# Patient Record
Sex: MALE | Race: WHITE | Employment: OTHER | URBAN - NONMETROPOLITAN AREA
[De-identification: names, ages, dates, MRNs, and addresses within clinical notes are randomized per-mention and may not be internally consistent; named-entity substitution may affect disease eponyms.]

---

## 2018-10-05 ENCOUNTER — OFFICE VISIT (OUTPATIENT)
Dept: SURGERY | Age: 63
End: 2018-10-05
Payer: COMMERCIAL

## 2018-10-05 VITALS
WEIGHT: 213 LBS | DIASTOLIC BLOOD PRESSURE: 84 MMHG | HEIGHT: 75 IN | TEMPERATURE: 98.3 F | SYSTOLIC BLOOD PRESSURE: 139 MMHG | BODY MASS INDEX: 26.49 KG/M2 | OXYGEN SATURATION: 98 % | HEART RATE: 100 BPM

## 2018-10-05 DIAGNOSIS — K43.9 VENTRAL HERNIA WITHOUT OBSTRUCTION OR GANGRENE: Primary | ICD-10-CM

## 2018-10-05 DIAGNOSIS — Z01.818 PRE-OP TESTING: ICD-10-CM

## 2018-10-05 PROCEDURE — 99203 OFFICE O/P NEW LOW 30 MIN: CPT | Performed by: SURGERY

## 2018-10-05 RX ORDER — CETIRIZINE HYDROCHLORIDE 10 MG/1
10 TABLET ORAL DAILY PRN
COMMUNITY

## 2018-10-08 ENCOUNTER — HOSPITAL ENCOUNTER (OUTPATIENT)
Age: 63
Discharge: HOME | End: 2018-10-08
Payer: COMMERCIAL

## 2018-10-08 ENCOUNTER — HOSPITAL ENCOUNTER (OUTPATIENT)
Age: 63
Discharge: HOME | End: 2018-10-10
Payer: COMMERCIAL

## 2018-10-08 ENCOUNTER — TELEPHONE (OUTPATIENT)
Dept: SURGERY | Age: 63
End: 2018-10-08

## 2018-10-08 ENCOUNTER — HOSPITAL ENCOUNTER (OUTPATIENT)
Dept: GENERAL RADIOLOGY | Age: 63
Discharge: HOME | End: 2018-10-10
Payer: COMMERCIAL

## 2018-10-08 DIAGNOSIS — Z01.818 PRE-OP TESTING: ICD-10-CM

## 2018-10-08 LAB
ANION GAP SERPL CALCULATED.3IONS-SCNC: 13 MMOL/L (ref 7–16)
BUN BLDV-MCNC: 12 MG/DL (ref 8–23)
CALCIUM SERPL-MCNC: 8.5 MG/DL (ref 8.6–10.2)
CHLORIDE BLD-SCNC: 99 MMOL/L (ref 98–107)
CO2: 28 MMOL/L (ref 22–29)
CREAT SERPL-MCNC: 1.2 MG/DL (ref 0.7–1.2)
EKG ATRIAL RATE: 86 BPM
EKG P AXIS: 61 DEGREES
EKG P-R INTERVAL: 156 MS
EKG Q-T INTERVAL: 402 MS
EKG QRS DURATION: 86 MS
EKG QTC CALCULATION (BAZETT): 481 MS
EKG R AXIS: -4 DEGREES
EKG T AXIS: 47 DEGREES
EKG VENTRICULAR RATE: 86 BPM
GFR AFRICAN AMERICAN: >60
GFR NON-AFRICAN AMERICAN: >60 ML/MIN/1.73
GLUCOSE BLD-MCNC: 127 MG/DL (ref 74–109)
HCT VFR BLD CALC: 42.2 % (ref 37–54)
HEMOGLOBIN: 15 G/DL (ref 12.5–16.5)
MCH RBC QN AUTO: 36.2 PG (ref 26–35)
MCHC RBC AUTO-ENTMCNC: 35.5 % (ref 32–34.5)
MCV RBC AUTO: 101.9 FL (ref 80–99.9)
PDW BLD-RTO: 13.9 FL (ref 11.5–15)
PLATELET # BLD: 318 E9/L (ref 130–450)
PMV BLD AUTO: 8.8 FL (ref 7–12)
POTASSIUM SERPL-SCNC: 3.3 MMOL/L (ref 3.5–5)
RBC # BLD: 4.14 E12/L (ref 3.8–5.8)
SODIUM BLD-SCNC: 140 MMOL/L (ref 132–146)
WBC # BLD: 7.4 E9/L (ref 4.5–11.5)

## 2018-10-08 PROCEDURE — 93010 ELECTROCARDIOGRAM REPORT: CPT | Performed by: INTERNAL MEDICINE

## 2018-10-08 PROCEDURE — 71045 X-RAY EXAM CHEST 1 VIEW: CPT

## 2018-10-08 PROCEDURE — 93005 ELECTROCARDIOGRAM TRACING: CPT | Performed by: SURGERY

## 2018-10-08 PROCEDURE — 85027 COMPLETE CBC AUTOMATED: CPT

## 2018-10-08 PROCEDURE — 80048 BASIC METABOLIC PNL TOTAL CA: CPT

## 2018-10-08 PROCEDURE — 36415 COLL VENOUS BLD VENIPUNCTURE: CPT

## 2018-10-08 NOTE — TELEPHONE ENCOUNTER
Called and left message for patient to call me back. Called and spoke to Hollis from [facility] to see if CPT code 45342 requires and authorization, per reference number [email or web address], Anh Mortensen is required. Patient is scheduled for [email or web address]. All documents are scanned into chart.  Electronically signed by Debo Sol MA on 10/8/18 at 2:27 PM

## 2018-10-12 ENCOUNTER — ANESTHESIA EVENT (OUTPATIENT)
Dept: OPERATING ROOM | Age: 63
End: 2018-10-12
Payer: COMMERCIAL

## 2018-10-15 ENCOUNTER — HOSPITAL ENCOUNTER (EMERGENCY)
Age: 63
Discharge: HOME | End: 2018-10-16
Attending: EMERGENCY MEDICINE
Payer: COMMERCIAL

## 2018-10-15 ENCOUNTER — ANESTHESIA (OUTPATIENT)
Dept: OPERATING ROOM | Age: 63
End: 2018-10-15
Payer: COMMERCIAL

## 2018-10-15 ENCOUNTER — HOSPITAL ENCOUNTER (OUTPATIENT)
Age: 63
Setting detail: OUTPATIENT SURGERY
Discharge: HOME | End: 2018-10-15
Attending: SURGERY | Admitting: SURGERY
Payer: COMMERCIAL

## 2018-10-15 VITALS
DIASTOLIC BLOOD PRESSURE: 75 MMHG | WEIGHT: 217 LBS | RESPIRATION RATE: 18 BRPM | SYSTOLIC BLOOD PRESSURE: 115 MMHG | BODY MASS INDEX: 27.85 KG/M2 | OXYGEN SATURATION: 95 % | TEMPERATURE: 97.3 F | HEIGHT: 74 IN | HEART RATE: 76 BPM

## 2018-10-15 VITALS
SYSTOLIC BLOOD PRESSURE: 158 MMHG | HEIGHT: 74 IN | BODY MASS INDEX: 27.85 KG/M2 | HEART RATE: 108 BPM | TEMPERATURE: 97.8 F | OXYGEN SATURATION: 97 % | DIASTOLIC BLOOD PRESSURE: 63 MMHG | RESPIRATION RATE: 16 BRPM | WEIGHT: 217 LBS

## 2018-10-15 VITALS
RESPIRATION RATE: 6 BRPM | TEMPERATURE: 96.8 F | OXYGEN SATURATION: 99 % | SYSTOLIC BLOOD PRESSURE: 135 MMHG | DIASTOLIC BLOOD PRESSURE: 81 MMHG

## 2018-10-15 DIAGNOSIS — G89.18 POST-OP PAIN: Primary | ICD-10-CM

## 2018-10-15 DIAGNOSIS — Z71.1 WORRIED WELL: Primary | ICD-10-CM

## 2018-10-15 PROCEDURE — 99283 EMERGENCY DEPT VISIT LOW MDM: CPT

## 2018-10-15 PROCEDURE — 2580000003 HC RX 258

## 2018-10-15 PROCEDURE — 3600000013 HC SURGERY LEVEL 3 ADDTL 15MIN: Performed by: SURGERY

## 2018-10-15 PROCEDURE — 3700000001 HC ADD 15 MINUTES (ANESTHESIA): Performed by: SURGERY

## 2018-10-15 PROCEDURE — 3700000000 HC ANESTHESIA ATTENDED CARE: Performed by: SURGERY

## 2018-10-15 PROCEDURE — 2709999900 HC NON-CHARGEABLE SUPPLY: Performed by: SURGERY

## 2018-10-15 PROCEDURE — 7100000000 HC PACU RECOVERY - FIRST 15 MIN: Performed by: SURGERY

## 2018-10-15 PROCEDURE — 6360000002 HC RX W HCPCS

## 2018-10-15 PROCEDURE — C1781 MESH (IMPLANTABLE): HCPCS | Performed by: SURGERY

## 2018-10-15 PROCEDURE — 7100000001 HC PACU RECOVERY - ADDTL 15 MIN: Performed by: SURGERY

## 2018-10-15 PROCEDURE — 7100000011 HC PHASE II RECOVERY - ADDTL 15 MIN: Performed by: SURGERY

## 2018-10-15 PROCEDURE — 7100000010 HC PHASE II RECOVERY - FIRST 15 MIN: Performed by: SURGERY

## 2018-10-15 PROCEDURE — 49568 PR IMPLANT MESH HERNIA REPAIR/DEBRIDEMENT CLOSURE: CPT | Performed by: SURGERY

## 2018-10-15 PROCEDURE — 2500000003 HC RX 250 WO HCPCS

## 2018-10-15 PROCEDURE — 6360000002 HC RX W HCPCS: Performed by: ANESTHESIOLOGY

## 2018-10-15 PROCEDURE — 49561 PR REPAIR INCISIONAL HERNIA,STRANG: CPT | Performed by: SURGERY

## 2018-10-15 PROCEDURE — 2500000003 HC RX 250 WO HCPCS: Performed by: SURGERY

## 2018-10-15 PROCEDURE — 3600000003 HC SURGERY LEVEL 3 BASE: Performed by: SURGERY

## 2018-10-15 PROCEDURE — 6370000000 HC RX 637 (ALT 250 FOR IP): Performed by: ANESTHESIOLOGY

## 2018-10-15 DEVICE — IMPLANTABLE DEVICE: Type: IMPLANTABLE DEVICE | Site: ABDOMEN | Status: FUNCTIONAL

## 2018-10-15 RX ORDER — PROMETHAZINE HYDROCHLORIDE 25 MG/ML
6.25 INJECTION, SOLUTION INTRAMUSCULAR; INTRAVENOUS PRN
Status: DISCONTINUED | OUTPATIENT
Start: 2018-10-15 | End: 2018-10-15 | Stop reason: HOSPADM

## 2018-10-15 RX ORDER — PROPOFOL 10 MG/ML
INJECTION, EMULSION INTRAVENOUS PRN
Status: DISCONTINUED | OUTPATIENT
Start: 2018-10-15 | End: 2018-10-15 | Stop reason: SDUPTHER

## 2018-10-15 RX ORDER — SODIUM CHLORIDE 9 MG/ML
INJECTION, SOLUTION INTRAVENOUS CONTINUOUS PRN
Status: DISCONTINUED | OUTPATIENT
Start: 2018-10-15 | End: 2018-10-15 | Stop reason: SDUPTHER

## 2018-10-15 RX ORDER — LIDOCAINE HYDROCHLORIDE 20 MG/ML
INJECTION, SOLUTION EPIDURAL; INFILTRATION; INTRACAUDAL; PERINEURAL PRN
Status: DISCONTINUED | OUTPATIENT
Start: 2018-10-15 | End: 2018-10-15 | Stop reason: SDUPTHER

## 2018-10-15 RX ORDER — ROCURONIUM BROMIDE 10 MG/ML
INJECTION, SOLUTION INTRAVENOUS PRN
Status: DISCONTINUED | OUTPATIENT
Start: 2018-10-15 | End: 2018-10-15 | Stop reason: SDUPTHER

## 2018-10-15 RX ORDER — OXYCODONE HYDROCHLORIDE AND ACETAMINOPHEN 5; 325 MG/1; MG/1
1 TABLET ORAL EVERY 6 HOURS PRN
Qty: 28 TABLET | Refills: 0 | Status: SHIPPED | OUTPATIENT
Start: 2018-10-15 | End: 2018-10-15

## 2018-10-15 RX ORDER — CLINDAMYCIN PHOSPHATE 900 MG/50ML
900 INJECTION INTRAVENOUS
Status: COMPLETED | OUTPATIENT
Start: 2018-10-15 | End: 2018-10-15

## 2018-10-15 RX ORDER — LABETALOL HYDROCHLORIDE 5 MG/ML
INJECTION, SOLUTION INTRAVENOUS PRN
Status: DISCONTINUED | OUTPATIENT
Start: 2018-10-15 | End: 2018-10-15 | Stop reason: SDUPTHER

## 2018-10-15 RX ORDER — MIDAZOLAM HYDROCHLORIDE 1 MG/ML
INJECTION INTRAMUSCULAR; INTRAVENOUS PRN
Status: DISCONTINUED | OUTPATIENT
Start: 2018-10-15 | End: 2018-10-15 | Stop reason: SDUPTHER

## 2018-10-15 RX ORDER — NEOSTIGMINE METHYLSULFATE 1 MG/ML
INJECTION, SOLUTION INTRAVENOUS PRN
Status: DISCONTINUED | OUTPATIENT
Start: 2018-10-15 | End: 2018-10-15 | Stop reason: SDUPTHER

## 2018-10-15 RX ORDER — DIPHENHYDRAMINE HYDROCHLORIDE 50 MG/ML
12.5 INJECTION INTRAMUSCULAR; INTRAVENOUS
Status: DISCONTINUED | OUTPATIENT
Start: 2018-10-15 | End: 2018-10-15 | Stop reason: HOSPADM

## 2018-10-15 RX ORDER — OXYCODONE HYDROCHLORIDE AND ACETAMINOPHEN 5; 325 MG/1; MG/1
1 TABLET ORAL EVERY 6 HOURS PRN
Qty: 28 TABLET | Refills: 0 | Status: SHIPPED | OUTPATIENT
Start: 2018-10-15 | End: 2018-10-22

## 2018-10-15 RX ORDER — ONDANSETRON 2 MG/ML
INJECTION INTRAMUSCULAR; INTRAVENOUS PRN
Status: DISCONTINUED | OUTPATIENT
Start: 2018-10-15 | End: 2018-10-15 | Stop reason: SDUPTHER

## 2018-10-15 RX ORDER — GLYCOPYRROLATE 0.2 MG/ML
INJECTION INTRAMUSCULAR; INTRAVENOUS PRN
Status: DISCONTINUED | OUTPATIENT
Start: 2018-10-15 | End: 2018-10-15 | Stop reason: SDUPTHER

## 2018-10-15 RX ORDER — BUPIVACAINE HYDROCHLORIDE AND EPINEPHRINE 2.5; 5 MG/ML; UG/ML
INJECTION, SOLUTION EPIDURAL; INFILTRATION; INTRACAUDAL; PERINEURAL PRN
Status: DISCONTINUED | OUTPATIENT
Start: 2018-10-15 | End: 2018-10-15 | Stop reason: HOSPADM

## 2018-10-15 RX ORDER — MEPERIDINE HYDROCHLORIDE 25 MG/ML
12.5 INJECTION INTRAMUSCULAR; INTRAVENOUS; SUBCUTANEOUS EVERY 10 MIN PRN
Status: DISCONTINUED | OUTPATIENT
Start: 2018-10-15 | End: 2018-10-15 | Stop reason: HOSPADM

## 2018-10-15 RX ORDER — FENTANYL CITRATE 50 UG/ML
INJECTION, SOLUTION INTRAMUSCULAR; INTRAVENOUS PRN
Status: DISCONTINUED | OUTPATIENT
Start: 2018-10-15 | End: 2018-10-15 | Stop reason: SDUPTHER

## 2018-10-15 RX ORDER — OXYCODONE HYDROCHLORIDE AND ACETAMINOPHEN 5; 325 MG/1; MG/1
1 TABLET ORAL PRN
Status: COMPLETED | OUTPATIENT
Start: 2018-10-15 | End: 2018-10-15

## 2018-10-15 RX ORDER — SODIUM CHLORIDE 0.9 % (FLUSH) 0.9 %
10 SYRINGE (ML) INJECTION PRN
Status: DISCONTINUED | OUTPATIENT
Start: 2018-10-15 | End: 2018-10-15 | Stop reason: HOSPADM

## 2018-10-15 RX ORDER — SODIUM CHLORIDE 0.9 % (FLUSH) 0.9 %
10 SYRINGE (ML) INJECTION EVERY 12 HOURS SCHEDULED
Status: DISCONTINUED | OUTPATIENT
Start: 2018-10-15 | End: 2018-10-15 | Stop reason: HOSPADM

## 2018-10-15 RX ORDER — FENTANYL CITRATE 50 UG/ML
25 INJECTION, SOLUTION INTRAMUSCULAR; INTRAVENOUS EVERY 5 MIN PRN
Status: DISCONTINUED | OUTPATIENT
Start: 2018-10-15 | End: 2018-10-15 | Stop reason: HOSPADM

## 2018-10-15 RX ORDER — DEXAMETHASONE SODIUM PHOSPHATE 4 MG/ML
INJECTION, SOLUTION INTRA-ARTICULAR; INTRALESIONAL; INTRAMUSCULAR; INTRAVENOUS; SOFT TISSUE PRN
Status: DISCONTINUED | OUTPATIENT
Start: 2018-10-15 | End: 2018-10-15 | Stop reason: SDUPTHER

## 2018-10-15 RX ADMIN — FENTANYL CITRATE 25 MCG: 50 INJECTION INTRAMUSCULAR; INTRAVENOUS at 11:44

## 2018-10-15 RX ADMIN — MIDAZOLAM HYDROCHLORIDE 2 MG: 1 INJECTION, SOLUTION INTRAMUSCULAR; INTRAVENOUS at 10:08

## 2018-10-15 RX ADMIN — ROCURONIUM BROMIDE 50 MG: 10 SOLUTION INTRAVENOUS at 10:13

## 2018-10-15 RX ADMIN — LIDOCAINE HYDROCHLORIDE 100 MG: 20 INJECTION, SOLUTION EPIDURAL; INFILTRATION; INTRACAUDAL; PERINEURAL at 10:13

## 2018-10-15 RX ADMIN — PROPOFOL 200 MG: 10 INJECTION, EMULSION INTRAVENOUS at 10:13

## 2018-10-15 RX ADMIN — FENTANYL CITRATE 50 MCG: 50 INJECTION, SOLUTION INTRAMUSCULAR; INTRAVENOUS at 11:26

## 2018-10-15 RX ADMIN — OXYCODONE HYDROCHLORIDE AND ACETAMINOPHEN 1 TABLET: 5; 325 TABLET ORAL at 12:33

## 2018-10-15 RX ADMIN — SODIUM CHLORIDE: 9 INJECTION, SOLUTION INTRAVENOUS at 11:27

## 2018-10-15 RX ADMIN — GLYCOPYRROLATE 0.6 MG: 0.2 INJECTION, SOLUTION INTRAMUSCULAR; INTRAVENOUS at 11:09

## 2018-10-15 RX ADMIN — FENTANYL CITRATE 25 MCG: 50 INJECTION, SOLUTION INTRAMUSCULAR; INTRAVENOUS at 11:23

## 2018-10-15 RX ADMIN — SODIUM CHLORIDE: 9 INJECTION, SOLUTION INTRAVENOUS at 10:04

## 2018-10-15 RX ADMIN — DEXAMETHASONE SODIUM PHOSPHATE 10 MG: 4 INJECTION, SOLUTION INTRAMUSCULAR; INTRAVENOUS at 10:20

## 2018-10-15 RX ADMIN — Medication 3 MG: at 11:09

## 2018-10-15 RX ADMIN — CLINDAMYCIN IN 5 PERCENT DEXTROSE 900 MG: 18 INJECTION, SOLUTION INTRAVENOUS at 10:04

## 2018-10-15 RX ADMIN — MIDAZOLAM HYDROCHLORIDE 1 MG: 1 INJECTION, SOLUTION INTRAMUSCULAR; INTRAVENOUS at 10:04

## 2018-10-15 RX ADMIN — FENTANYL CITRATE 50 MCG: 50 INJECTION, SOLUTION INTRAMUSCULAR; INTRAVENOUS at 10:13

## 2018-10-15 RX ADMIN — LABETALOL HYDROCHLORIDE 10 MG: 5 INJECTION INTRAVENOUS at 11:16

## 2018-10-15 RX ADMIN — ONDANSETRON HYDROCHLORIDE 4 MG: 2 INJECTION, SOLUTION INTRAMUSCULAR; INTRAVENOUS at 10:45

## 2018-10-15 RX ADMIN — FENTANYL CITRATE 25 MCG: 50 INJECTION INTRAMUSCULAR; INTRAVENOUS at 11:52

## 2018-10-15 ASSESSMENT — PAIN DESCRIPTION - PAIN TYPE
TYPE: SURGICAL PAIN

## 2018-10-15 ASSESSMENT — PULMONARY FUNCTION TESTS
PIF_VALUE: 23
PIF_VALUE: 24
PIF_VALUE: 30
PIF_VALUE: 1
PIF_VALUE: 0
PIF_VALUE: 23
PIF_VALUE: 30
PIF_VALUE: 27
PIF_VALUE: 30
PIF_VALUE: 26
PIF_VALUE: 0
PIF_VALUE: 25
PIF_VALUE: 25
PIF_VALUE: 28
PIF_VALUE: 0
PIF_VALUE: 28
PIF_VALUE: 4
PIF_VALUE: 20
PIF_VALUE: 24
PIF_VALUE: 0
PIF_VALUE: 26
PIF_VALUE: 25
PIF_VALUE: 29
PIF_VALUE: 24
PIF_VALUE: 24
PIF_VALUE: 0
PIF_VALUE: 24
PIF_VALUE: 31
PIF_VALUE: 2
PIF_VALUE: 25
PIF_VALUE: 23
PIF_VALUE: 13
PIF_VALUE: 0
PIF_VALUE: 26
PIF_VALUE: 29
PIF_VALUE: 3
PIF_VALUE: 0
PIF_VALUE: 24
PIF_VALUE: 24
PIF_VALUE: 27
PIF_VALUE: 0
PIF_VALUE: 2
PIF_VALUE: 23
PIF_VALUE: 24
PIF_VALUE: 24
PIF_VALUE: 28
PIF_VALUE: 26
PIF_VALUE: 18
PIF_VALUE: 24
PIF_VALUE: 24
PIF_VALUE: 23
PIF_VALUE: 30
PIF_VALUE: 23
PIF_VALUE: 24
PIF_VALUE: 24
PIF_VALUE: 28
PIF_VALUE: 28
PIF_VALUE: 24
PIF_VALUE: 1
PIF_VALUE: 1
PIF_VALUE: 23
PIF_VALUE: 5
PIF_VALUE: 26
PIF_VALUE: 3
PIF_VALUE: 23
PIF_VALUE: 23
PIF_VALUE: 19
PIF_VALUE: 27
PIF_VALUE: 26
PIF_VALUE: 23
PIF_VALUE: 1

## 2018-10-15 ASSESSMENT — PAIN SCALES - GENERAL
PAINLEVEL_OUTOF10: 4
PAINLEVEL_OUTOF10: 5
PAINLEVEL_OUTOF10: 7
PAINLEVEL_OUTOF10: 5
PAINLEVEL_OUTOF10: 5

## 2018-10-15 ASSESSMENT — PAIN DESCRIPTION - ONSET
ONSET: GRADUAL

## 2018-10-15 ASSESSMENT — PAIN DESCRIPTION - LOCATION
LOCATION: ABDOMEN

## 2018-10-15 ASSESSMENT — PAIN DESCRIPTION - DESCRIPTORS
DESCRIPTORS: BURNING;DISCOMFORT
DESCRIPTORS: DISCOMFORT
DESCRIPTORS: DISCOMFORT
DESCRIPTORS: BURNING;DISCOMFORT
DESCRIPTORS: DISCOMFORT
DESCRIPTORS: BURNING;DISCOMFORT

## 2018-10-15 ASSESSMENT — PAIN DESCRIPTION - FREQUENCY
FREQUENCY: CONTINUOUS

## 2018-10-15 ASSESSMENT — PAIN - FUNCTIONAL ASSESSMENT: PAIN_FUNCTIONAL_ASSESSMENT: 0-10

## 2018-10-15 ASSESSMENT — PAIN DESCRIPTION - ORIENTATION
ORIENTATION: ANTERIOR

## 2018-10-15 ASSESSMENT — LIFESTYLE VARIABLES: SMOKING_STATUS: 0

## 2018-10-15 ASSESSMENT — PAIN DESCRIPTION - PROGRESSION: CLINICAL_PROGRESSION: GRADUALLY IMPROVING

## 2018-10-15 NOTE — H&P
morning (before breakfast) 7/1/15     Eric Ortega MD            Allergies   Allergen Reactions    Pcn [Penicillins]           Social History   Social History            Social History    Marital status:        Spouse name: N/A    Number of children: N/A    Years of education: N/A             Social History Main Topics    Smoking status: Former Smoker       Packs/day: 1.00       Start date: 1/1/1985       Quit date: 1/1/2005    Smokeless tobacco: Never Used    Alcohol use Yes         Comment: occ    Drug use: No    Sexual activity: Not Asked           Other Topics Concern    None          Social History Narrative    None            Family History         Family History   Problem Relation Age of Onset    Cancer Mother      Cancer Father              Review of Systems   All other systems reviewed and are negative.               Objective:  Vitals       Vitals:     10/05/18 1040   BP: 139/84   Site: Right Upper Arm   Position: Sitting   Cuff Size: Medium Adult   Pulse: 100   Temp: 98.3 °F (36.8 °C)   TempSrc: Oral   SpO2: 98%   Weight: 213 lb (96.6 kg)   Height: 6' 3\" (1.905 m)            Physical Exam   Constitutional: He is oriented to person, place, and time and well-developed, well-nourished, and in no distress. HENT:   Head: Normocephalic and atraumatic. Eyes: Right eye exhibits no discharge. Left eye exhibits no discharge. Neck: No tracheal deviation present. Cardiovascular: Normal rate. Pulmonary/Chest: Effort normal. No respiratory distress. Abdominal: Soft. He exhibits no distension. There is no rebound and no guarding. There is a tender approximately 3 cm soft tissue mass in the midline several cm above the umbilicus. No hernia defect appreciated. Non reducible. Neurological: He is alert and oriented to person, place, and time. Skin: Skin is warm and dry.    Psychiatric: Affect normal.                               Audelia Lowe MD  10/5/2018     NOTE: This report, in part or full, may have been transcribed using voice recognition software. Every effort was made to ensure accuracy; however, inadvertent computerized transcription errors may be present.  Please excuse any transcriptional grammatical or spelling errors that may have escaped my editorial review.

## 2018-10-15 NOTE — OP NOTE
with cautery. The protruding fat was amputated and the remainder was returned to the abdomen. The peritoneum was entered, and closed with 3-0 vicryl suture. Preproperitoneal space was cleared off circumferentially. Above ePTFE coated mesh was placed in the preperitoneal space and  Secured with 2-0 prolene. The fascia was closed over the mesh with 2-0 PDS in interrupted, vest over pants fashion. The abdomen was reinsufflated and the abdominal wall inspected. There was no bleeding noted. No mesh was exposed. The abdomen was desufflated. The midline dermis was closed with  3-0 vicryl. Skin was closed at all sites with 4-0 monocryl and skin glue. Counts were correct at the end of the case. Tolerated procedure well    I was present and scrubbed for the procedure.     Sana Lopez MD  10/15/18  11:20 AM

## 2018-10-15 NOTE — PROGRESS NOTES
1218 admitted to pacu stage 2  2 lap sites to abdomen well approx with surgical glue intact  , family member at bedside   [id number] [id number] medicated for discomfort

## 2018-10-16 ASSESSMENT — ENCOUNTER SYMPTOMS
EYE PAIN: 0
BACK PAIN: 0
WHEEZING: 0
EYE DISCHARGE: 0
VOMITING: 0
ABDOMINAL PAIN: 0
SORE THROAT: 0
SINUS PRESSURE: 0
NAUSEA: 0
EYE REDNESS: 0
COUGH: 0
DIARRHEA: 0
SHORTNESS OF BREATH: 0

## 2018-10-30 ENCOUNTER — OFFICE VISIT (OUTPATIENT)
Dept: SURGERY | Age: 63
End: 2018-10-30

## 2018-10-30 VITALS
TEMPERATURE: 98.3 F | DIASTOLIC BLOOD PRESSURE: 89 MMHG | HEART RATE: 120 BPM | WEIGHT: 215 LBS | HEIGHT: 74 IN | SYSTOLIC BLOOD PRESSURE: 134 MMHG | BODY MASS INDEX: 27.59 KG/M2

## 2018-10-30 DIAGNOSIS — Z98.890 S/P REPAIR OF VENTRAL HERNIA: Primary | ICD-10-CM

## 2018-10-30 DIAGNOSIS — Z87.19 S/P REPAIR OF VENTRAL HERNIA: Primary | ICD-10-CM

## 2018-10-30 PROCEDURE — 99024 POSTOP FOLLOW-UP VISIT: CPT | Performed by: SURGERY

## 2018-10-30 NOTE — PROGRESS NOTES
General Surgery Clinic Note      Assessment/Plan:     Diagnosis Orders   1. S/P repair of ventral hernia      doing well. no issues. continue restrictions       Return in about 4 weeks (around 11/27/2018). Chief Complaint   Patient presents with    Post-Op Check     Hernia repair       HPI: Asiya Obregon is a 61 y.o. male here for postop follow-up. He is doing well following his ventral hernia repair. He's had no issues. Has not any pain. His preoperative discomfort has resolved. His bowels are moving normally. He has not noticed any recurrent bulge or issues. Review of Systems          Objective:  Vitals:    10/30/18 1523   BP: 134/89   Pulse: 120   Temp: 98.3 °F (36.8 °C)   TempSrc: Oral   Weight: 215 lb (97.5 kg)   Height: 6' 2\" (1.88 m)          Physical Exam   Constitutional: No distress. Pulmonary/Chest: Effort normal. No respiratory distress. Abdominal: Soft. He exhibits no distension. There is no tenderness. There is no rebound and no guarding. Incisions clean, dry and intact. There is no evidence of hernia recurrence. Skin: He is not diaphoretic. Ana Carmona MD  10/30/2018    NOTE: This report, in part or full, may have been transcribed using voice recognition software. Every effort was made to ensure accuracy; however, inadvertent computerized transcription errors may be present. Please excuseany transcriptional grammatical or spelling errors that may have escaped my editorial review.

## 2018-11-27 ENCOUNTER — OFFICE VISIT (OUTPATIENT)
Dept: SURGERY | Age: 63
End: 2018-11-27

## 2018-11-27 VITALS
DIASTOLIC BLOOD PRESSURE: 91 MMHG | HEART RATE: 96 BPM | OXYGEN SATURATION: 98 % | TEMPERATURE: 98.6 F | BODY MASS INDEX: 27.59 KG/M2 | WEIGHT: 215 LBS | HEIGHT: 74 IN | SYSTOLIC BLOOD PRESSURE: 147 MMHG

## 2018-11-27 DIAGNOSIS — Z87.19 S/P REPAIR OF VENTRAL HERNIA: Primary | ICD-10-CM

## 2018-11-27 DIAGNOSIS — Z98.890 S/P REPAIR OF VENTRAL HERNIA: Primary | ICD-10-CM

## 2018-11-27 PROCEDURE — 99024 POSTOP FOLLOW-UP VISIT: CPT | Performed by: SURGERY

## 2018-11-30 NOTE — PROGRESS NOTES
General Surgery Clinic Note      Assessment/Plan:     Diagnosis Orders   1. S/P repair of ventral hernia      doing well. no issues. Return if symptoms worsen or fail to improve. Chief Complaint   Patient presents with    Post-Op Check     post-op check from hernia repair       HPI: Asiya Obregon is a 61 y.o. male here for follow-up of ventral hernia repair. He is doing well. Has no pain present. No issues. Denies any bulging. He has been adhering to his restrictions. Review of Systems          Objective:  Vitals:    11/27/18 1325   BP: (!) 147/91   Site: Right Upper Arm   Position: Sitting   Cuff Size: Medium Adult   Pulse: 96   Temp: 98.6 °F (37 °C)   TempSrc: Oral   SpO2: 98%   Weight: 215 lb (97.5 kg)   Height: 6' 2\" (1.88 m)          Physical Exam   Constitutional: No distress. Pulmonary/Chest: Effort normal. No respiratory distress. Abdominal: Soft. He exhibits no distension. There is no tenderness. There is no rebound and no guarding. Incisions clean, dry and intact. There is no evidence of hernia recurrence. Skin: He is not diaphoretic. Ana Carmona MD  11/30/2018    NOTE: This report, in part or full, may have been transcribed using voice recognition software. Every effort was made to ensure accuracy; however, inadvertent computerized transcription errors may be present. Please excuseany transcriptional grammatical or spelling errors that may have escaped my editorial review.

## 2019-03-01 NOTE — ED PROVIDER NOTES
ATTENDING PROVIDER ATTESTATION:     I have personally performed and/or participated in the history, exam, medical decision making, and procedures and agree with all pertinent clinical information unless otherwise noted. I have also reviewed and agree with the past medical, family and social history unless otherwise noted. My findings/plan:Workup is negative, pt is feeling better, pt to dc home and f/u with pcp, given strict return precautions for any new or worsening symptoms    Impression:   1.  Worried well            Rachel Medel MD  03/01/19 2287
(36.6 °C) (Oral)   Resp 16   Ht 6' 2\" (1.88 m)   Wt 217 lb (98.4 kg)   SpO2 97%   BMI 27.86 kg/m²   Oxygen Saturation Interpretation: Normal      ------------------------------------------ PROGRESS NOTES ------------------------------------------  Patient seen and examined. There is no tenderness in the suprapubic region and does not appear to feel full. Patient was bladder scanned and there was no evidence of urine the patient's bladder at this point it is felt safe for discharge, follow up with his primary care physician and surgeon as recommended. I have spoken with the patient and discussed todays results, in addition to providing specific details for the plan of care and counseling regarding the diagnosis and prognosis. Their questions are answered at this time and they are agreeable with the plan. I discussed at length with them reasons for immediate return here for re evaluation. They will followup with their primary care physician by calling their office tomorrow. --------------------------------- ADDITIONAL PROVIDER NOTES ---------------------------------  At this time the patient is without objective evidence of an acute process requiring hospitalization or inpatient management. They have remained hemodynamically stable throughout their entire ED visit and are stable for discharge with outpatient follow-up. The plan has been discussed in detail and they are aware of the specific conditions for emergent return, as well as the importance of follow-up. New Prescriptions    No medications on file       Diagnosis:  1. Worried well        Disposition:  Patient's disposition: Discharge to home  Patient's condition is stable.          Nica Harris DO  Resident  10/16/18 0001

## 2021-01-01 ENCOUNTER — IMMUNIZATION (OUTPATIENT)
Dept: PRIMARY CARE CLINIC | Age: 66
End: 2021-01-01
Payer: MEDICARE

## 2021-01-01 PROCEDURE — 91300 COVID-19, PFIZER VACCINE 30MCG/0.3ML DOSE: CPT | Performed by: PHYSICIAN ASSISTANT

## 2021-01-01 PROCEDURE — 0002A COVID-19, PFIZER VACCINE 30MCG/0.3ML DOSE: CPT | Performed by: PHYSICIAN ASSISTANT

## 2021-02-27 ENCOUNTER — IMMUNIZATION (OUTPATIENT)
Dept: PRIMARY CARE CLINIC | Age: 66
End: 2021-02-27
Payer: MEDICARE

## 2021-02-27 PROCEDURE — 0001A COVID-19, PFIZER VACCINE 30MCG/0.3ML DOSE: CPT | Performed by: EMERGENCY MEDICINE

## 2021-02-27 PROCEDURE — 91300 COVID-19, PFIZER VACCINE 30MCG/0.3ML DOSE: CPT | Performed by: EMERGENCY MEDICINE

## 2022-01-01 ENCOUNTER — APPOINTMENT (OUTPATIENT)
Dept: GENERAL RADIOLOGY | Age: 67
DRG: 207 | End: 2022-01-01
Payer: MEDICARE

## 2022-01-01 ENCOUNTER — APPOINTMENT (OUTPATIENT)
Dept: MRI IMAGING | Age: 67
DRG: 207 | End: 2022-01-01
Payer: MEDICARE

## 2022-01-01 ENCOUNTER — ANESTHESIA EVENT (OUTPATIENT)
Dept: ENDOSCOPY | Age: 67
DRG: 207 | End: 2022-01-01
Payer: MEDICARE

## 2022-01-01 ENCOUNTER — HOSPITAL ENCOUNTER (INPATIENT)
Age: 67
LOS: 17 days | DRG: 207 | End: 2022-03-19
Attending: EMERGENCY MEDICINE | Admitting: GENERAL PRACTICE
Payer: MEDICARE

## 2022-01-01 ENCOUNTER — ANESTHESIA EVENT (OUTPATIENT)
Dept: ENDOSCOPY | Age: 67
DRG: 684 | End: 2022-01-01
Payer: MEDICARE

## 2022-01-01 ENCOUNTER — APPOINTMENT (OUTPATIENT)
Dept: CT IMAGING | Age: 67
DRG: 684 | End: 2022-01-01
Payer: MEDICARE

## 2022-01-01 ENCOUNTER — APPOINTMENT (OUTPATIENT)
Dept: CT IMAGING | Age: 67
DRG: 207 | End: 2022-01-01
Payer: MEDICARE

## 2022-01-01 ENCOUNTER — ANESTHESIA (OUTPATIENT)
Dept: ENDOSCOPY | Age: 67
DRG: 684 | End: 2022-01-01
Payer: MEDICARE

## 2022-01-01 ENCOUNTER — APPOINTMENT (OUTPATIENT)
Dept: ULTRASOUND IMAGING | Age: 67
DRG: 207 | End: 2022-01-01
Payer: MEDICARE

## 2022-01-01 ENCOUNTER — HOSPITAL ENCOUNTER (INPATIENT)
Age: 67
LOS: 5 days | Discharge: NURSING, REHAB OR LONG-TERM CARE | DRG: 684 | End: 2022-02-23
Attending: EMERGENCY MEDICINE | Admitting: GENERAL PRACTICE
Payer: MEDICARE

## 2022-01-01 ENCOUNTER — ANESTHESIA (OUTPATIENT)
Dept: ENDOSCOPY | Age: 67
DRG: 207 | End: 2022-01-01
Payer: MEDICARE

## 2022-01-01 VITALS
TEMPERATURE: 97.7 F | OXYGEN SATURATION: 87 % | HEART RATE: 90 BPM | RESPIRATION RATE: 22 BRPM | WEIGHT: 183.8 LBS | HEIGHT: 74 IN | BODY MASS INDEX: 23.59 KG/M2 | DIASTOLIC BLOOD PRESSURE: 88 MMHG | SYSTOLIC BLOOD PRESSURE: 206 MMHG

## 2022-01-01 VITALS — DIASTOLIC BLOOD PRESSURE: 59 MMHG | OXYGEN SATURATION: 94 % | SYSTOLIC BLOOD PRESSURE: 137 MMHG

## 2022-01-01 VITALS
OXYGEN SATURATION: 99 % | DIASTOLIC BLOOD PRESSURE: 70 MMHG | RESPIRATION RATE: 19 BRPM | SYSTOLIC BLOOD PRESSURE: 104 MMHG

## 2022-01-01 VITALS
HEART RATE: 86 BPM | HEIGHT: 74 IN | DIASTOLIC BLOOD PRESSURE: 80 MMHG | RESPIRATION RATE: 18 BRPM | WEIGHT: 186.8 LBS | BODY MASS INDEX: 23.97 KG/M2 | SYSTOLIC BLOOD PRESSURE: 130 MMHG | OXYGEN SATURATION: 100 % | TEMPERATURE: 98.2 F

## 2022-01-01 DIAGNOSIS — R11.0 NAUSEA: ICD-10-CM

## 2022-01-01 DIAGNOSIS — N17.9 ACUTE KIDNEY INJURY (HCC): ICD-10-CM

## 2022-01-01 DIAGNOSIS — R63.8 ALTERATION IN APPETITE: ICD-10-CM

## 2022-01-01 DIAGNOSIS — R41.82 ALTERED MENTAL STATUS, UNSPECIFIED ALTERED MENTAL STATUS TYPE: Primary | ICD-10-CM

## 2022-01-01 DIAGNOSIS — R10.84 GENERALIZED ABDOMINAL PAIN: Primary | ICD-10-CM

## 2022-01-01 LAB
ABO/RH: NORMAL
ABSOLUTE CD 3: 1022 CELLS/UL (ref 660–2200)
ABSOLUTE CD 4 HELPER: 927 CELLS/UL (ref 490–1600)
ABSOLUTE CD 8 (SUPP): 95 CELLS/UL (ref 150–1050)
ACANTHOCYTES: ABNORMAL
ACETAMINOPHEN LEVEL: <5 MCG/ML (ref 10–30)
AFP-TUMOR MARKER: 2 NG/ML (ref 0–9)
ALBUMIN SERPL-MCNC: 2.3 G/DL (ref 3.5–5.2)
ALBUMIN SERPL-MCNC: 2.4 G/DL (ref 3.5–5.2)
ALBUMIN SERPL-MCNC: 2.5 G/DL (ref 3.5–5.2)
ALBUMIN SERPL-MCNC: 2.6 G/DL (ref 3.5–5.2)
ALBUMIN SERPL-MCNC: 2.8 G/DL (ref 3.5–4.7)
ALBUMIN SERPL-MCNC: 2.8 G/DL (ref 3.5–5.2)
ALBUMIN SERPL-MCNC: 2.8 G/DL (ref 3.5–5.2)
ALBUMIN SERPL-MCNC: 3.1 G/DL (ref 3.5–5.2)
ALBUMIN SERPL-MCNC: 3.2 G/DL (ref 3.5–5.2)
ALBUMIN SERPL-MCNC: 3.3 G/DL (ref 3.5–5.2)
ALBUMIN SERPL-MCNC: 3.3 G/DL (ref 3.5–5.2)
ALBUMIN SERPL-MCNC: 3.5 G/DL (ref 3.5–5.2)
ALBUMIN SERPL-MCNC: 3.5 G/DL (ref 3.5–5.2)
ALBUMIN SERPL-MCNC: 3.7 G/DL (ref 3.5–5.2)
ALBUMIN SERPL-MCNC: 3.8 G/DL (ref 3.5–5.2)
ALP BLD-CCNC: 102 U/L (ref 40–129)
ALP BLD-CCNC: 102 U/L (ref 40–129)
ALP BLD-CCNC: 105 U/L (ref 40–129)
ALP BLD-CCNC: 108 U/L (ref 40–129)
ALP BLD-CCNC: 114 U/L (ref 40–129)
ALP BLD-CCNC: 122 U/L (ref 40–129)
ALP BLD-CCNC: 124 U/L (ref 40–129)
ALP BLD-CCNC: 126 U/L (ref 40–129)
ALP BLD-CCNC: 142 U/L (ref 40–129)
ALP BLD-CCNC: 79 U/L (ref 40–129)
ALP BLD-CCNC: 85 U/L (ref 40–129)
ALP BLD-CCNC: 89 U/L (ref 40–129)
ALP BLD-CCNC: 91 U/L (ref 40–129)
ALP BLD-CCNC: 95 U/L (ref 40–129)
ALP BLD-CCNC: 96 U/L (ref 40–129)
ALP BLD-CCNC: 97 U/L (ref 40–129)
ALP BLD-CCNC: 98 U/L (ref 40–129)
ALPHA-1-GLOBULIN: 0.2 G/DL (ref 0.2–0.4)
ALPHA-2-GLOBULIN: 0.6 G/DL (ref 0.5–1)
ALT SERPL-CCNC: 15 U/L (ref 0–40)
ALT SERPL-CCNC: 15 U/L (ref 0–40)
ALT SERPL-CCNC: 18 U/L (ref 0–40)
ALT SERPL-CCNC: 20 U/L (ref 0–40)
ALT SERPL-CCNC: 21 U/L (ref 0–40)
ALT SERPL-CCNC: 22 U/L (ref 0–40)
ALT SERPL-CCNC: 23 U/L (ref 0–40)
ALT SERPL-CCNC: 24 U/L (ref 0–40)
ALT SERPL-CCNC: 26 U/L (ref 0–40)
ALT SERPL-CCNC: 30 U/L (ref 0–40)
ALT SERPL-CCNC: 31 U/L (ref 0–40)
AMMONIA: 16.4 UMOL/L (ref 16–60)
AMMONIA: 18.9 UMOL/L (ref 16–60)
AMMONIA: 20.3 UMOL/L (ref 16–60)
AMMONIA: 25.5 UMOL/L (ref 16–60)
AMPHETAMINE SCREEN, URINE: NOT DETECTED
ANAEROBIC CULTURE: NORMAL
ANION GAP SERPL CALCULATED.3IONS-SCNC: 10 MMOL/L (ref 7–16)
ANION GAP SERPL CALCULATED.3IONS-SCNC: 11 MMOL/L (ref 7–16)
ANION GAP SERPL CALCULATED.3IONS-SCNC: 11 MMOL/L (ref 7–16)
ANION GAP SERPL CALCULATED.3IONS-SCNC: 12 MMOL/L (ref 7–16)
ANION GAP SERPL CALCULATED.3IONS-SCNC: 12 MMOL/L (ref 7–16)
ANION GAP SERPL CALCULATED.3IONS-SCNC: 13 MMOL/L (ref 7–16)
ANION GAP SERPL CALCULATED.3IONS-SCNC: 14 MMOL/L (ref 7–16)
ANION GAP SERPL CALCULATED.3IONS-SCNC: 15 MMOL/L (ref 7–16)
ANION GAP SERPL CALCULATED.3IONS-SCNC: 16 MMOL/L (ref 7–16)
ANION GAP SERPL CALCULATED.3IONS-SCNC: 17 MMOL/L (ref 7–16)
ANION GAP SERPL CALCULATED.3IONS-SCNC: 17 MMOL/L (ref 7–16)
ANION GAP SERPL CALCULATED.3IONS-SCNC: 18 MMOL/L (ref 7–16)
ANION GAP SERPL CALCULATED.3IONS-SCNC: 19 MMOL/L (ref 7–16)
ANION GAP SERPL CALCULATED.3IONS-SCNC: 21 MMOL/L (ref 7–16)
ANION GAP SERPL CALCULATED.3IONS-SCNC: 22 MMOL/L (ref 7–16)
ANION GAP SERPL CALCULATED.3IONS-SCNC: 22 MMOL/L (ref 7–16)
ANION GAP SERPL CALCULATED.3IONS-SCNC: 26 MMOL/L (ref 7–16)
ANION GAP SERPL CALCULATED.3IONS-SCNC: 28 MMOL/L (ref 7–16)
ANION GAP SERPL CALCULATED.3IONS-SCNC: 8 MMOL/L (ref 7–16)
ANION GAP SERPL CALCULATED.3IONS-SCNC: 8 MMOL/L (ref 7–16)
ANION GAP SERPL CALCULATED.3IONS-SCNC: 9 MMOL/L (ref 7–16)
ANION GAP SERPL CALCULATED.3IONS-SCNC: 9 MMOL/L (ref 7–16)
ANISOCYTOSIS: ABNORMAL
ANTI-MITOCHONDRIAL AB, IFA: NEGATIVE
ANTI-NUCLEAR ANTIBODY (ANA): NEGATIVE
ANTIBODY SCREEN: NORMAL
APPEARANCE CSF: CLEAR
APPEARANCE CSF: CLEAR
APTT: 24.3 SEC (ref 24.5–35.1)
APTT: 26.6 SEC (ref 24.5–35.1)
AST SERPL-CCNC: 28 U/L (ref 0–39)
AST SERPL-CCNC: 29 U/L (ref 0–39)
AST SERPL-CCNC: 30 U/L (ref 0–39)
AST SERPL-CCNC: 32 U/L (ref 0–39)
AST SERPL-CCNC: 33 U/L (ref 0–39)
AST SERPL-CCNC: 33 U/L (ref 0–39)
AST SERPL-CCNC: 34 U/L (ref 0–39)
AST SERPL-CCNC: 36 U/L (ref 0–39)
AST SERPL-CCNC: 41 U/L (ref 0–39)
AST SERPL-CCNC: 42 U/L (ref 0–39)
AST SERPL-CCNC: 43 U/L (ref 0–39)
AST SERPL-CCNC: 44 U/L (ref 0–39)
AST SERPL-CCNC: 49 U/L (ref 0–39)
ATYPICAL LYMPHOCYTE RELATIVE PERCENT: 0.8 % (ref 0–4)
ATYPICAL LYMPHOCYTE RELATIVE PERCENT: 0.9 % (ref 0–4)
B.E.: -6.1 MMOL/L (ref -3–3)
B.E.: -7 MMOL/L (ref -3–3)
B.E.: -8.2 MMOL/L (ref -3–3)
B.E.: 2 MMOL/L (ref -3–3)
B.E.: 2.1 MMOL/L (ref -3–3)
B.E.: 3.7 MMOL/L (ref -3–3)
B.E.: 4.1 MMOL/L (ref -3–3)
B.E.: 4.7 MMOL/L (ref -3–3)
B.E.: 5 MMOL/L (ref -3–3)
B.E.: 5.9 MMOL/L (ref -3–3)
B.E.: 8.7 MMOL/L (ref -3–3)
BACTERIA: ABNORMAL /HPF
BACTERIA: ABNORMAL /HPF
BARBITURATE SCREEN URINE: NOT DETECTED
BASOPHILIC STIPPLING: ABNORMAL
BASOPHILS ABSOLUTE: 0 E9/L (ref 0–0.2)
BASOPHILS ABSOLUTE: 0.01 E9/L (ref 0–0.2)
BASOPHILS ABSOLUTE: 0.02 E9/L (ref 0–0.2)
BASOPHILS ABSOLUTE: 0.02 E9/L (ref 0–0.2)
BASOPHILS ABSOLUTE: 0.03 E9/L (ref 0–0.2)
BASOPHILS ABSOLUTE: 0.04 E9/L (ref 0–0.2)
BASOPHILS ABSOLUTE: 0.12 E9/L (ref 0–0.2)
BASOPHILS ABSOLUTE: 0.12 E9/L (ref 0–0.2)
BASOPHILS ABSOLUTE: 0.13 E9/L (ref 0–0.2)
BASOPHILS RELATIVE PERCENT: 0 % (ref 0–2)
BASOPHILS RELATIVE PERCENT: 0.1 % (ref 0–2)
BASOPHILS RELATIVE PERCENT: 0.1 % (ref 0–2)
BASOPHILS RELATIVE PERCENT: 0.2 % (ref 0–2)
BASOPHILS RELATIVE PERCENT: 0.3 % (ref 0–2)
BASOPHILS RELATIVE PERCENT: 0.4 % (ref 0–2)
BASOPHILS RELATIVE PERCENT: 0.9 % (ref 0–2)
BENZODIAZEPINE SCREEN, URINE: NOT DETECTED
BETA GLOBULIN: 0.8 G/DL (ref 0.8–1.3)
BILIRUB SERPL-MCNC: 0.5 MG/DL (ref 0–1.2)
BILIRUB SERPL-MCNC: 0.6 MG/DL (ref 0–1.2)
BILIRUB SERPL-MCNC: 0.9 MG/DL (ref 0–1.2)
BILIRUB SERPL-MCNC: 0.9 MG/DL (ref 0–1.2)
BILIRUB SERPL-MCNC: 1 MG/DL (ref 0–1.2)
BILIRUB SERPL-MCNC: 1.1 MG/DL (ref 0–1.2)
BILIRUB SERPL-MCNC: 1.1 MG/DL (ref 0–1.2)
BILIRUB SERPL-MCNC: 1.2 MG/DL (ref 0–1.2)
BILIRUB SERPL-MCNC: 1.3 MG/DL (ref 0–1.2)
BILIRUB SERPL-MCNC: 1.4 MG/DL (ref 0–1.2)
BILIRUB SERPL-MCNC: 1.5 MG/DL (ref 0–1.2)
BILIRUBIN URINE: ABNORMAL
BILIRUBIN URINE: NEGATIVE
BLOOD BANK DISPENSE STATUS: NORMAL
BLOOD BANK DISPENSE STATUS: NORMAL
BLOOD BANK PRODUCT CODE: NORMAL
BLOOD BANK PRODUCT CODE: NORMAL
BLOOD CULTURE, ROUTINE: NORMAL
BLOOD, URINE: NEGATIVE
BLOOD, URINE: NEGATIVE
BPU ID: NORMAL
BPU ID: NORMAL
BUN BLDV-MCNC: 12 MG/DL (ref 6–23)
BUN BLDV-MCNC: 14 MG/DL (ref 6–23)
BUN BLDV-MCNC: 15 MG/DL (ref 6–23)
BUN BLDV-MCNC: 15 MG/DL (ref 6–23)
BUN BLDV-MCNC: 17 MG/DL (ref 6–23)
BUN BLDV-MCNC: 17 MG/DL (ref 6–23)
BUN BLDV-MCNC: 18 MG/DL (ref 6–23)
BUN BLDV-MCNC: 18 MG/DL (ref 6–23)
BUN BLDV-MCNC: 19 MG/DL (ref 6–23)
BUN BLDV-MCNC: 20 MG/DL (ref 6–23)
BUN BLDV-MCNC: 22 MG/DL (ref 6–23)
BUN BLDV-MCNC: 24 MG/DL (ref 6–23)
BUN BLDV-MCNC: 25 MG/DL (ref 6–23)
BUN BLDV-MCNC: 25 MG/DL (ref 6–23)
BUN BLDV-MCNC: 26 MG/DL (ref 6–23)
BUN BLDV-MCNC: 26 MG/DL (ref 6–23)
BUN BLDV-MCNC: 27 MG/DL (ref 6–23)
BUN BLDV-MCNC: 27 MG/DL (ref 6–23)
BUN BLDV-MCNC: 28 MG/DL (ref 6–23)
BUN BLDV-MCNC: 29 MG/DL (ref 6–23)
BUN BLDV-MCNC: 30 MG/DL (ref 6–23)
BUN BLDV-MCNC: 33 MG/DL (ref 6–23)
BUN BLDV-MCNC: 34 MG/DL (ref 6–23)
BUN BLDV-MCNC: 35 MG/DL (ref 6–23)
BUN BLDV-MCNC: 37 MG/DL (ref 6–23)
BUN BLDV-MCNC: 38 MG/DL (ref 6–23)
BUN BLDV-MCNC: 45 MG/DL (ref 6–23)
BUN BLDV-MCNC: 45 MG/DL (ref 6–23)
BUN BLDV-MCNC: 46 MG/DL (ref 6–23)
C-REACTIVE PROTEIN: 15.1 MG/DL (ref 0–0.4)
C-REACTIVE PROTEIN: 29.8 MG/DL (ref 0–0.4)
CALCIUM SERPL-MCNC: 6.5 MG/DL (ref 8.6–10.2)
CALCIUM SERPL-MCNC: 6.6 MG/DL (ref 8.6–10.2)
CALCIUM SERPL-MCNC: 6.6 MG/DL (ref 8.6–10.2)
CALCIUM SERPL-MCNC: 7 MG/DL (ref 8.6–10.2)
CALCIUM SERPL-MCNC: 7.2 MG/DL (ref 8.6–10.2)
CALCIUM SERPL-MCNC: 7.2 MG/DL (ref 8.6–10.2)
CALCIUM SERPL-MCNC: 7.5 MG/DL (ref 8.6–10.2)
CALCIUM SERPL-MCNC: 7.6 MG/DL (ref 8.6–10.2)
CALCIUM SERPL-MCNC: 7.7 MG/DL (ref 8.6–10.2)
CALCIUM SERPL-MCNC: 7.8 MG/DL (ref 8.6–10.2)
CALCIUM SERPL-MCNC: 7.8 MG/DL (ref 8.6–10.2)
CALCIUM SERPL-MCNC: 8.4 MG/DL (ref 8.6–10.2)
CALCIUM SERPL-MCNC: 8.5 MG/DL (ref 8.6–10.2)
CALCIUM SERPL-MCNC: 8.6 MG/DL (ref 8.6–10.2)
CALCIUM SERPL-MCNC: 8.7 MG/DL (ref 8.6–10.2)
CALCIUM SERPL-MCNC: 8.8 MG/DL (ref 8.6–10.2)
CALCIUM SERPL-MCNC: 8.9 MG/DL (ref 8.6–10.2)
CALCIUM SERPL-MCNC: 8.9 MG/DL (ref 8.6–10.2)
CALCIUM SERPL-MCNC: 9.1 MG/DL (ref 8.6–10.2)
CALCIUM SERPL-MCNC: 9.2 MG/DL (ref 8.6–10.2)
CALCIUM SERPL-MCNC: 9.3 MG/DL (ref 8.6–10.2)
CALCIUM SERPL-MCNC: 9.3 MG/DL (ref 8.6–10.2)
CALCIUM SERPL-MCNC: 9.5 MG/DL (ref 8.6–10.2)
CALCIUM SERPL-MCNC: 9.5 MG/DL (ref 8.6–10.2)
CANNABINOID SCREEN URINE: POSITIVE
CD4/CD8 RATIO: 9.76 RATIO (ref 0.8–6.17)
CHLORIDE BLD-SCNC: 101 MMOL/L (ref 98–107)
CHLORIDE BLD-SCNC: 102 MMOL/L (ref 98–107)
CHLORIDE BLD-SCNC: 102 MMOL/L (ref 98–107)
CHLORIDE BLD-SCNC: 103 MMOL/L (ref 98–107)
CHLORIDE BLD-SCNC: 103 MMOL/L (ref 98–107)
CHLORIDE BLD-SCNC: 104 MMOL/L (ref 98–107)
CHLORIDE BLD-SCNC: 105 MMOL/L (ref 98–107)
CHLORIDE BLD-SCNC: 105 MMOL/L (ref 98–107)
CHLORIDE BLD-SCNC: 106 MMOL/L (ref 98–107)
CHLORIDE BLD-SCNC: 107 MMOL/L (ref 98–107)
CHLORIDE BLD-SCNC: 108 MMOL/L (ref 98–107)
CHLORIDE BLD-SCNC: 109 MMOL/L (ref 98–107)
CHLORIDE BLD-SCNC: 110 MMOL/L (ref 98–107)
CHLORIDE BLD-SCNC: 112 MMOL/L (ref 98–107)
CHLORIDE BLD-SCNC: 95 MMOL/L (ref 98–107)
CHLORIDE BLD-SCNC: 95 MMOL/L (ref 98–107)
CHLORIDE BLD-SCNC: 96 MMOL/L (ref 98–107)
CHLORIDE BLD-SCNC: 97 MMOL/L (ref 98–107)
CHLORIDE BLD-SCNC: 98 MMOL/L (ref 98–107)
CHLORIDE BLD-SCNC: 99 MMOL/L (ref 98–107)
CHLORIDE URINE RANDOM: <20 MMOL/L
CLARITY: CLEAR
CLARITY: CLEAR
CO2: 13 MMOL/L (ref 22–29)
CO2: 14 MMOL/L (ref 22–29)
CO2: 14 MMOL/L (ref 22–29)
CO2: 15 MMOL/L (ref 22–29)
CO2: 16 MMOL/L (ref 22–29)
CO2: 16 MMOL/L (ref 22–29)
CO2: 17 MMOL/L (ref 22–29)
CO2: 17 MMOL/L (ref 22–29)
CO2: 18 MMOL/L (ref 22–29)
CO2: 19 MMOL/L (ref 22–29)
CO2: 19 MMOL/L (ref 22–29)
CO2: 20 MMOL/L (ref 22–29)
CO2: 20 MMOL/L (ref 22–29)
CO2: 22 MMOL/L (ref 22–29)
CO2: 22 MMOL/L (ref 22–29)
CO2: 23 MMOL/L (ref 22–29)
CO2: 23 MMOL/L (ref 22–29)
CO2: 25 MMOL/L (ref 22–29)
CO2: 26 MMOL/L (ref 22–29)
CO2: 26 MMOL/L (ref 22–29)
CO2: 28 MMOL/L (ref 22–29)
CO2: 29 MMOL/L (ref 22–29)
CO2: 31 MMOL/L (ref 22–29)
CO2: 32 MMOL/L (ref 22–29)
CO2: 32 MMOL/L (ref 22–29)
CO2: 35 MMOL/L (ref 22–29)
CO2: 37 MMOL/L (ref 22–29)
CO2: 38 MMOL/L (ref 22–29)
CO2: 9 MMOL/L (ref 22–29)
COCAINE METABOLITE SCREEN URINE: NOT DETECTED
COHB: 0.1 % (ref 0–1.5)
COHB: 0.1 % (ref 0–1.5)
COHB: 0.2 % (ref 0–1.5)
COHB: 0.2 % (ref 0–1.5)
COHB: 0.3 % (ref 0–1.5)
COHB: 0.4 % (ref 0–1.5)
COHB: 0.5 % (ref 0–1.5)
COHB: 0.5 % (ref 0–1.5)
COHB: 0.6 % (ref 0–1.5)
COHB: 0.8 % (ref 0–1.5)
COHB: 0.9 % (ref 0–1.5)
COLOR CSF: COLORLESS
COLOR CSF: COLORLESS
COLOR: ABNORMAL
COLOR: YELLOW
CREAT SERPL-MCNC: 1.1 MG/DL (ref 0.7–1.2)
CREAT SERPL-MCNC: 1.2 MG/DL (ref 0.7–1.2)
CREAT SERPL-MCNC: 1.3 MG/DL (ref 0.7–1.2)
CREAT SERPL-MCNC: 1.4 MG/DL (ref 0.7–1.2)
CREAT SERPL-MCNC: 1.5 MG/DL (ref 0.7–1.2)
CREAT SERPL-MCNC: 1.6 MG/DL (ref 0.7–1.2)
CREAT SERPL-MCNC: 1.7 MG/DL (ref 0.7–1.2)
CREAT SERPL-MCNC: 1.8 MG/DL (ref 0.7–1.2)
CREAT SERPL-MCNC: 1.8 MG/DL (ref 0.7–1.2)
CREAT SERPL-MCNC: 2.2 MG/DL (ref 0.7–1.2)
CREATININE URINE: 134 MG/DL (ref 40–278)
CRITICAL: ABNORMAL
CRYPTOCOCCAL ANTIGEN CSF: NEGATIVE
CRYPTOCOCCUS NEOFORMANS/GATTII CSF BY PCR: NOT DETECTED
CSF CULTURE: NORMAL
CULTURE, BLOOD 2: NORMAL
CULTURE, RESPIRATORY: NORMAL
CYTOMEGALOVIRUS CSF BY PCR: NOT DETECTED
DATE ANALYZED: ABNORMAL
DATE OF COLLECTION: ABNORMAL
DESCRIPTION BLOOD BANK: NORMAL
DESCRIPTION BLOOD BANK: NORMAL
EKG ATRIAL RATE: 101 BPM
EKG P AXIS: 102 DEGREES
EKG P-R INTERVAL: 160 MS
EKG Q-T INTERVAL: 310 MS
EKG QRS DURATION: 82 MS
EKG QTC CALCULATION (BAZETT): 401 MS
EKG R AXIS: -23 DEGREES
EKG T AXIS: -176 DEGREES
EKG VENTRICULAR RATE: 101 BPM
ELECTROPHORESIS: ABNORMAL
ENTEROVIRUS CSF BY PCR: NOT DETECTED
EOSINOPHILS ABSOLUTE: 0 E9/L (ref 0.05–0.5)
EOSINOPHILS ABSOLUTE: 0.01 E9/L (ref 0.05–0.5)
EOSINOPHILS ABSOLUTE: 0.01 E9/L (ref 0.05–0.5)
EOSINOPHILS ABSOLUTE: 0.02 E9/L (ref 0.05–0.5)
EOSINOPHILS ABSOLUTE: 0.03 E9/L (ref 0.05–0.5)
EOSINOPHILS ABSOLUTE: 0.07 E9/L (ref 0.05–0.5)
EOSINOPHILS ABSOLUTE: 0.08 E9/L (ref 0.05–0.5)
EOSINOPHILS ABSOLUTE: 0.08 E9/L (ref 0.05–0.5)
EOSINOPHILS ABSOLUTE: 0.13 E9/L (ref 0.05–0.5)
EOSINOPHILS ABSOLUTE: 0.23 E9/L (ref 0.05–0.5)
EOSINOPHILS ABSOLUTE: 0.24 E9/L (ref 0.05–0.5)
EOSINOPHILS ABSOLUTE: 0.68 E9/L (ref 0.05–0.5)
EOSINOPHILS RELATIVE PERCENT: 0 % (ref 0–6)
EOSINOPHILS RELATIVE PERCENT: 0.1 % (ref 0–6)
EOSINOPHILS RELATIVE PERCENT: 0.3 % (ref 0–6)
EOSINOPHILS RELATIVE PERCENT: 0.6 % (ref 0–6)
EOSINOPHILS RELATIVE PERCENT: 0.8 % (ref 0–6)
EOSINOPHILS RELATIVE PERCENT: 0.9 % (ref 0–6)
EOSINOPHILS RELATIVE PERCENT: 0.9 % (ref 0–6)
EOSINOPHILS RELATIVE PERCENT: 1.7 % (ref 0–6)
EOSINOPHILS RELATIVE PERCENT: 1.7 % (ref 0–6)
EOSINOPHILS RELATIVE PERCENT: 4.3 % (ref 0–6)
ESCHERICHIA COLI K1 CSF BY PCR: NOT DETECTED
ETHANOL: <10 MG/DL (ref 0–0.08)
FENTANYL SCREEN, URINE: NOT DETECTED
FIO2: 30 %
FIO2: 40 %
FOLATE: 2.7 NG/ML (ref 4.8–24.2)
FOLATE: 3.7 NG/ML (ref 4.8–24.2)
GAMMA GLOBULIN: 0.7 G/DL (ref 0.7–1.6)
GFR AFRICAN AMERICAN: 36
GFR AFRICAN AMERICAN: 46
GFR AFRICAN AMERICAN: 46
GFR AFRICAN AMERICAN: 49
GFR AFRICAN AMERICAN: 52
GFR AFRICAN AMERICAN: 57
GFR AFRICAN AMERICAN: >60
GFR NON-AFRICAN AMERICAN: 30 ML/MIN/1.73
GFR NON-AFRICAN AMERICAN: 38 ML/MIN/1.73
GFR NON-AFRICAN AMERICAN: 38 ML/MIN/1.73
GFR NON-AFRICAN AMERICAN: 40 ML/MIN/1.73
GFR NON-AFRICAN AMERICAN: 43 ML/MIN/1.73
GFR NON-AFRICAN AMERICAN: 47 ML/MIN/1.73
GFR NON-AFRICAN AMERICAN: 51 ML/MIN/1.73
GFR NON-AFRICAN AMERICAN: 55 ML/MIN/1.73
GFR NON-AFRICAN AMERICAN: >60 ML/MIN/1.73
GLUCOSE BLD-MCNC: 106 MG/DL (ref 74–99)
GLUCOSE BLD-MCNC: 109 MG/DL (ref 74–99)
GLUCOSE BLD-MCNC: 113 MG/DL (ref 74–99)
GLUCOSE BLD-MCNC: 113 MG/DL (ref 74–99)
GLUCOSE BLD-MCNC: 116 MG/DL (ref 74–99)
GLUCOSE BLD-MCNC: 124 MG/DL (ref 74–99)
GLUCOSE BLD-MCNC: 125 MG/DL (ref 74–99)
GLUCOSE BLD-MCNC: 134 MG/DL (ref 74–99)
GLUCOSE BLD-MCNC: 136 MG/DL (ref 74–99)
GLUCOSE BLD-MCNC: 139 MG/DL (ref 74–99)
GLUCOSE BLD-MCNC: 140 MG/DL (ref 74–99)
GLUCOSE BLD-MCNC: 140 MG/DL (ref 74–99)
GLUCOSE BLD-MCNC: 143 MG/DL (ref 74–99)
GLUCOSE BLD-MCNC: 145 MG/DL (ref 74–99)
GLUCOSE BLD-MCNC: 150 MG/DL (ref 74–99)
GLUCOSE BLD-MCNC: 152 MG/DL (ref 74–99)
GLUCOSE BLD-MCNC: 162 MG/DL (ref 74–99)
GLUCOSE BLD-MCNC: 163 MG/DL (ref 74–99)
GLUCOSE BLD-MCNC: 166 MG/DL (ref 74–99)
GLUCOSE BLD-MCNC: 184 MG/DL (ref 74–99)
GLUCOSE BLD-MCNC: 214 MG/DL (ref 74–99)
GLUCOSE BLD-MCNC: 221 MG/DL (ref 74–99)
GLUCOSE BLD-MCNC: 65 MG/DL (ref 74–99)
GLUCOSE BLD-MCNC: 71 MG/DL (ref 74–99)
GLUCOSE BLD-MCNC: 76 MG/DL (ref 74–99)
GLUCOSE BLD-MCNC: 77 MG/DL (ref 74–99)
GLUCOSE BLD-MCNC: 77 MG/DL (ref 74–99)
GLUCOSE BLD-MCNC: 78 MG/DL (ref 74–99)
GLUCOSE BLD-MCNC: 82 MG/DL (ref 74–99)
GLUCOSE BLD-MCNC: 83 MG/DL (ref 74–99)
GLUCOSE BLD-MCNC: 86 MG/DL (ref 74–99)
GLUCOSE BLD-MCNC: 87 MG/DL (ref 74–99)
GLUCOSE BLD-MCNC: 87 MG/DL (ref 74–99)
GLUCOSE BLD-MCNC: 88 MG/DL (ref 74–99)
GLUCOSE BLD-MCNC: 96 MG/DL (ref 74–99)
GLUCOSE URINE: NEGATIVE MG/DL
GLUCOSE URINE: NEGATIVE MG/DL
GLUCOSE, CSF: 105 MG/DL (ref 40–70)
GRAM STAIN ORDERABLE: NORMAL
GRAM STAIN RESULT: NORMAL
HAEMOPHILUS INFLUENZAE CSF BY PCR: NOT DETECTED
HAPTOGLOBIN: 10 MG/DL (ref 30–200)
HAV IGM SER IA-ACNC: NORMAL
HCO3: 15.1 MMOL/L (ref 22–26)
HCO3: 16.5 MMOL/L (ref 22–26)
HCO3: 16.6 MMOL/L (ref 22–26)
HCO3: 24.8 MMOL/L (ref 22–26)
HCO3: 24.9 MMOL/L (ref 22–26)
HCO3: 26.1 MMOL/L (ref 22–26)
HCO3: 26.9 MMOL/L (ref 22–26)
HCO3: 27.2 MMOL/L (ref 22–26)
HCO3: 28.3 MMOL/L (ref 22–26)
HCO3: 29.2 MMOL/L (ref 22–26)
HCO3: 32.2 MMOL/L (ref 22–26)
HCT VFR BLD CALC: 19.2 % (ref 37–54)
HCT VFR BLD CALC: 20.8 % (ref 37–54)
HCT VFR BLD CALC: 21.7 % (ref 37–54)
HCT VFR BLD CALC: 21.8 % (ref 37–54)
HCT VFR BLD CALC: 21.9 % (ref 37–54)
HCT VFR BLD CALC: 21.9 % (ref 37–54)
HCT VFR BLD CALC: 22.7 % (ref 37–54)
HCT VFR BLD CALC: 22.9 % (ref 37–54)
HCT VFR BLD CALC: 23 % (ref 37–54)
HCT VFR BLD CALC: 23.7 % (ref 37–54)
HCT VFR BLD CALC: 23.8 % (ref 37–54)
HCT VFR BLD CALC: 23.9 % (ref 37–54)
HCT VFR BLD CALC: 24.6 % (ref 37–54)
HCT VFR BLD CALC: 24.8 % (ref 37–54)
HCT VFR BLD CALC: 24.9 % (ref 37–54)
HCT VFR BLD CALC: 26.2 % (ref 37–54)
HCT VFR BLD CALC: 28.8 % (ref 37–54)
HCT VFR BLD CALC: 29.4 % (ref 37–54)
HCT VFR BLD CALC: 31.4 % (ref 37–54)
HCT VFR BLD CALC: 31.5 % (ref 37–54)
HCT VFR BLD CALC: 32.8 % (ref 37–54)
HCT VFR BLD CALC: 33.9 % (ref 37–54)
HCT VFR BLD CALC: 34.5 % (ref 37–54)
HCT VFR BLD CALC: 36.2 % (ref 37–54)
HCT VFR BLD CALC: 36.3 % (ref 37–54)
HEMOGLOBIN: 10.4 G/DL (ref 12.5–16.5)
HEMOGLOBIN: 11.2 G/DL (ref 12.5–16.5)
HEMOGLOBIN: 11.3 G/DL (ref 12.5–16.5)
HEMOGLOBIN: 11.6 G/DL (ref 12.5–16.5)
HEMOGLOBIN: 12.5 G/DL (ref 12.5–16.5)
HEMOGLOBIN: 12.8 G/DL (ref 12.5–16.5)
HEMOGLOBIN: 6.3 G/DL (ref 12.5–16.5)
HEMOGLOBIN: 7 G/DL (ref 12.5–16.5)
HEMOGLOBIN: 7 G/DL (ref 12.5–16.5)
HEMOGLOBIN: 7.2 G/DL (ref 12.5–16.5)
HEMOGLOBIN: 7.4 G/DL (ref 12.5–16.5)
HEMOGLOBIN: 7.4 G/DL (ref 12.5–16.5)
HEMOGLOBIN: 7.5 G/DL (ref 12.5–16.5)
HEMOGLOBIN: 7.7 G/DL (ref 12.5–16.5)
HEMOGLOBIN: 8 G/DL (ref 12.5–16.5)
HEMOGLOBIN: 8 G/DL (ref 12.5–16.5)
HEMOGLOBIN: 8.1 G/DL (ref 12.5–16.5)
HEMOGLOBIN: 8.3 G/DL (ref 12.5–16.5)
HEMOGLOBIN: 8.3 G/DL (ref 12.5–16.5)
HEMOGLOBIN: 8.6 G/DL (ref 12.5–16.5)
HEMOGLOBIN: 9.6 G/DL (ref 12.5–16.5)
HEMOGLOBIN: 9.7 G/DL (ref 12.5–16.5)
HEPATITIS B CORE IGM ANTIBODY: NORMAL
HEPATITIS B SURFACE ANTIGEN INTERPRETATION: NORMAL
HEPATITIS C ANTIBODY INTERPRETATION: NORMAL
HERPES SIMPLEX VIRUS 1 CSF BY PCR: NOT DETECTED
HERPES SIMPLEX VIRUS 2 CSF BY PCR: NOT DETECTED
HHB: 0.7 % (ref 0–5)
HHB: 0.7 % (ref 0–5)
HHB: 1.2 % (ref 0–5)
HHB: 1.3 % (ref 0–5)
HHB: 1.4 % (ref 0–5)
HHB: 1.5 % (ref 0–5)
HHB: 1.7 % (ref 0–5)
HHB: 2.7 % (ref 0–5)
HHB: 2.8 % (ref 0–5)
HHB: 3.2 % (ref 0–5)
HHB: 3.5 % (ref 0–5)
HIV-1 AND HIV-2 ANTIBODIES: NORMAL
HUMAN HERPESVIRUS 6 CSF BY PCR: NOT DETECTED
HUMAN PARECHOVIRUS CSF BY PCR: NOT DETECTED
HYPOCHROMIA: ABNORMAL
IGA: 259 MG/DL (ref 70–400)
IGG 1: 268 MG/DL (ref 240–1118)
IGG 2: 285 MG/DL (ref 124–549)
IGG 3: 70 MG/DL (ref 21–134)
IGG 4: 4 MG/DL (ref 1–123)
IGG: 723 MG/DL (ref 700–1600)
IGM: 41 MG/DL (ref 40–230)
IMMATURE GRANULOCYTES #: 0.13 E9/L
IMMATURE GRANULOCYTES #: 0.23 E9/L
IMMATURE GRANULOCYTES #: 0.35 E9/L
IMMATURE GRANULOCYTES #: 0.42 E9/L
IMMATURE GRANULOCYTES #: 0.61 E9/L
IMMATURE GRANULOCYTES %: 1.4 % (ref 0–5)
IMMATURE GRANULOCYTES %: 1.9 % (ref 0–5)
IMMATURE GRANULOCYTES %: 3.5 % (ref 0–5)
IMMATURE GRANULOCYTES %: 4.2 % (ref 0–5)
IMMATURE GRANULOCYTES %: 4.3 % (ref 0–5)
IMMATURE RETIC FRACT: 12 % (ref 2.3–13.4)
IMMUNOFIXATION RESULT, SERUM: NORMAL
INR BLD: 1.2
INR BLD: 1.2
IRON SATURATION: 31 % (ref 20–55)
IRON: 32 MCG/DL (ref 59–158)
KETONES, URINE: 15 MG/DL
KETONES, URINE: NEGATIVE MG/DL
L. PNEUMOPHILA SEROGP 1 UR AG: NORMAL
LAB: ABNORMAL
LACTATE DEHYDROGENASE: 285 U/L (ref 135–225)
LACTIC ACID, SEPSIS: 1.9 MMOL/L (ref 0.5–1.9)
LACTIC ACID: 1.1 MMOL/L (ref 0.5–2.2)
LACTIC ACID: 1.2 MMOL/L (ref 0.5–2.2)
LACTIC ACID: 1.4 MMOL/L (ref 0.5–2.2)
LACTIC ACID: 1.8 MMOL/L (ref 0.5–2.2)
LACTIC ACID: 2 MMOL/L (ref 0.5–2.2)
LEUKOCYTE ESTERASE, URINE: ABNORMAL
LEUKOCYTE ESTERASE, URINE: ABNORMAL
LIPASE: 41 U/L (ref 13–60)
LISTERIA MONOCYTOGENES CSF BY PCR: NOT DETECTED
LYMPH SUBSET INFORMATION: ABNORMAL
LYMPHOCYTES ABSOLUTE: 0.41 E9/L (ref 1.5–4)
LYMPHOCYTES ABSOLUTE: 0.47 E9/L (ref 1.5–4)
LYMPHOCYTES ABSOLUTE: 0.59 E9/L (ref 1.5–4)
LYMPHOCYTES ABSOLUTE: 0.79 E9/L (ref 1.5–4)
LYMPHOCYTES ABSOLUTE: 0.81 E9/L (ref 1.5–4)
LYMPHOCYTES ABSOLUTE: 0.92 E9/L (ref 1.5–4)
LYMPHOCYTES ABSOLUTE: 0.95 E9/L (ref 1.5–4)
LYMPHOCYTES ABSOLUTE: 1.01 E9/L (ref 1.5–4)
LYMPHOCYTES ABSOLUTE: 1.11 E9/L (ref 1.5–4)
LYMPHOCYTES ABSOLUTE: 1.14 E9/L (ref 1.5–4)
LYMPHOCYTES ABSOLUTE: 1.48 E9/L (ref 1.5–4)
LYMPHOCYTES ABSOLUTE: 2.28 E9/L (ref 1.5–4)
LYMPHOCYTES ABSOLUTE: 2.35 E9/L (ref 1.5–4)
LYMPHOCYTES RELATIVE PERCENT: 1.8 % (ref 20–42)
LYMPHOCYTES RELATIVE PERCENT: 10.4 % (ref 20–42)
LYMPHOCYTES RELATIVE PERCENT: 15.8 % (ref 20–42)
LYMPHOCYTES RELATIVE PERCENT: 16.1 % (ref 20–42)
LYMPHOCYTES RELATIVE PERCENT: 16.7 % (ref 20–42)
LYMPHOCYTES RELATIVE PERCENT: 2.6 % (ref 20–42)
LYMPHOCYTES RELATIVE PERCENT: 3.5 % (ref 20–42)
LYMPHOCYTES RELATIVE PERCENT: 3.5 % (ref 20–42)
LYMPHOCYTES RELATIVE PERCENT: 4.3 % (ref 20–42)
LYMPHOCYTES RELATIVE PERCENT: 5.6 % (ref 20–42)
LYMPHOCYTES RELATIVE PERCENT: 6.1 % (ref 20–42)
LYMPHOCYTES RELATIVE PERCENT: 7 % (ref 20–42)
LYMPHOCYTES RELATIVE PERCENT: 8.8 % (ref 20–42)
LYMPHOCYTES RELATIVE PERCENT: 9.3 % (ref 20–42)
LYMPHOCYTES RELATIVE PERCENT: 9.3 % (ref 20–42)
Lab: ABNORMAL
MAGNESIUM: 1.4 MG/DL (ref 1.6–2.6)
MAGNESIUM: 1.5 MG/DL (ref 1.6–2.6)
MAGNESIUM: 1.6 MG/DL (ref 1.6–2.6)
MAGNESIUM: 1.7 MG/DL (ref 1.6–2.6)
MAGNESIUM: 1.8 MG/DL (ref 1.6–2.6)
MAGNESIUM: 1.9 MG/DL (ref 1.6–2.6)
MAGNESIUM: 2 MG/DL (ref 1.6–2.6)
MAGNESIUM: 2.3 MG/DL (ref 1.6–2.6)
MAGNESIUM: 2.4 MG/DL (ref 1.6–2.6)
MCH RBC QN AUTO: 33 PG (ref 26–35)
MCH RBC QN AUTO: 34 PG (ref 26–35)
MCH RBC QN AUTO: 34 PG (ref 26–35)
MCH RBC QN AUTO: 34.1 PG (ref 26–35)
MCH RBC QN AUTO: 34.1 PG (ref 26–35)
MCH RBC QN AUTO: 34.2 PG (ref 26–35)
MCH RBC QN AUTO: 34.9 PG (ref 26–35)
MCH RBC QN AUTO: 36.3 PG (ref 26–35)
MCH RBC QN AUTO: 38 PG (ref 26–35)
MCH RBC QN AUTO: 38.7 PG (ref 26–35)
MCH RBC QN AUTO: 38.9 PG (ref 26–35)
MCH RBC QN AUTO: 39.1 PG (ref 26–35)
MCH RBC QN AUTO: 39.5 PG (ref 26–35)
MCH RBC QN AUTO: 39.6 PG (ref 26–35)
MCH RBC QN AUTO: 39.8 PG (ref 26–35)
MCH RBC QN AUTO: 40.3 PG (ref 26–35)
MCH RBC QN AUTO: 40.6 PG (ref 26–35)
MCH RBC QN AUTO: 40.7 PG (ref 26–35)
MCH RBC QN AUTO: 40.8 PG (ref 26–35)
MCH RBC QN AUTO: 40.8 PG (ref 26–35)
MCH RBC QN AUTO: 40.9 PG (ref 26–35)
MCH RBC QN AUTO: 41 PG (ref 26–35)
MCHC RBC AUTO-ENTMCNC: 31.1 % (ref 32–34.5)
MCHC RBC AUTO-ENTMCNC: 32 % (ref 32–34.5)
MCHC RBC AUTO-ENTMCNC: 32.2 % (ref 32–34.5)
MCHC RBC AUTO-ENTMCNC: 32.3 % (ref 32–34.5)
MCHC RBC AUTO-ENTMCNC: 32.8 % (ref 32–34.5)
MCHC RBC AUTO-ENTMCNC: 32.8 % (ref 32–34.5)
MCHC RBC AUTO-ENTMCNC: 33 % (ref 32–34.5)
MCHC RBC AUTO-ENTMCNC: 33.3 % (ref 32–34.5)
MCHC RBC AUTO-ENTMCNC: 33.3 % (ref 32–34.5)
MCHC RBC AUTO-ENTMCNC: 33.6 % (ref 32–34.5)
MCHC RBC AUTO-ENTMCNC: 33.7 % (ref 32–34.5)
MCHC RBC AUTO-ENTMCNC: 33.7 % (ref 32–34.5)
MCHC RBC AUTO-ENTMCNC: 33.9 % (ref 32–34.5)
MCHC RBC AUTO-ENTMCNC: 34.1 % (ref 32–34.5)
MCHC RBC AUTO-ENTMCNC: 34.2 % (ref 32–34.5)
MCHC RBC AUTO-ENTMCNC: 34.2 % (ref 32–34.5)
MCHC RBC AUTO-ENTMCNC: 34.4 % (ref 32–34.5)
MCHC RBC AUTO-ENTMCNC: 34.5 % (ref 32–34.5)
MCHC RBC AUTO-ENTMCNC: 34.8 % (ref 32–34.5)
MCHC RBC AUTO-ENTMCNC: 35.4 % (ref 32–34.5)
MCV RBC AUTO: 101.9 FL (ref 80–99.9)
MCV RBC AUTO: 102.5 FL (ref 80–99.9)
MCV RBC AUTO: 104 FL (ref 80–99.9)
MCV RBC AUTO: 105.5 FL (ref 80–99.9)
MCV RBC AUTO: 105.8 FL (ref 80–99.9)
MCV RBC AUTO: 106.3 FL (ref 80–99.9)
MCV RBC AUTO: 106.3 FL (ref 80–99.9)
MCV RBC AUTO: 107.8 FL (ref 80–99.9)
MCV RBC AUTO: 113.9 FL (ref 80–99.9)
MCV RBC AUTO: 114.1 FL (ref 80–99.9)
MCV RBC AUTO: 114.2 FL (ref 80–99.9)
MCV RBC AUTO: 115.7 FL (ref 80–99.9)
MCV RBC AUTO: 116 FL (ref 80–99.9)
MCV RBC AUTO: 117.1 FL (ref 80–99.9)
MCV RBC AUTO: 117.9 FL (ref 80–99.9)
MCV RBC AUTO: 117.9 FL (ref 80–99.9)
MCV RBC AUTO: 118.4 FL (ref 80–99.9)
MCV RBC AUTO: 118.5 FL (ref 80–99.9)
MCV RBC AUTO: 119.5 FL (ref 80–99.9)
MCV RBC AUTO: 121.1 FL (ref 80–99.9)
MCV RBC AUTO: 123.5 FL (ref 80–99.9)
MCV RBC AUTO: 123.7 FL (ref 80–99.9)
METAMYELOCYTES RELATIVE PERCENT: 0.9 % (ref 0–1)
METAMYELOCYTES RELATIVE PERCENT: 0.9 % (ref 0–1)
METAMYELOCYTES RELATIVE PERCENT: 1.7 % (ref 0–1)
METAMYELOCYTES RELATIVE PERCENT: 4.2 % (ref 0–1)
METER GLUCOSE: 108 MG/DL (ref 74–99)
METER GLUCOSE: 83 MG/DL (ref 74–99)
METHADONE SCREEN, URINE: NOT DETECTED
METHB: 0.3 % (ref 0–1.5)
MODE: ABNORMAL
MODE: AC
MONOCYTE, CSF: 31 % (ref 10–70)
MONOCYTE, CSF: 67 % (ref 10–70)
MONOCYTES ABSOLUTE: 0 E9/L (ref 0.1–0.95)
MONOCYTES ABSOLUTE: 0.14 E9/L (ref 0.1–0.95)
MONOCYTES ABSOLUTE: 0.27 E9/L (ref 0.1–0.95)
MONOCYTES ABSOLUTE: 0.32 E9/L (ref 0.1–0.95)
MONOCYTES ABSOLUTE: 0.39 E9/L (ref 0.1–0.95)
MONOCYTES ABSOLUTE: 0.41 E9/L (ref 0.1–0.95)
MONOCYTES ABSOLUTE: 0.44 E9/L (ref 0.1–0.95)
MONOCYTES ABSOLUTE: 0.5 E9/L (ref 0.1–0.95)
MONOCYTES ABSOLUTE: 0.62 E9/L (ref 0.1–0.95)
MONOCYTES ABSOLUTE: 0.62 E9/L (ref 0.1–0.95)
MONOCYTES ABSOLUTE: 0.63 E9/L (ref 0.1–0.95)
MONOCYTES ABSOLUTE: 0.73 E9/L (ref 0.1–0.95)
MONOCYTES ABSOLUTE: 0.8 E9/L (ref 0.1–0.95)
MONOCYTES ABSOLUTE: 1.12 E9/L (ref 0.1–0.95)
MONOCYTES ABSOLUTE: 1.14 E9/L (ref 0.1–0.95)
MONOCYTES RELATIVE PERCENT: 0 % (ref 2–12)
MONOCYTES RELATIVE PERCENT: 0.9 % (ref 2–12)
MONOCYTES RELATIVE PERCENT: 1.7 % (ref 2–12)
MONOCYTES RELATIVE PERCENT: 1.8 % (ref 2–12)
MONOCYTES RELATIVE PERCENT: 2.6 % (ref 2–12)
MONOCYTES RELATIVE PERCENT: 3.4 % (ref 2–12)
MONOCYTES RELATIVE PERCENT: 3.5 % (ref 2–12)
MONOCYTES RELATIVE PERCENT: 3.9 % (ref 2–12)
MONOCYTES RELATIVE PERCENT: 4.3 % (ref 2–12)
MONOCYTES RELATIVE PERCENT: 5.2 % (ref 2–12)
MONOCYTES RELATIVE PERCENT: 6.1 % (ref 2–12)
MONOCYTES RELATIVE PERCENT: 7.5 % (ref 2–12)
MONOCYTES RELATIVE PERCENT: 7.9 % (ref 2–12)
MONOCYTES RELATIVE PERCENT: 8.5 % (ref 2–12)
MONOCYTES RELATIVE PERCENT: 9.3 % (ref 2–12)
MYELOCYTE PERCENT: 0.9 % (ref 0–0)
MYELOCYTE PERCENT: 1.7 % (ref 0–0)
MYELOCYTE PERCENT: 2.6 % (ref 0–0)
MYELOCYTE PERCENT: 3.5 % (ref 0–0)
MYELOCYTE PERCENT: 4.4 % (ref 0–0)
MYELOCYTE PERCENT: 6.8 % (ref 0–0)
NEISSERIA MENINGITIDIS CSF BY PCR: NOT DETECTED
NEUTROPHILS ABSOLUTE: 10.59 E9/L (ref 1.8–7.3)
NEUTROPHILS ABSOLUTE: 11.32 E9/L (ref 1.8–7.3)
NEUTROPHILS ABSOLUTE: 11.64 E9/L (ref 1.8–7.3)
NEUTROPHILS ABSOLUTE: 12.59 E9/L (ref 1.8–7.3)
NEUTROPHILS ABSOLUTE: 13.97 E9/L (ref 1.8–7.3)
NEUTROPHILS ABSOLUTE: 14.06 E9/L (ref 1.8–7.3)
NEUTROPHILS ABSOLUTE: 14.54 E9/L (ref 1.8–7.3)
NEUTROPHILS ABSOLUTE: 19.78 E9/L (ref 1.8–7.3)
NEUTROPHILS ABSOLUTE: 6.92 E9/L (ref 1.8–7.3)
NEUTROPHILS ABSOLUTE: 7.48 E9/L (ref 1.8–7.3)
NEUTROPHILS ABSOLUTE: 7.52 E9/L (ref 1.8–7.3)
NEUTROPHILS ABSOLUTE: 8.21 E9/L (ref 1.8–7.3)
NEUTROPHILS ABSOLUTE: 9.27 E9/L (ref 1.8–7.3)
NEUTROPHILS ABSOLUTE: 9.37 E9/L (ref 1.8–7.3)
NEUTROPHILS ABSOLUTE: 9.44 E9/L (ref 1.8–7.3)
NEUTROPHILS RELATIVE PERCENT: 67.8 % (ref 43–80)
NEUTROPHILS RELATIVE PERCENT: 73.8 % (ref 43–80)
NEUTROPHILS RELATIVE PERCENT: 77.2 % (ref 43–80)
NEUTROPHILS RELATIVE PERCENT: 77.4 % (ref 43–80)
NEUTROPHILS RELATIVE PERCENT: 78.1 % (ref 43–80)
NEUTROPHILS RELATIVE PERCENT: 78.6 % (ref 43–80)
NEUTROPHILS RELATIVE PERCENT: 83 % (ref 43–80)
NEUTROPHILS RELATIVE PERCENT: 84.2 % (ref 43–80)
NEUTROPHILS RELATIVE PERCENT: 86.1 % (ref 43–80)
NEUTROPHILS RELATIVE PERCENT: 86.6 % (ref 43–80)
NEUTROPHILS RELATIVE PERCENT: 87 % (ref 43–80)
NEUTROPHILS RELATIVE PERCENT: 90.4 % (ref 43–80)
NEUTROPHILS RELATIVE PERCENT: 91.3 % (ref 43–80)
NEUTROPHILS RELATIVE PERCENT: 91.3 % (ref 43–80)
NEUTROPHILS RELATIVE PERCENT: 94.7 % (ref 43–80)
NEUTROPHILS, CSF: 33 % (ref 0–10)
NEUTROPHILS, CSF: 69 % (ref 0–10)
NITRITE, URINE: NEGATIVE
NITRITE, URINE: NEGATIVE
NUCLEATED RED BLOOD CELLS: 0 /100 WBC
NUCLEATED RED BLOOD CELLS: 0.8 /100 WBC
NUCLEATED RED BLOOD CELLS: 0.9 /100 WBC
O2 CONTENT: 10.5 ML/DL
O2 CONTENT: 11 ML/DL
O2 CONTENT: 11.6 ML/DL
O2 CONTENT: 11.6 ML/DL
O2 CONTENT: 11.9 ML/DL
O2 CONTENT: 12.2 ML/DL
O2 CONTENT: 12.2 ML/DL
O2 CONTENT: 12.9 ML/DL
O2 CONTENT: 14.2 ML/DL
O2 CONTENT: 15.8 ML/DL
O2 CONTENT: 9.3 ML/DL
O2 SATURATION: 96.5 % (ref 92–98.5)
O2 SATURATION: 96.8 % (ref 92–98.5)
O2 SATURATION: 97.2 % (ref 92–98.5)
O2 SATURATION: 97.3 % (ref 92–98.5)
O2 SATURATION: 98.3 % (ref 92–98.5)
O2 SATURATION: 98.5 % (ref 92–98.5)
O2 SATURATION: 98.6 % (ref 92–98.5)
O2 SATURATION: 98.7 % (ref 92–98.5)
O2 SATURATION: 98.8 % (ref 92–98.5)
O2 SATURATION: 99.3 % (ref 92–98.5)
O2 SATURATION: 99.3 % (ref 92–98.5)
O2HB: 95.3 % (ref 94–97)
O2HB: 96.2 % (ref 94–97)
O2HB: 96.4 % (ref 94–97)
O2HB: 96.5 % (ref 94–97)
O2HB: 97.5 % (ref 94–97)
O2HB: 97.6 % (ref 94–97)
O2HB: 97.8 % (ref 94–97)
O2HB: 98 % (ref 94–97)
O2HB: 98.4 % (ref 94–97)
O2HB: 98.8 % (ref 94–97)
O2HB: 98.9 % (ref 94–97)
OPERATOR ID: 101
OPERATOR ID: 2485
OPERATOR ID: 2485
OPERATOR ID: 2856
OPERATOR ID: 516
OPERATOR ID: 7296
OPERATOR ID: 7296
OPERATOR ID: ABNORMAL
OPIATE SCREEN URINE: NOT DETECTED
OSMOLALITY: 307 MOSM/KG (ref 285–310)
OVALOCYTES: ABNORMAL
OVALOCYTES: ABNORMAL
OXYCODONE URINE: NOT DETECTED
PATHOLOGIST REVIEW: NORMAL
PATIENT TEMP: 27 C
PATIENT TEMP: 37 C
PCO2: 24.5 MMHG (ref 35–45)
PCO2: 24.6 MMHG (ref 35–45)
PCO2: 26.6 MMHG (ref 35–45)
PCO2: 30.2 MMHG (ref 35–45)
PCO2: 31.5 MMHG (ref 35–45)
PCO2: 31.6 MMHG (ref 35–45)
PCO2: 31.9 MMHG (ref 35–45)
PCO2: 34.5 MMHG (ref 35–45)
PCO2: 36.2 MMHG (ref 35–45)
PCO2: 36.5 MMHG (ref 35–45)
PCO2: 39.7 MMHG (ref 35–45)
PDW BLD-RTO: 12.3 FL (ref 11.5–15)
PDW BLD-RTO: 12.5 FL (ref 11.5–15)
PDW BLD-RTO: 12.6 FL (ref 11.5–15)
PDW BLD-RTO: 12.8 FL (ref 11.5–15)
PDW BLD-RTO: 12.9 FL (ref 11.5–15)
PDW BLD-RTO: 13.1 FL (ref 11.5–15)
PDW BLD-RTO: 13.6 FL (ref 11.5–15)
PDW BLD-RTO: 13.7 FL (ref 11.5–15)
PDW BLD-RTO: 17.9 FL (ref 11.5–15)
PDW BLD-RTO: 20.6 FL (ref 11.5–15)
PDW BLD-RTO: 20.7 FL (ref 11.5–15)
PDW BLD-RTO: 20.7 FL (ref 11.5–15)
PDW BLD-RTO: 20.8 FL (ref 11.5–15)
PDW BLD-RTO: 21 FL (ref 11.5–15)
PDW BLD-RTO: 21.2 FL (ref 11.5–15)
PDW BLD-RTO: 21.6 FL (ref 11.5–15)
PEEP/CPAP: 8 CMH2O
PFO2: 2.66 MMHG/%
PFO2: 2.86 MMHG/%
PFO2: 3.34 MMHG/%
PFO2: 3.37 MMHG/%
PFO2: 3.4 MMHG/%
PFO2: 3.66 MMHG/%
PFO2: 3.66 MMHG/%
PFO2: 3.96 MMHG/%
PFO2: 4.04 MMHG/%
PH BLOOD GAS: 7.41 (ref 7.35–7.45)
PH BLOOD GAS: 7.41 (ref 7.35–7.45)
PH BLOOD GAS: 7.45 (ref 7.35–7.45)
PH BLOOD GAS: 7.51 (ref 7.35–7.45)
PH BLOOD GAS: 7.52 (ref 7.35–7.45)
PH BLOOD GAS: 7.53 (ref 7.35–7.45)
PH BLOOD GAS: 7.54 (ref 7.35–7.45)
PH BLOOD GAS: 7.57 (ref 7.35–7.45)
PH UA: 6 (ref 5–9)
PH UA: 6.5 (ref 5–9)
PHENCYCLIDINE SCREEN URINE: NOT DETECTED
PHOSPHORUS: 1 MG/DL (ref 2.5–4.5)
PHOSPHORUS: 1.3 MG/DL (ref 2.5–4.5)
PHOSPHORUS: 1.5 MG/DL (ref 2.5–4.5)
PHOSPHORUS: 2.1 MG/DL (ref 2.5–4.5)
PHOSPHORUS: 2.3 MG/DL (ref 2.5–4.5)
PHOSPHORUS: 2.3 MG/DL (ref 2.5–4.5)
PHOSPHORUS: 2.5 MG/DL (ref 2.5–4.5)
PHOSPHORUS: 2.7 MG/DL (ref 2.5–4.5)
PHOSPHORUS: 2.7 MG/DL (ref 2.5–4.5)
PHOSPHORUS: 2.9 MG/DL (ref 2.5–4.5)
PHOSPHORUS: 2.9 MG/DL (ref 2.5–4.5)
PHOSPHORUS: 3 MG/DL (ref 2.5–4.5)
PHOSPHORUS: 3.1 MG/DL (ref 2.5–4.5)
PHOSPHORUS: 3.4 MG/DL (ref 2.5–4.5)
PHOSPHORUS: 3.5 MG/DL (ref 2.5–4.5)
PHOSPHORUS: 3.6 MG/DL (ref 2.5–4.5)
PHOSPHORUS: 3.9 MG/DL (ref 2.5–4.5)
PHOSPHORUS: 4.2 MG/DL (ref 2.5–4.5)
PLATELET # BLD: 108 E9/L (ref 130–450)
PLATELET # BLD: 115 E9/L (ref 130–450)
PLATELET # BLD: 126 E9/L (ref 130–450)
PLATELET # BLD: 135 E9/L (ref 130–450)
PLATELET # BLD: 137 E9/L (ref 130–450)
PLATELET # BLD: 142 E9/L (ref 130–450)
PLATELET # BLD: 165 E9/L (ref 130–450)
PLATELET # BLD: 193 E9/L (ref 130–450)
PLATELET # BLD: 194 E9/L (ref 130–450)
PLATELET # BLD: 210 E9/L (ref 130–450)
PLATELET # BLD: 212 E9/L (ref 130–450)
PLATELET # BLD: 216 E9/L (ref 130–450)
PLATELET # BLD: 238 E9/L (ref 130–450)
PLATELET # BLD: 262 E9/L (ref 130–450)
PLATELET # BLD: 294 E9/L (ref 130–450)
PLATELET # BLD: 302 E9/L (ref 130–450)
PLATELET # BLD: 302 E9/L (ref 130–450)
PLATELET # BLD: 312 E9/L (ref 130–450)
PLATELET # BLD: 340 E9/L (ref 130–450)
PLATELET # BLD: 365 E9/L (ref 130–450)
PLATELET # BLD: 392 E9/L (ref 130–450)
PLATELET # BLD: 417 E9/L (ref 130–450)
PLATELET # BLD: 441 E9/L (ref 130–450)
PMV BLD AUTO: 10 FL (ref 7–12)
PMV BLD AUTO: 10 FL (ref 7–12)
PMV BLD AUTO: 10.1 FL (ref 7–12)
PMV BLD AUTO: 10.1 FL (ref 7–12)
PMV BLD AUTO: 10.2 FL (ref 7–12)
PMV BLD AUTO: 10.2 FL (ref 7–12)
PMV BLD AUTO: 10.4 FL (ref 7–12)
PMV BLD AUTO: 10.5 FL (ref 7–12)
PMV BLD AUTO: 10.5 FL (ref 7–12)
PMV BLD AUTO: 10.7 FL (ref 7–12)
PMV BLD AUTO: 10.8 FL (ref 7–12)
PMV BLD AUTO: 10.8 FL (ref 7–12)
PMV BLD AUTO: 10.9 FL (ref 7–12)
PMV BLD AUTO: 10.9 FL (ref 7–12)
PMV BLD AUTO: 9.8 FL (ref 7–12)
PMV BLD AUTO: 9.9 FL (ref 7–12)
PO2: 100.1 MMHG (ref 75–100)
PO2: 102 MMHG (ref 75–100)
PO2: 109.7 MMHG (ref 75–100)
PO2: 109.8 MMHG (ref 75–100)
PO2: 134.6 MMHG (ref 75–100)
PO2: 158.4 MMHG (ref 75–100)
PO2: 161.6 MMHG (ref 75–100)
PO2: 79.8 MMHG (ref 75–100)
PO2: 84.5 MMHG (ref 75–100)
PO2: 85 MMHG (ref 75–100)
PO2: 85.7 MMHG (ref 75–100)
POIKILOCYTES: ABNORMAL
POLYCHROMASIA: ABNORMAL
POTASSIUM REFLEX MAGNESIUM: 3.1 MMOL/L (ref 3.5–5)
POTASSIUM REFLEX MAGNESIUM: 3.6 MMOL/L (ref 3.5–5)
POTASSIUM REFLEX MAGNESIUM: 3.6 MMOL/L (ref 3.5–5)
POTASSIUM REFLEX MAGNESIUM: 3.7 MMOL/L (ref 3.5–5)
POTASSIUM REFLEX MAGNESIUM: 3.8 MMOL/L (ref 3.5–5)
POTASSIUM SERPL-SCNC: 2.5 MMOL/L (ref 3.5–5)
POTASSIUM SERPL-SCNC: 2.6 MMOL/L (ref 3.5–5)
POTASSIUM SERPL-SCNC: 2.7 MMOL/L (ref 3.5–5)
POTASSIUM SERPL-SCNC: 2.9 MMOL/L (ref 3.5–5)
POTASSIUM SERPL-SCNC: 3 MMOL/L (ref 3.5–5)
POTASSIUM SERPL-SCNC: 3.2 MMOL/L (ref 3.5–5)
POTASSIUM SERPL-SCNC: 3.4 MMOL/L (ref 3.5–5)
POTASSIUM SERPL-SCNC: 3.4 MMOL/L (ref 3.5–5)
POTASSIUM SERPL-SCNC: 3.5 MMOL/L (ref 3.5–5)
POTASSIUM SERPL-SCNC: 3.6 MMOL/L (ref 3.5–5)
POTASSIUM SERPL-SCNC: 3.7 MMOL/L (ref 3.5–5)
POTASSIUM SERPL-SCNC: 3.7 MMOL/L (ref 3.5–5)
POTASSIUM SERPL-SCNC: 3.8 MMOL/L (ref 3.5–5)
POTASSIUM SERPL-SCNC: 4 MMOL/L (ref 3.5–5)
POTASSIUM SERPL-SCNC: 4.1 MMOL/L (ref 3.5–5)
POTASSIUM SERPL-SCNC: 4.2 MMOL/L (ref 3.5–5)
POTASSIUM SERPL-SCNC: 4.3 MMOL/L (ref 3.5–5)
POTASSIUM SERPL-SCNC: 4.4 MMOL/L (ref 3.5–5)
POTASSIUM SERPL-SCNC: 4.5 MMOL/L (ref 3.5–5)
POTASSIUM SERPL-SCNC: 4.5 MMOL/L (ref 3.5–5)
POTASSIUM SERPL-SCNC: 4.7 MMOL/L (ref 3.5–5)
POTASSIUM SERPL-SCNC: 5.1 MMOL/L (ref 3.5–5)
PROCALCITONIN: 0.35 NG/ML (ref 0–0.08)
PROCALCITONIN: 0.37 NG/ML (ref 0–0.08)
PROMYELOCYTES PERCENT: 0.9 % (ref 0–0)
PROMYELOCYTES PERCENT: 0.9 % (ref 0–0)
PROTEIN CSF: 77 MG/DL (ref 15–40)
PROTEIN UA: NEGATIVE MG/DL
PROTEIN UA: NEGATIVE MG/DL
PROTHROMBIN TIME: 12.4 SEC (ref 9.3–12.4)
PROTHROMBIN TIME: 13.4 SEC (ref 9.3–12.4)
PS: 12 CMH20
PS: 8 CMH20
PS: 8 CMH20
RBC # BLD: 1.66 E12/L (ref 3.8–5.8)
RBC # BLD: 1.9 E12/L (ref 3.8–5.8)
RBC # BLD: 1.93 E12/L (ref 3.8–5.8)
RBC # BLD: 1.99 E12/L (ref 3.8–5.8)
RBC # BLD: 2.01 E12/L (ref 3.8–5.8)
RBC # BLD: 2.02 E12/L (ref 3.8–5.8)
RBC # BLD: 2.06 E12/L (ref 3.8–5.8)
RBC # BLD: 2.1 E12/L (ref 3.8–5.8)
RBC # BLD: 2.15 E12/L (ref 3.8–5.8)
RBC # BLD: 2.24 E12/L (ref 3.8–5.8)
RBC # BLD: 2.26 E12/L (ref 3.8–5.8)
RBC # BLD: 2.35 E12/L (ref 3.8–5.8)
RBC # BLD: 2.41 E12/L (ref 3.8–5.8)
RBC # BLD: 2.43 E12/L (ref 3.8–5.8)
RBC # BLD: 2.48 E12/L (ref 3.8–5.8)
RBC # BLD: 2.52 E12/L (ref 3.8–5.8)
RBC # BLD: 2.55 E12/L (ref 3.8–5.8)
RBC # BLD: 2.74 E12/L (ref 3.8–5.8)
RBC # BLD: 2.77 E12/L (ref 3.8–5.8)
RBC # BLD: 2.85 E12/L (ref 3.8–5.8)
RBC # BLD: 3.08 E12/L (ref 3.8–5.8)
RBC # BLD: 3.12 E12/L (ref 3.8–5.8)
RBC # BLD: NORMAL 10*6/UL
RBC # BLD: NORMAL 10*6/UL
RBC CSF: <2000 /UL
RBC CSF: <2000 /UL
RBC UA: ABNORMAL /HPF (ref 0–2)
RBC UA: ABNORMAL /HPF (ref 0–2)
REASON FOR REJECTION: NORMAL
REJECTED TEST: NORMAL
RETIC HGB EQUIVALENT: 38.5 PG (ref 28.2–36.6)
RETICULOCYTE ABSOLUTE COUNT: 0.07 E12/L
RETICULOCYTE COUNT PCT: 2.8 % (ref 0.4–1.9)
RPR: NORMAL
RR MECHANICAL: 10 B/MIN
RR MECHANICAL: 10 B/MIN
RR MECHANICAL: 12 B/MIN
RR MECHANICAL: 12 B/MIN
RR MECHANICAL: 16 B/MIN
RR MECHANICAL: 16 B/MIN
SALICYLATE, SERUM: <0.3 MG/DL (ref 0–30)
SARS-COV-2, NAAT: NOT DETECTED
SARS-COV-2, NAAT: NOT DETECTED
SCHISTOCYTES: ABNORMAL
SEDIMENTATION RATE, ERYTHROCYTE: 108 MM/HR (ref 0–15)
SEDIMENTATION RATE, ERYTHROCYTE: 40 MM/HR (ref 0–15)
SEDIMENTATION RATE, ERYTHROCYTE: 47 MM/HR (ref 0–15)
SMEAR, RESPIRATORY: NORMAL
SMOOTH MUSCLE ANTIBODY: NEGATIVE
SODIUM BLD-SCNC: 134 MMOL/L (ref 132–146)
SODIUM BLD-SCNC: 134 MMOL/L (ref 132–146)
SODIUM BLD-SCNC: 135 MMOL/L (ref 132–146)
SODIUM BLD-SCNC: 135 MMOL/L (ref 132–146)
SODIUM BLD-SCNC: 136 MMOL/L (ref 132–146)
SODIUM BLD-SCNC: 137 MMOL/L (ref 132–146)
SODIUM BLD-SCNC: 138 MMOL/L (ref 132–146)
SODIUM BLD-SCNC: 139 MMOL/L (ref 132–146)
SODIUM BLD-SCNC: 140 MMOL/L (ref 132–146)
SODIUM BLD-SCNC: 140 MMOL/L (ref 132–146)
SODIUM BLD-SCNC: 141 MMOL/L (ref 132–146)
SODIUM BLD-SCNC: 142 MMOL/L (ref 132–146)
SODIUM BLD-SCNC: 142 MMOL/L (ref 132–146)
SODIUM BLD-SCNC: 143 MMOL/L (ref 132–146)
SODIUM BLD-SCNC: 143 MMOL/L (ref 132–146)
SODIUM BLD-SCNC: 144 MMOL/L (ref 132–146)
SODIUM BLD-SCNC: 145 MMOL/L (ref 132–146)
SODIUM BLD-SCNC: 147 MMOL/L (ref 132–146)
SODIUM BLD-SCNC: 148 MMOL/L (ref 132–146)
SODIUM BLD-SCNC: 148 MMOL/L (ref 132–146)
SODIUM BLD-SCNC: 150 MMOL/L (ref 132–146)
SODIUM BLD-SCNC: 152 MMOL/L (ref 132–146)
SODIUM BLD-SCNC: 154 MMOL/L (ref 132–146)
SODIUM URINE: <20 MMOL/L
SOURCE, BLOOD GAS: ABNORMAL
SPECIFIC GRAVITY UA: 1.01 (ref 1–1.03)
SPECIFIC GRAVITY UA: 1.02 (ref 1–1.03)
STOMATOCYTES: ABNORMAL
STOMATOCYTES: ABNORMAL
STREPTOCOCCUS AGALACTIAE CSF BY PCR: NOT DETECTED
STREPTOCOCCUS PNEUMONIAE CSF BY PCR: NOT DETECTED
T4 FREE: 1.14 NG/DL (ref 0.93–1.7)
T4 TOTAL: 4 MCG/DL (ref 4.5–11.7)
TARGET CELLS: ABNORMAL
TEAR DROP CELLS: ABNORMAL
THB: 10.4 G/DL (ref 11.5–16.5)
THB: 11.1 G/DL (ref 11.5–16.5)
THB: 6.7 G/DL (ref 11.5–16.5)
THB: 7.5 G/DL (ref 11.5–16.5)
THB: 7.9 G/DL (ref 11.5–16.5)
THB: 8.3 G/DL (ref 11.5–16.5)
THB: 8.5 G/DL (ref 11.5–16.5)
THB: 8.6 G/DL (ref 11.5–16.5)
THB: 8.7 G/DL (ref 11.5–16.5)
THB: 8.7 G/DL (ref 11.5–16.5)
THB: 9.1 G/DL (ref 11.5–16.5)
TIME ANALYZED: 1132
TIME ANALYZED: 1824
TIME ANALYZED: 516
TIME ANALYZED: 520
TIME ANALYZED: 526
TIME ANALYZED: 526
TIME ANALYZED: 542
TIME ANALYZED: 550
TIME ANALYZED: 612
TIME ANALYZED: 644
TIME ANALYZED: 648
TOTAL IRON BINDING CAPACITY: 103 MCG/DL (ref 250–450)
TOTAL PROTEIN: 4.2 G/DL (ref 6.4–8.3)
TOTAL PROTEIN: 5 G/DL (ref 6.4–8.3)
TOTAL PROTEIN: 5.1 G/DL (ref 6.4–8.3)
TOTAL PROTEIN: 5.1 G/DL (ref 6.4–8.3)
TOTAL PROTEIN: 5.3 G/DL (ref 6.4–8.3)
TOTAL PROTEIN: 5.4 G/DL (ref 6.4–8.3)
TOTAL PROTEIN: 5.5 G/DL (ref 6.4–8.3)
TOTAL PROTEIN: 5.5 G/DL (ref 6.4–8.3)
TOTAL PROTEIN: 5.6 G/DL (ref 6.4–8.3)
TOTAL PROTEIN: 5.6 G/DL (ref 6.4–8.3)
TOTAL PROTEIN: 5.7 G/DL (ref 6.4–8.3)
TOTAL PROTEIN: 6.1 G/DL (ref 6.4–8.3)
TOTAL PROTEIN: 6.5 G/DL (ref 6.4–8.3)
TOXIC GRANULATION: ABNORMAL
TOXIC GRANULATION: ABNORMAL
TRICYCLIC ANTIDEPRESSANTS SCREEN SERUM: NEGATIVE NG/ML
TROPONIN, HIGH SENSITIVITY: 47 NG/L (ref 0–11)
TROPONIN, HIGH SENSITIVITY: 54 NG/L (ref 0–11)
TSH SERPL DL<=0.05 MIU/L-ACNC: 1.41 UIU/ML (ref 0.27–4.2)
TSH SERPL DL<=0.05 MIU/L-ACNC: 2.23 UIU/ML (ref 0.27–4.2)
TSH SERPL DL<=0.05 MIU/L-ACNC: 3.33 UIU/ML (ref 0.27–4.2)
TUBE NUMBER CSF: ABNORMAL
TUBE NUMBER CSF: ABNORMAL
URIC ACID, SERUM: 9.9 MG/DL (ref 3.4–7)
URINE CULTURE, ROUTINE: NORMAL
UROBILINOGEN, URINE: 1 E.U./DL
UROBILINOGEN, URINE: 1 E.U./DL
VARICELLA ZOSTER VIRUS CSF BY PCR: NOT DETECTED
VDRL CSF SCREEN: NON REACTIVE
VDRL TITER CSF: NON REACTIVE
VITAMIN B-12: 1776 PG/ML (ref 211–946)
VITAMIN B-12: 998 PG/ML (ref 211–946)
VT MECHANICAL: 450 ML
VT MECHANICAL: 480 ML
VT MECHANICAL: 500 ML
WBC # BLD: 10.3 E9/L (ref 4.5–11.5)
WBC # BLD: 10.3 E9/L (ref 4.5–11.5)
WBC # BLD: 11 E9/L (ref 4.5–11.5)
WBC # BLD: 12 E9/L (ref 4.5–11.5)
WBC # BLD: 13.4 E9/L (ref 4.5–11.5)
WBC # BLD: 13.8 E9/L (ref 4.5–11.5)
WBC # BLD: 14.2 E9/L (ref 4.5–11.5)
WBC # BLD: 14.6 E9/L (ref 4.5–11.5)
WBC # BLD: 14.7 E9/L (ref 4.5–11.5)
WBC # BLD: 14.8 E9/L (ref 4.5–11.5)
WBC # BLD: 15.8 E9/L (ref 4.5–11.5)
WBC # BLD: 15.8 E9/L (ref 4.5–11.5)
WBC # BLD: 16.8 E9/L (ref 4.5–11.5)
WBC # BLD: 20.6 E9/L (ref 4.5–11.5)
WBC # BLD: 7.2 E9/L (ref 4.5–11.5)
WBC # BLD: 7.5 E9/L (ref 4.5–11.5)
WBC # BLD: 8.4 E9/L (ref 4.5–11.5)
WBC # BLD: 8.8 E9/L (ref 4.5–11.5)
WBC # BLD: 9.2 E9/L (ref 4.5–11.5)
WBC # BLD: 9.4 E9/L (ref 4.5–11.5)
WBC # BLD: 9.7 E9/L (ref 4.5–11.5)
WBC # BLD: 9.9 E9/L (ref 4.5–11.5)
WBC CSF: <3 /UL (ref 0–2)
WBC CSF: <3 /UL (ref 0–2)
WBC UA: ABNORMAL /HPF (ref 0–5)
WBC UA: ABNORMAL /HPF (ref 0–5)

## 2022-01-01 PROCEDURE — 82805 BLOOD GASES W/O2 SATURATION: CPT

## 2022-01-01 PROCEDURE — 87102 FUNGUS ISOLATION CULTURE: CPT

## 2022-01-01 PROCEDURE — 81001 URINALYSIS AUTO W/SCOPE: CPT

## 2022-01-01 PROCEDURE — 2000000000 HC ICU R&B

## 2022-01-01 PROCEDURE — 94003 VENT MGMT INPAT SUBQ DAY: CPT

## 2022-01-01 PROCEDURE — 6360000002 HC RX W HCPCS: Performed by: INTERNAL MEDICINE

## 2022-01-01 PROCEDURE — 85027 COMPLETE CBC AUTOMATED: CPT

## 2022-01-01 PROCEDURE — 2580000003 HC RX 258: Performed by: INTERNAL MEDICINE

## 2022-01-01 PROCEDURE — 82607 VITAMIN B-12: CPT

## 2022-01-01 PROCEDURE — 85025 COMPLETE CBC W/AUTO DIFF WBC: CPT

## 2022-01-01 PROCEDURE — P9047 ALBUMIN (HUMAN), 25%, 50ML: HCPCS | Performed by: INTERNAL MEDICINE

## 2022-01-01 PROCEDURE — 82105 ALPHA-FETOPROTEIN SERUM: CPT

## 2022-01-01 PROCEDURE — 3609010300 HC COLONOSCOPY W/BIOPSY SINGLE/MULTIPLE: Performed by: SURGERY

## 2022-01-01 PROCEDURE — 6370000000 HC RX 637 (ALT 250 FOR IP): Performed by: GENERAL PRACTICE

## 2022-01-01 PROCEDURE — 83735 ASSAY OF MAGNESIUM: CPT

## 2022-01-01 PROCEDURE — 82945 GLUCOSE OTHER FLUID: CPT

## 2022-01-01 PROCEDURE — 6370000000 HC RX 637 (ALT 250 FOR IP): Performed by: INTERNAL MEDICINE

## 2022-01-01 PROCEDURE — 86593 SYPHILIS TEST NON-TREP QUANT: CPT

## 2022-01-01 PROCEDURE — 70450 CT HEAD/BRAIN W/O DYE: CPT

## 2022-01-01 PROCEDURE — 80053 COMPREHEN METABOLIC PANEL: CPT

## 2022-01-01 PROCEDURE — 87305 ASPERGILLUS AG IA: CPT

## 2022-01-01 PROCEDURE — 76705 ECHO EXAM OF ABDOMEN: CPT

## 2022-01-01 PROCEDURE — 6370000000 HC RX 637 (ALT 250 FOR IP): Performed by: SURGERY

## 2022-01-01 PROCEDURE — 84100 ASSAY OF PHOSPHORUS: CPT

## 2022-01-01 PROCEDURE — 2500000003 HC RX 250 WO HCPCS: Performed by: INTERNAL MEDICINE

## 2022-01-01 PROCEDURE — 71045 X-RAY EXAM CHEST 1 VIEW: CPT

## 2022-01-01 PROCEDURE — 86255 FLUORESCENT ANTIBODY SCREEN: CPT

## 2022-01-01 PROCEDURE — 2709999900 HC NON-CHARGEABLE SUPPLY: Performed by: SURGERY

## 2022-01-01 PROCEDURE — 009U3ZX DRAINAGE OF SPINAL CANAL, PERCUTANEOUS APPROACH, DIAGNOSTIC: ICD-10-PCS | Performed by: GENERAL PRACTICE

## 2022-01-01 PROCEDURE — 87070 CULTURE OTHR SPECIMN AEROBIC: CPT

## 2022-01-01 PROCEDURE — 2580000003 HC RX 258: Performed by: EMERGENCY MEDICINE

## 2022-01-01 PROCEDURE — 2580000003 HC RX 258: Performed by: SPECIALIST

## 2022-01-01 PROCEDURE — 92526 ORAL FUNCTION THERAPY: CPT | Performed by: SPEECH-LANGUAGE PATHOLOGIST

## 2022-01-01 PROCEDURE — 7100000010 HC PHASE II RECOVERY - FIRST 15 MIN: Performed by: SURGERY

## 2022-01-01 PROCEDURE — 82436 ASSAY OF URINE CHLORIDE: CPT

## 2022-01-01 PROCEDURE — 86403 PARTICLE AGGLUT ANTBDY SCRN: CPT

## 2022-01-01 PROCEDURE — 37799 UNLISTED PX VASCULAR SURGERY: CPT

## 2022-01-01 PROCEDURE — 6370000000 HC RX 637 (ALT 250 FOR IP): Performed by: STUDENT IN AN ORGANIZED HEALTH CARE EDUCATION/TRAINING PROGRAM

## 2022-01-01 PROCEDURE — 36415 COLL VENOUS BLD VENIPUNCTURE: CPT

## 2022-01-01 PROCEDURE — 36620 INSERTION CATHETER ARTERY: CPT

## 2022-01-01 PROCEDURE — 6360000002 HC RX W HCPCS: Performed by: SPECIALIST

## 2022-01-01 PROCEDURE — 88305 TISSUE EXAM BY PATHOLOGIST: CPT

## 2022-01-01 PROCEDURE — 3700000001 HC ADD 15 MINUTES (ANESTHESIA): Performed by: INTERNAL MEDICINE

## 2022-01-01 PROCEDURE — 83930 ASSAY OF BLOOD OSMOLALITY: CPT

## 2022-01-01 PROCEDURE — 87206 SMEAR FLUORESCENT/ACID STAI: CPT

## 2022-01-01 PROCEDURE — 99233 SBSQ HOSP IP/OBS HIGH 50: CPT | Performed by: INTERNAL MEDICINE

## 2022-01-01 PROCEDURE — 2700000000 HC OXYGEN THERAPY PER DAY

## 2022-01-01 PROCEDURE — 2060000000 HC ICU INTERMEDIATE R&B

## 2022-01-01 PROCEDURE — 88342 IMHCHEM/IMCYTCHM 1ST ANTB: CPT

## 2022-01-01 PROCEDURE — 2709999900 HC NON-CHARGEABLE SUPPLY: Performed by: INTERNAL MEDICINE

## 2022-01-01 PROCEDURE — 80048 BASIC METABOLIC PNL TOTAL CA: CPT

## 2022-01-01 PROCEDURE — 97161 PT EVAL LOW COMPLEX 20 MIN: CPT

## 2022-01-01 PROCEDURE — 87449 NOS EACH ORGANISM AG IA: CPT

## 2022-01-01 PROCEDURE — 99222 1ST HOSP IP/OBS MODERATE 55: CPT | Performed by: SURGERY

## 2022-01-01 PROCEDURE — 84443 ASSAY THYROID STIM HORMONE: CPT

## 2022-01-01 PROCEDURE — 94640 AIRWAY INHALATION TREATMENT: CPT

## 2022-01-01 PROCEDURE — 85014 HEMATOCRIT: CPT

## 2022-01-01 PROCEDURE — 6360000002 HC RX W HCPCS: Performed by: STUDENT IN AN ORGANIZED HEALTH CARE EDUCATION/TRAINING PROGRAM

## 2022-01-01 PROCEDURE — 86592 SYPHILIS TEST NON-TREP QUAL: CPT

## 2022-01-01 PROCEDURE — 83605 ASSAY OF LACTIC ACID: CPT

## 2022-01-01 PROCEDURE — 71250 CT THORAX DX C-: CPT

## 2022-01-01 PROCEDURE — 2500000003 HC RX 250 WO HCPCS

## 2022-01-01 PROCEDURE — 86900 BLOOD TYPING SEROLOGIC ABO: CPT

## 2022-01-01 PROCEDURE — 87075 CULTR BACTERIA EXCEPT BLOOD: CPT

## 2022-01-01 PROCEDURE — 87205 SMEAR GRAM STAIN: CPT

## 2022-01-01 PROCEDURE — 2580000003 HC RX 258: Performed by: SURGERY

## 2022-01-01 PROCEDURE — 87635 SARS-COV-2 COVID-19 AMP PRB: CPT

## 2022-01-01 PROCEDURE — 85730 THROMBOPLASTIN TIME PARTIAL: CPT

## 2022-01-01 PROCEDURE — 6360000004 HC RX CONTRAST MEDICATION: Performed by: RADIOLOGY

## 2022-01-01 PROCEDURE — 2580000003 HC RX 258: Performed by: NURSE PRACTITIONER

## 2022-01-01 PROCEDURE — 82784 ASSAY IGA/IGD/IGG/IGM EACH: CPT

## 2022-01-01 PROCEDURE — 6370000000 HC RX 637 (ALT 250 FOR IP): Performed by: SPECIALIST

## 2022-01-01 PROCEDURE — 84145 PROCALCITONIN (PCT): CPT

## 2022-01-01 PROCEDURE — 82140 ASSAY OF AMMONIA: CPT

## 2022-01-01 PROCEDURE — 92611 MOTION FLUOROSCOPY/SWALLOW: CPT | Performed by: SPEECH-LANGUAGE PATHOLOGIST

## 2022-01-01 PROCEDURE — 87088 URINE BACTERIA CULTURE: CPT

## 2022-01-01 PROCEDURE — 6360000002 HC RX W HCPCS: Performed by: GENERAL PRACTICE

## 2022-01-01 PROCEDURE — 2580000003 HC RX 258

## 2022-01-01 PROCEDURE — 96360 HYDRATION IV INFUSION INIT: CPT

## 2022-01-01 PROCEDURE — 74018 RADEX ABDOMEN 1 VIEW: CPT

## 2022-01-01 PROCEDURE — 85018 HEMOGLOBIN: CPT

## 2022-01-01 PROCEDURE — 6360000002 HC RX W HCPCS: Performed by: NURSE PRACTITIONER

## 2022-01-01 PROCEDURE — 94668 MNPJ CHEST WALL SBSQ: CPT

## 2022-01-01 PROCEDURE — 87040 BLOOD CULTURE FOR BACTERIA: CPT

## 2022-01-01 PROCEDURE — 93005 ELECTROCARDIOGRAM TRACING: CPT | Performed by: EMERGENCY MEDICINE

## 2022-01-01 PROCEDURE — 3700000000 HC ANESTHESIA ATTENDED CARE: Performed by: SURGERY

## 2022-01-01 PROCEDURE — 86038 ANTINUCLEAR ANTIBODIES: CPT

## 2022-01-01 PROCEDURE — 82746 ASSAY OF FOLIC ACID SERUM: CPT

## 2022-01-01 PROCEDURE — 0DB68ZX EXCISION OF STOMACH, VIA NATURAL OR ARTIFICIAL OPENING ENDOSCOPIC, DIAGNOSTIC: ICD-10-PCS | Performed by: SURGERY

## 2022-01-01 PROCEDURE — 87116 MYCOBACTERIA CULTURE: CPT

## 2022-01-01 PROCEDURE — 6370000000 HC RX 637 (ALT 250 FOR IP): Performed by: NURSE PRACTITIONER

## 2022-01-01 PROCEDURE — 80179 DRUG ASSAY SALICYLATE: CPT

## 2022-01-01 PROCEDURE — 86360 T CELL ABSOLUTE COUNT/RATIO: CPT

## 2022-01-01 PROCEDURE — C9113 INJ PANTOPRAZOLE SODIUM, VIA: HCPCS | Performed by: INTERNAL MEDICINE

## 2022-01-01 PROCEDURE — 84132 ASSAY OF SERUM POTASSIUM: CPT

## 2022-01-01 PROCEDURE — 94669 MECHANICAL CHEST WALL OSCILL: CPT

## 2022-01-01 PROCEDURE — 85045 AUTOMATED RETICULOCYTE COUNT: CPT

## 2022-01-01 PROCEDURE — 86923 COMPATIBILITY TEST ELECTRIC: CPT

## 2022-01-01 PROCEDURE — 86703 HIV-1/HIV-2 1 RESULT ANTBDY: CPT

## 2022-01-01 PROCEDURE — 6360000002 HC RX W HCPCS

## 2022-01-01 PROCEDURE — 83550 IRON BINDING TEST: CPT

## 2022-01-01 PROCEDURE — 82570 ASSAY OF URINE CREATININE: CPT

## 2022-01-01 PROCEDURE — 2580000003 HC RX 258: Performed by: GENERAL PRACTICE

## 2022-01-01 PROCEDURE — 80074 ACUTE HEPATITIS PANEL: CPT

## 2022-01-01 PROCEDURE — 5A1955Z RESPIRATORY VENTILATION, GREATER THAN 96 CONSECUTIVE HOURS: ICD-10-PCS | Performed by: INTERNAL MEDICINE

## 2022-01-01 PROCEDURE — 94664 DEMO&/EVAL PT USE INHALER: CPT

## 2022-01-01 PROCEDURE — 87015 SPECIMEN INFECT AGNT CONCNTJ: CPT

## 2022-01-01 PROCEDURE — 72125 CT NECK SPINE W/O DYE: CPT

## 2022-01-01 PROCEDURE — 0DBQ8ZX EXCISION OF ANUS, VIA NATURAL OR ARTIFICIAL OPENING ENDOSCOPIC, DIAGNOSTIC: ICD-10-PCS | Performed by: SURGERY

## 2022-01-01 PROCEDURE — 82077 ASSAY SPEC XCP UR&BREATH IA: CPT

## 2022-01-01 PROCEDURE — 86334 IMMUNOFIX E-PHORESIS SERUM: CPT

## 2022-01-01 PROCEDURE — C1751 CATH, INF, PER/CENT/MIDLINE: HCPCS

## 2022-01-01 PROCEDURE — 87483 CNS DNA AMP PROBE TYPE 12-25: CPT

## 2022-01-01 PROCEDURE — 89220 SPUTUM SPECIMEN COLLECTION: CPT

## 2022-01-01 PROCEDURE — 36592 COLLECT BLOOD FROM PICC: CPT

## 2022-01-01 PROCEDURE — 85049 AUTOMATED PLATELET COUNT: CPT

## 2022-01-01 PROCEDURE — 36556 INSERT NON-TUNNEL CV CATH: CPT

## 2022-01-01 PROCEDURE — 45380 COLONOSCOPY AND BIOPSY: CPT | Performed by: SURGERY

## 2022-01-01 PROCEDURE — 36430 TRANSFUSION BLD/BLD COMPNT: CPT

## 2022-01-01 PROCEDURE — 51702 INSERT TEMP BLADDER CATH: CPT

## 2022-01-01 PROCEDURE — 82962 GLUCOSE BLOOD TEST: CPT

## 2022-01-01 PROCEDURE — P9016 RBC LEUKOCYTES REDUCED: HCPCS

## 2022-01-01 PROCEDURE — 3609012400 HC EGD TRANSORAL BIOPSY SINGLE/MULTIPLE: Performed by: SURGERY

## 2022-01-01 PROCEDURE — 2580000003 HC RX 258: Performed by: NURSE ANESTHETIST, CERTIFIED REGISTERED

## 2022-01-01 PROCEDURE — 85651 RBC SED RATE NONAUTOMATED: CPT

## 2022-01-01 PROCEDURE — 0BJ08ZZ INSPECTION OF TRACHEOBRONCHIAL TREE, VIA NATURAL OR ARTIFICIAL OPENING ENDOSCOPIC: ICD-10-PCS | Performed by: GENERAL PRACTICE

## 2022-01-01 PROCEDURE — 0DB98ZX EXCISION OF DUODENUM, VIA NATURAL OR ARTIFICIAL OPENING ENDOSCOPIC, DIAGNOSTIC: ICD-10-PCS | Performed by: SURGERY

## 2022-01-01 PROCEDURE — 86359 T CELLS TOTAL COUNT: CPT

## 2022-01-01 PROCEDURE — 43239 EGD BIOPSY SINGLE/MULTIPLE: CPT | Performed by: SURGERY

## 2022-01-01 PROCEDURE — 0DB58ZX EXCISION OF ESOPHAGUS, VIA NATURAL OR ARTIFICIAL OPENING ENDOSCOPIC, DIAGNOSTIC: ICD-10-PCS | Performed by: SURGERY

## 2022-01-01 PROCEDURE — 80307 DRUG TEST PRSMV CHEM ANLYZR: CPT

## 2022-01-01 PROCEDURE — 99284 EMERGENCY DEPT VISIT MOD MDM: CPT

## 2022-01-01 PROCEDURE — 80143 DRUG ASSAY ACETAMINOPHEN: CPT

## 2022-01-01 PROCEDURE — 83010 ASSAY OF HAPTOGLOBIN QUANT: CPT

## 2022-01-01 PROCEDURE — 84436 ASSAY OF TOTAL THYROXINE: CPT

## 2022-01-01 PROCEDURE — 3700000001 HC ADD 15 MINUTES (ANESTHESIA): Performed by: SURGERY

## 2022-01-01 PROCEDURE — 84550 ASSAY OF BLOOD/URIC ACID: CPT

## 2022-01-01 PROCEDURE — 2500000003 HC RX 250 WO HCPCS: Performed by: RADIOLOGY

## 2022-01-01 PROCEDURE — 93010 ELECTROCARDIOGRAM REPORT: CPT | Performed by: INTERNAL MEDICINE

## 2022-01-01 PROCEDURE — 02HV33Z INSERTION OF INFUSION DEVICE INTO SUPERIOR VENA CAVA, PERCUTANEOUS APPROACH: ICD-10-PCS | Performed by: GENERAL PRACTICE

## 2022-01-01 PROCEDURE — 70553 MRI BRAIN STEM W/O & W/DYE: CPT

## 2022-01-01 PROCEDURE — 86140 C-REACTIVE PROTEIN: CPT

## 2022-01-01 PROCEDURE — 89051 BODY FLUID CELL COUNT: CPT

## 2022-01-01 PROCEDURE — 99283 EMERGENCY DEPT VISIT LOW MDM: CPT

## 2022-01-01 PROCEDURE — 84165 PROTEIN E-PHORESIS SERUM: CPT

## 2022-01-01 PROCEDURE — 84300 ASSAY OF URINE SODIUM: CPT

## 2022-01-01 PROCEDURE — 76937 US GUIDE VASCULAR ACCESS: CPT

## 2022-01-01 PROCEDURE — 3700000000 HC ANESTHESIA ATTENDED CARE: Performed by: INTERNAL MEDICINE

## 2022-01-01 PROCEDURE — 6360000002 HC RX W HCPCS: Performed by: NURSE ANESTHETIST, CERTIFIED REGISTERED

## 2022-01-01 PROCEDURE — 83690 ASSAY OF LIPASE: CPT

## 2022-01-01 PROCEDURE — 94667 MNPJ CHEST WALL 1ST: CPT

## 2022-01-01 PROCEDURE — 83540 ASSAY OF IRON: CPT

## 2022-01-01 PROCEDURE — 97165 OT EVAL LOW COMPLEX 30 MIN: CPT

## 2022-01-01 PROCEDURE — 99233 SBSQ HOSP IP/OBS HIGH 50: CPT | Performed by: SURGERY

## 2022-01-01 PROCEDURE — 85610 PROTHROMBIN TIME: CPT

## 2022-01-01 PROCEDURE — 31500 INSERT EMERGENCY AIRWAY: CPT

## 2022-01-01 PROCEDURE — 6370000000 HC RX 637 (ALT 250 FOR IP): Performed by: PHYSICIAN ASSISTANT

## 2022-01-01 PROCEDURE — 0BH17EZ INSERTION OF ENDOTRACHEAL AIRWAY INTO TRACHEA, VIA NATURAL OR ARTIFICIAL OPENING: ICD-10-PCS | Performed by: INTERNAL MEDICINE

## 2022-01-01 PROCEDURE — 99232 SBSQ HOSP IP/OBS MODERATE 35: CPT | Performed by: SURGERY

## 2022-01-01 PROCEDURE — 84157 ASSAY OF PROTEIN OTHER: CPT

## 2022-01-01 PROCEDURE — 94002 VENT MGMT INPAT INIT DAY: CPT

## 2022-01-01 PROCEDURE — 86850 RBC ANTIBODY SCREEN: CPT

## 2022-01-01 PROCEDURE — 74176 CT ABD & PELVIS W/O CONTRAST: CPT

## 2022-01-01 PROCEDURE — 73120 X-RAY EXAM OF HAND: CPT

## 2022-01-01 PROCEDURE — 7100000010 HC PHASE II RECOVERY - FIRST 15 MIN: Performed by: INTERNAL MEDICINE

## 2022-01-01 PROCEDURE — 70486 CT MAXILLOFACIAL W/O DYE: CPT

## 2022-01-01 PROCEDURE — 62328 DX LMBR SPI PNXR W/FLUOR/CT: CPT

## 2022-01-01 PROCEDURE — A9579 GAD-BASE MR CONTRAST NOS,1ML: HCPCS | Performed by: RADIOLOGY

## 2022-01-01 PROCEDURE — 36569 INSJ PICC 5 YR+ W/O IMAGING: CPT

## 2022-01-01 PROCEDURE — 97168 OT RE-EVAL EST PLAN CARE: CPT

## 2022-01-01 PROCEDURE — 86901 BLOOD TYPING SEROLOGIC RH(D): CPT

## 2022-01-01 PROCEDURE — 97530 THERAPEUTIC ACTIVITIES: CPT

## 2022-01-01 PROCEDURE — 3609027000 HC BRONCHOSCOPY: Performed by: INTERNAL MEDICINE

## 2022-01-01 PROCEDURE — 97165 OT EVAL LOW COMPLEX 30 MIN: CPT | Performed by: OCCUPATIONAL THERAPIST

## 2022-01-01 PROCEDURE — 84439 ASSAY OF FREE THYROXINE: CPT

## 2022-01-01 PROCEDURE — 7100000011 HC PHASE II RECOVERY - ADDTL 15 MIN: Performed by: INTERNAL MEDICINE

## 2022-01-01 PROCEDURE — 7100000011 HC PHASE II RECOVERY - ADDTL 15 MIN: Performed by: SURGERY

## 2022-01-01 PROCEDURE — 74230 X-RAY XM SWLNG FUNCJ C+: CPT

## 2022-01-01 PROCEDURE — 83615 LACTATE (LD) (LDH) ENZYME: CPT

## 2022-01-01 PROCEDURE — 92610 EVALUATE SWALLOWING FUNCTION: CPT

## 2022-01-01 PROCEDURE — 84484 ASSAY OF TROPONIN QUANT: CPT

## 2022-01-01 PROCEDURE — 82787 IGG 1 2 3 OR 4 EACH: CPT

## 2022-01-01 RX ORDER — FOLIC ACID 1 MG/1
1 TABLET ORAL DAILY
Status: DISCONTINUED | OUTPATIENT
Start: 2022-01-01 | End: 2022-01-01 | Stop reason: HOSPADM

## 2022-01-01 RX ORDER — SUCRALFATE 1 G/1
1 TABLET ORAL
COMMUNITY

## 2022-01-01 RX ORDER — HEPARIN SODIUM (PORCINE) LOCK FLUSH IV SOLN 100 UNIT/ML 100 UNIT/ML
3 SOLUTION INTRAVENOUS PRN
Status: DISCONTINUED | OUTPATIENT
Start: 2022-01-01 | End: 2022-01-01 | Stop reason: HOSPADM

## 2022-01-01 RX ORDER — HEPARIN SODIUM (PORCINE) LOCK FLUSH IV SOLN 100 UNIT/ML 100 UNIT/ML
3 SOLUTION INTRAVENOUS EVERY 12 HOURS SCHEDULED
Status: DISCONTINUED | OUTPATIENT
Start: 2022-01-01 | End: 2022-01-01 | Stop reason: HOSPADM

## 2022-01-01 RX ORDER — MAGNESIUM SULFATE IN WATER 40 MG/ML
2000 INJECTION, SOLUTION INTRAVENOUS ONCE
Status: COMPLETED | OUTPATIENT
Start: 2022-01-01 | End: 2022-01-01

## 2022-01-01 RX ORDER — POTASSIUM CHLORIDE 7.45 MG/ML
INJECTION INTRAVENOUS
Status: COMPLETED
Start: 2022-01-01 | End: 2022-01-01

## 2022-01-01 RX ORDER — SODIUM CHLORIDE 9 MG/ML
INJECTION, SOLUTION INTRAVENOUS EVERY 8 HOURS
Status: DISCONTINUED | OUTPATIENT
Start: 2022-01-01 | End: 2022-01-01 | Stop reason: HOSPADM

## 2022-01-01 RX ORDER — FOLIC ACID 1 MG/1
1 TABLET ORAL DAILY
Qty: 30 TABLET | Refills: 3 | DISCHARGE
Start: 2022-01-01

## 2022-01-01 RX ORDER — CHLORDIAZEPOXIDE HYDROCHLORIDE 5 MG/1
10 CAPSULE, GELATIN COATED ORAL EVERY 12 HOURS
Status: DISCONTINUED | OUTPATIENT
Start: 2022-01-01 | End: 2022-01-01

## 2022-01-01 RX ORDER — ONDANSETRON 2 MG/ML
4 INJECTION INTRAMUSCULAR; INTRAVENOUS EVERY 6 HOURS PRN
Status: DISCONTINUED | OUTPATIENT
Start: 2022-01-01 | End: 2022-01-01 | Stop reason: HOSPADM

## 2022-01-01 RX ORDER — LANSOPRAZOLE
30 KIT
Status: DISCONTINUED | OUTPATIENT
Start: 2022-01-01 | End: 2022-01-01 | Stop reason: HOSPADM

## 2022-01-01 RX ORDER — ETOMIDATE 2 MG/ML
INJECTION INTRAVENOUS
Status: COMPLETED
Start: 2022-01-01 | End: 2022-01-01

## 2022-01-01 RX ORDER — BENZOCAINE/MENTHOL 6 MG-10 MG
LOZENGE MUCOUS MEMBRANE 2 TIMES DAILY
Status: DISCONTINUED | OUTPATIENT
Start: 2022-01-01 | End: 2022-01-01 | Stop reason: HOSPADM

## 2022-01-01 RX ORDER — ROCURONIUM BROMIDE 10 MG/ML
80 INJECTION, SOLUTION INTRAVENOUS ONCE
Status: COMPLETED | OUTPATIENT
Start: 2022-01-01 | End: 2022-01-01

## 2022-01-01 RX ORDER — ACETAMINOPHEN 325 MG/1
650 TABLET ORAL EVERY 4 HOURS PRN
COMMUNITY

## 2022-01-01 RX ORDER — ZINC SULFATE 50(220)MG
50 CAPSULE ORAL DAILY
Status: DISCONTINUED | OUTPATIENT
Start: 2022-01-01 | End: 2022-01-01 | Stop reason: HOSPADM

## 2022-01-01 RX ORDER — PROPOFOL 10 MG/ML
INJECTION, EMULSION INTRAVENOUS
Status: DISCONTINUED
Start: 2022-01-01 | End: 2022-01-01 | Stop reason: HOSPADM

## 2022-01-01 RX ORDER — FLUCONAZOLE 100 MG/1
200 TABLET ORAL DAILY
Status: DISCONTINUED | OUTPATIENT
Start: 2022-01-01 | End: 2022-01-01

## 2022-01-01 RX ORDER — LORAZEPAM 2 MG/ML
1 INJECTION INTRAMUSCULAR ONCE
Status: COMPLETED | OUTPATIENT
Start: 2022-01-01 | End: 2022-01-01

## 2022-01-01 RX ORDER — SODIUM CHLORIDE 9 MG/ML
INJECTION, SOLUTION INTRAVENOUS CONTINUOUS PRN
Status: DISCONTINUED | OUTPATIENT
Start: 2022-01-01 | End: 2022-01-01 | Stop reason: SDUPTHER

## 2022-01-01 RX ORDER — MORPHINE SULFATE 2 MG/ML
INJECTION, SOLUTION INTRAMUSCULAR; INTRAVENOUS
Status: DISCONTINUED
Start: 2022-01-01 | End: 2022-01-01 | Stop reason: HOSPADM

## 2022-01-01 RX ORDER — POTASSIUM CHLORIDE 29.8 MG/ML
20 INJECTION INTRAVENOUS
Status: COMPLETED | OUTPATIENT
Start: 2022-01-01 | End: 2022-01-01

## 2022-01-01 RX ORDER — ALBUMIN (HUMAN) 12.5 G/50ML
25 SOLUTION INTRAVENOUS EVERY 8 HOURS
Status: COMPLETED | OUTPATIENT
Start: 2022-01-01 | End: 2022-01-01

## 2022-01-01 RX ORDER — SODIUM CHLORIDE 9 MG/ML
25 INJECTION, SOLUTION INTRAVENOUS PRN
Status: DISCONTINUED | OUTPATIENT
Start: 2022-01-01 | End: 2022-01-01 | Stop reason: HOSPADM

## 2022-01-01 RX ORDER — DOXYCYCLINE HYCLATE 100 MG/1
100 CAPSULE ORAL 2 TIMES DAILY
Status: DISCONTINUED | OUTPATIENT
Start: 2022-01-01 | End: 2022-01-01

## 2022-01-01 RX ORDER — DIMETHICONE, OXYBENZONE, AND PADIMATE O 2; 2.5; 6.6 G/100G; G/100G; G/100G
STICK TOPICAL PRN
Status: DISCONTINUED | OUTPATIENT
Start: 2022-01-01 | End: 2022-01-01 | Stop reason: HOSPADM

## 2022-01-01 RX ORDER — POTASSIUM CHLORIDE 29.8 MG/ML
40 INJECTION INTRAVENOUS
Status: COMPLETED | OUTPATIENT
Start: 2022-01-01 | End: 2022-01-01

## 2022-01-01 RX ORDER — SODIUM CHLORIDE 9 MG/ML
INJECTION, SOLUTION INTRAVENOUS EVERY 8 HOURS
Status: DISCONTINUED | OUTPATIENT
Start: 2022-01-01 | End: 2022-01-01

## 2022-01-01 RX ORDER — SODIUM CHLORIDE 9 MG/ML
1000 INJECTION, SOLUTION INTRAVENOUS CONTINUOUS
Status: DISCONTINUED | OUTPATIENT
Start: 2022-01-01 | End: 2022-01-01

## 2022-01-01 RX ORDER — SODIUM CHLORIDE 9 MG/ML
INJECTION, SOLUTION INTRAVENOUS CONTINUOUS
Status: DISCONTINUED | OUTPATIENT
Start: 2022-01-01 | End: 2022-01-01

## 2022-01-01 RX ORDER — PANTOPRAZOLE SODIUM 40 MG/1
40 TABLET, DELAYED RELEASE ORAL
Qty: 30 TABLET | Refills: 3 | Status: SHIPPED | OUTPATIENT
Start: 2022-01-01

## 2022-01-01 RX ORDER — MORPHINE SULFATE 4 MG/ML
4 INJECTION, SOLUTION INTRAMUSCULAR; INTRAVENOUS
Status: DISCONTINUED | OUTPATIENT
Start: 2022-01-01 | End: 2022-01-01 | Stop reason: HOSPADM

## 2022-01-01 RX ORDER — SODIUM CHLORIDE 0.9 % (FLUSH) 0.9 %
5-40 SYRINGE (ML) INJECTION PRN
Status: DISCONTINUED | OUTPATIENT
Start: 2022-01-01 | End: 2022-01-01 | Stop reason: HOSPADM

## 2022-01-01 RX ORDER — PROPOFOL 10 MG/ML
INJECTION, EMULSION INTRAVENOUS PRN
Status: DISCONTINUED | OUTPATIENT
Start: 2022-01-01 | End: 2022-01-01

## 2022-01-01 RX ORDER — MAGNESIUM SULFATE 1 G/100ML
1000 INJECTION INTRAVENOUS ONCE
Status: COMPLETED | OUTPATIENT
Start: 2022-01-01 | End: 2022-01-01

## 2022-01-01 RX ORDER — FUROSEMIDE 10 MG/ML
40 INJECTION INTRAMUSCULAR; INTRAVENOUS EVERY 8 HOURS
Status: DISCONTINUED | OUTPATIENT
Start: 2022-01-01 | End: 2022-01-01

## 2022-01-01 RX ORDER — POTASSIUM CHLORIDE 7.45 MG/ML
10 INJECTION INTRAVENOUS
Status: COMPLETED | OUTPATIENT
Start: 2022-01-01 | End: 2022-01-01

## 2022-01-01 RX ORDER — POTASSIUM CHLORIDE 29.8 MG/ML
20 INJECTION INTRAVENOUS ONCE
Status: COMPLETED | OUTPATIENT
Start: 2022-01-01 | End: 2022-01-01

## 2022-01-01 RX ORDER — PROPOFOL 10 MG/ML
INJECTION, EMULSION INTRAVENOUS PRN
Status: DISCONTINUED | OUTPATIENT
Start: 2022-01-01 | End: 2022-01-01 | Stop reason: SDUPTHER

## 2022-01-01 RX ORDER — ONDANSETRON 2 MG/ML
4 INJECTION INTRAMUSCULAR; INTRAVENOUS EVERY 4 HOURS PRN
Status: DISCONTINUED | OUTPATIENT
Start: 2022-01-01 | End: 2022-01-01 | Stop reason: HOSPADM

## 2022-01-01 RX ORDER — SODIUM CHLORIDE 9 MG/ML
INJECTION, SOLUTION INTRAVENOUS PRN
Status: DISCONTINUED | OUTPATIENT
Start: 2022-01-01 | End: 2022-01-01 | Stop reason: HOSPADM

## 2022-01-01 RX ORDER — DOCUSATE SODIUM 100 MG/1
100 CAPSULE, LIQUID FILLED ORAL EVERY 12 HOURS PRN
COMMUNITY

## 2022-01-01 RX ORDER — NIFEDIPINE 30 MG/1
30 TABLET, FILM COATED, EXTENDED RELEASE ORAL DAILY
Status: DISCONTINUED | OUTPATIENT
Start: 2022-01-01 | End: 2022-01-01 | Stop reason: HOSPADM

## 2022-01-01 RX ORDER — POTASSIUM CHLORIDE 7.45 MG/ML
10 INJECTION INTRAVENOUS ONCE
Status: COMPLETED | OUTPATIENT
Start: 2022-01-01 | End: 2022-01-01

## 2022-01-01 RX ORDER — DOCUSATE SODIUM 100 MG/1
100 CAPSULE, LIQUID FILLED ORAL EVERY 12 HOURS PRN
Status: DISCONTINUED | OUTPATIENT
Start: 2022-01-01 | End: 2022-01-01

## 2022-01-01 RX ORDER — PANTOPRAZOLE SODIUM 40 MG/10ML
40 INJECTION, POWDER, LYOPHILIZED, FOR SOLUTION INTRAVENOUS DAILY
Status: DISCONTINUED | OUTPATIENT
Start: 2022-01-01 | End: 2022-01-01

## 2022-01-01 RX ORDER — FLUCONAZOLE 2 MG/ML
200 INJECTION, SOLUTION INTRAVENOUS EVERY 24 HOURS
Status: DISCONTINUED | OUTPATIENT
Start: 2022-01-01 | End: 2022-01-01

## 2022-01-01 RX ORDER — POTASSIUM CHLORIDE 29.8 MG/ML
20 INJECTION INTRAVENOUS
Status: DISPENSED | OUTPATIENT
Start: 2022-01-01 | End: 2022-01-01

## 2022-01-01 RX ORDER — AMLODIPINE BESYLATE 5 MG/1
5 TABLET ORAL DAILY
Qty: 30 TABLET | Refills: 3 | DISCHARGE
Start: 2022-01-01

## 2022-01-01 RX ORDER — ASCORBIC ACID 500 MG
500 TABLET ORAL 3 TIMES DAILY
COMMUNITY

## 2022-01-01 RX ORDER — ASCORBIC ACID 500 MG
500 TABLET ORAL 3 TIMES DAILY
Status: DISCONTINUED | OUTPATIENT
Start: 2022-01-01 | End: 2022-01-01 | Stop reason: HOSPADM

## 2022-01-01 RX ORDER — FUROSEMIDE 10 MG/ML
40 INJECTION INTRAMUSCULAR; INTRAVENOUS DAILY
Status: DISCONTINUED | OUTPATIENT
Start: 2022-01-01 | End: 2022-01-01

## 2022-01-01 RX ORDER — 0.9 % SODIUM CHLORIDE 0.9 %
1000 INTRAVENOUS SOLUTION INTRAVENOUS ONCE
Status: COMPLETED | OUTPATIENT
Start: 2022-01-01 | End: 2022-01-01

## 2022-01-01 RX ORDER — POTASSIUM CHLORIDE 7.45 MG/ML
10 INJECTION INTRAVENOUS
Status: DISPENSED | OUTPATIENT
Start: 2022-01-01 | End: 2022-01-01

## 2022-01-01 RX ORDER — MORPHINE SULFATE 2 MG/ML
2 INJECTION, SOLUTION INTRAMUSCULAR; INTRAVENOUS
Status: DISCONTINUED | OUTPATIENT
Start: 2022-01-01 | End: 2022-01-01 | Stop reason: HOSPADM

## 2022-01-01 RX ORDER — SODIUM CHLORIDE 0.9 % (FLUSH) 0.9 %
5-40 SYRINGE (ML) INJECTION EVERY 12 HOURS SCHEDULED
Status: DISCONTINUED | OUTPATIENT
Start: 2022-01-01 | End: 2022-01-01 | Stop reason: HOSPADM

## 2022-01-01 RX ORDER — POTASSIUM CHLORIDE 7.45 MG/ML
10 INJECTION INTRAVENOUS
Status: DISCONTINUED | OUTPATIENT
Start: 2022-01-01 | End: 2022-01-01 | Stop reason: ALTCHOICE

## 2022-01-01 RX ORDER — LIDOCAINE HYDROCHLORIDE 20 MG/ML
SOLUTION OROPHARYNGEAL PRN
Status: DISCONTINUED | OUTPATIENT
Start: 2022-01-01 | End: 2022-01-01 | Stop reason: ALTCHOICE

## 2022-01-01 RX ORDER — LEVOFLOXACIN 5 MG/ML
500 INJECTION, SOLUTION INTRAVENOUS EVERY 24 HOURS
Status: DISCONTINUED | OUTPATIENT
Start: 2022-01-01 | End: 2022-01-01

## 2022-01-01 RX ORDER — POTASSIUM CHLORIDE 7.45 MG/ML
40 INJECTION INTRAVENOUS ONCE
Status: COMPLETED | OUTPATIENT
Start: 2022-01-01 | End: 2022-01-01

## 2022-01-01 RX ORDER — SODIUM CHLORIDE 450 MG/100ML
INJECTION, SOLUTION INTRAVENOUS CONTINUOUS
Status: DISCONTINUED | OUTPATIENT
Start: 2022-01-01 | End: 2022-01-01 | Stop reason: HOSPADM

## 2022-01-01 RX ORDER — SUCRALFATE 1 G/1
1 TABLET ORAL
Status: DISCONTINUED | OUTPATIENT
Start: 2022-01-01 | End: 2022-01-01 | Stop reason: HOSPADM

## 2022-01-01 RX ORDER — LIDOCAINE HYDROCHLORIDE 10 MG/ML
5 INJECTION, SOLUTION EPIDURAL; INFILTRATION; INTRACAUDAL; PERINEURAL ONCE
Status: COMPLETED | OUTPATIENT
Start: 2022-01-01 | End: 2022-01-01

## 2022-01-01 RX ORDER — DOXYCYCLINE HYCLATE 100 MG
100 TABLET ORAL 2 TIMES DAILY
COMMUNITY
Start: 2022-01-01 | End: 2022-01-01

## 2022-01-01 RX ORDER — SODIUM CHLORIDE 9 MG/ML
INJECTION, SOLUTION INTRAVENOUS PRN
Status: DISCONTINUED | OUTPATIENT
Start: 2022-01-01 | End: 2022-01-01 | Stop reason: SDUPTHER

## 2022-01-01 RX ORDER — PANTOPRAZOLE SODIUM 40 MG/1
40 TABLET, DELAYED RELEASE ORAL
Status: DISCONTINUED | OUTPATIENT
Start: 2022-01-01 | End: 2022-01-01

## 2022-01-01 RX ORDER — DIAPER,BRIEF,INFANT-TODD,DISP
EACH MISCELLANEOUS 2 TIMES DAILY
Status: DISCONTINUED | OUTPATIENT
Start: 2022-01-01 | End: 2022-01-01 | Stop reason: CLARIF

## 2022-01-01 RX ORDER — THIAMINE HYDROCHLORIDE 100 MG/ML
INJECTION, SOLUTION INTRAMUSCULAR; INTRAVENOUS
Status: DISCONTINUED
Start: 2022-01-01 | End: 2022-01-01 | Stop reason: WASHOUT

## 2022-01-01 RX ORDER — FENTANYL CITRATE 50 UG/ML
25 INJECTION, SOLUTION INTRAMUSCULAR; INTRAVENOUS
Status: DISCONTINUED | OUTPATIENT
Start: 2022-01-01 | End: 2022-01-01

## 2022-01-01 RX ORDER — ACYCLOVIR 200 MG/1
400 CAPSULE ORAL 3 TIMES DAILY
Status: DISCONTINUED | OUTPATIENT
Start: 2022-01-01 | End: 2022-01-01

## 2022-01-01 RX ORDER — ZINC SULFATE 50(220)MG
50 CAPSULE ORAL DAILY
Qty: 30 CAPSULE | Refills: 3 | DISCHARGE
Start: 2022-01-01

## 2022-01-01 RX ORDER — DEXTROSE AND SODIUM CHLORIDE 5; .45 G/100ML; G/100ML
INJECTION, SOLUTION INTRAVENOUS CONTINUOUS
Status: DISCONTINUED | OUTPATIENT
Start: 2022-01-01 | End: 2022-01-01

## 2022-01-01 RX ORDER — FLUCONAZOLE 100 MG/1
200 TABLET ORAL DAILY
Status: DISCONTINUED | OUTPATIENT
Start: 2022-01-01 | End: 2022-01-01 | Stop reason: HOSPADM

## 2022-01-01 RX ORDER — DEXTROSE, SODIUM CHLORIDE, AND POTASSIUM CHLORIDE 5; .45; .15 G/100ML; G/100ML; G/100ML
INJECTION INTRAVENOUS CONTINUOUS
Status: DISCONTINUED | OUTPATIENT
Start: 2022-01-01 | End: 2022-01-01

## 2022-01-01 RX ORDER — FLUCONAZOLE 2 MG/ML
400 INJECTION, SOLUTION INTRAVENOUS EVERY 24 HOURS
Status: DISCONTINUED | OUTPATIENT
Start: 2022-01-01 | End: 2022-01-01

## 2022-01-01 RX ORDER — ACETAMINOPHEN 325 MG/1
650 TABLET ORAL EVERY 4 HOURS PRN
Status: DISCONTINUED | OUTPATIENT
Start: 2022-01-01 | End: 2022-01-01 | Stop reason: HOSPADM

## 2022-01-01 RX ORDER — DOCUSATE SODIUM 100 MG/1
100 CAPSULE, LIQUID FILLED ORAL 2 TIMES DAILY
Status: DISCONTINUED | OUTPATIENT
Start: 2022-01-01 | End: 2022-01-01

## 2022-01-01 RX ORDER — SENNA PLUS 8.6 MG/1
1 TABLET ORAL NIGHTLY
Status: DISCONTINUED | OUTPATIENT
Start: 2022-01-01 | End: 2022-01-01 | Stop reason: HOSPADM

## 2022-01-01 RX ORDER — ACYCLOVIR 800 MG/1
800 TABLET ORAL
COMMUNITY

## 2022-01-01 RX ORDER — POTASSIUM CHLORIDE 20 MEQ/1
20 TABLET, EXTENDED RELEASE ORAL 2 TIMES DAILY WITH MEALS
Status: DISCONTINUED | OUTPATIENT
Start: 2022-01-01 | End: 2022-01-01 | Stop reason: HOSPADM

## 2022-01-01 RX ORDER — ACYCLOVIR 800 MG/1
800 TABLET ORAL
Status: DISCONTINUED | OUTPATIENT
Start: 2022-01-01 | End: 2022-01-01

## 2022-01-01 RX ORDER — AMLODIPINE BESYLATE 5 MG/1
5 TABLET ORAL DAILY
Status: DISCONTINUED | OUTPATIENT
Start: 2022-01-01 | End: 2022-01-01 | Stop reason: HOSPADM

## 2022-01-01 RX ORDER — BUMETANIDE 0.25 MG/ML
1 INJECTION, SOLUTION INTRAMUSCULAR; INTRAVENOUS EVERY 8 HOURS
Status: COMPLETED | OUTPATIENT
Start: 2022-01-01 | End: 2022-01-01

## 2022-01-01 RX ORDER — PANTOPRAZOLE SODIUM 40 MG/1
40 TABLET, DELAYED RELEASE ORAL
Status: DISCONTINUED | OUTPATIENT
Start: 2022-01-01 | End: 2022-01-01 | Stop reason: HOSPADM

## 2022-01-01 RX ORDER — POTASSIUM CHLORIDE 29.8 MG/ML
20 INJECTION INTRAVENOUS
Status: DISCONTINUED | OUTPATIENT
Start: 2022-01-01 | End: 2022-01-01

## 2022-01-01 RX ORDER — GLYCOPYRROLATE 0.2 MG/ML
0.2 INJECTION INTRAMUSCULAR; INTRAVENOUS EVERY 4 HOURS PRN
Status: DISCONTINUED | OUTPATIENT
Start: 2022-01-01 | End: 2022-01-01 | Stop reason: HOSPADM

## 2022-01-01 RX ORDER — LORAZEPAM 2 MG/ML
1 INJECTION INTRAMUSCULAR EVERY 6 HOURS PRN
Status: DISCONTINUED | OUTPATIENT
Start: 2022-01-01 | End: 2022-01-01 | Stop reason: HOSPADM

## 2022-01-01 RX ORDER — CHLORHEXIDINE GLUCONATE 0.12 MG/ML
15 RINSE ORAL 2 TIMES DAILY
Status: DISCONTINUED | OUTPATIENT
Start: 2022-01-01 | End: 2022-01-01

## 2022-01-01 RX ORDER — IPRATROPIUM BROMIDE AND ALBUTEROL SULFATE 2.5; .5 MG/3ML; MG/3ML
1 SOLUTION RESPIRATORY (INHALATION)
Status: DISCONTINUED | OUTPATIENT
Start: 2022-01-01 | End: 2022-01-01 | Stop reason: HOSPADM

## 2022-01-01 RX ORDER — SODIUM CHLORIDE, SODIUM LACTATE, POTASSIUM CHLORIDE, CALCIUM CHLORIDE 600; 310; 30; 20 MG/100ML; MG/100ML; MG/100ML; MG/100ML
INJECTION, SOLUTION INTRAVENOUS CONTINUOUS
Status: DISCONTINUED | OUTPATIENT
Start: 2022-01-01 | End: 2022-01-01

## 2022-01-01 RX ORDER — POTASSIUM CHLORIDE 29.8 MG/ML
40 INJECTION INTRAVENOUS ONCE
Status: DISCONTINUED | OUTPATIENT
Start: 2022-01-01 | End: 2022-01-01 | Stop reason: CLARIF

## 2022-01-01 RX ORDER — VITAMIN B COMPLEX
1000 TABLET ORAL DAILY
Status: DISCONTINUED | OUTPATIENT
Start: 2022-01-01 | End: 2022-01-01 | Stop reason: HOSPADM

## 2022-01-01 RX ORDER — FUROSEMIDE 10 MG/ML
40 INJECTION INTRAMUSCULAR; INTRAVENOUS 2 TIMES DAILY
Status: DISCONTINUED | OUTPATIENT
Start: 2022-01-01 | End: 2022-01-01

## 2022-01-01 RX ORDER — CETIRIZINE HYDROCHLORIDE 10 MG/1
10 TABLET ORAL DAILY PRN
Status: DISCONTINUED | OUTPATIENT
Start: 2022-01-01 | End: 2022-01-01 | Stop reason: HOSPADM

## 2022-01-01 RX ORDER — ASPIRIN 81 MG/1
81 TABLET, CHEWABLE ORAL DAILY
Status: DISCONTINUED | OUTPATIENT
Start: 2022-01-01 | End: 2022-01-01 | Stop reason: HOSPADM

## 2022-01-01 RX ORDER — LIDOCAINE HYDROCHLORIDE 20 MG/ML
INJECTION, SOLUTION EPIDURAL; INFILTRATION; INTRACAUDAL; PERINEURAL PRN
Status: DISCONTINUED | OUTPATIENT
Start: 2022-01-01 | End: 2022-01-01 | Stop reason: ALTCHOICE

## 2022-01-01 RX ORDER — ALBUMIN (HUMAN) 12.5 G/50ML
SOLUTION INTRAVENOUS
Status: DISPENSED
Start: 2022-01-01 | End: 2022-01-01

## 2022-01-01 RX ORDER — LORAZEPAM 2 MG/ML
INJECTION INTRAMUSCULAR
Status: COMPLETED
Start: 2022-01-01 | End: 2022-01-01

## 2022-01-01 RX ADMIN — FOLIC ACID 1 MG: 1 TABLET ORAL at 08:25

## 2022-01-01 RX ADMIN — ACYCLOVIR 400 MG: 200 CAPSULE ORAL at 13:54

## 2022-01-01 RX ADMIN — SUCRALFATE 1 G: 1 TABLET ORAL at 20:21

## 2022-01-01 RX ADMIN — ERTAPENEM SODIUM 1000 MG: 1 INJECTION, POWDER, LYOPHILIZED, FOR SOLUTION INTRAMUSCULAR; INTRAVENOUS at 12:53

## 2022-01-01 RX ADMIN — IPRATROPIUM BROMIDE AND ALBUTEROL SULFATE 1 AMPULE: .5; 2.5 SOLUTION RESPIRATORY (INHALATION) at 09:32

## 2022-01-01 RX ADMIN — IPRATROPIUM BROMIDE AND ALBUTEROL SULFATE 1 AMPULE: .5; 2.5 SOLUTION RESPIRATORY (INHALATION) at 20:33

## 2022-01-01 RX ADMIN — ZINC SULFATE 220 MG (50 MG) CAPSULE 50 MG: CAPSULE at 08:25

## 2022-01-01 RX ADMIN — SUCRALFATE 1 G: 1 TABLET ORAL at 10:45

## 2022-01-01 RX ADMIN — NYSTATIN 500000 UNITS: 500000 SUSPENSION ORAL at 15:43

## 2022-01-01 RX ADMIN — POTASSIUM CHLORIDE 10 MEQ: 7.46 INJECTION, SOLUTION INTRAVENOUS at 17:20

## 2022-01-01 RX ADMIN — PANTOPRAZOLE SODIUM 40 MG: 40 INJECTION, POWDER, FOR SOLUTION INTRAVENOUS at 08:31

## 2022-01-01 RX ADMIN — IPRATROPIUM BROMIDE AND ALBUTEROL SULFATE 1 AMPULE: .5; 2.5 SOLUTION RESPIRATORY (INHALATION) at 16:02

## 2022-01-01 RX ADMIN — POTASSIUM PHOSPHATE, MONOBASIC AND POTASSIUM PHOSPHATE, DIBASIC 30 MMOL: 224; 236 INJECTION, SOLUTION, CONCENTRATE INTRAVENOUS at 11:42

## 2022-01-01 RX ADMIN — IPRATROPIUM BROMIDE AND ALBUTEROL SULFATE 1 AMPULE: .5; 2.5 SOLUTION RESPIRATORY (INHALATION) at 16:48

## 2022-01-01 RX ADMIN — SODIUM BICARBONATE: 84 INJECTION, SOLUTION INTRAVENOUS at 10:44

## 2022-01-01 RX ADMIN — ACETAMINOPHEN 650 MG: 325 TABLET ORAL at 23:41

## 2022-01-01 RX ADMIN — SODIUM CHLORIDE, PRESERVATIVE FREE 300 UNITS: 5 INJECTION INTRAVENOUS at 10:53

## 2022-01-01 RX ADMIN — SENNOSIDES 8.6 MG: 8.6 TABLET, COATED ORAL at 21:14

## 2022-01-01 RX ADMIN — SODIUM CHLORIDE: 9 INJECTION, SOLUTION INTRAVENOUS at 23:52

## 2022-01-01 RX ADMIN — BUMETANIDE 1 MG: 0.25 INJECTION, SOLUTION INTRAMUSCULAR; INTRAVENOUS at 17:21

## 2022-01-01 RX ADMIN — NYSTATIN 500000 UNITS: 500000 SUSPENSION ORAL at 22:35

## 2022-01-01 RX ADMIN — PANTOPRAZOLE SODIUM 40 MG: 40 TABLET, DELAYED RELEASE ORAL at 05:23

## 2022-01-01 RX ADMIN — NYSTATIN 500000 UNITS: 500000 SUSPENSION ORAL at 09:41

## 2022-01-01 RX ADMIN — IPRATROPIUM BROMIDE AND ALBUTEROL SULFATE 1 AMPULE: .5; 2.5 SOLUTION RESPIRATORY (INHALATION) at 08:00

## 2022-01-01 RX ADMIN — IPRATROPIUM BROMIDE AND ALBUTEROL SULFATE 1 AMPULE: .5; 2.5 SOLUTION RESPIRATORY (INHALATION) at 08:32

## 2022-01-01 RX ADMIN — FLUCONAZOLE 200 MG: 100 TABLET ORAL at 07:56

## 2022-01-01 RX ADMIN — BUMETANIDE 1 MG: 0.25 INJECTION, SOLUTION INTRAMUSCULAR; INTRAVENOUS at 02:25

## 2022-01-01 RX ADMIN — MEROPENEM 1000 MG: 1 INJECTION, POWDER, FOR SOLUTION INTRAVENOUS at 11:21

## 2022-01-01 RX ADMIN — CHLORHEXIDINE GLUCONATE 0.12% ORAL RINSE 15 ML: 1.2 LIQUID ORAL at 22:07

## 2022-01-01 RX ADMIN — DOCUSATE SODIUM 100 MG: 50 LIQUID ORAL at 22:10

## 2022-01-01 RX ADMIN — CHLORHEXIDINE GLUCONATE 0.12% ORAL RINSE 15 ML: 1.2 LIQUID ORAL at 21:19

## 2022-01-01 RX ADMIN — Medication 500 MG: at 14:51

## 2022-01-01 RX ADMIN — SENNOSIDES 8.6 MG: 8.6 TABLET, COATED ORAL at 19:53

## 2022-01-01 RX ADMIN — POTASSIUM CHLORIDE 10 MEQ: 7.46 INJECTION, SOLUTION INTRAVENOUS at 23:28

## 2022-01-01 RX ADMIN — NYSTATIN 500000 UNITS: 500000 SUSPENSION ORAL at 13:00

## 2022-01-01 RX ADMIN — IPRATROPIUM BROMIDE AND ALBUTEROL SULFATE 1 AMPULE: .5; 2.5 SOLUTION RESPIRATORY (INHALATION) at 21:11

## 2022-01-01 RX ADMIN — Medication 25 MCG/HR: at 16:26

## 2022-01-01 RX ADMIN — SODIUM CHLORIDE: 9 INJECTION, SOLUTION INTRAVENOUS at 21:54

## 2022-01-01 RX ADMIN — NYSTATIN 500000 UNITS: 500000 SUSPENSION ORAL at 12:56

## 2022-01-01 RX ADMIN — NYSTATIN 500000 UNITS: 500000 SUSPENSION ORAL at 17:29

## 2022-01-01 RX ADMIN — SUCRALFATE 1 G: 1 TABLET ORAL at 16:25

## 2022-01-01 RX ADMIN — NYSTATIN 500000 UNITS: 500000 SUSPENSION ORAL at 16:55

## 2022-01-01 RX ADMIN — Medication 500 MG: at 10:41

## 2022-01-01 RX ADMIN — SUCRALFATE 1 G: 1 TABLET ORAL at 05:39

## 2022-01-01 RX ADMIN — FUROSEMIDE 40 MG: 10 INJECTION, SOLUTION INTRAMUSCULAR; INTRAVENOUS at 08:46

## 2022-01-01 RX ADMIN — IPRATROPIUM BROMIDE AND ALBUTEROL SULFATE 1 AMPULE: .5; 2.5 SOLUTION RESPIRATORY (INHALATION) at 15:16

## 2022-01-01 RX ADMIN — SODIUM CHLORIDE: 4.5 INJECTION, SOLUTION INTRAVENOUS at 00:09

## 2022-01-01 RX ADMIN — Medication 10 ML: at 09:18

## 2022-01-01 RX ADMIN — PETROLATUM: 420 OINTMENT TOPICAL at 09:00

## 2022-01-01 RX ADMIN — MEROPENEM 1000 MG: 1 INJECTION, POWDER, FOR SOLUTION INTRAVENOUS at 17:45

## 2022-01-01 RX ADMIN — SUCRALFATE 1 G: 1 TABLET ORAL at 21:14

## 2022-01-01 RX ADMIN — CHLORHEXIDINE GLUCONATE 0.12% ORAL RINSE 15 ML: 1.2 LIQUID ORAL at 20:59

## 2022-01-01 RX ADMIN — FOLIC ACID 1 MG: 1 TABLET ORAL at 09:13

## 2022-01-01 RX ADMIN — DOCUSATE SODIUM 100 MG: 100 CAPSULE, LIQUID FILLED ORAL at 21:30

## 2022-01-01 RX ADMIN — SUCRALFATE 1 G: 1 TABLET ORAL at 06:17

## 2022-01-01 RX ADMIN — PETROLATUM: 420 OINTMENT TOPICAL at 09:23

## 2022-01-01 RX ADMIN — Medication 500 MG: at 09:50

## 2022-01-01 RX ADMIN — MEROPENEM 1000 MG: 1 INJECTION, POWDER, FOR SOLUTION INTRAVENOUS at 09:07

## 2022-01-01 RX ADMIN — NYSTATIN 500000 UNITS: 500000 SUSPENSION ORAL at 08:46

## 2022-01-01 RX ADMIN — THIAMINE HYDROCHLORIDE 250 MG: 100 INJECTION, SOLUTION INTRAMUSCULAR; INTRAVENOUS at 12:30

## 2022-01-01 RX ADMIN — MEROPENEM 1000 MG: 1 INJECTION, POWDER, FOR SOLUTION INTRAVENOUS at 11:00

## 2022-01-01 RX ADMIN — NYSTATIN 500000 UNITS: 500000 SUSPENSION ORAL at 21:30

## 2022-01-01 RX ADMIN — ENOXAPARIN SODIUM 40 MG: 100 INJECTION SUBCUTANEOUS at 08:26

## 2022-01-01 RX ADMIN — DEXMEDETOMIDINE HYDROCHLORIDE 0.2 MCG/KG/HR: 100 INJECTION, SOLUTION INTRAVENOUS at 18:04

## 2022-01-01 RX ADMIN — GADOTERIDOL 16 ML: 279.3 INJECTION, SOLUTION INTRAVENOUS at 15:38

## 2022-01-01 RX ADMIN — ROCURONIUM BROMIDE 80 MG: 10 INJECTION INTRAVENOUS at 11:26

## 2022-01-01 RX ADMIN — IPRATROPIUM BROMIDE AND ALBUTEROL SULFATE 1 AMPULE: .5; 2.5 SOLUTION RESPIRATORY (INHALATION) at 11:10

## 2022-01-01 RX ADMIN — FUROSEMIDE 40 MG: 10 INJECTION, SOLUTION INTRAMUSCULAR; INTRAVENOUS at 08:26

## 2022-01-01 RX ADMIN — MAGNESIUM SULFATE HEPTAHYDRATE 2000 MG: 40 INJECTION, SOLUTION INTRAVENOUS at 09:10

## 2022-01-01 RX ADMIN — SODIUM BICARBONATE: 84 INJECTION, SOLUTION INTRAVENOUS at 16:38

## 2022-01-01 RX ADMIN — SODIUM CHLORIDE: 9 INJECTION, SOLUTION INTRAVENOUS at 11:35

## 2022-01-01 RX ADMIN — ACYCLOVIR 400 MG: 200 CAPSULE ORAL at 20:30

## 2022-01-01 RX ADMIN — PANTOPRAZOLE SODIUM 40 MG: 40 INJECTION, POWDER, FOR SOLUTION INTRAVENOUS at 09:14

## 2022-01-01 RX ADMIN — SUCRALFATE 1 G: 1 TABLET ORAL at 11:21

## 2022-01-01 RX ADMIN — ACYCLOVIR 400 MG: 200 CAPSULE ORAL at 14:51

## 2022-01-01 RX ADMIN — Medication 500 MG: at 08:35

## 2022-01-01 RX ADMIN — IPRATROPIUM BROMIDE AND ALBUTEROL SULFATE 1 AMPULE: .5; 2.5 SOLUTION RESPIRATORY (INHALATION) at 12:02

## 2022-01-01 RX ADMIN — IPRATROPIUM BROMIDE AND ALBUTEROL SULFATE 1 AMPULE: .5; 2.5 SOLUTION RESPIRATORY (INHALATION) at 08:37

## 2022-01-01 RX ADMIN — DOXYCYCLINE HYCLATE 100 MG: 100 CAPSULE ORAL at 20:19

## 2022-01-01 RX ADMIN — IPRATROPIUM BROMIDE AND ALBUTEROL SULFATE 1 AMPULE: .5; 2.5 SOLUTION RESPIRATORY (INHALATION) at 20:21

## 2022-01-01 RX ADMIN — Medication 25 MCG/HR: at 12:08

## 2022-01-01 RX ADMIN — SUCRALFATE 1 G: 1 TABLET ORAL at 10:33

## 2022-01-01 RX ADMIN — SODIUM CHLORIDE, PRESERVATIVE FREE 300 UNITS: 5 INJECTION INTRAVENOUS at 21:14

## 2022-01-01 RX ADMIN — THIAMINE HYDROCHLORIDE 500 MG: 100 INJECTION, SOLUTION INTRAMUSCULAR; INTRAVENOUS at 11:16

## 2022-01-01 RX ADMIN — ALBUMIN (HUMAN) 25 G: 0.25 INJECTION, SOLUTION INTRAVENOUS at 02:16

## 2022-01-01 RX ADMIN — NYSTATIN 500000 UNITS: 500000 SUSPENSION ORAL at 18:50

## 2022-01-01 RX ADMIN — POTASSIUM CHLORIDE 10 MEQ: 7.46 INJECTION, SOLUTION INTRAVENOUS at 22:52

## 2022-01-01 RX ADMIN — SUCRALFATE 1 G: 1 TABLET ORAL at 20:20

## 2022-01-01 RX ADMIN — Medication 500 MG: at 08:25

## 2022-01-01 RX ADMIN — ACYCLOVIR 400 MG: 200 CAPSULE ORAL at 15:43

## 2022-01-01 RX ADMIN — CHLORDIAZEPOXIDE HYDROCHLORIDE 10 MG: 5 CAPSULE ORAL at 09:38

## 2022-01-01 RX ADMIN — IPRATROPIUM BROMIDE AND ALBUTEROL SULFATE 1 AMPULE: .5; 2.5 SOLUTION RESPIRATORY (INHALATION) at 11:29

## 2022-01-01 RX ADMIN — IPRATROPIUM BROMIDE AND ALBUTEROL SULFATE 1 AMPULE: .5; 2.5 SOLUTION RESPIRATORY (INHALATION) at 13:22

## 2022-01-01 RX ADMIN — PETROLATUM: 420 OINTMENT TOPICAL at 23:52

## 2022-01-01 RX ADMIN — SODIUM CHLORIDE: 9 INJECTION, SOLUTION INTRAVENOUS at 20:25

## 2022-01-01 RX ADMIN — FLUCONAZOLE, SODIUM CHLORIDE 200 MG: 2 INJECTION INTRAVENOUS at 08:53

## 2022-01-01 RX ADMIN — AMLODIPINE BESYLATE 5 MG: 5 TABLET ORAL at 20:20

## 2022-01-01 RX ADMIN — SODIUM CHLORIDE 1000 ML: 9 INJECTION, SOLUTION INTRAVENOUS at 17:12

## 2022-01-01 RX ADMIN — ZINC SULFATE 220 MG (50 MG) CAPSULE 50 MG: CAPSULE at 09:35

## 2022-01-01 RX ADMIN — SUCRALFATE 1 G: 1 TABLET ORAL at 10:58

## 2022-01-01 RX ADMIN — LORAZEPAM 2 MG: 2 INJECTION INTRAMUSCULAR; INTRAVENOUS at 16:52

## 2022-01-01 RX ADMIN — ACYCLOVIR SODIUM 800 MG: 500 INJECTION, SOLUTION INTRAVENOUS at 23:12

## 2022-01-01 RX ADMIN — CHLORHEXIDINE GLUCONATE 0.12% ORAL RINSE 15 ML: 1.2 LIQUID ORAL at 20:24

## 2022-01-01 RX ADMIN — SUCRALFATE 1 G: 1 TABLET ORAL at 20:30

## 2022-01-01 RX ADMIN — PETROLATUM: 420 OINTMENT TOPICAL at 08:34

## 2022-01-01 RX ADMIN — FOLIC ACID 1 MG: 1 TABLET ORAL at 09:50

## 2022-01-01 RX ADMIN — Medication 1000 UNITS: at 08:25

## 2022-01-01 RX ADMIN — IPRATROPIUM BROMIDE AND ALBUTEROL SULFATE 1 AMPULE: .5; 2.5 SOLUTION RESPIRATORY (INHALATION) at 12:46

## 2022-01-01 RX ADMIN — NYSTATIN 500000 UNITS: 500000 SUSPENSION ORAL at 13:17

## 2022-01-01 RX ADMIN — BARIUM SULFATE 70 G: 0.81 POWDER, FOR SUSPENSION ORAL at 13:23

## 2022-01-01 RX ADMIN — PETROLATUM: 420 OINTMENT TOPICAL at 21:58

## 2022-01-01 RX ADMIN — IPRATROPIUM BROMIDE AND ALBUTEROL SULFATE 1 AMPULE: .5; 2.5 SOLUTION RESPIRATORY (INHALATION) at 16:22

## 2022-01-01 RX ADMIN — FLUCONAZOLE 200 MG: 100 TABLET ORAL at 10:41

## 2022-01-01 RX ADMIN — HYDROCORTISONE: 0.01 CREAM TOPICAL at 12:27

## 2022-01-01 RX ADMIN — IPRATROPIUM BROMIDE AND ALBUTEROL SULFATE 1 AMPULE: .5; 2.5 SOLUTION RESPIRATORY (INHALATION) at 20:47

## 2022-01-01 RX ADMIN — SUCRALFATE 1 G: 1 TABLET ORAL at 06:01

## 2022-01-01 RX ADMIN — NYSTATIN 500000 UNITS: 500000 SUSPENSION ORAL at 08:26

## 2022-01-01 RX ADMIN — CETIRIZINE HYDROCHLORIDE 10 MG: 10 TABLET, FILM COATED ORAL at 09:03

## 2022-01-01 RX ADMIN — SUCRALFATE 1 G: 1 TABLET ORAL at 06:46

## 2022-01-01 RX ADMIN — PETROLATUM: 420 OINTMENT TOPICAL at 09:18

## 2022-01-01 RX ADMIN — ACYCLOVIR 400 MG: 200 CAPSULE ORAL at 09:50

## 2022-01-01 RX ADMIN — IPRATROPIUM BROMIDE AND ALBUTEROL SULFATE 1 AMPULE: .5; 2.5 SOLUTION RESPIRATORY (INHALATION) at 19:58

## 2022-01-01 RX ADMIN — ASPIRIN 81 MG 81 MG: 81 TABLET ORAL at 09:51

## 2022-01-01 RX ADMIN — Medication 500 MG: at 15:43

## 2022-01-01 RX ADMIN — MAGNESIUM SULFATE HEPTAHYDRATE 1000 MG: 1 INJECTION, SOLUTION INTRAVENOUS at 12:27

## 2022-01-01 RX ADMIN — SODIUM CHLORIDE 1000 ML: 9 INJECTION, SOLUTION INTRAVENOUS at 15:31

## 2022-01-01 RX ADMIN — Medication 500 MG: at 09:02

## 2022-01-01 RX ADMIN — SODIUM CHLORIDE, PRESERVATIVE FREE 300 UNITS: 5 INJECTION INTRAVENOUS at 11:05

## 2022-01-01 RX ADMIN — NYSTATIN 500000 UNITS: 500000 SUSPENSION ORAL at 09:03

## 2022-01-01 RX ADMIN — Medication 500 MG: at 09:36

## 2022-01-01 RX ADMIN — SUCRALFATE 1 G: 1 TABLET ORAL at 05:59

## 2022-01-01 RX ADMIN — MEROPENEM 1000 MG: 1 INJECTION, POWDER, FOR SOLUTION INTRAVENOUS at 02:01

## 2022-01-01 RX ADMIN — AMLODIPINE BESYLATE 5 MG: 5 TABLET ORAL at 09:24

## 2022-01-01 RX ADMIN — SODIUM CHLORIDE, PRESERVATIVE FREE 300 UNITS: 5 INJECTION INTRAVENOUS at 09:04

## 2022-01-01 RX ADMIN — SENNOSIDES 8.6 MG: 8.6 TABLET, COATED ORAL at 20:51

## 2022-01-01 RX ADMIN — SODIUM BICARBONATE: 84 INJECTION, SOLUTION INTRAVENOUS at 14:11

## 2022-01-01 RX ADMIN — NYSTATIN 500000 UNITS: 500000 SUSPENSION ORAL at 17:55

## 2022-01-01 RX ADMIN — IPRATROPIUM BROMIDE AND ALBUTEROL SULFATE 1 AMPULE: .5; 2.5 SOLUTION RESPIRATORY (INHALATION) at 13:37

## 2022-01-01 RX ADMIN — CHLORDIAZEPOXIDE HYDROCHLORIDE 10 MG: 5 CAPSULE ORAL at 09:22

## 2022-01-01 RX ADMIN — ZINC SULFATE 220 MG (50 MG) CAPSULE 50 MG: CAPSULE at 16:34

## 2022-01-01 RX ADMIN — ACETAMINOPHEN 650 MG: 325 TABLET ORAL at 00:23

## 2022-01-01 RX ADMIN — POTASSIUM CHLORIDE 10 MEQ: 7.46 INJECTION, SOLUTION INTRAVENOUS at 16:17

## 2022-01-01 RX ADMIN — IPRATROPIUM BROMIDE AND ALBUTEROL SULFATE 1 AMPULE: .5; 2.5 SOLUTION RESPIRATORY (INHALATION) at 08:41

## 2022-01-01 RX ADMIN — Medication 1000 UNITS: at 09:02

## 2022-01-01 RX ADMIN — Medication 500 MG: at 09:03

## 2022-01-01 RX ADMIN — ACYCLOVIR 400 MG: 200 CAPSULE ORAL at 21:19

## 2022-01-01 RX ADMIN — POTASSIUM CHLORIDE 20 MEQ: 29.8 INJECTION INTRAVENOUS at 10:04

## 2022-01-01 RX ADMIN — IPRATROPIUM BROMIDE AND ALBUTEROL SULFATE 1 AMPULE: .5; 2.5 SOLUTION RESPIRATORY (INHALATION) at 08:19

## 2022-01-01 RX ADMIN — ONDANSETRON 4 MG: 2 INJECTION INTRAMUSCULAR; INTRAVENOUS at 18:55

## 2022-01-01 RX ADMIN — CHLORHEXIDINE GLUCONATE 0.12% ORAL RINSE 15 ML: 1.2 LIQUID ORAL at 20:00

## 2022-01-01 RX ADMIN — Medication 1000 UNITS: at 09:13

## 2022-01-01 RX ADMIN — SUCRALFATE 1 G: 1 TABLET ORAL at 20:17

## 2022-01-01 RX ADMIN — Medication 2 MG/HR: at 12:09

## 2022-01-01 RX ADMIN — IPRATROPIUM BROMIDE AND ALBUTEROL SULFATE 1 AMPULE: .5; 2.5 SOLUTION RESPIRATORY (INHALATION) at 08:52

## 2022-01-01 RX ADMIN — IPRATROPIUM BROMIDE AND ALBUTEROL SULFATE 1 AMPULE: .5; 2.5 SOLUTION RESPIRATORY (INHALATION) at 19:22

## 2022-01-01 RX ADMIN — Medication 10 ML: at 10:42

## 2022-01-01 RX ADMIN — PETROLATUM: 420 OINTMENT TOPICAL at 22:35

## 2022-01-01 RX ADMIN — SUCRALFATE 1 G: 1 TABLET ORAL at 16:11

## 2022-01-01 RX ADMIN — SENNOSIDES 8.6 MG: 8.6 TABLET, COATED ORAL at 20:00

## 2022-01-01 RX ADMIN — ACETAMINOPHEN 650 MG: 325 TABLET ORAL at 18:06

## 2022-01-01 RX ADMIN — Medication 1000 UNITS: at 08:26

## 2022-01-01 RX ADMIN — DOCUSATE SODIUM 100 MG: 50 LIQUID ORAL at 09:02

## 2022-01-01 RX ADMIN — SUCRALFATE 1 G: 1 TABLET ORAL at 22:10

## 2022-01-01 RX ADMIN — ACYCLOVIR SODIUM 800 MG: 500 INJECTION, SOLUTION INTRAVENOUS at 01:30

## 2022-01-01 RX ADMIN — Medication 500 MG: at 19:53

## 2022-01-01 RX ADMIN — POTASSIUM CHLORIDE 40 MEQ: 29.8 INJECTION, SOLUTION INTRAVENOUS at 10:44

## 2022-01-01 RX ADMIN — NYSTATIN 500000 UNITS: 500000 SUSPENSION ORAL at 17:38

## 2022-01-01 RX ADMIN — PETROLATUM: 420 OINTMENT TOPICAL at 08:44

## 2022-01-01 RX ADMIN — FUROSEMIDE 40 MG: 10 INJECTION, SOLUTION INTRAMUSCULAR; INTRAVENOUS at 22:08

## 2022-01-01 RX ADMIN — SENNOSIDES 8.6 MG: 8.6 TABLET, COATED ORAL at 22:10

## 2022-01-01 RX ADMIN — MORPHINE SULFATE 2 MG: 2 INJECTION, SOLUTION INTRAMUSCULAR; INTRAVENOUS at 16:49

## 2022-01-01 RX ADMIN — POTASSIUM CHLORIDE 10 MEQ: 7.45 INJECTION INTRAVENOUS at 11:28

## 2022-01-01 RX ADMIN — CHLORDIAZEPOXIDE HYDROCHLORIDE 10 MG: 5 CAPSULE ORAL at 21:30

## 2022-01-01 RX ADMIN — LIDOCAINE HYDROCHLORIDE 1 ML: 10 INJECTION, SOLUTION EPIDURAL; INFILTRATION; INTRACAUDAL; PERINEURAL at 11:05

## 2022-01-01 RX ADMIN — ERTAPENEM SODIUM 1000 MG: 1 INJECTION, POWDER, LYOPHILIZED, FOR SOLUTION INTRAMUSCULAR; INTRAVENOUS at 11:12

## 2022-01-01 RX ADMIN — CHLORDIAZEPOXIDE HYDROCHLORIDE 10 MG: 5 CAPSULE ORAL at 21:21

## 2022-01-01 RX ADMIN — CHLORHEXIDINE GLUCONATE 0.12% ORAL RINSE 15 ML: 1.2 LIQUID ORAL at 08:35

## 2022-01-01 RX ADMIN — PETROLATUM: 420 OINTMENT TOPICAL at 10:07

## 2022-01-01 RX ADMIN — Medication 500 MG: at 21:30

## 2022-01-01 RX ADMIN — POTASSIUM CHLORIDE 10 MEQ: 7.46 INJECTION, SOLUTION INTRAVENOUS at 02:17

## 2022-01-01 RX ADMIN — Medication: at 09:02

## 2022-01-01 RX ADMIN — ZINC SULFATE 220 MG (50 MG) CAPSULE 50 MG: CAPSULE at 09:13

## 2022-01-01 RX ADMIN — SODIUM CHLORIDE 1000 ML: 9 INJECTION, SOLUTION INTRAVENOUS at 15:30

## 2022-01-01 RX ADMIN — DOCUSATE SODIUM 100 MG: 50 LIQUID ORAL at 21:14

## 2022-01-01 RX ADMIN — PANTOPRAZOLE SODIUM 40 MG: 40 INJECTION, POWDER, FOR SOLUTION INTRAVENOUS at 09:42

## 2022-01-01 RX ADMIN — IPRATROPIUM BROMIDE AND ALBUTEROL SULFATE 1 AMPULE: .5; 2.5 SOLUTION RESPIRATORY (INHALATION) at 08:26

## 2022-01-01 RX ADMIN — HYDROCORTISONE: 0.01 CREAM TOPICAL at 22:51

## 2022-01-01 RX ADMIN — SUCRALFATE 1 G: 1 TABLET ORAL at 10:00

## 2022-01-01 RX ADMIN — NIFEDIPINE 30 MG: 30 TABLET, EXTENDED RELEASE ORAL at 11:22

## 2022-01-01 RX ADMIN — POTASSIUM CHLORIDE 20 MEQ: 29.8 INJECTION, SOLUTION INTRAVENOUS at 11:11

## 2022-01-01 RX ADMIN — DOXYCYCLINE HYCLATE 100 MG: 100 CAPSULE ORAL at 09:24

## 2022-01-01 RX ADMIN — ERTAPENEM SODIUM 1000 MG: 1 INJECTION, POWDER, LYOPHILIZED, FOR SOLUTION INTRAMUSCULAR; INTRAVENOUS at 12:15

## 2022-01-01 RX ADMIN — Medication 10 ML: at 08:30

## 2022-01-01 RX ADMIN — FOLIC ACID 1 MG: 1 TABLET ORAL at 08:34

## 2022-01-01 RX ADMIN — FOLIC ACID 1 MG: 1 TABLET ORAL at 10:41

## 2022-01-01 RX ADMIN — NYSTATIN 500000 UNITS: 500000 SUSPENSION ORAL at 12:52

## 2022-01-01 RX ADMIN — NYSTATIN 500000 UNITS: 500000 SUSPENSION ORAL at 20:59

## 2022-01-01 RX ADMIN — IPRATROPIUM BROMIDE AND ALBUTEROL SULFATE 1 AMPULE: .5; 2.5 SOLUTION RESPIRATORY (INHALATION) at 16:29

## 2022-01-01 RX ADMIN — MEROPENEM 1000 MG: 1 INJECTION, POWDER, FOR SOLUTION INTRAVENOUS at 21:30

## 2022-01-01 RX ADMIN — BUMETANIDE 1 MG: 0.25 INJECTION, SOLUTION INTRAMUSCULAR; INTRAVENOUS at 12:01

## 2022-01-01 RX ADMIN — AMLODIPINE BESYLATE 5 MG: 5 TABLET ORAL at 09:10

## 2022-01-01 RX ADMIN — SUCRALFATE 1 G: 1 TABLET ORAL at 21:17

## 2022-01-01 RX ADMIN — ACYCLOVIR 400 MG: 200 CAPSULE ORAL at 08:25

## 2022-01-01 RX ADMIN — FLUCONAZOLE 200 MG: 100 TABLET ORAL at 09:50

## 2022-01-01 RX ADMIN — Medication 500 MG: at 22:10

## 2022-01-01 RX ADMIN — SUCRALFATE 1 G: 1 TABLET ORAL at 21:19

## 2022-01-01 RX ADMIN — SODIUM BICARBONATE: 84 INJECTION, SOLUTION INTRAVENOUS at 06:54

## 2022-01-01 RX ADMIN — FUROSEMIDE 40 MG: 10 INJECTION, SOLUTION INTRAMUSCULAR; INTRAVENOUS at 18:42

## 2022-01-01 RX ADMIN — POTASSIUM CHLORIDE 10 MEQ: 7.46 INJECTION, SOLUTION INTRAVENOUS at 10:05

## 2022-01-01 RX ADMIN — POTASSIUM CHLORIDE 20 MEQ: 20 TABLET, EXTENDED RELEASE ORAL at 10:20

## 2022-01-01 RX ADMIN — PETROLATUM: 420 OINTMENT TOPICAL at 03:22

## 2022-01-01 RX ADMIN — ASPIRIN 81 MG 81 MG: 81 TABLET ORAL at 13:48

## 2022-01-01 RX ADMIN — DOCUSATE SODIUM 100 MG: 50 LIQUID ORAL at 08:34

## 2022-01-01 RX ADMIN — IPRATROPIUM BROMIDE AND ALBUTEROL SULFATE 1 AMPULE: .5; 2.5 SOLUTION RESPIRATORY (INHALATION) at 08:12

## 2022-01-01 RX ADMIN — Medication 3 MG/HR: at 05:43

## 2022-01-01 RX ADMIN — FUROSEMIDE 40 MG: 10 INJECTION, SOLUTION INTRAMUSCULAR; INTRAVENOUS at 05:03

## 2022-01-01 RX ADMIN — NYSTATIN 500000 UNITS: 500000 SUSPENSION ORAL at 22:10

## 2022-01-01 RX ADMIN — DOCUSATE SODIUM 100 MG: 50 LIQUID ORAL at 20:30

## 2022-01-01 RX ADMIN — ENOXAPARIN SODIUM 40 MG: 100 INJECTION SUBCUTANEOUS at 08:31

## 2022-01-01 RX ADMIN — LANSOPRAZOLE 30 MG: KIT at 05:39

## 2022-01-01 RX ADMIN — ZINC SULFATE 220 MG (50 MG) CAPSULE 50 MG: CAPSULE at 09:03

## 2022-01-01 RX ADMIN — POTASSIUM CHLORIDE 10 MEQ: 7.46 INJECTION, SOLUTION INTRAVENOUS at 11:10

## 2022-01-01 RX ADMIN — NYSTATIN 500000 UNITS: 500000 SUSPENSION ORAL at 12:00

## 2022-01-01 RX ADMIN — ALBUMIN (HUMAN) 25 G: 0.25 INJECTION, SOLUTION INTRAVENOUS at 21:39

## 2022-01-01 RX ADMIN — POTASSIUM CHLORIDE 20 MEQ: 20 TABLET, EXTENDED RELEASE ORAL at 19:04

## 2022-01-01 RX ADMIN — IPRATROPIUM BROMIDE AND ALBUTEROL SULFATE 1 AMPULE: .5; 2.5 SOLUTION RESPIRATORY (INHALATION) at 12:57

## 2022-01-01 RX ADMIN — PETROLATUM: 420 OINTMENT TOPICAL at 21:19

## 2022-01-01 RX ADMIN — FLUCONAZOLE 400 MG: 2 INJECTION, SOLUTION INTRAVENOUS at 09:00

## 2022-01-01 RX ADMIN — IPRATROPIUM BROMIDE AND ALBUTEROL SULFATE 1 AMPULE: .5; 2.5 SOLUTION RESPIRATORY (INHALATION) at 16:03

## 2022-01-01 RX ADMIN — IPRATROPIUM BROMIDE AND ALBUTEROL SULFATE 1 AMPULE: .5; 2.5 SOLUTION RESPIRATORY (INHALATION) at 15:49

## 2022-01-01 RX ADMIN — Medication 500 MG: at 09:13

## 2022-01-01 RX ADMIN — ACYCLOVIR 800 MG: 800 TABLET ORAL at 00:40

## 2022-01-01 RX ADMIN — IPRATROPIUM BROMIDE AND ALBUTEROL SULFATE 1 AMPULE: .5; 2.5 SOLUTION RESPIRATORY (INHALATION) at 20:15

## 2022-01-01 RX ADMIN — SUCRALFATE 1 G: 1 TABLET ORAL at 13:17

## 2022-01-01 RX ADMIN — ZINC SULFATE 220 MG (50 MG) CAPSULE 50 MG: CAPSULE at 08:34

## 2022-01-01 RX ADMIN — ZINC SULFATE 220 MG (50 MG) CAPSULE 50 MG: CAPSULE at 09:24

## 2022-01-01 RX ADMIN — Medication 10 ML: at 20:01

## 2022-01-01 RX ADMIN — THIAMINE HYDROCHLORIDE 250 MG: 100 INJECTION, SOLUTION INTRAMUSCULAR; INTRAVENOUS at 12:09

## 2022-01-01 RX ADMIN — ENOXAPARIN SODIUM 40 MG: 100 INJECTION SUBCUTANEOUS at 09:13

## 2022-01-01 RX ADMIN — ENOXAPARIN SODIUM 40 MG: 100 INJECTION SUBCUTANEOUS at 09:03

## 2022-01-01 RX ADMIN — PANTOPRAZOLE SODIUM 40 MG: 40 INJECTION, POWDER, FOR SOLUTION INTRAVENOUS at 08:43

## 2022-01-01 RX ADMIN — FUROSEMIDE 40 MG: 10 INJECTION, SOLUTION INTRAMUSCULAR; INTRAVENOUS at 10:40

## 2022-01-01 RX ADMIN — IPRATROPIUM BROMIDE AND ALBUTEROL SULFATE 1 AMPULE: .5; 2.5 SOLUTION RESPIRATORY (INHALATION) at 20:24

## 2022-01-01 RX ADMIN — SODIUM CHLORIDE, PRESERVATIVE FREE 300 UNITS: 5 INJECTION INTRAVENOUS at 20:51

## 2022-01-01 RX ADMIN — NYSTATIN 500000 UNITS: 500000 SUSPENSION ORAL at 16:11

## 2022-01-01 RX ADMIN — Medication 1000 UNITS: at 09:03

## 2022-01-01 RX ADMIN — ENOXAPARIN SODIUM 40 MG: 100 INJECTION SUBCUTANEOUS at 10:42

## 2022-01-01 RX ADMIN — ACYCLOVIR 400 MG: 200 CAPSULE ORAL at 21:17

## 2022-01-01 RX ADMIN — MEROPENEM 1000 MG: 1 INJECTION, POWDER, FOR SOLUTION INTRAVENOUS at 03:17

## 2022-01-01 RX ADMIN — SODIUM CHLORIDE: 4.5 INJECTION, SOLUTION INTRAVENOUS at 07:41

## 2022-01-01 RX ADMIN — Medication 500 MG: at 13:16

## 2022-01-01 RX ADMIN — SUCRALFATE 1 G: 1 TABLET ORAL at 06:53

## 2022-01-01 RX ADMIN — SUCRALFATE 1 G: 1 TABLET ORAL at 09:25

## 2022-01-01 RX ADMIN — Medication 500 MG: at 21:17

## 2022-01-01 RX ADMIN — CHLORDIAZEPOXIDE HYDROCHLORIDE 10 MG: 5 CAPSULE ORAL at 22:55

## 2022-01-01 RX ADMIN — Medication 50 MCG/HR: at 05:44

## 2022-01-01 RX ADMIN — PETROLATUM: 420 OINTMENT TOPICAL at 09:39

## 2022-01-01 RX ADMIN — POTASSIUM CHLORIDE 20 MEQ: 20 TABLET, EXTENDED RELEASE ORAL at 16:51

## 2022-01-01 RX ADMIN — ZINC SULFATE 220 MG (50 MG) CAPSULE 50 MG: CAPSULE at 10:03

## 2022-01-01 RX ADMIN — PETROLATUM: 420 OINTMENT TOPICAL at 08:30

## 2022-01-01 RX ADMIN — PANTOPRAZOLE SODIUM 40 MG: 40 TABLET, DELAYED RELEASE ORAL at 09:25

## 2022-01-01 RX ADMIN — SUCRALFATE 1 G: 1 TABLET ORAL at 20:59

## 2022-01-01 RX ADMIN — SUCRALFATE 1 G: 1 TABLET ORAL at 17:34

## 2022-01-01 RX ADMIN — FLUCONAZOLE 200 MG: 100 TABLET ORAL at 08:25

## 2022-01-01 RX ADMIN — ENOXAPARIN SODIUM 40 MG: 100 INJECTION SUBCUTANEOUS at 10:01

## 2022-01-01 RX ADMIN — POTASSIUM CHLORIDE 20 MEQ: 29.8 INJECTION, SOLUTION INTRAVENOUS at 17:17

## 2022-01-01 RX ADMIN — ENOXAPARIN SODIUM 40 MG: 100 INJECTION SUBCUTANEOUS at 09:30

## 2022-01-01 RX ADMIN — POTASSIUM CHLORIDE 10 MEQ: 7.46 INJECTION, SOLUTION INTRAVENOUS at 13:24

## 2022-01-01 RX ADMIN — PETROLATUM: 420 OINTMENT TOPICAL at 21:14

## 2022-01-01 RX ADMIN — IPRATROPIUM BROMIDE AND ALBUTEROL SULFATE 1 AMPULE: .5; 2.5 SOLUTION RESPIRATORY (INHALATION) at 13:07

## 2022-01-01 RX ADMIN — POTASSIUM CHLORIDE 10 MEQ: 7.46 INJECTION, SOLUTION INTRAVENOUS at 09:12

## 2022-01-01 RX ADMIN — FLUCONAZOLE 400 MG: 2 INJECTION, SOLUTION INTRAVENOUS at 11:10

## 2022-01-01 RX ADMIN — SUCRALFATE 1 G: 1 TABLET ORAL at 11:09

## 2022-01-01 RX ADMIN — Medication 500 MG: at 13:30

## 2022-01-01 RX ADMIN — MEROPENEM 1000 MG: 1 INJECTION, POWDER, FOR SOLUTION INTRAVENOUS at 04:22

## 2022-01-01 RX ADMIN — DOCUSATE SODIUM 100 MG: 50 LIQUID ORAL at 09:13

## 2022-01-01 RX ADMIN — THIAMINE HYDROCHLORIDE 250 MG: 100 INJECTION, SOLUTION INTRAMUSCULAR; INTRAVENOUS at 13:12

## 2022-01-01 RX ADMIN — MAGNESIUM SULFATE HEPTAHYDRATE 2000 MG: 40 INJECTION, SOLUTION INTRAVENOUS at 06:58

## 2022-01-01 RX ADMIN — IPRATROPIUM BROMIDE AND ALBUTEROL SULFATE 1 AMPULE: .5; 2.5 SOLUTION RESPIRATORY (INHALATION) at 17:06

## 2022-01-01 RX ADMIN — NYSTATIN 500000 UNITS: 500000 SUSPENSION ORAL at 16:04

## 2022-01-01 RX ADMIN — SODIUM CHLORIDE: 9 INJECTION, SOLUTION INTRAVENOUS at 13:57

## 2022-01-01 RX ADMIN — PROPOFOL 300 MG: 10 INJECTION, EMULSION INTRAVENOUS at 14:46

## 2022-01-01 RX ADMIN — NYSTATIN 500000 UNITS: 500000 SUSPENSION ORAL at 08:44

## 2022-01-01 RX ADMIN — Medication 500 MG: at 20:00

## 2022-01-01 RX ADMIN — HYDROCORTISONE: 0.01 CREAM TOPICAL at 10:22

## 2022-01-01 RX ADMIN — POTASSIUM CHLORIDE, DEXTROSE MONOHYDRATE AND SODIUM CHLORIDE: 150; 5; 450 INJECTION, SOLUTION INTRAVENOUS at 20:19

## 2022-01-01 RX ADMIN — AMLODIPINE BESYLATE 5 MG: 5 TABLET ORAL at 09:03

## 2022-01-01 RX ADMIN — FOLIC ACID 1 MG: 1 TABLET ORAL at 09:24

## 2022-01-01 RX ADMIN — POTASSIUM PHOSPHATE, MONOBASIC AND POTASSIUM PHOSPHATE, DIBASIC 30 MMOL: 224; 236 INJECTION, SOLUTION, CONCENTRATE INTRAVENOUS at 11:11

## 2022-01-01 RX ADMIN — CHLORHEXIDINE GLUCONATE 0.12% ORAL RINSE 15 ML: 1.2 LIQUID ORAL at 11:17

## 2022-01-01 RX ADMIN — NYSTATIN 500000 UNITS: 500000 SUSPENSION ORAL at 12:08

## 2022-01-01 RX ADMIN — SUCRALFATE 1 G: 1 TABLET ORAL at 06:09

## 2022-01-01 RX ADMIN — POTASSIUM CHLORIDE, DEXTROSE MONOHYDRATE AND SODIUM CHLORIDE: 150; 5; 450 INJECTION, SOLUTION INTRAVENOUS at 05:02

## 2022-01-01 RX ADMIN — FLUCONAZOLE 200 MG: 100 TABLET ORAL at 08:34

## 2022-01-01 RX ADMIN — DEXTROSE AND SODIUM CHLORIDE: 5; 450 INJECTION, SOLUTION INTRAVENOUS at 14:38

## 2022-01-01 RX ADMIN — NYSTATIN 500000 UNITS: 500000 SUSPENSION ORAL at 09:14

## 2022-01-01 RX ADMIN — ZINC SULFATE 220 MG (50 MG) CAPSULE 50 MG: CAPSULE at 10:20

## 2022-01-01 RX ADMIN — Medication 500 MG: at 22:34

## 2022-01-01 RX ADMIN — POTASSIUM CHLORIDE 20 MEQ: 29.8 INJECTION, SOLUTION INTRAVENOUS at 06:58

## 2022-01-01 RX ADMIN — SUCRALFATE 1 G: 1 TABLET ORAL at 17:38

## 2022-01-01 RX ADMIN — Medication 500 MG: at 21:19

## 2022-01-01 RX ADMIN — NYSTATIN 500000 UNITS: 500000 SUSPENSION ORAL at 16:38

## 2022-01-01 RX ADMIN — ENOXAPARIN SODIUM 40 MG: 100 INJECTION SUBCUTANEOUS at 08:45

## 2022-01-01 RX ADMIN — Medication 1 MG/HR: at 08:10

## 2022-01-01 RX ADMIN — PANTOPRAZOLE SODIUM 40 MG: 40 TABLET, DELAYED RELEASE ORAL at 06:56

## 2022-01-01 RX ADMIN — PETROLATUM: 420 OINTMENT TOPICAL at 09:14

## 2022-01-01 RX ADMIN — FUROSEMIDE 40 MG: 10 INJECTION, SOLUTION INTRAMUSCULAR; INTRAVENOUS at 14:24

## 2022-01-01 RX ADMIN — DEXTROSE AND SODIUM CHLORIDE: 5; 450 INJECTION, SOLUTION INTRAVENOUS at 06:32

## 2022-01-01 RX ADMIN — PETROLATUM: 420 OINTMENT TOPICAL at 22:56

## 2022-01-01 RX ADMIN — SUCRALFATE 1 G: 1 TABLET ORAL at 22:34

## 2022-01-01 RX ADMIN — NYSTATIN 500000 UNITS: 500000 SUSPENSION ORAL at 21:19

## 2022-01-01 RX ADMIN — POTASSIUM CHLORIDE 20 MEQ: 29.8 INJECTION, SOLUTION INTRAVENOUS at 09:52

## 2022-01-01 RX ADMIN — Medication 1000 UNITS: at 08:34

## 2022-01-01 RX ADMIN — DOCUSATE SODIUM 100 MG: 50 LIQUID ORAL at 09:50

## 2022-01-01 RX ADMIN — Medication 500 MG: at 09:10

## 2022-01-01 RX ADMIN — FLUCONAZOLE 400 MG: 2 INJECTION, SOLUTION INTRAVENOUS at 10:56

## 2022-01-01 RX ADMIN — ACYCLOVIR SODIUM 800 MG: 500 INJECTION, SOLUTION INTRAVENOUS at 08:00

## 2022-01-01 RX ADMIN — PETROLATUM: 420 OINTMENT TOPICAL at 08:48

## 2022-01-01 RX ADMIN — DEXTROSE AND SODIUM CHLORIDE: 5; 450 INJECTION, SOLUTION INTRAVENOUS at 12:22

## 2022-01-01 RX ADMIN — IPRATROPIUM BROMIDE AND ALBUTEROL SULFATE 1 AMPULE: .5; 2.5 SOLUTION RESPIRATORY (INHALATION) at 15:44

## 2022-01-01 RX ADMIN — SODIUM CHLORIDE: 9 INJECTION, SOLUTION INTRAVENOUS at 10:00

## 2022-01-01 RX ADMIN — CHLORHEXIDINE GLUCONATE 0.12% ORAL RINSE 15 ML: 1.2 LIQUID ORAL at 08:49

## 2022-01-01 RX ADMIN — FLUCONAZOLE 400 MG: 2 INJECTION, SOLUTION INTRAVENOUS at 17:00

## 2022-01-01 RX ADMIN — FOLIC ACID 1 MG: 1 TABLET ORAL at 07:56

## 2022-01-01 RX ADMIN — SODIUM CHLORIDE, PRESERVATIVE FREE 300 UNITS: 5 INJECTION INTRAVENOUS at 22:10

## 2022-01-01 RX ADMIN — Medication 500 MG: at 20:51

## 2022-01-01 RX ADMIN — THIAMINE HYDROCHLORIDE 250 MG: 100 INJECTION, SOLUTION INTRAMUSCULAR; INTRAVENOUS at 15:14

## 2022-01-01 RX ADMIN — MEROPENEM 1000 MG: 1 INJECTION, POWDER, FOR SOLUTION INTRAVENOUS at 17:00

## 2022-01-01 RX ADMIN — ERTAPENEM SODIUM 1000 MG: 1 INJECTION, POWDER, LYOPHILIZED, FOR SOLUTION INTRAMUSCULAR; INTRAVENOUS at 12:08

## 2022-01-01 RX ADMIN — SODIUM BICARBONATE: 84 INJECTION, SOLUTION INTRAVENOUS at 00:19

## 2022-01-01 RX ADMIN — SODIUM CHLORIDE: 9 INJECTION, SOLUTION INTRAVENOUS at 14:38

## 2022-01-01 RX ADMIN — NYSTATIN 500000 UNITS: 500000 SUSPENSION ORAL at 10:01

## 2022-01-01 RX ADMIN — DOCUSATE SODIUM 100 MG: 50 LIQUID ORAL at 20:59

## 2022-01-01 RX ADMIN — POTASSIUM CHLORIDE 10 MEQ: 7.46 INJECTION, SOLUTION INTRAVENOUS at 09:06

## 2022-01-01 RX ADMIN — Medication 10 ML: at 20:51

## 2022-01-01 RX ADMIN — DOCUSATE SODIUM 100 MG: 50 LIQUID ORAL at 20:16

## 2022-01-01 RX ADMIN — Medication 10 ML: at 22:23

## 2022-01-01 RX ADMIN — SUCRALFATE 1 G: 1 TABLET ORAL at 11:18

## 2022-01-01 RX ADMIN — ENOXAPARIN SODIUM 40 MG: 100 INJECTION SUBCUTANEOUS at 08:32

## 2022-01-01 RX ADMIN — PANTOPRAZOLE SODIUM 40 MG: 40 INJECTION, POWDER, FOR SOLUTION INTRAVENOUS at 08:46

## 2022-01-01 RX ADMIN — ASPIRIN 81 MG 81 MG: 81 TABLET ORAL at 09:39

## 2022-01-01 RX ADMIN — CHLORHEXIDINE GLUCONATE 0.12% ORAL RINSE 15 ML: 1.2 LIQUID ORAL at 08:31

## 2022-01-01 RX ADMIN — CETIRIZINE HYDROCHLORIDE 10 MG: 10 TABLET, FILM COATED ORAL at 09:39

## 2022-01-01 RX ADMIN — PANTOPRAZOLE SODIUM 40 MG: 40 TABLET, DELAYED RELEASE ORAL at 06:14

## 2022-01-01 RX ADMIN — NYSTATIN 500000 UNITS: 500000 SUSPENSION ORAL at 10:41

## 2022-01-01 RX ADMIN — FLUCONAZOLE 200 MG: 100 TABLET ORAL at 09:13

## 2022-01-01 RX ADMIN — DOCUSATE SODIUM 100 MG: 50 LIQUID ORAL at 08:26

## 2022-01-01 RX ADMIN — DOCUSATE SODIUM 100 MG: 50 LIQUID ORAL at 20:51

## 2022-01-01 RX ADMIN — IPRATROPIUM BROMIDE AND ALBUTEROL SULFATE 1 AMPULE: .5; 2.5 SOLUTION RESPIRATORY (INHALATION) at 20:16

## 2022-01-01 RX ADMIN — MAGNESIUM SULFATE HEPTAHYDRATE 1000 MG: 1 INJECTION, SOLUTION INTRAVENOUS at 09:43

## 2022-01-01 RX ADMIN — Medication 500 MG: at 18:42

## 2022-01-01 RX ADMIN — CHLORHEXIDINE GLUCONATE 0.12% ORAL RINSE 15 ML: 1.2 LIQUID ORAL at 20:17

## 2022-01-01 RX ADMIN — FUROSEMIDE 40 MG: 10 INJECTION, SOLUTION INTRAMUSCULAR; INTRAVENOUS at 21:35

## 2022-01-01 RX ADMIN — SODIUM CHLORIDE: 9 INJECTION, SOLUTION INTRAVENOUS at 06:13

## 2022-01-01 RX ADMIN — POTASSIUM CHLORIDE: 7.45 INJECTION INTRAVENOUS at 07:04

## 2022-01-01 RX ADMIN — POTASSIUM CHLORIDE 10 MEQ: 7.46 INJECTION, SOLUTION INTRAVENOUS at 22:09

## 2022-01-01 RX ADMIN — NIFEDIPINE 30 MG: 30 TABLET, EXTENDED RELEASE ORAL at 09:52

## 2022-01-01 RX ADMIN — PETROLATUM: 420 OINTMENT TOPICAL at 21:17

## 2022-01-01 RX ADMIN — THIAMINE HYDROCHLORIDE 250 MG: 100 INJECTION, SOLUTION INTRAMUSCULAR; INTRAVENOUS at 13:22

## 2022-01-01 RX ADMIN — ALBUMIN (HUMAN) 25 G: 0.25 INJECTION, SOLUTION INTRAVENOUS at 15:02

## 2022-01-01 RX ADMIN — POTASSIUM PHOSPHATE, MONOBASIC AND POTASSIUM PHOSPHATE, DIBASIC 30 MMOL: 224; 236 INJECTION, SOLUTION, CONCENTRATE INTRAVENOUS at 13:15

## 2022-01-01 RX ADMIN — NYSTATIN 500000 UNITS: 500000 SUSPENSION ORAL at 12:04

## 2022-01-01 RX ADMIN — Medication 1000 UNITS: at 09:10

## 2022-01-01 RX ADMIN — DOCUSATE SODIUM 100 MG: 50 LIQUID ORAL at 09:10

## 2022-01-01 RX ADMIN — ALBUMIN (HUMAN) 25 G: 0.25 INJECTION, SOLUTION INTRAVENOUS at 17:27

## 2022-01-01 RX ADMIN — ZINC SULFATE 220 MG (50 MG) CAPSULE 50 MG: CAPSULE at 09:10

## 2022-01-01 RX ADMIN — IPRATROPIUM BROMIDE AND ALBUTEROL SULFATE 1 AMPULE: .5; 2.5 SOLUTION RESPIRATORY (INHALATION) at 16:26

## 2022-01-01 RX ADMIN — ALBUMIN (HUMAN) 25 G: 0.25 INJECTION, SOLUTION INTRAVENOUS at 14:24

## 2022-01-01 RX ADMIN — SUCRALFATE 1 G: 1 TABLET ORAL at 19:53

## 2022-01-01 RX ADMIN — Medication 25 MCG/HR: at 08:09

## 2022-01-01 RX ADMIN — ETOMIDATE 20 MG: 2 INJECTION, SOLUTION INTRAVENOUS at 11:25

## 2022-01-01 RX ADMIN — SODIUM CHLORIDE, PRESERVATIVE FREE 300 UNITS: 5 INJECTION INTRAVENOUS at 21:00

## 2022-01-01 RX ADMIN — AMLODIPINE BESYLATE 5 MG: 5 TABLET ORAL at 08:26

## 2022-01-01 RX ADMIN — NIFEDIPINE 30 MG: 30 TABLET, EXTENDED RELEASE ORAL at 09:38

## 2022-01-01 RX ADMIN — IPRATROPIUM BROMIDE AND ALBUTEROL SULFATE 1 AMPULE: .5; 2.5 SOLUTION RESPIRATORY (INHALATION) at 07:54

## 2022-01-01 RX ADMIN — POTASSIUM CHLORIDE 10 MEQ: 7.46 INJECTION, SOLUTION INTRAVENOUS at 10:08

## 2022-01-01 RX ADMIN — MAGNESIUM SULFATE HEPTAHYDRATE 2000 MG: 40 INJECTION, SOLUTION INTRAVENOUS at 07:10

## 2022-01-01 RX ADMIN — ALBUMIN (HUMAN) 25 G: 0.25 INJECTION, SOLUTION INTRAVENOUS at 05:03

## 2022-01-01 RX ADMIN — MAGNESIUM CITRATE 300 ML: 1.75 LIQUID ORAL at 16:02

## 2022-01-01 RX ADMIN — NYSTATIN 500000 UNITS: 500000 SUSPENSION ORAL at 08:33

## 2022-01-01 RX ADMIN — DOCUSATE SODIUM 100 MG: 50 LIQUID ORAL at 10:01

## 2022-01-01 RX ADMIN — AMLODIPINE BESYLATE 5 MG: 5 TABLET ORAL at 10:41

## 2022-01-01 RX ADMIN — IPRATROPIUM BROMIDE AND ALBUTEROL SULFATE 1 AMPULE: .5; 2.5 SOLUTION RESPIRATORY (INHALATION) at 12:31

## 2022-01-01 RX ADMIN — PETROLATUM: 420 OINTMENT TOPICAL at 08:27

## 2022-01-01 RX ADMIN — Medication 1000 UNITS: at 10:03

## 2022-01-01 RX ADMIN — CHLORHEXIDINE GLUCONATE 0.12% ORAL RINSE 15 ML: 1.2 LIQUID ORAL at 22:36

## 2022-01-01 RX ADMIN — Medication 500 MG: at 09:24

## 2022-01-01 RX ADMIN — POTASSIUM CHLORIDE 20 MEQ: 20 TABLET, EXTENDED RELEASE ORAL at 09:52

## 2022-01-01 RX ADMIN — CHLORHEXIDINE GLUCONATE 0.12% ORAL RINSE 15 ML: 1.2 LIQUID ORAL at 08:43

## 2022-01-01 RX ADMIN — PETROLATUM: 420 OINTMENT TOPICAL at 08:31

## 2022-01-01 RX ADMIN — DOCUSATE SODIUM 100 MG: 50 LIQUID ORAL at 08:43

## 2022-01-01 RX ADMIN — PANTOPRAZOLE SODIUM 40 MG: 40 TABLET, DELAYED RELEASE ORAL at 05:43

## 2022-01-01 RX ADMIN — POTASSIUM CHLORIDE 40 MEQ: 29.8 INJECTION, SOLUTION INTRAVENOUS at 07:32

## 2022-01-01 RX ADMIN — NIFEDIPINE 30 MG: 30 TABLET, EXTENDED RELEASE ORAL at 22:55

## 2022-01-01 RX ADMIN — ALBUMIN (HUMAN) 25 G: 0.25 INJECTION, SOLUTION INTRAVENOUS at 21:17

## 2022-01-01 RX ADMIN — NYSTATIN 500000 UNITS: 500000 SUSPENSION ORAL at 21:17

## 2022-01-01 RX ADMIN — FOLIC ACID 1 MG: 1 TABLET ORAL at 09:03

## 2022-01-01 RX ADMIN — PETROLATUM: 420 OINTMENT TOPICAL at 09:30

## 2022-01-01 RX ADMIN — IPRATROPIUM BROMIDE AND ALBUTEROL SULFATE 1 AMPULE: .5; 2.5 SOLUTION RESPIRATORY (INHALATION) at 16:11

## 2022-01-01 RX ADMIN — SUCRALFATE 1 G: 1 TABLET ORAL at 09:02

## 2022-01-01 RX ADMIN — Medication 1000 UNITS: at 09:50

## 2022-01-01 RX ADMIN — DOCUSATE SODIUM 100 MG: 50 LIQUID ORAL at 21:17

## 2022-01-01 RX ADMIN — FUROSEMIDE 40 MG: 10 INJECTION, SOLUTION INTRAMUSCULAR; INTRAVENOUS at 13:47

## 2022-01-01 RX ADMIN — NYSTATIN 500000 UNITS: 500000 SUSPENSION ORAL at 13:47

## 2022-01-01 RX ADMIN — SUCRALFATE 1 G: 1 TABLET ORAL at 17:45

## 2022-01-01 RX ADMIN — Medication 1000 UNITS: at 09:36

## 2022-01-01 RX ADMIN — FLUCONAZOLE, SODIUM CHLORIDE 200 MG: 2 INJECTION INTRAVENOUS at 08:46

## 2022-01-01 RX ADMIN — Medication 1000 UNITS: at 09:24

## 2022-01-01 RX ADMIN — NYSTATIN 500000 UNITS: 500000 SUSPENSION ORAL at 21:21

## 2022-01-01 RX ADMIN — ASPIRIN 81 MG 81 MG: 81 TABLET ORAL at 10:20

## 2022-01-01 RX ADMIN — BARIUM SULFATE 120 ML: 400 SUSPENSION ORAL at 13:23

## 2022-01-01 RX ADMIN — Medication 3 MG/HR: at 17:31

## 2022-01-01 RX ADMIN — PANTOPRAZOLE SODIUM 40 MG: 40 INJECTION, POWDER, FOR SOLUTION INTRAVENOUS at 08:26

## 2022-01-01 RX ADMIN — LORAZEPAM 1 MG: 2 INJECTION INTRAMUSCULAR; INTRAVENOUS at 21:05

## 2022-01-01 RX ADMIN — NYSTATIN 500000 UNITS: 500000 SUSPENSION ORAL at 18:42

## 2022-01-01 RX ADMIN — POTASSIUM CHLORIDE 20 MEQ: 29.8 INJECTION, SOLUTION INTRAVENOUS at 07:58

## 2022-01-01 RX ADMIN — IPRATROPIUM BROMIDE AND ALBUTEROL SULFATE 1 AMPULE: .5; 2.5 SOLUTION RESPIRATORY (INHALATION) at 07:55

## 2022-01-01 RX ADMIN — POTASSIUM CHLORIDE 20 MEQ: 20 TABLET, EXTENDED RELEASE ORAL at 16:18

## 2022-01-01 RX ADMIN — Medication 10 ML: at 21:14

## 2022-01-01 RX ADMIN — NYSTATIN 500000 UNITS: 500000 SUSPENSION ORAL at 22:09

## 2022-01-01 RX ADMIN — IPRATROPIUM BROMIDE AND ALBUTEROL SULFATE 1 AMPULE: .5; 2.5 SOLUTION RESPIRATORY (INHALATION) at 11:43

## 2022-01-01 RX ADMIN — FUROSEMIDE 40 MG: 10 INJECTION, SOLUTION INTRAMUSCULAR; INTRAVENOUS at 21:10

## 2022-01-01 RX ADMIN — FENTANYL CITRATE 25 MCG: 50 INJECTION, SOLUTION INTRAMUSCULAR; INTRAVENOUS at 10:42

## 2022-01-01 RX ADMIN — MAGNESIUM SULFATE HEPTAHYDRATE 2000 MG: 40 INJECTION, SOLUTION INTRAVENOUS at 17:30

## 2022-01-01 RX ADMIN — SODIUM CHLORIDE: 9 INJECTION, SOLUTION INTRAVENOUS at 12:08

## 2022-01-01 RX ADMIN — Medication 500 MG: at 20:31

## 2022-01-01 RX ADMIN — NYSTATIN 500000 UNITS: 500000 SUSPENSION ORAL at 14:10

## 2022-01-01 RX ADMIN — CHLORHEXIDINE GLUCONATE 0.12% ORAL RINSE 15 ML: 1.2 LIQUID ORAL at 10:01

## 2022-01-01 RX ADMIN — SUCRALFATE 1 G: 1 TABLET ORAL at 18:28

## 2022-01-01 RX ADMIN — SUCRALFATE 1 G: 1 TABLET ORAL at 17:21

## 2022-01-01 RX ADMIN — SUCRALFATE 1 G: 1 TABLET ORAL at 20:00

## 2022-01-01 RX ADMIN — DOCUSATE SODIUM 100 MG: 50 LIQUID ORAL at 19:53

## 2022-01-01 RX ADMIN — NIFEDIPINE 30 MG: 30 TABLET, EXTENDED RELEASE ORAL at 13:49

## 2022-01-01 RX ADMIN — SODIUM CHLORIDE: 9 INJECTION, SOLUTION INTRAVENOUS at 00:09

## 2022-01-01 RX ADMIN — POTASSIUM CHLORIDE 10 MEQ: 7.46 INJECTION, SOLUTION INTRAVENOUS at 10:56

## 2022-01-01 RX ADMIN — BARIUM SULFATE 10 ML: 400 PASTE ORAL at 13:23

## 2022-01-01 RX ADMIN — POTASSIUM CHLORIDE 20 MEQ: 29.8 INJECTION, SOLUTION INTRAVENOUS at 09:10

## 2022-01-01 RX ADMIN — POTASSIUM CHLORIDE 10 MEQ: 7.46 INJECTION, SOLUTION INTRAVENOUS at 19:02

## 2022-01-01 RX ADMIN — SUCRALFATE 1 G: 1 TABLET ORAL at 20:51

## 2022-01-01 RX ADMIN — IPRATROPIUM BROMIDE AND ALBUTEROL SULFATE 1 AMPULE: .5; 2.5 SOLUTION RESPIRATORY (INHALATION) at 08:33

## 2022-01-01 RX ADMIN — PETROLATUM: 420 OINTMENT TOPICAL at 22:08

## 2022-01-01 RX ADMIN — ACYCLOVIR SODIUM 800 MG: 500 INJECTION, SOLUTION INTRAVENOUS at 11:12

## 2022-01-01 RX ADMIN — FUROSEMIDE 40 MG: 10 INJECTION, SOLUTION INTRAMUSCULAR; INTRAVENOUS at 16:02

## 2022-01-01 RX ADMIN — FOLIC ACID 1 MG: 1 TABLET ORAL at 20:21

## 2022-01-01 RX ADMIN — Medication 500 MG: at 13:54

## 2022-01-01 RX ADMIN — FLUCONAZOLE, SODIUM CHLORIDE 200 MG: 2 INJECTION INTRAVENOUS at 09:30

## 2022-01-01 RX ADMIN — PETROLATUM: 420 OINTMENT TOPICAL at 23:29

## 2022-01-01 RX ADMIN — NYSTATIN 500000 UNITS: 500000 SUSPENSION ORAL at 18:02

## 2022-01-01 RX ADMIN — CHLORHEXIDINE GLUCONATE 0.12% ORAL RINSE 15 ML: 1.2 LIQUID ORAL at 09:15

## 2022-01-01 RX ADMIN — POTASSIUM CHLORIDE 10 MEQ: 7.46 INJECTION, SOLUTION INTRAVENOUS at 00:39

## 2022-01-01 RX ADMIN — PETROLATUM: 420 OINTMENT TOPICAL at 20:51

## 2022-01-01 RX ADMIN — SUCRALFATE 1 G: 1 TABLET ORAL at 16:55

## 2022-01-01 RX ADMIN — IPRATROPIUM BROMIDE AND ALBUTEROL SULFATE 1 AMPULE: .5; 2.5 SOLUTION RESPIRATORY (INHALATION) at 20:38

## 2022-01-01 RX ADMIN — PETROLATUM: 420 OINTMENT TOPICAL at 20:24

## 2022-01-01 RX ADMIN — ACETAMINOPHEN 650 MG: 325 TABLET ORAL at 08:34

## 2022-01-01 RX ADMIN — IPRATROPIUM BROMIDE AND ALBUTEROL SULFATE 1 AMPULE: .5; 2.5 SOLUTION RESPIRATORY (INHALATION) at 20:48

## 2022-01-01 RX ADMIN — IPRATROPIUM BROMIDE AND ALBUTEROL SULFATE 1 AMPULE: .5; 2.5 SOLUTION RESPIRATORY (INHALATION) at 19:34

## 2022-01-01 RX ADMIN — NYSTATIN 500000 UNITS: 500000 SUSPENSION ORAL at 09:24

## 2022-01-01 RX ADMIN — PANTOPRAZOLE SODIUM 40 MG: 40 INJECTION, POWDER, FOR SOLUTION INTRAVENOUS at 10:01

## 2022-01-01 RX ADMIN — IPRATROPIUM BROMIDE AND ALBUTEROL SULFATE 1 AMPULE: .5; 2.5 SOLUTION RESPIRATORY (INHALATION) at 20:29

## 2022-01-01 RX ADMIN — Medication 500 MG: at 13:38

## 2022-01-01 RX ADMIN — SODIUM CHLORIDE: 9 INJECTION, SOLUTION INTRAVENOUS at 14:55

## 2022-01-01 RX ADMIN — SODIUM BICARBONATE: 84 INJECTION, SOLUTION INTRAVENOUS at 03:22

## 2022-01-01 RX ADMIN — IPRATROPIUM BROMIDE AND ALBUTEROL SULFATE 1 AMPULE: .5; 2.5 SOLUTION RESPIRATORY (INHALATION) at 07:57

## 2022-01-01 RX ADMIN — DEXTROSE AND SODIUM CHLORIDE: 5; 450 INJECTION, SOLUTION INTRAVENOUS at 16:25

## 2022-01-01 RX ADMIN — ALBUMIN (HUMAN) 25 G: 0.25 INJECTION, SOLUTION INTRAVENOUS at 05:26

## 2022-01-01 RX ADMIN — SUCRALFATE 1 G: 1 TABLET ORAL at 06:38

## 2022-01-01 RX ADMIN — CHLORHEXIDINE GLUCONATE 0.12% ORAL RINSE 15 ML: 1.2 LIQUID ORAL at 09:41

## 2022-01-01 RX ADMIN — FUROSEMIDE 40 MG: 10 INJECTION, SOLUTION INTRAMUSCULAR; INTRAVENOUS at 08:32

## 2022-01-01 RX ADMIN — PETROLATUM: 420 OINTMENT TOPICAL at 20:02

## 2022-01-01 RX ADMIN — SUCRALFATE 1 G: 1 TABLET ORAL at 18:02

## 2022-01-01 RX ADMIN — POTASSIUM PHOSPHATE, MONOBASIC AND POTASSIUM PHOSPHATE, DIBASIC 30 MMOL: 224; 236 INJECTION, SOLUTION, CONCENTRATE INTRAVENOUS at 16:03

## 2022-01-01 RX ADMIN — FOLIC ACID 1 MG: 1 TABLET ORAL at 16:34

## 2022-01-01 RX ADMIN — SODIUM CHLORIDE: 9 INJECTION, SOLUTION INTRAVENOUS at 05:23

## 2022-01-01 RX ADMIN — NOREPINEPHRINE BITARTRATE 5 MCG/MIN: 1 INJECTION, SOLUTION, CONCENTRATE INTRAVENOUS at 13:50

## 2022-01-01 RX ADMIN — POTASSIUM CHLORIDE 20 MEQ: 29.8 INJECTION, SOLUTION INTRAVENOUS at 08:34

## 2022-01-01 RX ADMIN — Medication 500 MG: at 10:03

## 2022-01-01 RX ADMIN — NYSTATIN 500000 UNITS: 500000 SUSPENSION ORAL at 20:31

## 2022-01-01 RX ADMIN — Medication 500 MG: at 21:14

## 2022-01-01 RX ADMIN — THIAMINE HYDROCHLORIDE 250 MG: 100 INJECTION, SOLUTION INTRAMUSCULAR; INTRAVENOUS at 12:05

## 2022-01-01 RX ADMIN — PETROLATUM: 420 OINTMENT TOPICAL at 22:12

## 2022-01-01 RX ADMIN — DOCUSATE SODIUM 100 MG: 50 LIQUID ORAL at 21:19

## 2022-01-01 RX ADMIN — FOLIC ACID 1 MG: 1 TABLET ORAL at 10:01

## 2022-01-01 RX ADMIN — POTASSIUM CHLORIDE 20 MEQ: 20 TABLET, EXTENDED RELEASE ORAL at 13:49

## 2022-01-01 RX ADMIN — FLUCONAZOLE 200 MG: 100 TABLET ORAL at 09:03

## 2022-01-01 RX ADMIN — NYSTATIN 500000 UNITS: 500000 SUSPENSION ORAL at 20:00

## 2022-01-01 RX ADMIN — DOXYCYCLINE HYCLATE 100 MG: 100 CAPSULE ORAL at 09:03

## 2022-01-01 RX ADMIN — NYSTATIN 500000 UNITS: 500000 SUSPENSION ORAL at 17:45

## 2022-01-01 RX ADMIN — FOLIC ACID 1 MG: 1 TABLET ORAL at 10:20

## 2022-01-01 RX ADMIN — DOCUSATE SODIUM 100 MG: 50 LIQUID ORAL at 10:41

## 2022-01-01 RX ADMIN — POTASSIUM CHLORIDE 10 MEQ: 7.46 INJECTION, SOLUTION INTRAVENOUS at 08:22

## 2022-01-01 RX ADMIN — SUCRALFATE 1 G: 1 TABLET ORAL at 21:30

## 2022-01-01 RX ADMIN — NYSTATIN 500000 UNITS: 500000 SUSPENSION ORAL at 13:48

## 2022-01-01 RX ADMIN — IPRATROPIUM BROMIDE AND ALBUTEROL SULFATE 1 AMPULE: .5; 2.5 SOLUTION RESPIRATORY (INHALATION) at 11:39

## 2022-01-01 RX ADMIN — ZINC SULFATE 220 MG (50 MG) CAPSULE 50 MG: CAPSULE at 09:50

## 2022-01-01 RX ADMIN — ACYCLOVIR SODIUM 800 MG: 500 INJECTION, SOLUTION INTRAVENOUS at 00:55

## 2022-01-01 RX ADMIN — THIAMINE HYDROCHLORIDE 500 MG: 100 INJECTION, SOLUTION INTRAMUSCULAR; INTRAVENOUS at 13:29

## 2022-01-01 RX ADMIN — SODIUM CHLORIDE: 9 INJECTION, SOLUTION INTRAVENOUS at 08:14

## 2022-01-01 RX ADMIN — MAGNESIUM CITRATE 300 ML: 1.75 LIQUID ORAL at 17:06

## 2022-01-01 RX ADMIN — MEROPENEM 1000 MG: 1 INJECTION, POWDER, FOR SOLUTION INTRAVENOUS at 03:11

## 2022-01-01 RX ADMIN — SUCRALFATE 1 G: 1 TABLET ORAL at 11:25

## 2022-01-01 RX ADMIN — MAGNESIUM SULFATE HEPTAHYDRATE 2000 MG: 40 INJECTION, SOLUTION INTRAVENOUS at 08:44

## 2022-01-01 RX ADMIN — POTASSIUM CHLORIDE 20 MEQ: 29.8 INJECTION, SOLUTION INTRAVENOUS at 10:19

## 2022-01-01 RX ADMIN — PETROLATUM: 420 OINTMENT TOPICAL at 21:27

## 2022-01-01 RX ADMIN — PANTOPRAZOLE SODIUM 40 MG: 40 INJECTION, POWDER, FOR SOLUTION INTRAVENOUS at 15:20

## 2022-01-01 RX ADMIN — IPRATROPIUM BROMIDE AND ALBUTEROL SULFATE 1 AMPULE: .5; 2.5 SOLUTION RESPIRATORY (INHALATION) at 19:54

## 2022-01-01 RX ADMIN — NYSTATIN 500000 UNITS: 500000 SUSPENSION ORAL at 17:34

## 2022-01-01 RX ADMIN — NYSTATIN 500000 UNITS: 500000 SUSPENSION ORAL at 17:21

## 2022-01-01 RX ADMIN — POTASSIUM CHLORIDE 20 MEQ: 29.8 INJECTION, SOLUTION INTRAVENOUS at 17:28

## 2022-01-01 RX ADMIN — FUROSEMIDE 40 MG: 10 INJECTION, SOLUTION INTRAMUSCULAR; INTRAVENOUS at 05:26

## 2022-01-01 RX ADMIN — ACYCLOVIR 400 MG: 200 CAPSULE ORAL at 09:13

## 2022-01-01 RX ADMIN — NYSTATIN 500000 UNITS: 500000 SUSPENSION ORAL at 13:25

## 2022-01-01 RX ADMIN — PETROLATUM: 420 OINTMENT TOPICAL at 20:17

## 2022-01-01 RX ADMIN — SUCRALFATE 1 G: 1 TABLET ORAL at 10:42

## 2022-01-01 RX ADMIN — IPRATROPIUM BROMIDE AND ALBUTEROL SULFATE 1 AMPULE: .5; 2.5 SOLUTION RESPIRATORY (INHALATION) at 16:34

## 2022-01-01 RX ADMIN — NYSTATIN 500000 UNITS: 500000 SUSPENSION ORAL at 09:35

## 2022-01-01 RX ADMIN — Medication 10 ML: at 08:32

## 2022-01-01 RX ADMIN — SUCRALFATE 1 G: 1 TABLET ORAL at 06:14

## 2022-01-01 RX ADMIN — SODIUM CHLORIDE: 9 INJECTION, SOLUTION INTRAVENOUS at 23:08

## 2022-01-01 RX ADMIN — LEVOFLOXACIN 500 MG: 500 INJECTION, SOLUTION INTRAVENOUS at 13:17

## 2022-01-01 RX ADMIN — SUCRALFATE 1 G: 1 TABLET ORAL at 21:53

## 2022-01-01 RX ADMIN — Medication 500 MG: at 13:17

## 2022-01-01 RX ADMIN — SODIUM CHLORIDE: 4.5 INJECTION, SOLUTION INTRAVENOUS at 21:20

## 2022-01-01 RX ADMIN — ZINC SULFATE 220 MG (50 MG) CAPSULE 50 MG: CAPSULE at 10:41

## 2022-01-01 RX ADMIN — POTASSIUM CHLORIDE 10 MEQ: 7.45 INJECTION INTRAVENOUS at 13:45

## 2022-01-01 RX ADMIN — MEROPENEM 1000 MG: 1 INJECTION, POWDER, FOR SOLUTION INTRAVENOUS at 18:30

## 2022-01-01 RX ADMIN — THIAMINE HYDROCHLORIDE 500 MG: 100 INJECTION, SOLUTION INTRAMUSCULAR; INTRAVENOUS at 14:50

## 2022-01-01 RX ADMIN — POTASSIUM CHLORIDE 20 MEQ: 29.8 INJECTION, SOLUTION INTRAVENOUS at 16:00

## 2022-01-01 RX ADMIN — FENTANYL CITRATE 25 MCG: 50 INJECTION, SOLUTION INTRAMUSCULAR; INTRAVENOUS at 10:51

## 2022-01-01 RX ADMIN — ACYCLOVIR SODIUM 800 MG: 500 INJECTION, SOLUTION INTRAVENOUS at 13:50

## 2022-01-01 RX ADMIN — SUCRALFATE 1 G: 1 TABLET ORAL at 06:48

## 2022-01-01 RX ADMIN — SODIUM CHLORIDE, PRESERVATIVE FREE 300 UNITS: 5 INJECTION INTRAVENOUS at 08:35

## 2022-01-01 RX ADMIN — Medication 500 MG: at 21:53

## 2022-01-01 RX ADMIN — PETROLATUM: 420 OINTMENT TOPICAL at 10:43

## 2022-01-01 RX ADMIN — ALBUMIN (HUMAN) 25 G: 0.25 INJECTION, SOLUTION INTRAVENOUS at 10:24

## 2022-01-01 RX ADMIN — NYSTATIN 500000 UNITS: 500000 SUSPENSION ORAL at 15:20

## 2022-01-01 RX ADMIN — CHLORHEXIDINE GLUCONATE 0.12% ORAL RINSE 15 ML: 1.2 LIQUID ORAL at 08:27

## 2022-01-01 RX ADMIN — SUCRALFATE 1 G: 1 TABLET ORAL at 06:00

## 2022-01-01 RX ADMIN — NYSTATIN 500000 UNITS: 500000 SUSPENSION ORAL at 19:58

## 2022-01-01 RX ADMIN — POTASSIUM CHLORIDE 20 MEQ: 29.8 INJECTION, SOLUTION INTRAVENOUS at 11:19

## 2022-01-01 RX ADMIN — NYSTATIN 500000 UNITS: 500000 SUSPENSION ORAL at 21:53

## 2022-01-01 RX ADMIN — SODIUM CHLORIDE: 9 INJECTION, SOLUTION INTRAVENOUS at 22:44

## 2022-01-01 RX ADMIN — POTASSIUM CHLORIDE 20 MEQ: 29.8 INJECTION, SOLUTION INTRAVENOUS at 09:41

## 2022-01-01 RX ADMIN — PETROLATUM: 420 OINTMENT TOPICAL at 09:12

## 2022-01-01 RX ADMIN — PETROLATUM: 420 OINTMENT TOPICAL at 09:47

## 2022-01-01 RX ADMIN — IPRATROPIUM BROMIDE AND ALBUTEROL SULFATE 1 AMPULE: .5; 2.5 SOLUTION RESPIRATORY (INHALATION) at 20:45

## 2022-01-01 RX ADMIN — CHLORHEXIDINE GLUCONATE 0.12% ORAL RINSE 15 ML: 1.2 LIQUID ORAL at 21:17

## 2022-01-01 RX ADMIN — NYSTATIN 500000 UNITS: 500000 SUSPENSION ORAL at 20:17

## 2022-01-01 RX ADMIN — IPRATROPIUM BROMIDE AND ALBUTEROL SULFATE 1 AMPULE: .5; 2.5 SOLUTION RESPIRATORY (INHALATION) at 17:41

## 2022-01-01 RX ADMIN — POTASSIUM CHLORIDE 40 MEQ: 7.45 INJECTION INTRAVENOUS at 20:59

## 2022-01-01 RX ADMIN — IPRATROPIUM BROMIDE AND ALBUTEROL SULFATE 1 AMPULE: .5; 2.5 SOLUTION RESPIRATORY (INHALATION) at 12:15

## 2022-01-01 RX ADMIN — POTASSIUM CHLORIDE 20 MEQ: 20 TABLET, EXTENDED RELEASE ORAL at 09:38

## 2022-01-01 RX ADMIN — NYSTATIN 500000 UNITS: 500000 SUSPENSION ORAL at 17:20

## 2022-01-01 RX ADMIN — SUCRALFATE 1 G: 1 TABLET ORAL at 18:42

## 2022-01-01 RX ADMIN — NYSTATIN 500000 UNITS: 500000 SUSPENSION ORAL at 07:57

## 2022-01-01 RX ADMIN — FOLIC ACID 1 MG: 1 TABLET ORAL at 09:36

## 2022-01-01 RX ADMIN — SUCRALFATE 1 G: 1 TABLET ORAL at 16:26

## 2022-01-01 RX ADMIN — FLUCONAZOLE 200 MG: 100 TABLET ORAL at 08:26

## 2022-01-01 RX ADMIN — DOCUSATE SODIUM 100 MG: 50 LIQUID ORAL at 20:01

## 2022-01-01 RX ADMIN — NYSTATIN 500000 UNITS: 500000 SUSPENSION ORAL at 16:21

## 2022-01-01 RX ADMIN — PETROLATUM: 420 OINTMENT TOPICAL at 20:59

## 2022-01-01 RX ADMIN — SENNOSIDES 8.6 MG: 8.6 TABLET, COATED ORAL at 20:31

## 2022-01-01 RX ADMIN — Medication 1000 UNITS: at 10:41

## 2022-01-01 RX ADMIN — SUCRALFATE 1 G: 1 TABLET ORAL at 06:19

## 2022-01-01 RX ADMIN — MEROPENEM 1000 MG: 1 INJECTION, POWDER, FOR SOLUTION INTRAVENOUS at 18:38

## 2022-01-01 RX ADMIN — ENOXAPARIN SODIUM 40 MG: 100 INJECTION SUBCUTANEOUS at 08:43

## 2022-01-01 RX ADMIN — THIAMINE HYDROCHLORIDE 250 MG: 100 INJECTION, SOLUTION INTRAMUSCULAR; INTRAVENOUS at 12:13

## 2022-01-01 RX ADMIN — DEXTROSE AND SODIUM CHLORIDE: 5; 450 INJECTION, SOLUTION INTRAVENOUS at 17:48

## 2022-01-01 RX ADMIN — SODIUM BICARBONATE: 84 INJECTION, SOLUTION INTRAVENOUS at 17:21

## 2022-01-01 RX ADMIN — PETROLATUM: 420 OINTMENT TOPICAL at 09:42

## 2022-01-01 RX ADMIN — SODIUM CHLORIDE: 9 INJECTION, SOLUTION INTRAVENOUS at 00:11

## 2022-01-01 RX ADMIN — ACYCLOVIR 800 MG: 800 TABLET ORAL at 20:19

## 2022-01-01 ASSESSMENT — PULMONARY FUNCTION TESTS
PIF_VALUE: 12
PIF_VALUE: 17
PIF_VALUE: 15
PIF_VALUE: 18
PIF_VALUE: 17
PIF_VALUE: 25
PIF_VALUE: 17
PIF_VALUE: 17
PIF_VALUE: 15
PIF_VALUE: 16
PIF_VALUE: 20
PIF_VALUE: 23
PIF_VALUE: 17
PIF_VALUE: 17
PIF_VALUE: 13
PIF_VALUE: 17
PIF_VALUE: 14
PIF_VALUE: 19
PIF_VALUE: 24
PIF_VALUE: 15
PIF_VALUE: 17
PIF_VALUE: 17
PIF_VALUE: 16
PIF_VALUE: 20
PIF_VALUE: 19
PIF_VALUE: 17
PIF_VALUE: 21
PIF_VALUE: 17
PIF_VALUE: 17
PIF_VALUE: 23
PIF_VALUE: 24
PIF_VALUE: 17
PIF_VALUE: 17
PIF_VALUE: 23
PIF_VALUE: 17
PIF_VALUE: 26
PIF_VALUE: 17
PIF_VALUE: 23
PIF_VALUE: 17
PIF_VALUE: 23
PIF_VALUE: 17
PIF_VALUE: 17
PIF_VALUE: 23
PIF_VALUE: 17
PIF_VALUE: 22
PIF_VALUE: 17
PIF_VALUE: 17
PIF_VALUE: 23
PIF_VALUE: 17
PIF_VALUE: 18
PIF_VALUE: 19
PIF_VALUE: 17
PIF_VALUE: 24
PIF_VALUE: 16
PIF_VALUE: 23
PIF_VALUE: 16
PIF_VALUE: 19
PIF_VALUE: 15
PIF_VALUE: 21
PIF_VALUE: 16
PIF_VALUE: 18
PIF_VALUE: 17
PIF_VALUE: 18
PIF_VALUE: 34
PIF_VALUE: 27
PIF_VALUE: 23
PIF_VALUE: 19
PIF_VALUE: 20
PIF_VALUE: 19
PIF_VALUE: 17
PIF_VALUE: 12
PIF_VALUE: 17
PIF_VALUE: 15
PIF_VALUE: 15
PIF_VALUE: 22
PIF_VALUE: 17
PIF_VALUE: 17
PIF_VALUE: 12
PIF_VALUE: 22
PIF_VALUE: 23
PIF_VALUE: 24
PIF_VALUE: 17
PIF_VALUE: 17
PIF_VALUE: 23
PIF_VALUE: 17
PIF_VALUE: 23
PIF_VALUE: 19
PIF_VALUE: 18
PIF_VALUE: 17
PIF_VALUE: 25
PIF_VALUE: 19
PIF_VALUE: 24
PIF_VALUE: 17
PIF_VALUE: 17
PIF_VALUE: 23
PIF_VALUE: 17
PIF_VALUE: 19
PIF_VALUE: 17
PIF_VALUE: 20
PIF_VALUE: 22
PIF_VALUE: 17
PIF_VALUE: 17
PIF_VALUE: 22
PIF_VALUE: 21
PIF_VALUE: 17
PIF_VALUE: 20
PIF_VALUE: 17
PIF_VALUE: 15
PIF_VALUE: 20

## 2022-01-01 ASSESSMENT — PAIN SCALES - GENERAL
PAINLEVEL_OUTOF10: 0
PAINLEVEL_OUTOF10: 10
PAINLEVEL_OUTOF10: 0
PAINLEVEL_OUTOF10: 4
PAINLEVEL_OUTOF10: 0
PAINLEVEL_OUTOF10: 3
PAINLEVEL_OUTOF10: 0

## 2022-01-01 ASSESSMENT — LIFESTYLE VARIABLES
SMOKING_STATUS: 0
SMOKING_STATUS: 0

## 2022-01-01 ASSESSMENT — PAIN SCALES - WONG BAKER

## 2022-01-01 ASSESSMENT — ENCOUNTER SYMPTOMS
ABDOMINAL PAIN: 1
BLOOD IN STOOL: 0
VOMITING: 0
SHORTNESS OF BREATH: 0
SHORTNESS OF BREATH: 0
NAUSEA: 0
ALLERGIC/IMMUNOLOGIC NEGATIVE: 1
EYE REDNESS: 0
CONSTIPATION: 1
COUGH: 0
SORE THROAT: 0
SHORTNESS OF BREATH: 0
BACK PAIN: 0
SORE THROAT: 0
DIARRHEA: 0
SHORTNESS OF BREATH: 0
DIARRHEA: 0
COUGH: 0
WHEEZING: 0
VOMITING: 0
ABDOMINAL PAIN: 0
EYE PAIN: 0
NAUSEA: 1
PHOTOPHOBIA: 0
BACK PAIN: 0

## 2022-01-01 ASSESSMENT — PAIN DESCRIPTION - DESCRIPTORS: DESCRIPTORS: OTHER (COMMENT)

## 2022-01-01 ASSESSMENT — PAIN DESCRIPTION - LOCATION
LOCATION: ABDOMEN
LOCATION: ABDOMEN

## 2022-01-01 ASSESSMENT — PAIN - FUNCTIONAL ASSESSMENT
PAIN_FUNCTIONAL_ASSESSMENT: 0-10
PAIN_FUNCTIONAL_ASSESSMENT: 0-10

## 2022-01-01 ASSESSMENT — PAIN DESCRIPTION - FREQUENCY
FREQUENCY: INTERMITTENT
FREQUENCY: INTERMITTENT

## 2022-01-01 ASSESSMENT — PAIN DESCRIPTION - PAIN TYPE
TYPE: ACUTE PAIN
TYPE: ACUTE PAIN

## 2022-02-18 PROBLEM — N17.9 AKI (ACUTE KIDNEY INJURY) (HCC): Status: ACTIVE | Noted: 2022-01-01

## 2022-02-18 NOTE — ED PROVIDER NOTES
Patient is a 10year-old male present to the ED due to vague abdominal pain nausea vomiting with diarrhea episodes over the last month. He also states he has a change in his taste and he does not know why it changed. Patient states that he usually able to eat and drink well and was previously on hypertensive and diuretic medication due to him having hypertension however his PCP took him off the diuretics due to him feeling that he was dehydrated. Since then he has continued to have worsening appetite and fatigue. He also complains of vague abdominal pain with sense of nausea. States this has not changed over the last months but has been persistent with weight loss of approximately 15 pounds. Denies any purposeful intent to lose weight. Denies any history of cancer. He states nothing makes his symptoms better. Admits to associated symptoms of nausea with fatigue decreased appetite. The history is provided by the patient. No  was used. Review of Systems   Constitutional: Positive for appetite change, fatigue and unexpected weight change. Negative for chills and fever. HENT: Negative for ear pain and sore throat. Eyes: Negative for pain. Respiratory: Negative for cough and shortness of breath. Cardiovascular: Negative for chest pain. Gastrointestinal: Positive for abdominal pain and nausea. Negative for diarrhea and vomiting. Genitourinary: Negative for flank pain and penile pain. Musculoskeletal: Negative for back pain and neck pain. Skin: Negative. Negative for rash. Allergic/Immunologic: Negative. Neurological: Negative. Negative for syncope and headaches. Physical Exam  Vitals and nursing note reviewed. Constitutional:       General: He is not in acute distress. Appearance: He is well-developed and normal weight. He is ill-appearing. HENT:      Head: Normocephalic and atraumatic.       Right Ear: External ear normal.      Left Ear: External ear normal.      Mouth/Throat:      Mouth: Mucous membranes are moist.      Pharynx: Oropharynx is clear. Eyes:      Pupils: Pupils are equal, round, and reactive to light. Cardiovascular:      Rate and Rhythm: Normal rate and regular rhythm. Heart sounds: Normal heart sounds. No murmur heard. Pulmonary:      Effort: Pulmonary effort is normal. No respiratory distress. Breath sounds: Normal breath sounds. Abdominal:      General: Abdomen is flat. Bowel sounds are normal.      Palpations: Abdomen is soft. Tenderness: There is no abdominal tenderness. There is no guarding or rebound. Musculoskeletal:      Cervical back: Neck supple. No muscular tenderness. Skin:     General: Skin is warm and dry. Neurological:      Mental Status: He is alert. Cranial Nerves: No cranial nerve deficit. Procedures           MDM  Number of Diagnoses or Management Options  Acute kidney injury ([address]  Alteration in appetite  Generalized abdominal pain  Nausea  Diagnosis management comments: Patient is a [de-identified] 10year-old male presented emergency department with weight loss decreased appetite and fatigue. Found to have acute kidney injury. Patient was given a liter fluid and put on a 125 cc/h infusion to complete the second liter following this he was admitted to the hospital for further evaluation by nephrology for his ongoing FRANCISCA as well as general surgery for his nausea with decreased appetite and unexpected weight loss of 15 to 20 pounds over the last month. CT abdomen pelvis did not find acute findings did find some evidence of gallbladder thickening without evidence of acute cholecystitis. This will be further evaluate by general surgery. Inpatient consults were placed both to nephrology and general surgery.   This week with Dr. Izzy Sanchez and he will admit the patient.                --------------------------------------------- PAST HISTORY ---------------------------------------------  Past Medical History:  has a past medical history of Allergic rhinitis, DVT (deep venous thrombosis) ([facility] [address] Hypertension, and Smoking. Past Surgical History:  has a past surgical history that includes ECHO Compl W Dop Color Flow (7/1/2015); Knee arthroscopy; Tonsillectomy; and pr lap, ventral hernia repair,reducible (N/A, 10/15/2018). Social History:  reports that he quit smoking about 17 years ago. He started smoking about 37 years ago. He smoked 1.00 pack per day. He has never used smokeless tobacco. He reports current alcohol use. He reports that he does not use drugs. Family History: family history includes Cancer in his father and mother. The patients home medications have been reviewed.     Allergies: Pcn [penicillins]    -------------------------------------------------- RESULTS -------------------------------------------------    LABS:  Results for orders placed or performed during the hospital encounter of 02/18/22   COVID-19, Rapid    Specimen: Leo   Result Value Ref Range    SARS-CoV-2, NAAT Not Detected Not Detected   CBC with Auto Differential   Result Value Ref Range    WBC 9.4 4.5 - 11.5 E9/L    RBC 3.12 (L) 3.80 - 5.80 E12/L    Hemoglobin 12.8 12.5 - 16.5 g/dL    Hematocrit 36.2 (L) 37.0 - 54.0 %    .0 (H) 80.0 - 99.9 fL    MCH 41.0 (H) 26.0 - 35.0 pg    MCHC 35.4 (H) 32.0 - 34.5 %    RDW 12.6 11.5 - 15.0 fL    Platelets 107 349 - 893 E9/L    MPV 9.9 7.0 - 12.0 fL    Neutrophils % 73.8 43.0 - 80.0 %    Immature Granulocytes % 1.4 0.0 - 5.0 %    Lymphocytes % 15.8 (L) 20.0 - 42.0 %    Monocytes % 8.5 2.0 - 12.0 %    Eosinophils % 0.1 0.0 - 6.0 %    Basophils % 0.4 0.0 - 2.0 %    Neutrophils Absolute 6.92 1.80 - 7.30 E9/L    Immature Granulocytes # 0.13 E9/L    Lymphocytes Absolute 1.48 (L) 1.50 - 4.00 E9/L    Monocytes Absolute 0.80 0.10 - 0.95 E9/L    Eosinophils Absolute 0.01 (L) 0.05 - 0.50 E9/L    Basophils Absolute 0.04 0.00 - 0.20 E9/L   Comprehensive Metabolic Panel w/ Reflex to MG   Result Value Ref Range    Sodium 137 132 - 146 mmol/L    Potassium reflex Magnesium 3.1 (L) 3.5 - 5.0 mmol/L    Chloride 96 (L) 98 - 107 mmol/L    CO2 23 22 - 29 mmol/L    Anion Gap 18 (H) 7 - 16 mmol/L    Glucose 125 (H) 74 - 99 mg/dL    BUN 35 (H) 6 - 23 mg/dL    CREATININE 2.2 (H) 0.7 - 1.2 mg/dL    GFR Non-African American 30 >=60 mL/min/1.73    GFR African American 36     Calcium 9.5 8.6 - 10.2 mg/dL    Total Protein 6.5 6.4 - 8.3 g/dL    Albumin 3.8 3.5 - 5.2 g/dL    Total Bilirubin 1.5 (H) 0.0 - 1.2 mg/dL    Alkaline Phosphatase 114 40 - 129 U/L    ALT 23 0 - 40 U/L    AST 28 0 - 39 U/L   Lipase   Result Value Ref Range    Lipase 41 13 - 60 U/L   Lactic Acid   Result Value Ref Range    Lactic Acid 2.0 0.5 - 2.2 mmol/L   Magnesium   Result Value Ref Range    Magnesium 1.6 1.6 - 2.6 mg/dL       RADIOLOGY:  CT ABDOMEN PELVIS WO CONTRAST Additional Contrast? None   Final Result   1. No acute intra-process. 2.  Cholelithiasis without CT evidence of acute cholecystitis. There is mild   diffuse hepatic steatosis. 3.  Right renal ectopia located in the left lower abdomen/pelvis. 4.  Testicles are located in the inguinal canal bilaterally. Wendy Traylor ------------------------- NURSING NOTES AND VITALS REVIEWED ---------------------------  Date / Time Roomed:  2/18/2022  3:09 PM  ED Bed Assignment:  07/07    The nursing notes within the ED encounter and vital signs as below have been reviewed.      Patient Vitals for the past 24 hrs:   BP Temp Temp src Pulse Resp SpO2 Height Weight   02/18/22 1815 108/69 -- -- 81 16 97 % -- --   02/18/22 1700 109/68 -- -- 87 16 99 % -- --   02/18/22 1646 124/83 -- -- 90 16 93 % -- --   02/18/22 1530 117/72 -- -- 100 16 100 % -- --   02/18/22 1450 96/66 96.4 °F (35.8 °C) Temporal 104 16 98 % 6' 2\" (1.88 m) 175 lb (79.4 kg)       Oxygen Saturation Interpretation: Normal    ------------------------------------------ PROGRESS NOTES ------------------------------------------  Re-evaluation(s):  Time: 1800  Patients symptoms show no change  Repeat physical examination is not changed    Counseling:  I have spoken with the patient and discussed todays results, in addition to providing specific details for the plan of care and counseling regarding the diagnosis and prognosis. Their questions are answered at this time and they are agreeable with the plan of admission.    --------------------------------- ADDITIONAL PROVIDER NOTES ---------------------------------  Consultations:  Time: 5966. Spoke with Dr. Helen Gaines. Discussed case. They will admit the patient. This patient's ED course included: a personal history and physicial examination, re-evaluation prior to disposition, multiple bedside re-evaluations, IV medications, cardiac monitoring, continuous pulse oximetry and complex medical decision making and emergency management    This patient has remained hemodynamically stable during their ED course. Diagnosis:  1. Generalized abdominal pain    2. Acute kidney injury ([address]    3. Nausea    4. Alteration in appetite        Disposition:  Patient's disposition: Admit to telemetry  Patient's condition is stable.              Sravanthi Stapleton DO  Resident  02/18/22 7992

## 2022-02-19 NOTE — CONSULTS
Consult Note    Dear Adrienne Mesa Cons, DO, thank you for referring Inna Webb for evaluation. Reason for Consult: Weight loss, constipation    HISTORY OF PRESENT ILLNESS:    The patient is a 77 y.o. male who presents with reported difficulties for the last month or so since he had a epidural procedure. Reports he is unable to take much oral intake in. He gets full quickly, does not have much taste or desire to eat. He reports a fairly moderate amount of weight loss as well as severe constipation which she attributes to not taking in much to eat. He did have a colonoscopy about 6 or so years ago and denies any colon polyps. Denies any family history of GI malignancy. Past Medical History:   Diagnosis Date    Allergic rhinitis     DVT (deep venous thrombosis) ([facility] [address] 2003    left leg    Hypertension     Smoking        Past Surgical History:   Procedure Laterality Date    ECHO COMPL W DOP COLOR FLOW  7/1/2015         KNEE ARTHROSCOPY      right    [place] LAP, VENTRAL HERNIA REPAIR,REDUCIBLE N/A 10/15/2018    DIAGNOSTIC LAPAROSCOPY,  OPEN VENTRAL HERNIA REPAIR WITH MESH performed by Carri De Guzman MD at [address] [place] [address]         Prior to Admission medications    Medication Sig Start Date End Date Taking?  Authorizing Provider   cetirizine (ZYRTEC) 10 MG tablet Take 10 mg by mouth daily as needed    Yes Historical Provider, MD   aspirin 81 MG chewable tablet Take 81 mg by mouth daily No hold per Dr. Christian Alfonso   Yes Historical Provider, MD   amLODIPine-benazepril (LOTREL) 10-40 MG per capsule Take 1 capsule by mouth daily   Yes Historical Provider, MD   pantoprazole (PROTONIX) 40 MG tablet Take 1 tablet by mouth every morning (before breakfast) 7/1/15   Agnes Cai MD       Scheduled Meds:   NIFEdipine  30 mg Oral Daily    aspirin  81 mg Oral Daily    potassium chloride  20 mEq Oral BID WC     ContinuousInfusions:   sodium chloride Stopped (02/18/22 7897)    sodium chloride 100 mL/hr at 22 2308     PRN Meds:    Allergies   Allergen Reactions    Pcn [Penicillins]        Social History     Socioeconomic History    Marital status:      Spouse name: Not on file    Number of children: Not on file    Years of education: Not on file    Highest education level: Not on file   Occupational History    Not on file   Tobacco Use    Smoking status: Former Smoker     Packs/day: 1.00     Start date: 1985     Quit date: 2005     Years since quittin.1    Smokeless tobacco: Never Used   Vaping Use    Vaping Use: Never used   Substance and Sexual Activity    Alcohol use: Yes     Comment: occasional beer    Drug use: No    Sexual activity: Not on file   Other Topics Concern    Not on file   Social History Narrative    Not on file     Social Determinants of Health     Financial Resource Strain:     Difficulty of Paying Living Expenses: Not on file   Food Insecurity:     Worried About [employer] in the Last Year: Not on file    Jo of Food in the Last Year: Not on file   Transportation Needs:     Lack of Transportation (Medical): Not on file    Lack of Transportation (Non-Medical):  Not on file   Physical Activity:     Days of Exercise per Week: Not on file    Minutes of Exercise per Session: Not on file   Stress:     Feeling of Stress : Not on file   Social Connections:     Frequency of Communication with Friends and Family: Not on file    Frequency of Social Gatherings with Friends and Family: Not on file    Attends Islam Services: Not on file    Active Member of Clubs or Organizations: Not on file    Attends Club or Organization Meetings: Not on file    Marital Status: Not on file   Intimate Partner Violence:     Fear of Current or Ex-Partner: Not on file    Emotionally Abused: Not on file    Physically Abused: Not on file    Sexually Abused: Not on file   Housing Stability:     Unable to Pay for Housing in the Last Year: Not on file    Number of Places Lived in the Last Year: Not on file    Unstable Housing in the Last Year: Not on file       Family History   Problem Relation Age of Onset    Cancer Mother     Cancer Father        Review Of Systems:   Review of Systems   Constitutional: Positive for unexpected weight change. Negative for chills and fever. HENT: Negative for ear pain, nosebleeds, sore throat and tinnitus. Eyes: Negative for photophobia and redness. Respiratory: Negative for cough, shortness of breath and wheezing. Cardiovascular: Negative for chest pain and palpitations. Gastrointestinal: Positive for constipation. Negative for abdominal pain, blood in stool, diarrhea, nausea and vomiting. Endocrine: Negative for polydipsia. Genitourinary: Negative for dysuria, hematuria and urgency. Musculoskeletal: Negative for back pain and neck pain. Skin: Negative for rash. Neurological: Negative for dizziness, tremors and seizures. Hematological: Does not bruise/bleed easily. All other systems reviewed and are negative.        Physical Exam:  Vitals:    02/18/22 1700 02/18/22 1815 02/18/22 1945 02/19/22 0550   BP: 109/68 108/69 125/86    Pulse: 87 81 86    Resp: 16 16 16    Temp:   97.9 °F (36.6 °C)    TempSrc:   Oral    SpO2: 99% 97% 100%    Weight:    178 lb 9.6 oz (81 kg)   Height:           General: Well nourished, well developed, no acute distress  Eyes:  PERRL   Conjunctiva unremarkable   ENT:  TM's intact bilaterally, no effusion   Nasal:  No mucosal edema     No nasal drainage   Oral:  mucosa moist and pink   Neck:  Supple   No palpable cervical lymphoadenopathy   Thyroid without mass or enlargement  Resp: Lungs CTAB   Equal and adequate air exchange without accessory muscle use   No rales, rhonchi or wheeze  CV: S1S2 RRR   No murmur   Intact distal pulses   No edema  GI: Abdomen Soft, non tender, non distended   Normoactive bowel sounds   No palpable hepatosplenomegaly  MS: Physiologic ROM of all extremities Intact distal pulses   No clubbing or cyanosis   Skin Warm and dry; no rash or lesion  Neuro: Alert and oriented; normal and intact dtr's   Psych: Euthymic mood, congruent affect      CT ABDOMEN PELVIS WO CONTRAST Additional Contrast? None    Result Date: 2/18/2022  EXAMINATION: CT OF THE ABDOMEN AND PELVIS WITHOUT CONTRAST 2/18/2022 4:33 pm TECHNIQUE: CT of the abdomen and pelvis was performed without the administration of intravenous contrast. Multiplanar reformatted images are provided for review. Dose modulation, iterative reconstruction, and/or weight based adjustment of the mA/kV was utilized to reduce the radiation dose to as low as reasonably achievable. COMPARISON: None. HISTORY: ORDERING SYSTEM PROVIDED HISTORY: abd pain weight loss TECHNOLOGIST PROVIDED HISTORY: Reason for exam:->abd pain weight loss Additional Contrast?->None FINDINGS: Lower Chest: Lung bases are clear. Organs: There is mild hepatic steatosis and evidence of prior granulomatous disease. Gallbladder contains multiple tiny high density calculi dependently. Common bile duct is normal.  Pancreas is normal.  Stomach has a normal configuration. There is a small fat containing hiatal hernia. 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 Left kidney is in normal position. Right kidney is located in the lower left abdomen. No evidence of previous surgery. GI/Bowel: No evidence of bowel obstruction or acute inflammation. The appendix is normal. Pelvis: No free pelvic fluid. Urinary bladder is normal.  There is sigmoid diverticulosis without diverticulitis. Testicles are located in the inguinal canal bilaterally. Peritoneum/Retroperitoneum: No retroperitoneal mass or adenopathy. Aorta is nonaneurysmal. Bones/Soft Tissues: Subcutaneous tissues are within normal limits. Degenerative changes in the lumbar spine. Osseous structures are otherwise unremarkable. 1.  No acute intra-process.  2.  Cholelithiasis without CT evidence of acute cholecystitis. There is mild diffuse hepatic steatosis. 3.  Right renal ectopia located in the left lower abdomen/pelvis. 4.  Testicles are located in the inguinal canal bilaterally. .        Assessment/Plan:  3 44-year-old male with change in bowel habits, constipation, weight loss, early satiety.   Recommend EGD and colonoscopy we will get these done on Monday        Electronically signed by Greg Norwood,  on 2/19/22 at 11:54 AM EST

## 2022-02-19 NOTE — H&P
[address] [address]                              HISTORY AND PHYSICAL    PATIENT NAME: Valentino Harris                      :        1955  MED REC NO:   [id number]                            ROOM:       0142  ACCOUNT NO:   [de-identified]                           ADMIT DATE: 2022  PROVIDER:     Shan Marques DO    HISTORY OF PRESENT ILLNESS:  This is a 78-year-old white male well known  to me and he is being treated for primarily hypertension. Over the past  several weeks, he has lost his appetite. He has lost approximately 30  to 40 pounds. He is not eating or drinking. He is not having good  bowel function, although he denies any blood in his stool. There is no  nausea or vomiting. He had generalized abdominal tenderness and we did  some chemistries on him and he was noted to be in acute kidney injury  with chronic kidney disease. We were unable to get any diagnostic CAT  cans of his abdomen because of his acute renal problem, but his wife  said that he is just getting so weak and not eating or drinking very  much and she brought him over to the hospital, where he was found to  have an elevated creatinine of 2.2 with an elevated BUN of 35. Slightly  elevated bilirubin at 1.5. Blood glucose at 125. Hemoglobin was 12.8. No left shift. CT of the abdomen without contrast showed only  cholelithiasis, but no cholecystitis. COVID test was negative. We are  going to bring him in for acute kidney injury. We will have renal see  him for fluid replacement and we are going to have Surgery take a look  at him and I would not expect him to have panendoscopy to find out if he  is having any underlying GI problems causing this weight loss and  appetite loss. RECENT AND CURRENT MEDICATIONS:  Include Procardia XL 30 mg daily and  aspirin 81 mg daily.     PAST MEDICAL HISTORY:  Significant for COPD, hypertension, and chronic  kidney disease. PAST SURGICAL HISTORY:  Consistent with hernia repair. FAMILY MEDICAL HISTORY:  Was noted and discussed. REVIEW OF SYSTEMS:  Negative for vertigo, cephalgia, ringing in the  ears, hearing loss, difficulty swallowing, hoarseness in his voice, or  bloody noses. He has no chest pain, palpitations, orthopnea, or  paroxysmal nocturnal dyspnea. There is no cough, wheeze, shortness of  breath, hemoptysis, or pleuritic pain. He has some vague abdominal  discomfort and some nausea. No vomiting. Some constipation. No  diarrhea. No blood in his stool. There is no urgency, frequency,  dysuria, or hematuria. The endocrine system is negative for diabetes or  thyroid disorder. Musculoskeletal system positive for generalized  osteoarthrosis. Psychiatric system negative for anxiety or depression. Neurologic system negative for CVA, seizures, tremors, tics, or TIAs. PHYSICAL EXAMINATION:  GENERAL:  Exam shows this to be a 70-year-old white male in moderate  distress with the above complaints. HEENT:  Head, eyes, ears, nose, and throat examination shows the head to  be normocephalic and atraumatic. The pupils are equal and reactive to  light and accommodation. Extraocular eye muscles are intact. Tympanic  membranes are clear. Ear canals are patent. Oral mucosa pink and  moist.  NECK:  Veins are flat and nondistended. No carotid bruits. CHEST:  Symmetrical.  HEART:  Had a regular rate and rhythm without murmur, gallops, or  friction rubs. LUNGS:  Clear to auscultation bilaterally without rhonchi, rales, or  wheezes. ABDOMEN:  Soft. Generalized midepigastric tenderness. No rebound, no  rigidity, no ascites, no bruits, and no hernias. EXTERNAL GENITALIA:  Intact. MUSCULOSKELETAL:  Peripheral pulses are intact. Legs without edema. BACK:  Spine shows physiologic curve.     ASSESSMENT AND PLAN:  Initial impression at this time is acute kidney  injury, loss of appetite, and significant weight loss over the last  three months. The patient will be admitted. Fluid replacement therapy  will be started for. Renal will be asked to see the patient to address  his acute kidney injury. Surgery will be asked to see the patient for  panendoscopy. At the time of admission, his condition is fair. His  prognosis is guarded.         Sj Gonzalez DO    D: 02/19/2022 11:14:53       T: 02/19/2022 11:17:12     [email or web address]  Job#: [id number]     Doc#: [id number]    CC:

## 2022-02-19 NOTE — PROGRESS NOTES
Consult sent for both Nephrology and General Surgery.    Nephrology for FRANCISCA and Gen Surgery for wt loss and constipation

## 2022-02-20 NOTE — CONSULTS
[facility] MD Cassi Borrero MD Martell Melnick MD Isabel Rumps MD  Consultation  Patient's Name: Dean Oreilly  10:56 PM  2/19/2022    Nephrologist: Lisa Guerra MD    Reason for Consult:  Acute kidney injury     Requesting Physician:  Maria Elena Bruno DO    Chief Complaint:  Weight loss , early staiety     History of Present Ilness:      76 y/o M with hx of HTN presenting to ED with cc of weight loss unintentional over the last few weeks with change in bowel habits and   Early stiatey. He was to have FRANCISCA on presentation with cr up to 2.2   He was stated on iv fluid with subsequent improvement in cr down to 1.5 . She had ct abdomen with no contrast and no hydronephrosis noted . Pt seen by general surgery and plan for EGD and colonoscopy on Monday . Past Medical History:   Diagnosis Date    Allergic rhinitis     DVT (deep venous thrombosis) ([address] 2003    left leg    Hypertension     Smoking        Past Surgical History:   Procedure Laterality Date    ECHO COMPL W DOP COLOR FLOW  7/1/2015         KNEE ARTHROSCOPY      right    [place] LAP, VENTRAL HERNIA REPAIR,REDUCIBLE N/A 10/15/2018    DIAGNOSTIC LAPAROSCOPY,  OPEN VENTRAL HERNIA REPAIR WITH MESH performed by Derek Barbosa MD at [facility] TONSILLECTOMY         Family History   Problem Relation Age of Onset    Cancer Mother     Cancer Father         reports that he quit smoking about 17 years ago. He started smoking about 37 years ago. He smoked 1.00 pack per day. He has never used smokeless tobacco. He reports current alcohol use. He reports that he does not use drugs.     Allergies:  Pcn [penicillins]    Current Medications:    NIFEdipine (ADALAT CC) extended release tablet 30 mg, Daily  aspirin chewable tablet 81 mg, Daily  potassium chloride (KLOR-CON M) extended release tablet 20 mEq, BID WC  0.9 % sodium chloride infusion, Continuous  0.9 % sodium chloride infusion, Continuous        Review of Systems:   Constitutional: no fevers , no chills , feels ok   Eyes: no eye pain , no itching , no drainage  Ears, nose, mouth, throat, and face: no ear ,nose pain , hearing is ok ,no nasal drainage   Respiratory: no sob ,no cough ,no wheezing . Cardiovascular: no chest pain , no palpitation ,no sob . Gastrointestinal: no nausea, vomiting , constipation , no abdominal pain . Genitourinary:no urinary retention , no burning , dysuria . No polyuria   Hematologic/lymphatic: no bleeding , no cougulation issues . Musculoskeletal:no joint pain , no swelling . Neurological: no headaches ,no weakness , no numbness . Endocrine: no thirst , no weight issues . Physical exam:   Vital signs /72   Pulse 90   Temp 97.3 °F (36.3 °C) (Oral)   Resp 18   Ht 6' 2\" (1.88 m)   Wt 178 lb 9.6 oz (81 kg)   SpO2 100%   BMI 22.93 kg/m²   Gen : NAD , appropriate for stated age . Head : at , nc   Neck : supple , no thyromegaly noted . Eyes : EOMI , PERRLA   CV : RRR , No M/R/G . Lungs: CTAB , no wheezing , good flow heard b/l   Abd : soft , NT , BS + , No Organomegaly appreciated . Skin : soft, dry . Neuro : CN  II-XII grossly intact , no focal neurologic deficit . Psych : cooperative .      Data:   Labs:  CBC with Differential:    Lab Results   Component Value Date    WBC 9.2 02/19/2022    RBC 2.77 02/19/2022    HGB 11.3 02/19/2022    HCT 32.8 02/19/2022     02/19/2022    .4 02/19/2022    MCH 40.8 02/19/2022    MCHC 34.5 02/19/2022    RDW 12.5 02/19/2022    LYMPHOPCT 15.8 02/18/2022    MONOPCT 8.5 02/18/2022    BASOPCT 0.4 02/18/2022    MONOSABS 0.80 02/18/2022    LYMPHSABS 1.48 02/18/2022    EOSABS 0.01 02/18/2022    BASOSABS 0.04 02/18/2022     CMP:    Lab Results   Component Value Date     02/19/2022    K 4.2 02/19/2022    K 3.1 02/18/2022     02/19/2022    CO2 14 02/19/2022    BUN 29 02/19/2022    CREATININE 1.5 02/19/2022    GFRAA 57 02/19/2022    LABGLOM 47 02/19/2022 GLUCOSE 96 02/19/2022    PROT 5.4 02/19/2022    LABALBU 3.3 02/19/2022    CALCIUM 7.8 02/19/2022    BILITOT 1.1 02/19/2022    ALKPHOS 95 02/19/2022    AST 28 02/19/2022    ALT 20 02/19/2022     Ionized Calcium:  No results found for: IONCA  Magnesium:    Lab Results   Component Value Date    MG 1.6 02/18/2022     Phosphorus:    Lab Results   Component Value Date    PHOS 3.3 06/30/2015     U/A:    Lab Results   Component Value Date    COLORU Yellow 02/19/2022    PHUR 6.5 02/19/2022    WBCUA 2-5 02/19/2022    RBCUA 2-5 02/19/2022    BACTERIA MANY 02/19/2022    CLARITYU Clear 02/19/2022    SPECGRAV 1.015 02/19/2022    LEUKOCYTESUR SMALL 02/19/2022    UROBILINOGEN 1.0 02/19/2022    BILIRUBINUR Negative 02/19/2022    BLOODU Negative 02/19/2022    GLUCOSEU Negative 02/19/2022     Microalbumen/Creatinine ratio:  No components found for: RUCREAT  Iron Saturation:  No components found for: PERCENTFE  TIBC:  No results found for: TIBC  FERRITIN:  No results found for: FERRITIN     Imaging:  CT ABDOMEN PELVIS WO CONTRAST Additional Contrast? None   Final Result   1. No acute intra-process. 2.  Cholelithiasis without CT evidence of acute cholecystitis. There is mild   diffuse hepatic steatosis. 3.  Right renal ectopia located in the left lower abdomen/pelvis. 4.  Testicles are located in the inguinal canal bilaterally. .             Assessment    -Acute kidney injury due to volume depletion . Peak cr 2.2   Cr down to 1.5 with iv NS gtt   Has basically bland urine sediment   Ct abdomen with no obstructive uropathy   Would continue current iv fluids till am   Bmp in am     -unintentional weight loss with change in bowel habits :  R/O malignancy . Plan for EGD and colonscopy Monday am .     -Macrocytosis with mcv of 115 :  Check b12 ,folate, TSH .          Thank you Dr. Maria Elena Bruno DO for allowing us to participate in care of Dean Oreilly            Electronically signed by Lisa Guerra MD on 2/19/2022 at 10:56 PM

## 2022-02-20 NOTE — PROGRESS NOTES
Patient currently doing okay. Poor oral intake. Labs pending for hypokalemia and tamika. Tolerating current fluids.  Likely scopes in am.  Heart rrr  Lungs cta  Abdomen soft

## 2022-02-20 NOTE — PROGRESS NOTES
General Surgery Progress Note    Subjective:   Chief complaint: Weight loss, constipation  The patient is feeling okay. Is taking a little bit of p.o. Denies abdominal pain.     Scheduled Meds:   magnesium citrate  300 mL Oral Once    magnesium citrate  300 mL Oral Once    NIFEdipine  30 mg Oral Daily    aspirin  81 mg Oral Daily    potassium chloride  20 mEq Oral BID WC     Continuous Infusions:   sodium chloride Stopped (22 1747)    sodium chloride 100 mL/hr at 22 2308     PRN Meds:    Allergies   Allergen Reactions    Pcn [Penicillins]        Objective:     /68   Pulse 88   Temp 97.6 °F (36.4 °C) (Infrared)   Resp 18   Ht 6' 2\" (1.88 m)   Wt 178 lb 9.6 oz (81 kg)   SpO2 100%   BMI 22.93 kg/m²     Average, Min, and Max for last 24 hours Vitals:  TEMPERATURE:  Temp  Av.6 °F (36.4 °C)  Min: 97.3 °F (36.3 °C)  Max: 97.8 °F (36.6 °C)    RESPIRATIONS RANGE: Resp  Av.3  Min: 16  Max: 18    PULSE RANGE: Pulse  Av.7  Min: 88  Max: 91    BLOOD PRESSURE RANGE:  Systolic (41ZNN), XY , Min:112 , ZNL:766   ; Diastolic (74HQJ), ZTK:40, Min:68, Max:72      PULSE OXIMETRY RANGE: SpO2  Av %  Min: 100 %  Max: 100 %    I/O last 3 completed shifts:  In: -   Out: 500 [Urine:500]    Lab Results   Component Value Date    WBC 7.2 2022    HGB 12.5 2022    HCT 36.3 (L) 2022    .9 (H) 2022     2022     Lab Results   Component Value Date     2022    K 4.2 2022    K 3.1 2022     2022    CO2 14 2022    BUN 29 2022    CREATININE 1.5 2022    GLUCOSE 96 2022    CALCIUM 7.8 2022      Lab Results   Component Value Date     2022    K 4.2 2022     2022    CO2 14 (L) 2022    BUN 29 (H) 2022    CREATININE 1.5 (H) 2022    GLUCOSE 96 2022    CALCIUM 7.8 (L) 2022    PROT 5.4 (L) 2022    LABALBU 3.3 (L) 2022    BILITOT 1.1 02/19/2022    ALKPHOS 95 02/19/2022    AST 28 02/19/2022    ALT 20 02/19/2022    LABGLOM 47 02/19/2022    GFRAA 57 02/19/2022     Lab Results   Component Value Date    ALT 20 02/19/2022    AST 28 02/19/2022    ALKPHOS 95 02/19/2022    BILITOT 1.1 02/19/2022       CT ABDOMEN PELVIS WO CONTRAST Additional Contrast? None   Final Result   1. No acute intra-process. 2.  Cholelithiasis without CT evidence of acute cholecystitis. There is mild   diffuse hepatic steatosis. 3.  Right renal ectopia located in the left lower abdomen/pelvis. 4.  Testicles are located in the inguinal canal bilaterally. .           Abdomen soft nondistended nontender palpation        Extremeties:  No lower extremity edema. Assessment/Plan:   3 79-year-old male with weight loss, early satiety, change in bowel habits.   Plan for EGD and colonoscopy tomorrow      Electronically signed by Armaan Camacho DO on 2/20/22 at 11:28 AM EST

## 2022-02-21 NOTE — PROGRESS NOTES
Comprehensive Nutrition Assessment    Type and Reason for Visit:  Initial,Positive Nutrition Screen    Nutrition Recommendations/Plan: Patient currently NPO, ADAT. Will add ONS w/ diet. Nutrition Assessment:  Pt adm w/ weight loss, early satiety, change in bowel habits, currently NPO for EGD/Cscope. Note FRANCISCA. Hx HTN/COPD/CKD. Will monitor for nutrition progression and provide ONS w/ diet. Malnutrition Assessment:  Malnutrition Status: At risk for malnutrition (Comment)    Context:  Acute Illness     Findings of the 6 clinical characteristics of malnutrition:  Energy Intake:  7 - 50% or less of estimated energy requirements for 5 or more days  Weight Loss:  Unable to assess (no actual wt hx per EMR)     Body Fat Loss:  Unable to assess (ENDO)     Muscle Mass Loss:  Unable to assess (ENDO)    Fluid Accumulation:  No significant fluid accumulation     Strength:  Not Performed    Estimated Daily Nutrient Needs:  Energy (kcal):  5515-2308; Weight Used for Energy Requirements:  Current     Protein (g):  105-120; Weight Used for Protein Requirements:  Current (1.3-1.5)        Fluid (ml/day):  7015-8681; Method Used for Fluid Requirements:  1 ml/kcal      Nutrition Related Findings:  A&O, active BS, soft abd, I/Os WNL, no noted edema      Wounds:  None       Current Nutrition Therapies:    Diet NPO    Anthropometric Measures:  · Height: 6' 2\" (188 cm)  · Current Body Weight: 177 lb 1.6 oz (80.3 kg) (2/21 actual)   · Admission Body Weight: 178 lb 9.6 oz (81 kg) (2/19 first taken bed scale)    · Usual Body Weight:  (no wt hx on file, noted subjective 30-40lbs wt loss per pt)     · Ideal Body Weight: 190 lbs; % Ideal Body Weight 93.2 %   · BMI: 22.7  · BMI Categories: Normal Weight (BMI 18.5-24. 9)       Nutrition Diagnosis:   · Inadequate oral intake related to altered GI function as evidenced by NPO or clear liquid status due to medical condition    Nutrition Interventions:   Food and/or Nutrient Delivery: Continue NPO  Nutrition Education/Counseling:  Education not indicated   Coordination of Nutrition Care:  Continue to monitor while inpatient    Goals:  Nutrition progression       Nutrition Monitoring and Evaluation:   Behavioral-Environmental Outcomes:  None Identified   Food/Nutrient Intake Outcomes:  Diet Advancement/Tolerance  Physical Signs/Symptoms Outcomes:  Biochemical Data,Nutrition Focused Physical Findings,Skin,Weight,Chewing or Swallowing,GI Status,Fluid Status or Edema     Discharge Planning:     Too soon to determine     Electronically signed by Madelyn Fisher MS, RD, LD on 2/21/22 at 2:49 PM EST    Contact: [phone]

## 2022-02-21 NOTE — ANESTHESIA POSTPROCEDURE EVALUATION
Department of Anesthesiology  Postprocedure Note    Patient: Othel Simmonds  MRN: [id number]  YOB: 1955  Date of evaluation: 2/21/2022  Time:  3:20 PM     Procedure Summary     Date: 02/21/22 Room / Location: [facility]    Anesthesia Start: 8234 Anesthesia Stop: 1518    Procedures:       COLONOSCOPY WITH BIOPSY (N/A )      EGD BIOPSY (N/A )      Procedure Not Yet Scheduled Diagnosis: (-)    Surgeons: Sana Gray DO Responsible Provider: Waylon Sever, DO    Anesthesia Type: MAC ASA Status: 2          Anesthesia Type: MAC    Pato Phase I: Pato Score: 10    Pato Phase II:      Last vitals: Reviewed and per EMR flowsheets.        Anesthesia Post Evaluation    Patient location during evaluation: bedside  Patient participation: complete - patient participated  Level of consciousness: awake and alert  Airway patency: patent  Nausea & Vomiting: no vomiting and no nausea  Complications: no  Cardiovascular status: hemodynamically stable  Respiratory status: room air and spontaneous ventilation  Hydration status: stable

## 2022-02-21 NOTE — PROGRESS NOTES
[facility] MD Jerrell Cid MD  [facility] PATIENT’S [facility] MD Idris Stevens MD  Progress note  Patient's Name: Riki Ledesma  7:08 PM  2/20/2022      FRANCISCA resolving   Stable vitals   For EGD /colonscopy in am     History of Present Ilness:      78 y/o M with hx of HTN presenting to ED with cc of weight loss unintentional over the last few weeks with change in bowel habits and   Early stiatey. He was to have FRANCISCA on presentation with cr up to 2.2   He was stated on iv fluid with subsequent improvement in cr down to 1.5 . She had ct abdomen with no contrast and no hydronephrosis noted . Pt seen by general surgery and plan for EGD and colonoscopy on Monday . Past Medical History:   Diagnosis Date    Allergic rhinitis     DVT (deep venous thrombosis) ([facility] [address] 2003    left leg    Hypertension     Smoking        Past Surgical History:   Procedure Laterality Date    ECHO COMPL W DOP COLOR FLOW  7/1/2015         KNEE ARTHROSCOPY      right    NE LAP, VENTRAL HERNIA REPAIR,REDUCIBLE N/A 10/15/2018    DIAGNOSTIC LAPAROSCOPY,  OPEN VENTRAL HERNIA REPAIR WITH MESH [facility]         Family History   Problem Relation Age of Onset    Cancer Mother     Cancer Father         reports that he quit smoking about 17 years ago. He started smoking about 37 years ago. He smoked 1.00 pack per day. He has never used smokeless tobacco. He reports current alcohol use. He reports that he does not use drugs.     Allergies:  Pcn [penicillins]    Current Medications:    ondansetron (ZOFRAN) injection 4 mg, Q4H PRN  NIFEdipine (ADALAT CC) extended release tablet 30 mg, Daily  aspirin chewable tablet 81 mg, Daily  potassium chloride (KLOR-CON M) extended release tablet 20 mEq, BID WC  0.9 % sodium chloride infusion, Continuous        Review of Systems:   Constitutional: no fevers , no chills , feels ok   Eyes: no eye pain , no itching , no drainage  Ears, nose, mouth, throat, and face: no ear ,nose pain , hearing is ok ,no nasal drainage   Respiratory: no sob ,no cough ,no wheezing . Cardiovascular: no chest pain , no palpitation ,no sob . Gastrointestinal: no nausea, vomiting , constipation , no abdominal pain . Genitourinary:no urinary retention , no burning , dysuria . No polyuria   Hematologic/lymphatic: no bleeding , no cougulation issues . Musculoskeletal:no joint pain , no swelling . Neurological: no headaches ,no weakness , no numbness . Endocrine: no thirst , no weight issues . Physical exam:   Vital signs BP (!) 119/90   Pulse 118   Temp 97.3 °F (36.3 °C) (Oral)   Resp 17   Ht 6' 2\" (1.88 m)   Wt 178 lb 9.6 oz (81 kg)   SpO2 98%   BMI 22.93 kg/m²   Gen : NAD , appropriate for stated age . Head : at , nc   Neck : supple , no thyromegaly noted . Eyes : EOMI , PERRLA   CV : RRR , No M/R/G . Lungs: CTAB , no wheezing , good flow heard b/l   Abd : soft , NT , BS + , No Organomegaly appreciated . Skin : soft, dry . Neuro : CN  II-XII grossly intact , no focal neurologic deficit . Psych : cooperative .      Data:   Labs:  CBC with Differential:    Lab Results   Component Value Date    WBC 7.2 02/20/2022    RBC 3.08 02/20/2022    HGB 12.5 02/20/2022    HCT 36.3 02/20/2022     02/20/2022    .9 02/20/2022    MCH 40.6 02/20/2022    MCHC 34.4 02/20/2022    RDW 12.6 02/20/2022    LYMPHOPCT 15.8 02/18/2022    MONOPCT 8.5 02/18/2022    BASOPCT 0.4 02/18/2022    MONOSABS 0.80 02/18/2022    LYMPHSABS 1.48 02/18/2022    EOSABS 0.01 02/18/2022    BASOSABS 0.04 02/18/2022     CMP:    Lab Results   Component Value Date     02/20/2022    K 3.5 02/20/2022    K 3.1 02/18/2022     02/20/2022    CO2 19 02/20/2022    BUN 24 02/20/2022    CREATININE 1.4 02/20/2022    GFRAA >60 02/20/2022    LABGLOM 51 02/20/2022    GLUCOSE 88 02/20/2022    PROT 6.1 02/20/2022    LABALBU 3.7 02/20/2022    CALCIUM 8.5 02/20/2022 BILITOT 1.4 02/20/2022    ALKPHOS 108 02/20/2022    AST 36 02/20/2022    ALT 24 02/20/2022     Ionized Calcium:  No results found for: IONCA  Magnesium:    Lab Results   Component Value Date    MG 1.6 02/18/2022     Phosphorus:    Lab Results   Component Value Date    PHOS 3.3 06/30/2015     U/A:    Lab Results   Component Value Date    COLORU Yellow 02/19/2022    PHUR 6.5 02/19/2022    WBCUA 2-5 02/19/2022    RBCUA 2-5 02/19/2022    BACTERIA MANY 02/19/2022    CLARITYU Clear 02/19/2022    SPECGRAV 1.015 02/19/2022    LEUKOCYTESUR SMALL 02/19/2022    UROBILINOGEN 1.0 02/19/2022    BILIRUBINUR Negative 02/19/2022    BLOODU Negative 02/19/2022    GLUCOSEU Negative 02/19/2022     Microalbumen/Creatinine ratio:  No components found for: RUCREAT  Iron Saturation:  No components found for: PERCENTFE  TIBC:  No results found for: TIBC  FERRITIN:  No results found for: FERRITIN     Imaging:  CT ABDOMEN PELVIS WO CONTRAST Additional Contrast? None   Final Result   1. No acute intra-process. 2.  Cholelithiasis without CT evidence of acute cholecystitis. There is mild   diffuse hepatic steatosis. 3.  Right renal ectopia located in the left lower abdomen/pelvis. 4.  Testicles are located in the inguinal canal bilaterally. .             Assessment    -Acute kidney injury due to volume depletion : Resolving   Peak cr 2.2   Cr down to 1.4 with iv NS gtt   Has basically bland urine sediment   Ct abdomen with no obstructive uropathy   Would continue current iv fluids till am   Bmp in am     -unintentional weight loss with change in bowel habits :  R/O malignancy . Plan for EGD and colonscopy Monday am .     -Macrocytosis with mcv of 115 :  Check b12 ,folate, TSH .                 Electronically signed by Rodolfo Meraz MD on 2/20/2022 at 7:08 PM

## 2022-02-21 NOTE — PROGRESS NOTES
Physical Therapy    PT eval attempted, but pt off floor at procedure. Will re-attempt at later date.

## 2022-02-21 NOTE — PROGRESS NOTES
Subjective: The patient is awake and alert. No problems overnight. Denies chest pain, angina, and dyspnea. Denies abdominal pain. Tolerating diet. No nausea or vomiting.had upper and lower endoscopy today. Esophagitis and some gastritis but nothing to explain his weight loss. Objective:    /79   Pulse 88   Temp 97.6 °F (36.4 °C) (Oral)   Resp 16   Ht 6' 2\" (1.88 m)   Wt 177 lb 1.6 oz (80.3 kg)   SpO2 100%   BMI 22.74 kg/m²     Heart:  RRR, no murmurs, gallops, or rubs.   Lungs:  CTA bilaterally, no wheeze, rales or rhonchi  Abd: bowel sounds present, nontender, nondistended, no masses  Extrem:  No clubbing, cyanosis, or edema    CBC with Differential:    Lab Results   Component Value Date    WBC 8.4 02/21/2022    RBC 2.85 02/21/2022    HGB 11.6 02/21/2022    HCT 34.5 02/21/2022     02/21/2022    .1 02/21/2022    MCH 40.7 02/21/2022    MCHC 33.6 02/21/2022    RDW 12.3 02/21/2022    LYMPHOPCT 15.8 02/18/2022    MONOPCT 8.5 02/18/2022    BASOPCT 0.4 02/18/2022    MONOSABS 0.80 02/18/2022    LYMPHSABS 1.48 02/18/2022    EOSABS 0.01 02/18/2022    BASOSABS 0.04 02/18/2022     CMP:    Lab Results   Component Value Date     02/21/2022    K 3.2 02/21/2022    K 3.1 02/18/2022     02/21/2022    CO2 20 02/21/2022    BUN 20 02/21/2022    CREATININE 1.4 02/21/2022    GFRAA >60 02/21/2022    LABGLOM 51 02/21/2022    GLUCOSE 86 02/21/2022    PROT 5.6 02/21/2022    LABALBU 3.5 02/21/2022    CALCIUM 8.5 02/21/2022    BILITOT 1.2 02/21/2022    ALKPHOS 98 02/21/2022    AST 30 02/21/2022    ALT 24 02/21/2022        Assessment:    Patient Active Problem List   Diagnosis    Gastrointestinal bleeding    DVT (deep venous thrombosis) ([facility])    Smoking addiction    FRANCISCA (acute kidney injury) ([facility] [address]       Plan:  CT chest  Check B12 and folate levels  PT/OT blaire Mary MD  5:22 PM  2/21/2022

## 2022-02-21 NOTE — PROGRESS NOTES
[facility] MD Willie Bryant MD Marry Nettles, MD Earlyne Atta MD  Progress Note    Patient's Name: Robert Wolfe  3:43 PM  2/21/2022 2/21: A little anxious, though denies complaint. Hungry, but NPO. ROS unremarkable. For EGD and colonoscopy today    History of Present Ilness:      76 y/o M with hx of HTN presenting to ED with cc of weight loss unintentional over the last few weeks with change in bowel habits and   Early stiatey. He was to have FRANCISCA on presentation with cr up to 2.2   He was stated on iv fluid with subsequent improvement in cr down to 1.5 . She had ct abdomen with no contrast and no hydronephrosis noted . Pt seen by general surgery and plan for EGD and colonoscopy on Monday . Allergies:  Pcn [penicillins]    Current Medications:    ondansetron (ZOFRAN) injection 4 mg, Q4H PRN  NIFEdipine (ADALAT CC) extended release tablet 30 mg, Daily  aspirin chewable tablet 81 mg, Daily  potassium chloride (KLOR-CON M) extended release tablet 20 mEq, BID WC  0.9 % sodium chloride infusion, Continuous        Review of Systems:   Constitutional: no fevers , no chills , feels ok   Eyes: no eye pain , no itching , no drainage  Ears, nose, mouth, throat, and face: no ear ,nose pain , hearing is ok ,no nasal drainage   Respiratory: no sob ,no cough ,no wheezing . Cardiovascular: no chest pain , no palpitation ,no sob . Gastrointestinal: no nausea, vomiting , constipation , no abdominal pain . Genitourinary:no urinary retention , no burning , dysuria . No polyuria   Hematologic/lymphatic: no bleeding , no cougulation issues . Musculoskeletal:no joint pain , no swelling . Neurological: no headaches ,no weakness , no numbness . Endocrine: no thirst , no weight issues .      Physical exam:   Vital signs /68   Pulse 86   Temp 96.7 °F (35.9 °C) (Temporal)   Resp 18   Ht 6' 2\" (1.88 m)   Wt 177 lb 1.6 oz (80.3 kg)   SpO2 98%   BMI 22.74 kg/m²   Gen : NAD , appropriate for stated age . Head : at , nc   Neck : supple , no thyromegaly noted . Eyes : EOMI , PERRLA   CV : RRR , No M/R/G . Lungs: CTAB , no wheezing , good flow heard b/l   Abd : soft , NT , BS + , No Organomegaly appreciated . Skin : soft, dry . Neuro : CN  II-XII grossly intact , no focal neurologic deficit . Psych : cooperative .      Data:   Labs:  CBC with Differential:    Lab Results   Component Value Date    WBC 8.4 02/21/2022    RBC 2.85 02/21/2022    HGB 11.6 02/21/2022    HCT 34.5 02/21/2022     02/21/2022    .1 02/21/2022    MCH 40.7 02/21/2022    MCHC 33.6 02/21/2022    RDW 12.3 02/21/2022    LYMPHOPCT 15.8 02/18/2022    MONOPCT 8.5 02/18/2022    BASOPCT 0.4 02/18/2022    MONOSABS 0.80 02/18/2022    LYMPHSABS 1.48 02/18/2022    EOSABS 0.01 02/18/2022    BASOSABS 0.04 02/18/2022     CMP:    Lab Results   Component Value Date     02/21/2022    K 3.2 02/21/2022    K 3.1 02/18/2022     02/21/2022    CO2 20 02/21/2022    BUN 20 02/21/2022    CREATININE 1.4 02/21/2022    GFRAA >60 02/21/2022    LABGLOM 51 02/21/2022    GLUCOSE 86 02/21/2022    PROT 5.6 02/21/2022    LABALBU 3.5 02/21/2022    CALCIUM 8.5 02/21/2022    BILITOT 1.2 02/21/2022    ALKPHOS 98 02/21/2022    AST 30 02/21/2022    ALT 24 02/21/2022     Ionized Calcium:  No results found for: IONCA  Magnesium:    Lab Results   Component Value Date    MG 1.6 02/18/2022     Phosphorus:    Lab Results   Component Value Date    PHOS 3.3 06/30/2015     U/A:    Lab Results   Component Value Date    COLORU Yellow 02/19/2022    PHUR 6.5 02/19/2022    WBCUA 2-5 02/19/2022    RBCUA 2-5 02/19/2022    BACTERIA MANY 02/19/2022    CLARITYU Clear 02/19/2022    SPECGRAV 1.015 02/19/2022    LEUKOCYTESUR SMALL 02/19/2022    UROBILINOGEN 1.0 02/19/2022    BILIRUBINUR Negative 02/19/2022    BLOODU Negative 02/19/2022    GLUCOSEU Negative 02/19/2022     Microalbumen/Creatinine ratio:  No components found for: RUCREAT  Iron Saturation:  No components found for: PERCENTFE  TIBC:  No results found for: TIBC  FERRITIN:  No results found for: FERRITIN     Imaging:  CT ABDOMEN PELVIS WO CONTRAST Additional Contrast? None   Final Result   1. No acute intra-process. 2.  Cholelithiasis without CT evidence of acute cholecystitis. There is mild   diffuse hepatic steatosis. 3.  Right renal ectopia located in the left lower abdomen/pelvis. 4.  Testicles are located in the inguinal canal bilaterally. .             Assessment    -Acute kidney injury due to prerenal azotemia associated with volume depletion. Azotemia continues to improve. Creatinine peaked at 2.2 mg/dL. Urine sediment is bland. CT abdomen demonstrates no obstructive uropathy.    -unintentional weight loss with change in bowel habits :  R/O malignancy . Plan for EGD and colonscopy Monday am .     -Macrocytosis with mcv of 115 :  Check b12 ,folate, TSH .      Recommendations  Continue IV fluid until taking p.o. again  Follow labs, UO           Electronically signed by Jaclyn Carlos MD on 2/21/2022 at 3:43 PM

## 2022-02-21 NOTE — OP NOTE
EGD/Colonoscopy Op Note    PATIENT: Gabriela Aldridge    DATE OF PROCEDURE: 2/21/2022     SURGEON: Kassie Christina MD    PREOPERATIVE DIAGNOSIS: Weight loss, change in bowel habits    POSTOPERATIVE DIAGNOSIS: Same, moderate gastritis, superficial duodenal ulcerations and duodenitis, reflux esophagitis rule out Martin's, pandiverticulosis, posterior midline anal fissure    OPERATION: Procedure(s):  COLONOSCOPY DIAGNOSTIC WITH BIOPSY  EGD BIOPSY      ANESTHESIA: Local monitored anesthesia. ESTIMATED BLOOD LOSS: minimal    COMPLICATIONS: None. SPECIMENS:    ID Type Source Tests Collected by Time Destination   A : BIOSY ANTRUM Tissue Stomach SURGICAL PATHOLOGY [facility], DO 2/21/2022 1451    B : BIOPSY DUODENUM R/O SPRUE Tissue Duodenum SURGICAL PATHOLOGY [facility], DO 2/21/2022 1453    C : BIOPSY GE JUNCTION Tissue Esophagus SURGICAL PATHOLOGY [facility], DO 2/21/2022 1455    D : BIOPSY ANAL VERGE Specimen Anus SURGICAL PATHOLOGY [facility], DO 2/21/2022 1509          HISTORY: The patient is a 77y.o. year old male with history of above preop diagnosis. I recommended esophagogastroduodenoscopy and colonoscopy with possible biopsy/polypectomy and I explained the risk, benefits, expected outcome, and alternatives to the procedure. Risks included but are not limited to bleeding, infection, respiratory distress, hypotension, and perforation. Patient understands and is in agreement. PROCEDURE: The patient was given IV conscious sedation per anesthesia. The patient was given supplemental oxygen by nasal cannula. The gastroscope was inserted orally and advanced under direct vision through the esophagus, through the stomach, through the pylorus, and into the duodenum. FINDINGS:    Duodenum: Distal second and third portion of duodenum was unremarkable. Biopsies were taken and submitted to rule out celiac's.   In the duodenal bulb and towards the second portion the multiple superficial ulcerations and duodenitis. Biopsies were taken. Stomach: Moderate gastritis status post biopsies    Esophagus: There is distal reflux esophagitis. Biopsies were taken to rule out Martin esophagus. No esophageal lesions or masses were noted otherwise. Larynx: not examined    The scope was removed and the patient tolerated the procedure well. The colonoscope was inserted per rectum and advanced under direct vision to the cecum without difficulty, identified by appendiceal orifice and ileocecal valve. The prep was Fair to moderately poor so exam was suboptimal and a small mucosal lesion could have been missed. FINDINGS:    AUGUSTINA: Hemorrhoids. The posterior midline there was some scarring suggestive of a chronic anal fissure. Biopsies were taken to rule out any occult neoplasia. Terminal Ileum: normal    Colon: Diverticulosis. Fair prep but no obvious large masses or polyps. Rectum/Anus: examined in normal and retroflexed positions -hemorrhoids    The colon was decompressed and the scope was removed. The withdraw time was approximately 6 minutes. The patient tolerated the procedure well. ASSESSMENT/PLAN:   1. Follow-up biopsies  2. PPI  3. No endoscopic findings to explain his weight loss at this time. 4. Colorectal Cancer Screening - recommend repeat colonoscopy in 5 years (may change pending biopsy results). Sooner if issues/concerns.     Jeoffrey Harada, MD  02/21/22  3:14 PM

## 2022-02-21 NOTE — PROGRESS NOTES
Results   Component Value Date    WBC 7.2 02/20/2022    HGB 12.5 02/20/2022    HCT 36.3 (L) 02/20/2022    .9 (H) 02/20/2022     02/20/2022     Lab Results   Component Value Date     02/20/2022    K 3.5 02/20/2022    K 3.1 02/18/2022     02/20/2022    CO2 19 02/20/2022    BUN 24 02/20/2022    CREATININE 1.4 02/20/2022    GLUCOSE 88 02/20/2022    CALCIUM 8.5 02/20/2022      Lab Results   Component Value Date     02/20/2022    K 3.5 02/20/2022     02/20/2022    CO2 19 (L) 02/20/2022    BUN 24 (H) 02/20/2022    CREATININE 1.4 (H) 02/20/2022    GLUCOSE 88 02/20/2022    CALCIUM 8.5 (L) 02/20/2022    PROT 6.1 (L) 02/20/2022    LABALBU 3.7 02/20/2022    BILITOT 1.4 (H) 02/20/2022    ALKPHOS 108 02/20/2022    AST 36 02/20/2022    ALT 24 02/20/2022    LABGLOM 51 02/20/2022    GFRAA >60 02/20/2022     Lab Results   Component Value Date    ALT 24 02/20/2022    AST 36 02/20/2022    ALKPHOS 108 02/20/2022    BILITOT 1.4 (H) 02/20/2022       CT ABDOMEN PELVIS WO CONTRAST Additional Contrast? None   Final Result   1. No acute intra-process. 2.  Cholelithiasis without CT evidence of acute cholecystitis. There is mild   diffuse hepatic steatosis. 3.  Right renal ectopia located in the left lower abdomen/pelvis. 4.  Testicles are located in the inguinal canal bilaterally. .           Abdomen soft nondistended nontender palpation        Extremeties:  No lower extremity edema. Assessment/Plan:   80-year-old male with weight loss, early satiety, change in bowel habits.      EGD and colonoscopy today 2/21    Electronically signed by Alesia Valdovinos DO on 2/20/22 at 11:28 AM EST

## 2022-02-21 NOTE — ANESTHESIA PRE PROCEDURE
Department of Anesthesiology  Preprocedure Note       Name:  Darya Mcmahan   Age:  77 y.o.  :  1955                                          MRN:  [id number]         Date:  2022      Surgeon: Zafar Bobo    Procedure: Procedure(s):  EGD/COLONOSCOPY    Medications prior to admission:   Prior to Admission medications    Medication Sig Start Date End Date Taking? Authorizing Provider   cetirizine (ZYRTEC) 10 MG tablet Take 10 mg by mouth daily as needed     Historical Provider, MD   pantoprazole (PROTONIX) 40 MG tablet Take 1 tablet by mouth every morning (before breakfast) 7/1/15   Corrina Gill MD   aspirin 81 MG chewable tablet Take 81 mg by mouth daily No hold per Dr. Zurdo Collins Provider, MD   amLODIPine-benazepril (LOTREL) 10-40 MG per capsule Take 1 capsule by mouth daily    Historical Provider, MD       Current medications:    No current facility-administered medications for this visit. No current outpatient medications on file. Facility-Administered Medications Ordered in Other Visits   Medication Dose Route Frequency Provider Last Rate Last Admin    ondansetron (ZOFRAN) injection 4 mg  4 mg IntraVENous Q4H PRN Claretha Paige, DO   4 mg at 22 1855    NIFEdipine (ADALAT CC) extended release tablet 30 mg  30 mg Oral Daily Claretha Paige, DO   30 mg at 22 1375    aspirin chewable tablet 81 mg  81 mg Oral Daily Claretha Paige, DO   81 mg at 22 5976    potassium chloride (KLOR-CON M) extended release tablet 20 mEq  20 mEq Oral BID WC Claretha Paige, DO   20 mEq at 22 1741    0.9 % sodium chloride infusion   IntraVENous Continuous Claretha Paige,  mL/hr at 22 2308 New Bag at 22 2308       Allergies:     Allergies   Allergen Reactions    Pcn [Penicillins]        Problem List:    Patient Active Problem List   Diagnosis Code    Gastrointestinal bleeding K92.2    DVT [facility]deep [facility] thrombosis) ([facility]) I82.409    Smoking addiction F17.200    FRANCISCA (acute ALKPHOS 108 02/20/2022    AST 36 02/20/2022    ALT 24 02/20/2022       POC Tests: No results for input(s): POCGLU, POCNA, POCK, POCCL, POCBUN, POCHEMO, POCHCT in the last 72 hours. Coags:   Lab Results   Component Value Date    PROTIME 11.5 06/27/2015    INR 1.0 06/27/2015    APTT 24.2 06/27/2015       HCG (If Applicable): No results found for: PREGTESTUR, PREGSERUM, HCG, HCGQUANT     ABGs: No results found for: PHART, PO2ART, ZNP4NQU, ZKA8FUR, BEART, [id number]     Type & Screen (If Applicable):  No results found for: LABABO, [address]    Anesthesia Evaluation  Patient summary reviewed and Nursing notes reviewed no history of anesthetic complications:   Airway: Mallampati: II  TM distance: >3 FB   Neck ROM: full  Mouth opening: > = 3 FB Dental:          Pulmonary: breath sounds clear to auscultation      (-) shortness of breath and not a current smoker                          ROS comment: Former smoker   Cardiovascular:  Exercise tolerance: good (>4 METS),   (+) hypertension: moderate,         Rhythm: regular  Rate: normal           Beta Blocker:  Not on Beta Blocker         Neuro/Psych:   Negative Neuro/Psych ROS              GI/Hepatic/Renal:   (+) GERD: well controlled,           Endo/Other: Negative Endo/Other ROS                    Abdominal:             Vascular:   + DVT, . Other Findings:               Anesthesia Plan      MAC     ASA 2       Induction: intravenous. MIPS: Prophylactic antiemetics administered. Anesthetic plan and risks discussed with patient. Plan discussed with CRNA. El Benavides MD   2/20/2022       Chart reviewed and patient examined on February 20, 2022 at 8:45 PM by El Benavides MD.  (Final assessment and plan per day of surgery team.)    DOS STAFF ADDENDUM:    Pt seen and examined, physical exam updated, chart reviewed including anesthesia, drug and allergy history. H&P reviewed.   No interval changes to history or physical examination (unless noted above). NPO status confirmed. Anesthetic plan, risks, benefits, alternatives discussed with patient. Patient verbalized an understanding and agrees to proceed. Moody Alvarado DO  Staff Anesthesiologist  2:22 PM        Patient seen and chart reviewed. No interval change in history or exam.   Anesthesia plan discussed, risk/benefits addressed. Patient's concerns and questions answered.      Jamaal Chaves, CRISTIANO - LOS  February 21, 2022  2:31 PM

## 2022-02-22 NOTE — PROGRESS NOTES
[facility] MD Luz Maria Snider MD Marcelene Abate, MD Aquilla Roussel MD  Progress Note    Patient's Name: Dianna Arroyo  1:55 PM  2/22/2022 2/21: A little anxious, though denies complaint. Hungry, but NPO. ROS unremarkable. For EGD and colonoscopy today  2/22: Feeling better. Good appetite and intake. ROS otherwise unremarkable. History of Present Ilness:      78 y/o M with hx of HTN presenting to ED with cc of weight loss unintentional over the last few weeks with change in bowel habits and   Early stiatey. He was to have FRANCISCA on presentation with cr up to 2.2   He was stated on iv fluid with subsequent improvement in cr down to 1.5 . She had ct abdomen with no contrast and no hydronephrosis noted . Pt seen by general surgery and plan for EGD and colonoscopy on Monday . Allergies:  Pcn [penicillins]    Current Medications:    pantoprazole (PROTONIX) tablet 40 mg, QAM AC  hydrocortisone-aloe 1 % cream, BID  ondansetron (ZOFRAN) injection 4 mg, Q4H PRN  NIFEdipine (ADALAT CC) extended release tablet 30 mg, Daily  aspirin chewable tablet 81 mg, Daily  potassium chloride (KLOR-CON M) extended release tablet 20 mEq, BID WC  0.9 % sodium chloride infusion, Continuous        Review of Systems:   Constitutional: no fevers , no chills , feels ok   Eyes: no eye pain , no itching , no drainage  Ears, nose, mouth, throat, and face: no ear ,nose pain , hearing is ok ,no nasal drainage   Respiratory: no sob ,no cough ,no wheezing . Cardiovascular: no chest pain , no palpitation ,no sob . Gastrointestinal: no nausea, vomiting , constipation , no abdominal pain . Genitourinary:no urinary retention , no burning , dysuria . No polyuria   Hematologic/lymphatic: no bleeding , no cougulation issues . Musculoskeletal:no joint pain , no swelling . Neurological: no headaches ,no weakness , no numbness . Endocrine: no thirst , no weight issues .      Physical exam: Vital signs /79   Pulse 86   Temp 97.5 °F (36.4 °C) (Oral)   Resp 18   Ht 6' 2\" (1.88 m)   Wt 185 lb (83.9 kg)   SpO2 100%   BMI 23.75 kg/m²   Gen : NAD , appropriate for stated age . Head : at , nc   Neck : supple , no thyromegaly noted . Eyes : EOMI , PERRLA   CV : RRR , No M/R/G . Lungs: CTAB , no wheezing , good flow heard b/l   Abd : soft , NT , BS + , No Organomegaly appreciated . Skin : soft, dry . Neuro : CN  II-XII grossly intact , no focal neurologic deficit . Psych : cooperative .      Data:   Labs:  CBC with Differential:    Lab Results   Component Value Date    WBC 7.5 02/22/2022    RBC 2.74 02/22/2022    HGB 11.2 02/22/2022    HCT 33.9 02/22/2022     02/22/2022    .7 02/22/2022    MCH 40.9 02/22/2022    MCHC 33.0 02/22/2022    RDW 12.9 02/22/2022    LYMPHOPCT 15.8 02/18/2022    MONOPCT 8.5 02/18/2022    BASOPCT 0.4 02/18/2022    MONOSABS 0.80 02/18/2022    LYMPHSABS 1.48 02/18/2022    EOSABS 0.01 02/18/2022    BASOSABS 0.04 02/18/2022     CMP:    Lab Results   Component Value Date     02/22/2022    K 3.8 02/22/2022    K 3.1 02/18/2022     02/22/2022    CO2 17 02/22/2022    BUN 14 02/22/2022    CREATININE 1.3 02/22/2022    GFRAA >60 02/22/2022    LABGLOM 55 02/22/2022    GLUCOSE 65 02/22/2022    PROT 5.6 02/22/2022    LABALBU 3.3 02/22/2022    CALCIUM 8.5 02/22/2022    BILITOT 1.2 02/22/2022    ALKPHOS 96 02/22/2022    AST 32 02/22/2022    ALT 26 02/22/2022     Ionized Calcium:  No results found for: IONCA  Magnesium:    Lab Results   Component Value Date    MG 1.5 02/22/2022     Phosphorus:    Lab Results   Component Value Date    PHOS 2.3 02/22/2022     U/A:    Lab Results   Component Value Date    COLORU Yellow 02/19/2022    PHUR 6.5 02/19/2022    WBCUA 2-5 02/19/2022    RBCUA 2-5 02/19/2022    BACTERIA MANY 02/19/2022    CLARITYU Clear 02/19/2022    SPECGRAV 1.015 02/19/2022    LEUKOCYTESUR SMALL 02/19/2022    UROBILINOGEN 1.0 02/19/2022 BILIRUBINUR Negative 02/19/2022    BLOODU Negative 02/19/2022    GLUCOSEU Negative 02/19/2022     Microalbumen/Creatinine ratio:  No components found for: RUCREAT  Iron Saturation:  No components found for: PERCENTFE  TIBC:  No results found for: TIBC  FERRITIN:  No results found for: FERRITIN     Imaging:  CT CHEST WO CONTRAST   Final Result   Small fat density surrounding the distal esophagus of a likely fat containing   hiatal hernia without distinct irregularity or obvious mass associated with   the distal esophagus; however, minimal adjacent paraesophageal lymph nodes   subcentimeter more conspicuous in number than size without adjacent   adenopathy otherwise in the chest somewhat indeterminate should be correlated   with esophagitis or symptomatology. No evidence for intrathoracic metastatic disease or primary malignancy   otherwise noted. CT ABDOMEN PELVIS WO CONTRAST Additional Contrast? None   Final Result   1. No acute intra-process. 2.  Cholelithiasis without CT evidence of acute cholecystitis. There is mild   diffuse hepatic steatosis. 3.  Right renal ectopia located in the left lower abdomen/pelvis. 4.  Testicles are located in the inguinal canal bilaterally. .             Assessment    -Acute kidney injury due to prerenal azotemia associated with volume depletion. Azotemia continues to improve. Creatinine peaked at 2.2 mg/dL. Urine sediment is bland. CT abdomen demonstrates no obstructive uropathy.    -unintentional weight loss with change in bowel habits :  R/O malignancy . Plan for EGD and colonscopy Monday am .     -Macrocytosis with mcv of 115 :  Check b12 ,folate, TSH . Creatinine a little better than yesterday. Good UO. Did not appear hypervolemic.     Recommendations  Stop IV fluid  Follow labs, UO  Supportive care         Electronically signed by Jose L Macias MD on 2/22/2022

## 2022-02-22 NOTE — PROGRESS NOTES
Patient sitting up in bed eating breakfast.  Does state he feels better. EGD colonoscopy and CT of the abdomen and chest really do not explain his recent weight loss. He has complained of some weakness in his legs and some numbness. His MCV and MCH on his CBC are quite high. B12 and folate levels were ordered but are not back yet. Hemoglobin slightly low at 11.2. We'll ask hematology to see the patient regarding his CBC findings. Did talk to him about subacute rehabilitation. He certainly would prefer to go home.

## 2022-02-22 NOTE — PROGRESS NOTES
General Surgery Progress Note    Subjective:   Chief complaint: Weight loss, constipation    Tolerated diet. Feeling significantly better. Scheduled Meds:   pantoprazole  40 mg Oral QAM AC    hydrocortisone-aloe   Topical BID    NIFEdipine  30 mg Oral Daily    aspirin  81 mg Oral Daily    potassium chloride  20 mEq Oral BID WC     Continuous Infusions:   sodium chloride 100 mL/hr at 22 0523     PRN Meds:    Allergies   Allergen Reactions    Pcn [Penicillins]        Objective:     /79   Pulse 86   Temp 97.5 °F (36.4 °C) (Oral)   Resp 18   Ht 6' 2\" (1.88 m)   Wt 185 lb (83.9 kg)   SpO2 100%   BMI 23.75 kg/m²     Average, Min, and Max for last 24 hours Vitals:  TEMPERATURE:  Temp  Av.5 °F (36.4 °C)  Min: 96.7 °F (35.9 °C)  Max: 97.9 °F (36.6 °C)    RESPIRATIONS RANGE: Resp  Avg: 15.7  Min: 0  Max: 59    PULSE RANGE: Pulse  Av.8  Min: 83  Max: 111    BLOOD PRESSURE RANGE:  Systolic (42FAV), TRB:093 , Min:104 , VPA:785   ; Diastolic (61BQB), JACINTO:41, Min:68, Max:98      PULSE OXIMETRY RANGE: SpO2  Av.3 %  Min: 94 %  Max: 100 %    Physical Exam  Constitutional:       Appearance: Normal appearance. HENT:      Head: Normocephalic and atraumatic. Nose: Nose normal.   Eyes:      Conjunctiva/sclera: Conjunctivae normal.      Pupils: Pupils are equal, round, and reactive to light. Cardiovascular:      Rate and Rhythm: Normal rate and regular rhythm. Pulses: Normal pulses. Heart sounds: Normal heart sounds. Pulmonary:      Effort: Pulmonary effort is normal.   Abdominal:      General: There is no distension. Palpations: Abdomen is soft. Tenderness: There is no abdominal tenderness. Musculoskeletal:         General: Normal range of motion. Cervical back: Normal range of motion. Skin:     General: Skin is warm and dry. Neurological:      General: No focal deficit present. Mental Status: He is alert and oriented to person, place, and time. Psychiatric:         Mood and Affect: Mood normal.           Lab Results   Component Value Date    WBC 8.4 02/21/2022    HGB 11.6 (L) 02/21/2022    HCT 34.5 (L) 02/21/2022    .1 (H) 02/21/2022     02/21/2022     Lab Results   Component Value Date     02/21/2022    K 3.2 02/21/2022    K 3.1 02/18/2022     02/21/2022    CO2 20 02/21/2022    BUN 20 02/21/2022    CREATININE 1.4 02/21/2022    GLUCOSE 86 02/21/2022    CALCIUM 8.5 02/21/2022      Lab Results   Component Value Date     02/21/2022    K 3.2 (L) 02/21/2022     (H) 02/21/2022    CO2 20 (L) 02/21/2022    BUN 20 02/21/2022    CREATININE 1.4 (H) 02/21/2022    GLUCOSE 86 02/21/2022    CALCIUM 8.5 (L) 02/21/2022    PROT 5.6 (L) 02/21/2022    LABALBU 3.5 02/21/2022    BILITOT 1.2 02/21/2022    ALKPHOS 98 02/21/2022    AST 30 02/21/2022    ALT 24 02/21/2022    LABGLOM 51 02/21/2022    GFRAA >60 02/21/2022     Lab Results   Component Value Date    ALT 24 02/21/2022    AST 30 02/21/2022    ALKPHOS 98 02/21/2022    BILITOT 1.2 02/21/2022       CT CHEST WO CONTRAST   Final Result   Small fat density surrounding the distal esophagus of a likely fat containing   hiatal hernia without distinct irregularity or obvious mass associated with   the distal esophagus; however, minimal adjacent paraesophageal lymph nodes   subcentimeter more conspicuous in number than size without adjacent   adenopathy otherwise in the chest somewhat indeterminate should be correlated   with esophagitis or symptomatology. No evidence for intrathoracic metastatic disease or primary malignancy   otherwise noted. CT ABDOMEN PELVIS WO CONTRAST Additional Contrast? None   Final Result   1. No acute intra-process. 2.  Cholelithiasis without CT evidence of acute cholecystitis. There is mild   diffuse hepatic steatosis. 3.  Right renal ectopia located in the left lower abdomen/pelvis.       4.  Testicles are located in the inguinal canal bilaterally. .           Abdomen soft nondistended nontender palpation        Extremeties:  No lower extremity edema. Assessment/Plan:   80-year-old male with weight loss, early satiety, change in bowel habits. EGD showed some gastritis and duodenitis  PPI  C scope showed just diverticulosis  Okay for regular diet  Continue supplements    Ok for [employer] signed by Ronni Ridley MD on 2/22/2022 at 8:37 AM      Attending Physician Statement:    Chief Complaint:   Chief Complaint   Patient presents with    Abdominal Pain     constantly feels full. constipation and diahrrea back and forth. emesis sometimes. I have examined the patient and performed the key aspects of physical exam, reviewed the record (including all pertinent and new radiology images and laboratory findings), and discussed the case with the surgical team.  I agree with the assessment and plan with the following additions, corrections, and changes. 14pt review of symptoms completed and negative except as mentioned. Feels well. We will follow up in a couple weeks in the office to go over biopsies. We will send him on a PPI    Anthony Montana MD  02/22/22  2:44 PM    NOTE: This report, in part or full, may have been transcribed using voice recognition software. Every effort was made to ensure accuracy; however, inadvertent computerized transcription errors may be present. Please excuse any transcriptional grammatical or spelling errors that may have escaped my editorial review.

## 2022-02-22 NOTE — CONSULTS
Department of Medicine  Division of Hematology/Oncology  Attending Consult Note      Requesting Physician:  Anthony Cuevas MD  Reason for Consult:  unexplained weight loss/elevated MCV and slight anemia    CHIEF COMPLAINT:  Abdominal pain, N/V    History Obtained From:  Chart review/patient    HISTORY OF PRESENT ILLNESS:      The patient is a 77 y.o. male with significant past medical history of hypertension, left lower extremity DVT who presents from home to the ED with abdominal pain, nausea and vomiting. Patient completed work-up and was admitted with diagnosis(es) of generalized abdominal pain, acute kidney injury, nausea and alteration in appetite. 30 to 40 pound weight loss however he is not eating or drinking well. Patient additionally complains of constipation and underwent a EGD and colonoscopy. Moderate gastritis noted in stomach and biopsies were taken. No evidence of malignancy seen on EGD or colonoscopy. Laboratory abnormalities on presentation included macrocytic anemia, mildly elevated bilirubin and acute kidney injury with a creatinine of 2.2. On presentation. Patient seen at bedside. He is [place] without hearing aids. He confirms the above history. He was not eating because things did not taste good to him and he occasionally had nausea and heartburn. He denies night sweats, fevers. Other complaints include numbnes and tingling of fingers and toes, unsteady gait, resting tremor of hands right side greater than left, burning of tongue. He denies current excessive alcohol use.        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      Diagnosis Date    Allergic rhinitis     DVT (deep venous thrombosis) ([facility] [address] 2003    left leg    Hypertension     Smoking        Past Surgical History:        Procedure Laterality Date    COLONOSCOPY N/A 2/21/2022    COLONOSCOPY WITH BIOPSY performed by [facility] Pand[facility] at [facility] ENDOSCOPY    ECHO COMPL W DOP COLOR FLOW  2015         KNEE ARTHROSCOPY      right    OK LAP, VENTRAL HERNIA REPAIR,REDUCIBLE N/A 10/15/2018    DIAGNOSTIC LAPAROSCOPY,  OPEN VENTRAL HERNIA REPAIR WITH MESH performed by Emily Major MD at [address] [place] [address] Close ENDOSCOPY N/A 2022    EGD BIOPSY performed by Emily Major MD at [facility] ENDOSCOPY       Current Medications:    Current Facility-Administered Medications: pantoprazole (PROTONIX) tablet 40 mg, 40 mg, Oral, QAM AC  hydrocortisone-aloe 1 % cream, , Topical, BID  ondansetron (ZOFRAN) injection 4 mg, 4 mg, IntraVENous, Q4H PRN  NIFEdipine (ADALAT CC) extended release tablet 30 mg, 30 mg, Oral, Daily  aspirin chewable tablet 81 mg, 81 mg, Oral, Daily  potassium chloride (KLOR-CON M) extended release tablet 20 mEq, 20 mEq, Oral, BID WC  0.9 % sodium chloride infusion, , IntraVENous, Continuous    Allergies:  Pcn [penicillins]    Social History:   Social History     Socioeconomic History    Marital status:      Spouse name: Not on file    Number of children: Not on file    Years of education: Not on file    Highest education level: Not on file   Occupational History    Not on file   Tobacco Use    Smoking status: Former Smoker     Packs/day: 1.00     Start date: 1985     Quit date: 2005     Years since quittin.1    Smokeless tobacco: Never Used   Vaping Use    Vaping Use: Never used   Substance and Sexual Activity    Alcohol use: Yes     Comment: occasional beer    Drug use: No    Sexual activity: Not on file   Other Topics Concern    Not on file   Social History Narrative    Not on file     Social Determinants of Health     Financial Resource Strain:     Difficulty of Paying Living Expenses: Not on file   Food Insecurity:     Worried About Running Out of Food in the Last Year: Not on file    Jo of Food in the Last Year: Not on file   Transportation Needs:     Lack of Transportation (Medical): Not on file    Lack of Transportation (Non-Medical): Not on file   Physical Activity:     Days of Exercise per Week: Not on file    Minutes of Exercise per Session: Not on file   Stress:     Feeling of Stress : Not on file   Social Connections:     Frequency of Communication with Friends and Family: Not on file    Frequency of Social Gatherings with Friends and Family: Not on file    Attends [employer] Services: Not on file    Active Member of [address] [place] [address] or Organizations: Not on file    Attends Club or Organization Meetings: Not on file    Marital Status: Not on file   Intimate Partner Violence:     Fear of Current or Ex-Partner: Not on file    Emotionally Abused: Not on file    Physically Abused: Not on file    Sexually Abused: Not on file   Housing Stability:     Unable to Pay for Housing in the Last Year: Not on file    Number of [place] in the Last Year: Not on file    Unstable Housing in the Last Year: Not on file       Family History:     Father - lung cancer, smoker  Mother lived to be over [address] old      Problem Relation Age of Onset    Cancer Mother     Cancer Father        REVIEW OF SYSTEMS:    As per HPI, remaining ROS negative. PHYSICAL EXAM:      Vitals:  /79   Pulse 86   Temp 97.5 °F (36.4 °C) (Oral)   Resp 18   Ht 6' 2\" (1.88 m)   Wt 185 lb (83.9 kg)   SpO2 100%   BMI 23.75 kg/m²     CONSTITUTIONAL:  awake, alert, cooperative, thin, tremulous  HEENT:  Normocepalic, atraumatic, no obvious abnormality.  Lids and lashes normal, PERRLA, EOMI, sclera clear, conjunctiva normal, mucosa dry, tongue without ulcerations but beefy red appearing  NECK:  Supple, full ROM, symmetrical, trachea midline, no adenopathy  HEMATOLOGIC/LYMPHATICS:  no cervical or supraclavicular lymphadenopathy  LUNGS:  respirations easy and not labored, good air exchange, clear to auscultation bilaterally, no crackles or wheezing  CARDIOVASCULAR:  Normal apical impulse, regular rate and rhythm, normal S1 and S2, no S3 or S4, and no murmur noted  ABDOMEN:  No scars, normal BS, soft, non-distended, non-tender, no masses palpated, no hepatosplenomegaly  MUSCULOSKELETAL: legs thin, no edema, upper extremities with generalized edema and extensive ecchymoses, skin tears and skin appears very thin  : deferred  SKIN: right lower arm with edematous area with overlying erythema and cool to touch,   NEUROLOGIC:  numbness tingling of fingertips and toes, Awake, alert, oriented to name, place and time. resting tremor of UE right side > left    DATA:    CMP:    Lab Results   Component Value Date     02/22/2022    K 3.8 02/22/2022    K 3.1 02/18/2022     02/22/2022    CO2 17 02/22/2022    BUN 14 02/22/2022    PROT 5.6 02/22/2022       CBC:    Recent Labs     02/20/22  1030 02/21/22  0817 02/22/22  0933   WBC 7.2 8.4 7.5   HGB 12.5 11.6* 11.2*    312 294       Radiology:    CT CHEST WO CONTRAST   Final Result   Small fat density surrounding the distal esophagus of a likely fat containing   hiatal hernia without distinct irregularity or obvious mass associated with   the distal esophagus; however, minimal adjacent paraesophageal lymph nodes   subcentimeter more conspicuous in number than size without adjacent   adenopathy otherwise in the chest somewhat indeterminate should be correlated   with esophagitis or symptomatology. No evidence for intrathoracic metastatic disease or primary malignancy   otherwise noted. CT ABDOMEN PELVIS WO CONTRAST Additional Contrast? None   Final Result   1. No acute intra-process. 2.  Cholelithiasis without CT evidence of acute cholecystitis. There is mild   diffuse hepatic steatosis. 3.  Right renal ectopia located in the left lower abdomen/pelvis. 4.  Testicles are located in the inguinal canal bilaterally. .             IMPRESSION:   76 yo male with macrocytic anemia and associated neuropathic findings of paresthesia, ataxia, folate deficiency identified. Patient reports a history of heavy alcohol use that stopped about 10 years ago when he retired. RECOMMENDATIONS:  Replace folate, folic acid 1 mg daily  Await biopsy from stomach tissue, evaluate for malabsorption vs poor diet  Add zinc to help with dysgeusia, uncertain is will help greatly need to treat underlying deficiencies  Check bloodwork to rule out underlying bone marrow dyscrasia  Has nutrition/folate deficiency. Will rule out hemolysis. MDS less likely but remains in differential.  Will monitor for now. Does not require bmbx at this time. Thank you for allowing us to participate in the care of Vicente Coronel Teresita Partida PA-C  [facility] [phone]    Note: This report was completed using [employer] voiced recognition software. Every effort has been made to ensure accuracy; however, inadvertent computerized transcription errors may be present. Electronically signed by MICHAEL Lewis on 2/22/2022 at 11:28 AM    Attending Addendum:    Patient seen and examined personally. Agree with the consult note above which has been updated to reflect changes.       Rajesh Ventura MD

## 2022-02-22 NOTE — PROGRESS NOTES
Patient up frequently to [facility] with loose brown watery stool mixed with urine, no complaints of pain, cramping, cramping. Will continue to monitor.

## 2022-02-22 NOTE — PROGRESS NOTES
Physical Therapy    Facility/Department: [facility] MED SURG  Initial Assessment    NAME: Ttay Lutz  : 1955  MRN: [id number]    Date of Service: 2022       Patient Diagnosis(es): The primary encounter diagnosis was Generalized abdominal pain. Diagnoses of Acute kidney injury ([facility] Nausea, and Alteration in appetite were also pertinent to this visit. has a past medical history of Allergic rhinitis, DVT (deep venous thrombosis) ([facility] Hypertension, and Smoking. has a past surgical history that includes ECHO Compl W Dop Color Flow (2015); Knee arthroscopy; Tonsillectomy; pr lap, ventral hernia repair,reducible (N/A, 10/15/2018); Colonoscopy (N/A, 2022); and Upper gastrointestinal endoscopy (N/A, 2022). Referring provider:  Diana Layne MD  PT Order:  PT eval and treat     Evaluating PT:  Maryann Rahman PT, DPT PT [id number]    Room #:  0621/0621-A  Diagnosis:  Nausea [R11.0]  Generalized abdominal pain [R10.84]  FRANCISCA (acute kidney injury) ([facility] [N17.9]  Acute kidney injury ([facility] [N17.9]  Alteration in appetite [R63.8]  Precautions:  Fall risk  Equipment Needs:  Possible w/w    SUBJECTIVE:    Pt lives with his wife in a 1 story home with 3 stairs to enter and grab bar. Pt reported the laundry is in the finished basement. Bed and bath is on first floor. Pt ambulated with no device PTA. Pt reported being independent with all functional mobility. OBJECTIVE:   Initial Evaluation  Date:  Treatment Short Term/ Long Term   Goals   Was pt agreeable to Eval/treatment? yes     Does pt have pain?  None reported     Bed Mobility  Rolling: SBA  Supine to sit: SBA  Sit to supine: SBA  Scooting: SBA to sitting EOB  independent   Transfers Sit to stand: Min A  Stand to sit: Min A  Stand pivot: NT  independent   Ambulation    15 feet with w/w Mod A   100+ feet with w/w SBA    Stair negotiation: ascended and descended  NT   3 steps with 1 rail SBA   ROM BLE:  WFL     Strength BLE:  Grossly 4/5 Grossly 4+/5   Balance Sitting EOB:  supervision  Dynamic Standing: Mod A with w/w  Sitting EOB:  independent  Dynamic Standing:  SBA with w/w   AM-PAC 6 Clicks 35/07       Pt is A & O x 3. Pt hard of hearing  Sensation:  Pt reported numbness in his finger tips     Therapeutic Exercises:  encouraged pt to perform ankle pumps, quad sets and hip and knee flex/ext. Patient education  Pt educated on PT objectives during eval and while in the hospital, LE exercises, hand placement during transfers, walker usage and safety. Patient response to education:   Pt verbalized understanding Pt demonstrated skill Pt requires further education in this area   yes With cueing yes     ASSESSMENT:    Conditions Requiring Skilled Therapeutic Intervention:    [x]Decreased strength     []Decreased ROM  [x]Decreased functional mobility  [x]Decreased balance   [x]Decreased endurance   [x]Decreased posture  [x]Decreased sensation  [x]Decreased coordination   []Decreased vision  []Decreased safety awareness   []Increased pain       Comments:  Pt found in bed. No report of dizziness during functional mobility. Cueing required for hand placement during transfers. Cueing required for walker usage and safety. Pt with very unsteady, uncoordinated gait. Pt also demonstrated uncoordinated movements in his UE when grabbing the walker or poring water into a cup. At end of eval, pt left in bed with call light in reach and bed alarm on.      Pt's/ family goals   1. To get stronger    Prognosis is good for reaching above PT goals. Patient and or family understand(s) diagnosis, prognosis, and plan of care.   yes    PHYSICAL THERAPY PLAN OF CARE:    PT POC is established based on physician order and patient diagnosis     Diagnosis:  Nausea [R11.0]  Generalized abdominal pain [R10.84]  FRANCISCA (acute kidney injury) ([address] [N17.9]  Acute kidney injury ([address] [N17.9]  Alteration in appetite [R63.8]    Current Treatment Recommendations:     [x] Strengthening to improve independence with functional mobility   [] ROM to improve independence with functional mobility   [x] Balance Training to improve static/dynamic balance and to reduce fall risk  [x] Endurance Training to improve activity tolerance during functional mobility   [x] Transfer Training to improve safety and independence with all functional transfers   [x] Gait Training to improve gait mechanics, endurance and assess need for appropriate assistive device  [x] Stair Training in preparation for safe discharge home and/or into the community   [] Positioning to prevent skin breakdown and contractures  [x] Safety and Education Training   [x] Patient/Caregiver Education   [] HEP  [] Other     PT long term treatment goals are located in above grid    Frequency of treatments: 2-5x/week x 1-2 weeks. Time in  0958  Time out  1010    Evaluation Time includes thorough review of current medical information, gathering information on past medical history/social history and prior level of function, completion of standardized testing/informal observation of tasks, assessment of data and education on plan of care and goals.     CPT codes:  [x] Low Complexity PT evaluation 71975  [] Moderate Complexity PT evaluation 21046  [] High Complexity PT evaluation 98272  [] PT Re-evaluation 56740  [] Therapeutic activities 16441 __minutes  [] Therapeutic exercises 43676 __ minutes      Stacia Lobato, PT, DPT  PT [id number]

## 2022-02-22 NOTE — PROGRESS NOTES
Occupational Therapy  OCCUPATIONAL THERAPY INITIAL EVALUATION    [facility] [place], [place]       Date:2022  Patient Name: Gabriela Aldridge  MRN: [id number]  : 1955  Room: [address] [place] [address]    Evaluating OT: Katiuska WEBB OTR/L #[id number]    Referring Provider: Kassie Christina MD  Specific Provider Orders/Date: eval and treat 2022    Diagnosis:  Nausea [R11.0]  Generalized abdominal pain [R10.84]  FRANCISCA (acute kidney injury) ([address] [N17.9]  Acute kidney injury ([address] [N17.9]  Alteration in appetite [R63.8]    Procedure: EGD, colonoscopy 22     Pertinent Medical History:   Past Medical History:   Diagnosis Date    Allergic rhinitis     DVT (deep venous thrombosis) ([facility]     left leg    Hypertension     Smoking         Precautions:  fall risk, alarm    Assessment of current deficits   [x] Functional mobility  []ADLs  [x] Strength               []Cognition   [x] Functional transfers   [x] IADLs         [x] Safety Awareness   [x]Endurance   [] Fine Coordination              [x] Balance      [] Vision/perception   []Sensation    []Gross Motor Coordination  [] ROM  [] Delirium                   [] Motor Control     OT PLAN OF CARE   OT POC based on physician orders, patient diagnosis and results of clinical assessment    Frequency/Duration 2-5 days/wk for 10-14 days PRN   Specific OT Treatment Interventions to include:   * Instruction/training on adapted ADL techniques and AE recommendations to increase functional independence within precautions       * Training on energy conservation strategies, correct breathing pattern and techniques to improve independence/tolerance for self-care routine  * Functional transfer/mobility training/DME recommendations for increased independence, safety, and fall prevention  * Patient/Family education to increase follow through with safety techniques and functional independence  * Recommendation of environmental modifications for increased safety with functional transfers/mobility and ADLs  * Therapeutic exercise to improve motor endurance, ROM, and functional strength for ADLs/functional transfers  * Therapeutic activities to facilitate/challenge dynamic balance, stand tolerance for increased safety and independence with ADLs    Recommended Adaptive Equipment: TBD     Home Living: Pt lives with wife in a one story home with 3 JAYSHREE. Bathroom setup: tub shower with shower chair   Equipment Owned: no device    Prior Level of Function: assist with ADLs, assist with IADLs. Completed functional mobility with no devices  Driving: yes, pt reports limited distance    Pain Level: no pain reported     Cognition: A&O: 4/4.     Problem solving:  good   Sequencing: good   Memory: good   Judgement/safety:  good     Functional Assessment: AM-PAC Daily Activity Raw Score: 16/24   Initial Eval Status  Date: 2/22/22 Treatment session:  STGs=LTGS  Timeframe 10-14 days     Feeding Set up  Overall B hand shakiness noted     Grooming  set up, seated level, d/t decreased standing tolerance  Independent   UB Dressing  Min A   Mod I with use of AD as needed   LB Dressing Mod A  Able to cross/lift legs to nikole and doff sock  Assist threading pants over feet, pulling up over knees and hips  Increased assistance with standing balance   Mod I with use of AD as needed   Bathing Min A at seated level d/t decreased standing tolerance     supervision   Toileting Mod A  Supervision   Bed Mobility  Supine to sit: Min A, assist with trunk  Supervision   Functional Transfers Mod A  Sit <> stand EOB and commode Cues for hand placement and safe technique   Supervision   Functional Mobility Mod A  With walker Cues for safe wheeled walker management and navigation, assist with maintaining balance  Mod I with AD as needed with fair tolerance   Balance Sitting:        Static: good       Dynamic: fair +    Standing: Fair - with walker  Sitting: Static: good       Dynamic: good    Standing: good    Activity Tolerance Fair -  Pt limited by decreased standing tolerance and overall balance, no SOB noted  good during ADL activity. Pt will verbalize and demonstrate good understanding of ECWS techniques     Hand Dominance: right   Strength ROM Additional Info:    RUE  WFL WFL good  noted during ADL tasks     LUE WFL WFL good  noted during ADL tasks         Hearing: WFL   Vision: WFL   Sensation:  No c/o numbness or tingling   Tone: WFL   Edema: none     Comments: Upon arrival, patient supine in bed. At end of session, patient up in chair with call light and phone within reach, all lines and tubes intact. Overall patient demonstrated decreased independence and safety during completion of ADL/functional transfer/mobility tasks. Pt would benefit from continued skilled OT to increase safety and independence with completion of ADL/IADL tasks for functional independence and quality of life. Bed/chair alarm: on                     Rehab Potential: Good for established goals     Patient/Family Goal: To get home. Patient and/or family were instructed on functional diagnosis, prognosis/goals and OT plan of care. Pt verbalized understanding.       Eval Complexity: Low    Time In: 2:40  Time Out: 3:00  Total Treatment Time: 0    Min Units   OT Eval Low 97165  x  1   OT Eval Medium 60805      OT Eval High 22396       OT Re-Eval [id number]       Therapeutic Ex 35501       Therapeutic Activities 53999       ADL/Self Care 52503       Orthotic Management 07158       Neuro Re-Ed 27867       Non-Billable Time          Evaluation time includes thorough review of current medical information, gathering information on past medical history/social history and prior level of function, completion of standardized testing/informal observation of tasks, assessment of data, and development of POC/Goals    [facility], OTR/L #[id number]

## 2022-02-23 PROBLEM — N17.9 AKI (ACUTE KIDNEY INJURY) (HCC): Status: RESOLVED | Noted: 2022-01-01 | Resolved: 2022-01-01

## 2022-02-23 NOTE — CARE COORDINATION
Care coordination:  Patient agreeable to TREV at discharge. Referral called to [facility]. Awaiting call back. Alicia Jordan RN  Case Management    Addendum:  able to accept. Ayesha to pick-up patient at 2pm. [address], gucci, transport forms in soft chart. COVID pending.     Alicia Jordan RN

## 2022-02-23 NOTE — PROGRESS NOTES
Subjective:  Chart reviewed, discussed with bedside RN  Patient seen at bedside    The patient is awake and alert. No problems overnight  Tolerating diet. Denies chest pain, angina, dyspnea, abdominal discomfort, nausea/vomiting and diarrhea/constipation. Objective:    /80   Pulse 86   Temp 98.2 °F (36.8 °C) (Oral)   Resp 18   Ht 6' 2\" (1.88 m)   Wt 186 lb 12.8 oz (84.7 kg)   SpO2 100%   BMI 23.98 kg/m²     General: NAD, awake and alert, resting tremor right hand  HEENT: normocephalic/atraumatic, tongue without ulcerations but beefy red appearing, EOMI, PERRLA, sclera anicteric, conjuntiva pink  NECK: supple, trachea midline  Heart:  RRR, no murmurs, gallops, or rubs. Lungs:  CTA bilaterally, no wheeze, rales or rhonchi  Abd: BS present, nontender, nondistended, no masses  Extrem:  legs thin, no edema, upper extremities with generalized edema and extensive ecchymoses, skin tears and skin appears very thin  Lymphatics: No palpable adenopathy in cervical and supraclavicular regions  : no rodriguez  Skin: per extremity exam  NEUROLOGIC:  numbness tingling of fingertips and toes, Awake, alert, oriented to name, place and time.   resting tremor of UE right side > left    CBC with Differential:    Lab Results   Component Value Date    WBC 10.3 02/23/2022    RBC 2.55 02/23/2022    HGB 10.4 02/23/2022    HCT 31.4 02/23/2022    HCT 31.5 02/23/2022     02/23/2022    .5 02/23/2022    MCH 40.8 02/23/2022    MCHC 33.0 02/23/2022    RDW 12.6 02/23/2022    NRBC 0.0 02/23/2022    LYMPHOPCT 3.5 02/23/2022    PROMYELOPCT 0.9 02/23/2022    MONOPCT 4.3 02/23/2022    BASOPCT 0.0 02/23/2022    MONOSABS 0.41 02/23/2022    LYMPHSABS 0.41 02/23/2022    EOSABS 0.00 02/23/2022    BASOSABS 0.00 02/23/2022     CMP:    Lab Results   Component Value Date     02/23/2022    K 4.5 02/23/2022    K 3.1 02/18/2022     02/23/2022    CO2 18 02/23/2022    BUN 12 02/23/2022    CREATININE 1.2 02/23/2022 GFRAA >60 02/23/2022    LABGLOM >60 02/23/2022    GLUCOSE 71 02/23/2022    PROT 5.3 02/23/2022    LABALBU 3.2 02/23/2022    CALCIUM 8.6 02/23/2022    BILITOT 1.3 02/23/2022    ALKPHOS 91 02/23/2022    AST 28 02/23/2022    ALT 24 02/23/2022          Current Facility-Administered Medications:     folic acid (FOLVITE) tablet 1 mg, 1 mg, Oral, Daily, MICHAEL Urena, 1 mg at 02/23/22 1020    zinc sulfate (ZINCATE) capsule 50 mg, 50 mg, Oral, Daily, MICHAEL Urena, 50 mg at 02/23/22 1020    pantoprazole (PROTONIX) tablet 40 mg, 40 mg, Oral, QAM ACCece MD, 40 mg at 02/23/22 0656    hydrocortisone-aloe 1 % cream, , Topical, BID, Marleny Tatum DO, Given at 02/23/22 1022    ondansetron [facility]) injection 4 mg, 4 mg, IntraVENous, Q4H PRN, Cece Guerra MD, 4 mg at 02/20/22 1855    NIFEdipine (ADALAT CC) extended release tablet 30 mg, 30 mg, Oral, Daily, Cece Guerra MD, 30 mg at 02/23/22 1122    aspirin chewable tablet 81 mg, 81 mg, Oral, Daily, Cece Guerra MD, 81 mg at 02/23/22 1020    potassium chloride (KLOR-CON M) extended release tablet 20 mEq, 20 mEq, Oral, BID WCCece MD, 20 mEq at 02/23/22 1020    CT CHEST WO CONTRAST   Final Result   Small fat density surrounding the distal esophagus of a likely fat containing   hiatal hernia without distinct irregularity or obvious mass associated with   the distal esophagus; however, minimal adjacent paraesophageal lymph nodes   subcentimeter more conspicuous in number than size without adjacent   adenopathy otherwise in the chest somewhat indeterminate should be correlated   with esophagitis or symptomatology. No evidence for intrathoracic metastatic disease or primary malignancy   otherwise noted. CT ABDOMEN PELVIS WO CONTRAST Additional Contrast? None   Final Result   1. No acute intra-process. 2.  Cholelithiasis without CT evidence of acute cholecystitis. There is mild   diffuse hepatic steatosis.       3.  Right renal ectopia located in the left lower abdomen/pelvis. 4.  Testicles are located in the inguinal canal bilaterally. .           76 yo male with macrocytic anemia associated with folate deficiency. Reticulocyte index slightly low in response to anemia at 1.6. A/P:  -folic acid 1 mg daily  - await additional results to rule out underlying bone marrow dyscrasia such as MDS and tissue biopsy from EGD that may identify absorbtion issue or H pylori which can contribute to folate deficiency through malabsorption  -weight loss possibly due to underlying gastritis and poor PO intake, follow  - patient will follow-up out patient in a couple weeks at the [facility]    Thank you for allowing us to participate in the care of Lisette Fulton. Melinda Kaur PA-C  [phone]    Electronically signed by MICHAEL Garland on 2/23/2022 at 12:08 PM    Note: This report was completed using theRightAPI voiced recognition software. Every effort has been made to ensure accuracy; however, inadvertent computerized transcription errors may be present.

## 2022-02-23 NOTE — PROGRESS NOTES
Subjective: The patient is awake and alert. No problems overnight. Denies chest pain, angina, and dyspnea. Denies abdominal pain. Tolerating diet. No nausea or vomiting. Feeling better. Objective:    /72   Pulse 90   Temp 98.1 °F (36.7 °C) (Oral)   Resp 18   Ht 6' 2\" (1.88 m)   Wt 186 lb 12.8 oz (84.7 kg)   SpO2 100%   BMI 23.98 kg/m²     Heart:  RRR, no murmurs, gallops, or rubs.   Lungs:  CTA bilaterally, no wheeze, rales or rhonchi  Abd: bowel sounds present, nontender, nondistended, no masses  Extrem:  No clubbing, cyanosis, or edema    CBC with Differential:    Lab Results   Component Value Date    WBC 7.5 02/22/2022    RBC 2.74 02/22/2022    HGB 11.2 02/22/2022    HCT 33.9 02/22/2022     02/22/2022    .7 02/22/2022    MCH 40.9 02/22/2022    MCHC 33.0 02/22/2022    RDW 12.9 02/22/2022    LYMPHOPCT 15.8 02/18/2022    MONOPCT 8.5 02/18/2022    BASOPCT 0.4 02/18/2022    MONOSABS 0.80 02/18/2022    LYMPHSABS 1.48 02/18/2022    EOSABS 0.01 02/18/2022    BASOSABS 0.04 02/18/2022     CMP:    Lab Results   Component Value Date     02/22/2022    K 3.8 02/22/2022    K 3.1 02/18/2022     02/22/2022    CO2 17 02/22/2022    BUN 14 02/22/2022    CREATININE 1.3 02/22/2022    GFRAA >60 02/22/2022    LABGLOM 55 02/22/2022    GLUCOSE 65 02/22/2022    PROT 5.6 02/22/2022    LABALBU 3.3 02/22/2022    CALCIUM 8.5 02/22/2022    BILITOT 1.2 02/22/2022    ALKPHOS 96 02/22/2022    AST 32 02/22/2022    ALT 26 02/22/2022    Folate 2.7 low    Assessment:    Patient Active Problem List   Diagnosis   (none) - all problems resolved or deleted   Folate deficiency    Plan:  Dia Taveras MD  7:32 AM  2/23/2022

## 2022-02-23 NOTE — DISCHARGE INSTR - COC
Continuity of Care Form    Patient Name: Sage Hylton   :  1955  MRN:  [id number]    Admit date:  2022  Discharge date:  ***    Code Status Order: Prior   Advance Directives:   Nic Directive Type of Healthcare Directive Copy in [address] Agent's Name Healthcare Agent's Phone Number    22 0926 No, patient does not have an advance directive for healthcare treatment -- -- -- -- --            Admitting Physician:  Renee Negron DO  PCP: Renee Negron DO    Discharging Nurse: [facility] Unit/Room#: 6443/4747-N  Discharging Unit Phone Number: ***    Emergency Contact:   Extended Emergency Contact Information  Primary Emergency Contact: Chayito Patricio  Address: [address] [place] [address] Phone: [phone]  Relation: Spouse  Secondary Emergency Contact: Aren Tran, [address] Phone: [phone]  Mobile Phone: [phone]  Relation: Brother/Sister    Past Surgical History:  Past Surgical History:   Procedure Laterality Date    COLONOSCOPY N/A 2022    COLONOSCOPY WITH BIOPSY performed by Sherrie Greenfield MD at [facility] ENDOSCOPY    ECHO COMPL W DOP COLOR FLOW  2015         KNEE ARTHROSCOPY      right    AK LAP, VENTRAL HERNIA REPAIR,REDUCIBLE N/A 10/15/2018    DIAGNOSTIC LAPAROSCOPY,  OPEN VENTRAL HERNIA REPAIR WITH MESH performed by Sherrie Greenfield MD at [address] N/A 2022    EGD BIOPSY performed by Sherrie Greenfield MD at [address] History:   Immunization History   Administered Date(s) Administered    COVID-19, Pfizer Purple top, DILUTE for use, 12+ yrs, 30mcg/0.3mL dose 2021, 2021       Active Problems:  Patient Active Problem List   Diagnosis Code   (none) - all problems resolved or deleted Isolation/Infection:   Isolation            No Isolation          Patient Infection Status       Infection Onset Added Last Indicated Last Indicated By Review Planned Expiration Resolved Resolved By    None active    Resolved    COVID-19 (Rule Out) 02/18/22 02/18/22 02/18/22 COVID-19, Rapid (Ordered)   02/18/22 Rule-Out Test Resulted            Nurse Assessment:  Last Vital Signs: /72   Pulse 90   Temp 98.1 °F (36.7 °C) (Oral)   Resp 18   Ht 6' 2\" (1.88 m)   Wt 186 lb 12.8 oz (84.7 kg)   SpO2 100%   BMI 23.98 kg/m²     Last documented pain score (0-10 scale): Pain Level: 0  Last Weight:   Wt Readings from Last 1 Encounters:   02/23/22 186 lb 12.8 oz (84.7 kg)     Mental Status:  oriented, alert, coherent, logical, thought processes intact, and able to concentrate and follow conversation    IV Access:  - None    Nursing Mobility/ADLs:  Walking   Assisted  Transfer  Assisted  Bathing  Assisted  Dressing  Assisted  Toileting  Assisted  Feeding  [facility]   whole    Wound Care Documentation and Therapy: Cleanse fragile skin gently and dry, cover with stratasorb. Versatel for skin tears. Elimination:  Continence: Bowel: Yes  Bladder: Yes  Urinary Catheter: None   Colostomy/Ileostomy/Ileal Conduit: No       Date of Last BM: 02/22/22 1800    Intake/Output Summary (Last 24 hours) at 2/23/2022 0733  Last data filed at 2/23/2022 0658  Gross per 24 hour   Intake 240 ml   Output --   Net 240 ml     I/O last 3 completed shifts: In: 3548 [P.O.:300; I.V.:1156]  Out: -     Safety Concerns:      At Risk for Falls    Impairments/Disabilities:      Vision and Hearing    Nutrition Therapy:  Current Nutrition Therapy:   - Oral Diet:  General     Routes of Feeding: Oral  Liquids: No Restrictions  Daily Fluid Restriction: no  Last Modified Barium Swallow with Video (Video Swallowing Test): not done    Treatments at the Time of Hospital Discharge:   Respiratory Treatments: None  Oxygen Therapy:  is not on home oxygen therapy. Ventilator:    - No ventilator support    Rehab Therapies: Physical Therapy and Occupational Therapy  Weight Bearing Status/Restrictions: No weight bearing restirctions  Other Medical Equipment (for information only, NOT a DME order):  walker  Other Treatments: ***    Patient's personal belongings (please select all that are sent with patient):  Glasses, extra set of clothes, jacket, flannel, shoes, lint roller, brush, cell phone, , hydrocortisone cream    RN SIGNATURE:  Jim Anderson RN    CASE MANAGEMENT/SOCIAL WORK SECTION    Inpatient Status Date: ***    Readmission Risk Assessment Score:  Readmission Risk              Risk of Unplanned Readmission:  11           Discharging to Facility/ Agency   Name:   Address:  Phone:  Fax:    Dialysis Facility (if applicable)   Name:  Address:  Dialysis Schedule:  Phone:  Fax:    / signature: {Esignature:[id number]}    PHYSICIAN SECTION    Prognosis: Good    Condition at Discharge: Stable    Rehab Potential (if transferring to Rehab): Good    Recommended Labs or Other Treatments After Discharge: cbc cmp    Physician Certification: I certify the above information and transfer of Isha Emerson  is necessary for the continuing treatment of the diagnosis listed and that he requires [facility] for less 30 days.      Update Admission H&P: {CHP DME Changes in MEBTN:[id number]}    PHYSICIAN SIGNATURE:  {Esignature:[id number]}

## 2022-02-24 NOTE — PROGRESS NOTES
[facility] MD Anna Gonzalez MD Madalynn Cline, MD Kaylyn Bottoms MD  Progress Note    Patient's Name: Hetal Scott  8:21 PM  2/23/2022 2/21: A little anxious, though denies complaint. Hungry, but NPO. ROS unremarkable. For EGD and colonoscopy today  2/22: Feeling better. Good appetite and intake. ROS otherwise unremarkable. 2/23: Seen this early afternoon. Denies complaint. Anxious to go home. Appetite is good. No dyspnea. Ongoing mild fatigue and malaise but improving he notes. History of Present Ilness:      76 y/o M with hx of HTN presenting to ED with cc of weight loss unintentional over the last few weeks with change in bowel habits and   Early stiatey. He was to have FRANCISCA on presentation with cr up to 2.2   He was stated on iv fluid with subsequent improvement in cr down to 1.5 . She had ct abdomen with no contrast and no hydronephrosis noted . Pt seen by general surgery and plan for EGD and colonoscopy on Monday . Allergies:  Pcn [penicillins]    Current Medications:    No current facility-administered medications for this encounter. Review of Systems:   Constitutional: no fevers , no chills , feels ok   Eyes: no eye pain , no itching , no drainage  Ears, nose, mouth, throat, and face: no ear ,nose pain , hearing is ok ,no nasal drainage   Respiratory: no sob ,no cough ,no wheezing . Cardiovascular: no chest pain , no palpitation ,no sob . Gastrointestinal: no nausea, vomiting , constipation , no abdominal pain . Genitourinary:no urinary retention , no burning , dysuria . No polyuria   Hematologic/lymphatic: no bleeding , no cougulation issues . Musculoskeletal:no joint pain , no swelling . Neurological: no headaches ,no weakness , no numbness . Endocrine: no thirst , no weight issues .      Physical exam:   Vital signs /80   Pulse 86   Temp 98.2 °F (36.8 °C) (Oral)   Resp 18   Ht 6' 2\" (1.88 m)   Wt 186 lb 12.8 oz (84.7 kg)   SpO2 100%   BMI 23.98 kg/m²   Gen : NAD , appropriate for stated age . Head : at , nc   Neck : supple , no thyromegaly noted . Eyes : EOMI , PERRLA   CV : RRR , No M/R/G . Lungs: CTAB , no wheezing , good flow heard b/l   Abd : soft , NT , BS + , No Organomegaly appreciated . Skin : soft, dry . Neuro : CN  II-XII grossly intact , no focal neurologic deficit . Psych : cooperative .      Data:   Labs:  CBC with Differential:    Lab Results   Component Value Date    WBC 10.3 02/23/2022    RBC 2.55 02/23/2022    HGB 10.4 02/23/2022    HCT 31.4 02/23/2022    HCT 31.5 02/23/2022     02/23/2022    .5 02/23/2022    MCH 40.8 02/23/2022    MCHC 33.0 02/23/2022    RDW 12.6 02/23/2022    NRBC 0.0 02/23/2022    LYMPHOPCT 3.5 02/23/2022    PROMYELOPCT 0.9 02/23/2022    MONOPCT 4.3 02/23/2022    BASOPCT 0.0 02/23/2022    MONOSABS 0.41 02/23/2022    LYMPHSABS 0.41 02/23/2022    EOSABS 0.00 02/23/2022    BASOSABS 0.00 02/23/2022     CMP:    Lab Results   Component Value Date     02/23/2022    K 4.5 02/23/2022    K 3.1 02/18/2022     02/23/2022    CO2 18 02/23/2022    BUN 12 02/23/2022    CREATININE 1.2 02/23/2022    GFRAA >60 02/23/2022    LABGLOM >60 02/23/2022    GLUCOSE 71 02/23/2022    PROT 5.3 02/23/2022    LABALBU 3.2 02/23/2022    CALCIUM 8.6 02/23/2022    BILITOT 1.3 02/23/2022    ALKPHOS 91 02/23/2022    AST 28 02/23/2022    ALT 24 02/23/2022     Ionized Calcium:  No results found for: IONCA  Magnesium:    Lab Results   Component Value Date    MG 1.4 02/23/2022     Phosphorus:    Lab Results   Component Value Date    PHOS 2.3 02/23/2022     U/A:    Lab Results   Component Value Date    COLORU Yellow 02/19/2022    PHUR 6.5 02/19/2022    WBCUA 2-5 02/19/2022    RBCUA 2-5 02/19/2022    BACTERIA MANY 02/19/2022    CLARITYU Clear 02/19/2022    SPECGRAV 1.015 02/19/2022    LEUKOCYTESUR SMALL 02/19/2022    UROBILINOGEN 1.0 02/19/2022    BILIRUBINUR Negative 02/19/2022    BLOODU Negative 02/19/2022    GLUCOSEU Negative 02/19/2022     Microalbumen/Creatinine ratio:  No components found for: RUCREAT  Iron Saturation:  No components found for: PERCENTFE  TIBC:  No results found for: TIBC  FERRITIN:  No results found for: FERRITIN     Imaging:  CT CHEST WO CONTRAST   Final Result   Small fat density surrounding the distal esophagus of a likely fat containing   hiatal hernia without distinct irregularity or obvious mass associated with   the distal esophagus; however, minimal adjacent paraesophageal lymph nodes   subcentimeter more conspicuous in number than size without adjacent   adenopathy otherwise in the chest somewhat indeterminate should be correlated   with esophagitis or symptomatology. No evidence for intrathoracic metastatic disease or primary malignancy   otherwise noted. CT ABDOMEN PELVIS WO CONTRAST Additional Contrast? None   Final Result   1. No acute intra-process. 2.  Cholelithiasis without CT evidence of acute cholecystitis. There is mild   diffuse hepatic steatosis. 3.  Right renal ectopia located in the left lower abdomen/pelvis. 4.  Testicles are located in the inguinal canal bilaterally. .             Assessment    -Acute kidney injury due to prerenal azotemia associated with volume depletion. Azotemia continues to improve. Creatinine peaked at 2.2 mg/dL. Urine sediment is bland. CT abdomen demonstrates no obstructive uropathy.    -unintentional weight loss with change in bowel habits :  R/O malignancy . Plan for EGD and colonscopy Monday am .     -Macrocytosis with mcv of 115 :  Check b12 ,folate, TSH . Creatinine a little better than yesterday. Good UO. Did not appear hypervolemic.     Recommendations  Okay for discharge from renal standpoint  Follow labs  Follow-up with his family physician in 1 week  Supportive care         Electronically signed by Mati Seth MD on 2/23/2022

## 2022-03-02 PROBLEM — R41.82 ALTERED MENTAL STATUS: Status: ACTIVE | Noted: 2022-01-01

## 2022-03-02 NOTE — CARE COORDINATION
Social Work / Discharge Planning:    Patient presented to the emergency department for AMS from [facility]. Per liakarlo Kinney, patient was at facility for short term rehab and is not a bed hold. Facility will accept patient back pending bed availability. Will need PT/OT for precert and authorization prior to return. Will also need negative COVID result on day of discharge. Assigned SW/CM will follow and verify with patient discharge plan.  Electronically signed by ARUN Dupree on 3/2/22 at 4:28 PM EST

## 2022-03-02 NOTE — PROGRESS NOTES
database initiated. Patient from [facility]. He is A&O x3 right now but has had periods of confusion. Stated he was dreaming about seeing his dog in the room at [place] and that's how he fell reaching for the dog.  He ambulates with a walker and is RA at baseline Pharmacy tech verified home medications

## 2022-03-02 NOTE — ED PROVIDER NOTES
Department of Emergency Medicine   ED  Provider Note  Admit Date/RoomTime: 3/2/2022 10:44 AM  ED Room: 23/23          History of Present Illness:  3/2/22, Time: 11:03 AM EST  Chief Complaint   Patient presents with    Altered Mental Status     sent by physician at [facility] for head CT. Pt wandering around unit, could not redirect. Laurie Lopez is a 77 y.o. male presenting to the ED for confusion and fall, beginning prior to arrival.  The complaint has been constant, moderate in severity, and worsened by nothing. Sent from the rehab facility for head CT. They found him wandering around the unit and unable to be redirected. They report he was acting confused. There is no alleged report of fall they just report has had worsening kidney function and was sent here for further evaluation. He says he feels fine and does not know why they sent him. He denies fever chills. He denies chest pain or shortness of breath. No abdominal pain. No back pain. He denies neck pain. Review of Systems:   A complete review of systems was performed and pertinent positives and negatives are stated within HPI, all other systems reviewed and are negative.        --------------------------------------------- PAST HISTORY ---------------------------------------------  Past Medical History:  has a past medical history of Allergic rhinitis, DVT (deep venous thrombosis) ([facility] [address] Hypertension, and Smoking. Past Surgical History:  has a past surgical history that includes ECHO Compl W Dop Color Flow (7/1/2015); Knee arthroscopy; Tonsillectomy; pr lap, ventral hernia repair,reducible (N/A, 10/15/2018); Colonoscopy (N/A, 2/21/2022); and Upper gastrointestinal endoscopy (N/A, 2/21/2022). Social History:  reports that he quit smoking about 17 years ago. He started smoking about 37 years ago. He smoked 1.00 pack per day. He has never used smokeless tobacco. He reports current alcohol use.  He reports that he does not use drugs. Family History: family history includes Cancer in his father and mother. . Unless otherwise noted, family history is non contributory    The patients home medications have been reviewed. Allergies: Pcn [penicillins]    I have reviewed the past medical history, past surgical history, social history, and family history    ---------------------------------------------------PHYSICAL EXAM--------------------------------------    Constitutional/General: Alert and oriented x3  Head: Normocephalic and atraumatic  Eyes: PERRL, EOMI, sclera non icteric  ENT: Oropharynx clear, handling secretions, no trismus, no asymmetry of the posterior oropharynx or uvular edema  Neck: Supple, full ROM, no stridor, no meningeal signs  Respiratory: Lungs clear to auscultation bilaterally, no wheezes, rales, or rhonchi. Not in respiratory distress  Cardiovascular:  Regular rate. Regular rhythm. No murmurs, no gallops, no rubs. 2+ distal pulses. Equal extremity pulses. Gastrointestinal:  Abdomen Soft, Non tender, Non distended. No rebound, guarding, or rigidity. No pulsatile masses. Musculoskeletal: Moves all extremities x 4. Warm and well perfused, no clubbing, no cyanosis, no edema. Capillary refill <3 seconds  Skin: skin warm and dry. Multiple bruises along the neck and arms  Neurologic: GCS 15, no focal deficits, symmetric strength 5/5 in the upper and lower extremities bilaterally  Psychiatric: Normal Affect          -------------------------------------------------- RESULTS -------------------------------------------------  I have personally reviewed all laboratory and imaging results for this patient. Results are listed below.      LABS: (Lab results interpreted by me)  Results for orders placed or performed during the hospital encounter of 03/02/22   CBC with Auto Differential   Result Value Ref Range    WBC 12.0 (H) 4.5 - 11.5 E9/L    RBC 2.48 (L) 3.80 - 5.80 E12/L    Hemoglobin 9.7 (L) 12.5 - 16.5 g/dL Hematocrit 29.4 (L) 37.0 - 54.0 %    .5 (H) 80.0 - 99.9 fL    MCH 39.1 (H) 26.0 - 35.0 pg    MCHC 33.0 32.0 - 34.5 %    RDW 12.8 11.5 - 15.0 fL    Platelets 615 375 - 907 E9/L    MPV 10.0 7.0 - 12.0 fL    Neutrophils % 78.6 43.0 - 80.0 %    Immature Granulocytes % 1.9 0.0 - 5.0 %    Lymphocytes % 9.3 (L) 20.0 - 42.0 %    Monocytes % 9.3 2.0 - 12.0 %    Eosinophils % 0.6 0.0 - 6.0 %    Basophils % 0.3 0.0 - 2.0 %    Neutrophils Absolute 9.44 (H) 1.80 - 7.30 E9/L    Immature Granulocytes # 0.23 E9/L    Lymphocytes Absolute 1.11 (L) 1.50 - 4.00 E9/L    Monocytes Absolute 1.12 (H) 0.10 - 0.95 E9/L    Eosinophils Absolute 0.07 0.05 - 0.50 E9/L    Basophils Absolute 0.03 0.00 - 0.20 E9/L    RBC Morphology Normal    Comprehensive Metabolic Panel w/ Reflex to MG   Result Value Ref Range    Sodium 140 132 - 146 mmol/L    Potassium reflex Magnesium 3.6 3.5 - 5.0 mmol/L    Chloride 104 98 - 107 mmol/L    CO2 15 (L) 22 - 29 mmol/L    Anion Gap 21 (H) 7 - 16 mmol/L    Glucose 113 (H) 74 - 99 mg/dL    BUN 14 6 - 23 mg/dL    CREATININE 1.5 (H) 0.7 - 1.2 mg/dL    GFR Non-African American 47 >=60 mL/min/1.73    GFR African American 57     Calcium 9.2 8.6 - 10.2 mg/dL    Total Protein 5.7 (L) 6.4 - 8.3 g/dL    Albumin 3.2 (L) 3.5 - 5.2 g/dL    Total Bilirubin 1.3 (H) 0.0 - 1.2 mg/dL    Alkaline Phosphatase 97 40 - 129 U/L    ALT 31 0 - 40 U/L    AST 33 0 - 39 U/L   Ammonia   Result Value Ref Range    Ammonia 25.5 16.0 - 60.0 umol/L   Protime-INR   Result Value Ref Range    Protime 12.4 9.3 - 12.4 sec    INR 1.2    APTT   Result Value Ref Range    aPTT 26.6 24.5 - 35.1 sec   Troponin   Result Value Ref Range    Troponin, High Sensitivity 54 (H) 0 - 11 ng/L   Urinalysis   Result Value Ref Range    Color, UA DARK YELLOW (A) Straw/Yellow    Clarity, UA Clear Clear    Glucose, Ur Negative Negative mg/dL    Bilirubin Urine MODERATE (A) Negative    Ketones, Urine 15 (A) Negative mg/dL    Specific Gravity, UA 1.025 1.005 - 1.030 Blood, Urine Negative Negative    pH, UA 6.0 5.0 - 9.0    Protein, UA Negative Negative mg/dL    Urobilinogen, Urine 1.0 <2.0 E.U./dL    Nitrite, Urine Negative Negative    Leukocyte Esterase, Urine TRACE (A) Negative   Lactate, Sepsis   Result Value Ref Range    Lactic Acid, Sepsis 1.9 0.5 - 1.9 mmol/L   Urine Drug Screen   Result Value Ref Range    Amphetamine Screen, Urine NOT DETECTED Negative <1000 ng/mL    Barbiturate Screen, Ur NOT DETECTED Negative < 200 ng/mL    Benzodiazepine Screen, Urine NOT DETECTED Negative < 200 ng/mL    Cannabinoid Scrn, Ur POSITIVE (A) Negative < 50ng/mL    Cocaine Metabolite Screen, Urine NOT DETECTED Negative < 300 ng/mL    Opiate Scrn, Ur NOT DETECTED Negative < 300ng/mL    PCP Screen, Urine NOT DETECTED Negative < 25 ng/mL    Methadone Screen, Urine NOT DETECTED Negative <300 ng/mL    Oxycodone Urine NOT DETECTED Negative <100 ng/mL    FENTANYL SCREEN, URINE NOT DETECTED Negative <1 ng/mL    Drug Screen Comment: see below    Serum Drug Screen   Result Value Ref Range    Ethanol Lvl <10 mg/dL    Acetaminophen Level <5.0 (L) 10.0 - 03.3 mcg/mL    Salicylate, Serum <0.4 0.0 - 30.0 mg/dL   Blood Gas, Arterial   Result Value Ref Range    Date Analyzed [id number]     Time Analyzed 1132     Source: Blood Arterial     pH, Blood Gas 7.407 7.350 - 7.450    PCO2 24.5 (L) 35.0 - 45.0 mmHg    PO2 85.0 75.0 - 100.0 mmHg    HCO3 15.1 (L) 22.0 - 26.0 mmol/L    B.E. -8.2 (L) -3.0 - 3.0 mmol/L    O2 Sat 96.8 92.0 - 98.5 %    O2Hb 96.2 94.0 - 97.0 %    COHb 0.3 0.0 - 1.5 %    MetHb 0.3 0.0 - 1.5 %    O2 Content 14.2 mL/dL    HHb 3.2 0.0 - 5.0 %    tHb (est) 10.4 (L) 11.5 - 16.5 g/dL    Mode RA     Date Of Collection      Time Collected      Pt Temp 37.0 C     ID [id number]     Lab [id number]     Critical(s) Notified .  No Critical Values    Microscopic Urinalysis   Result Value Ref Range    WBC, UA 1-3 0 - 5 /HPF    RBC, UA NONE 0 - 2 /HPF    Bacteria, UA RARE (A) None Seen /HPF   SPECIMEN REJECTION Result Value Ref Range    Rejected Test Trop     Reason for Rejection see below    Troponin   Result Value Ref Range    Troponin, High Sensitivity 47 (H) 0 - 11 ng/L   POCT Glucose   Result Value Ref Range    Meter Glucose 108 (H) 74 - 99 mg/dL   EKG 12 Lead   Result Value Ref Range    Ventricular Rate 101 BPM    Atrial Rate 101 BPM    P-R Interval 160 ms    QRS Duration 82 ms    Q-T Interval 310 ms    QTc Calculation (Bazett) 401 ms    P Axis 102 degrees    R Axis -23 degrees    T Axis -176 degrees   ,       RADIOLOGY:  Interpreted by Radiologist unless otherwise specified  CT HEAD WO CONTRAST   Final Result   No acute intracranial abnormality. Specifically, there is no acute   intracranial hemorrhage         CT FACIAL BONES WO CONTRAST   Final Result   No acute traumatic injury of the facial bones. CT CERVICAL SPINE WO CONTRAST   Final Result   1. There is no acute compression fracture or subluxation of the cervical   spine. 2. Advanced multilevel degenerative disc and degenerative joint disease. XR CHEST PORTABLE   Final Result   No acute process.                EKG Interpretation  Interpreted by emergency department physician, Dr. Layla Méndez     Date of EKG: 3/2/22  Time: 1140    Rhythm: sinus tachycardia  Rate: tachycardia  Axis: left  Conduction: normal  ST Segments: no acute change  T Waves: no acute change    Clinical Impression: Sinus tachycardia, no acute ischemic changes  Comparison to prior EKG: stable as compared to patient's most recent EKG      ------------------------- NURSING NOTES AND VITALS REVIEWED ---------------------------   The nursing notes within the ED encounter and vital signs as below have been reviewed by myself  /73   Pulse 110   Temp 97.7 °F (36.5 °C) (Oral)   Resp 20   Ht 6' 2\" (1.88 m)   Wt 175 lb (79.4 kg)   SpO2 98%   BMI 22.47 kg/m²     Oxygen Saturation Interpretation: Normal    The patients available past medical records and past encounters were reviewed. ------------------------------ ED COURSE/MEDICAL DECISION MAKING----------------------  Medications   0.9 % sodium chloride bolus (has no administration in time range)           The cardiac monitor revealed sinus tachycardia with a heart rate in the 100s as interpreted by me. The cardiac monitor was ordered secondary to the patient's mental status changes and to monitor the patient for dysrhythmia. CPT [id number]       I, Dr. Leticia Mcleod, am the primary provider of record    Medical Decision Making:   He is having agitation and periods of behavior and mental status changes at the nursing facility. They were unable to redirect him. He has been wandering around the unit. He also appears to have fallen and hit his face at some point. He is alert and oriented now. Although he seems to be fidgety. He does not appear to be in alcohol withdrawal, he says that he has drank in the past but since being admitted on 18 February he has been sober. Theoretically he should be outside of the timeframe for acute alcohol withdrawal.  Since his behavior is not normal, I spoke with Dr. Arnold Velez and he will readmit the patient for further evaluation. He denies chest pain or shortness of breath. No neck pain or stiffness. Nothing to suggest CNS infection. No fever. He is not altered right now. No meningeal signs. He is having these episodes randomly and is agitated when they happen. No seizure activity. Oxygen Saturation Interpretation: 98 % on RA. Re-Evaluations:     No change      This patient's ED course included: a personal history and physicial examination, re-evaluation prior to disposition, multiple bedside re-evaluations, IV medications, cardiac monitoring, continuous pulse oximetry and complex medical decision making and emergency management    This patient has remained hemodynamically stable during their ED course. Consultations:  Dr. Arnold Velez      Counseling:    The emergency provider has spoken with the patient and spouse/SO and discussed todays results, in addition to providing specific details for the plan of care and counseling regarding the diagnosis and prognosis. Questions are answered at this time and they are agreeable with the plan.       --------------------------------- IMPRESSION AND DISPOSITION ---------------------------------    IMPRESSION  1. Altered mental status, unspecified altered mental status type        DISPOSITION  Disposition: Admit to telemetry  Patient condition is stable        NOTE: This report was transcribed using voice recognition software.  Every effort was made to ensure accuracy; however, inadvertent computerized transcription errors may be present       Nidhi Saul MD  03/02/22 [id number]

## 2022-03-03 NOTE — PROGRESS NOTES
Occupational Therapy  OCCUPATIONAL THERAPY INITIAL EVALUATION     [facility] [place], [address]                                                  Patient Name: Marc Montejo    MRN: [id number]    : 1955    Room: [address] [place] [address]      Evaluating OT: Patricia Guardado OTR/L [id number]      Referring Provider: Shruti Fang DO    Specific Provider Orders/Date: OT eval and treat 3/3/22      Diagnosis:  Altered mental status [R41.82]  Altered mental status, unspecified altered mental status type [R41.82]      Pertinent Medical History: DVT, HTN      Precautions:  Fall Risk, alarm, confusion, sitter     Assessment of current deficits    [x] Functional mobility  [x]ADLs  [x] Strength               [x]Cognition    [x] Functional transfers   [x] IADLs         [x] Safety Awareness   [x]Endurance    [x] Fine Coordination              [x] Balance      [] Vision/perception   []Sensation     []Gross Motor Coordination  [] ROM  [] Delirium                   [] Motor Control     OT PLAN OF CARE   OT POC based on physician orders, patient diagnosis and results of clinical assessment    Frequency/Duration 2-4 days/wk for 2 weeks PRN   Specific OT Treatment Interventions to include:   * Instruction/training on adapted ADL techniques and AE recommendations to increase functional independence within precautions       * Training on energy conservation strategies, correct breathing pattern and techniques to improve independence/tolerance for self-care routine  * Functional transfer/mobility training/DME recommendations for increased independence, safety, and fall prevention  * Patient/Family education to increase follow through with safety techniques and functional independence  * Recommendation of environmental modifications for increased safety with functional transfers/mobility and ADLs  * Cognitive retraining/development of therapeutic activities to improve problem solving, judgement, memory, and attention for increased safety/participation in ADL/IADL tasks  * Therapeutic exercise to improve motor endurance, ROM, and functional strength for ADLs/functional transfers  * Therapeutic activities to facilitate/challenge dynamic balance, stand tolerance for increased safety and independence with ADLs  * Therapeutic activities to facilitate gross/fine motor skills for increased independence with ADLs  * Neuro-muscular re-education: facilitation of righting/equilibrium reactions, midline orientation, scapular stability/mobility, normalization of muscle tone, and facilitation of volitional active controled movement  * Positioning to improve skin integrity, interaction with environment and functional independence        Recommended Adaptive Equipment: TBD     Home Living: Pt was confused during session and would inconsistently respond to questions. Per chart, pt admitted from [place] [address]. Per recent OT note, prior to TREV pt lived with wife in 1 story house with 3 JAYSHREE. He has a tub/shower with a shower chair and did not use a device for functional mobility. Pt was receiving assistance with ADLs and IADLs at home. Pain Level: Pt did not report pain this date, although multiple skin tears were noted. Cognition: A&O: alert and oriented to self only; inconsistently follows 1 step commands. Pt confused throughout session and writhing and flailing BUE constantly.    Memory:  poor   Sequencing:  poor   Problem solving:  poor   Judgement/safety:  poor     Functional Assessment:  AM-PAC Daily Activity Raw Score: 10/24   Initial Eval Status  Date: 3/3/22 Treatment Status  Date: STGs = LTGs  Time frame: 10-14 days   Feeding Mod A/Min A  D/t cognition  Mod I/I   Grooming Max A  D/t cognition  Min A/SBA   UB Dressing Max A  To don hospital gown  Min A/SBA   LB Dressing Dependent  To don socks   Min A-with use of AD as appropriate/needed   Bathing Max A  Min A -with use of AD as appropriate/needed   Toileting Dependent/Max A  Min A    Bed Mobility  Supine to sit: Mod A X2   Sit to supine: Mod A X2   Min A/SBA   Functional Transfers Mod A X2 with no device  Sit<>stand from EOB  Cues for hand placement and safe technique. Decreased standing tolerance and balance  Min A    Functional Mobility NT  Min A -with device as needed to maximize independence with ADLs and functional task completion   Balance Sitting:     Static:  Min A    Dynamic:Mod A/Min A  Standing: Max A X2. Anterior lean noted with decreased balance. SBA/Sup for static/dynamic sitting balance to maximize independence with ADLs and functional task completion    Min A for standing balance to maximize independence with ADLs and functional task completion   Activity Tolerance Fair- with light activity. Fair+ with ADL activity. Visual/  Perceptual Glasses: none at bedside                Additional long-term goal: Pt will increase functional independence to PLOF to allow pt to live in least restrictive environment. Hand Dominance unable to obtain this information   AROM (PROM)/Strength   RUE  Unable to formally assess  Grossly WFL   LUE Unable to formally assess  Grossly WFL     Hearing: [facility]   Sensation:  No c/o numbness or tingling   Tone: WFL   Edema: none noted    Comments: Upon arrival patient lying in bed with sitter present. At end of session, patient returned to bed with call light and phone within reach, all lines and tubes intact, alarm set and sitter present. Overall patient demonstrated decreased independence and safety during completion of ADL/functional transfer/mobility tasks. Pt would benefit from continued skilled OT to increase safety and independence with completion of ADL/IADL tasks for functional independence and quality of life.     Rehab Potential: Good for established goals     Patient / Family Goal: none stated    Patient and/or family were instructed on functional diagnosis, prognosis/goals and OT plan of care. Demonstrated poor understanding. Eval Complexity: Low    Time In: 2385  Time Out: 2250    Min Units   OT Eval Low 97165  x  1   OT Eval Medium 46821      OT Eval High 93237      OT Re-Eval [id number]       Therapeutic Ex [id number]       Therapeutic Activities 00952       ADL/Self Care 68182       Orthotic Management 24961       Manual [id number]     Neuro Re-Ed 23641       Non-Billable Time          Evaluation Time additionally includes thorough review of current medical information, gathering information on past medical history/social history and prior level of function, interpretation of standardized testing/informal observation of tasks, assessment of data and development of plan of care and goals.             Frank Rust, OTR/L, [id number]

## 2022-03-03 NOTE — PROGRESS NOTES
Dr Efra Wong present on unit as was asked to come to pt room.   Was shown reddened finger, white patches on tongue, was notifed of pt coughing when eating or drinking

## 2022-03-03 NOTE — PROGRESS NOTES
Pt given am meds and had large emesis few minutes later, pills and water. While pt was taking pills, had strong moist cough.   Will monitor

## 2022-03-03 NOTE — CONSULTS
[facility] MD Ignacio Adair MD Benjamen Maduro, MD Mosie Croon, MD Buckner Divers, CNP   [place], OBED  Consultation  3/3/2022    Thank you for consult  Full note dictated, to follow  Briefly, 77 y.o. gentleman known to service, followed for [facility] in January of this year. Admitted with acute mental status change, myoclonic jerks and unusual movements. A/R:  1. Chronic kidney disease, stage IIIa. Creatinine at baseline    2. Metabolic acidosis, wide anion gap    3.   Cannabis on drug screen    Isotonic bicarbonate drip  Urinanalysis  Urine protein creatinine ratio, electrolytes, urine osmolality  Repeat BMP, check serum osmolality    Ignacio Blair MD, MD

## 2022-03-03 NOTE — PLAN OF CARE
Problem: Falls - Risk of:  Goal: Will remain free from falls  Description: Will remain free from falls  3/3/2022 0319 by Susu Da Silva RN  Outcome: Met This Shift

## 2022-03-03 NOTE — CARE COORDINATION
3/3/2022  Social Work Discharge Planning:Pt came from [address] and per liaison is not a bedhold but can return pending bed availability. Awaiting therapy evals . Will need a precert to return. SW confirmed with spouse that Pt will return. N-17 generted, and transport form is in chart. Electronically signed by ARUN Cope on 3/3/2022 at 9:34 AM

## 2022-03-03 NOTE — PROGRESS NOTES
Diagnosis:   · Moderate malnutrition,In context of chronic illness related to increase demand for energy/nutrients as evidenced by wounds,poor intake prior to admission,weight loss greater than or equal to 5% in 1 month      Nutrition Interventions:   Food and/or Nutrient Delivery:  Continue Current Diet,Start Oral Nutrition Supplement (ensure enlive)  Nutrition Education/Counseling:  No recommendation at this time   Coordination of Nutrition Care:  Continue to monitor while inpatient    Goals:  PO>75% at meals/ONS. Nutrition Monitoring and Evaluation:   Behavioral-Environmental Outcomes:  None Identified   Food/Nutrient Intake Outcomes:  Food and Nutrient Intake,Supplement Intake  Physical Signs/Symptoms Outcomes:  Biochemical Data,Nutrition Focused Physical Findings,Weight,Skin     Discharge Planning:     Too soon to determine,Continue current diet,Continue Oral Nutrition Supplement     Electronically signed by Apoorva Saldana on 3/3/22 at 11:13 AM EST    Contact:

## 2022-03-03 NOTE — PROGRESS NOTES
While  Placing PIV patient was lying flat per RN request and had 2 emesis of coffee ground liquid. Bed raised ASAP but patient continues to cough. Charge RN notified of this. Also patient has left 2nd finger bright red and edematous up to hand. Charge RN also notified of this. 3/3/2022 9:35 AM DANIEL SABILLON, VA-BC

## 2022-03-03 NOTE — PROGRESS NOTES
Initial Inpatient Wound Care    Admit Date: 3/2/2022 10:44 AM    Reason for consult:  Open areas on buttocks  Perfect served for multiple skin tears  Significant history: adm for AMS from nursing facility  Wound history:  POA  Findings:  Awake verbal. hard of hearing. Wife present. Patient flails arms around. Confusion but at times cooperates. Right arms dressing taped to hand  Cotton sleeve in place with drainage. Removed. Multiple skin tears open areas revealed. Top of hand 4 cm skin tear. Flap loose, re-approximated and versatel applied. Rt antecubital below      2x3 open skin tear after dressing removed. Bleeding    Rt upper arms, multiple wounds as pictured. Distal wound is 4x2x0,2 pink, yellow    Bilateral buttocks     wound as pictured 1x1x0.1, pink, chrnic irritation. Left arm      Dry gauze tape to open areas, 1.5x1.5-xeroform and kerlex to area  Inner buttocks red, denuded, raw  Amie Oris. RODGER Apodaca, Wound Care    Impression: multiple skin tears  Dressings applied today. Rt hand-versatel  Rt antecube and proximal arm wound. Xeroform. 2 other wounds rt arm, xeroform, abds, kerlex wrap.   Plan: Calmoseptine and Aquaphor to buttocks and inner buttocks  No tape to skin      CRISTIANO Chan - CNS 3/3/2022 3:19 PM

## 2022-03-03 NOTE — PLAN OF CARE
Problem: Skin Integrity:  Goal: Absence of new skin breakdown  Description: Absence of new skin breakdown  Outcome: Met This Shift     Problem: Falls - Risk of:  Goal: Absence of physical injury  Description: Absence of physical injury  Outcome: Met This Shift

## 2022-03-03 NOTE — PROGRESS NOTES
Physical Therapy    Facility/Department: [address] INTERNAL MEDICINE 2  Initial Assessment    NAME: Robert Wolfe  : 1955  MRN: [id number]    Date of Service: 3/3/2022      Patient Diagnosis(es): The encounter diagnosis was Altered mental status, unspecified altered mental status type. has a past medical history of Allergic rhinitis, DVT (deep venous thrombosis) ([address] Hypertension, and Smoking. has a past surgical history that includes ECHO Compl W Dop Color Flow (2015); Knee arthroscopy; Tonsillectomy; pr lap, ventral hernia repair,reducible (N/A, 10/15/2018); Colonoscopy (N/A, 2022); and Upper gastrointestinal endoscopy (N/A, 2022). Evaluating Therapist: Basilio Juarez PT    Room #:  0392/3581-R  Diagnosis:  Altered mental status [R41.82]  Altered mental status, unspecified altered mental status type [R41.82]  Precautions:  Falls, alarm      Social:  Pt admitted from [place] [address]. Pt unable to report prior level of function. Prior to last admit pt lived with wife and ambulated without device. Initial Evaluation  Date: 3/3/22 Treatment      Short Term/ Long Term   Goals   Was pt agreeable to Eval/treatment? Does pt have pain? No c/o pain     Bed Mobility  Rolling: mod assist  Supine to sit: mod assist of 2  Sit to supine: mod assist of 2  Scooting: mod assist of 2  Min assist   Transfers Sit to stand: mod assist of 2  Stand to sit: mod assist of 2  Stand pivot: NT  Min assist   Ambulation    NT secondary to poor standing balance. 25 feet with AAd with mod assist   Stair Negotiation  Ascended and descended  NT      LE strength     Does not follow test but at least 3/5        balance      Poor  Max assist of 2 standing balance     AM-PAC Raw score               10/         Pt is alert, confused  LE ROM: WFL  Sensation: NT  Edema: none  Endurance: fair     ASSESSMENT:    Pt displays functional ability as noted in the objective portion of this evaluation.       Comments:  Pt constantly moving arms during session. Pt max assist of 2 for standing balance, leans anterior. Unable to ambulate due to poor standing balance. Pt returned to bed at end of session      Pt's/ family goals   1. Pt unable to state    Conditions Requiring Skilled Therapeutic Intervention:    [x]Decreased strength     []Decreased ROM  [x]Decreased functional mobility  [x]Decreased balance   [x]Decreased endurance   []Decreased posture  []Decreased sensation  []Decreased coordination   []Decreased vision  [x]Decreased safety awareness   []Increased pain       Patient and or family understand(s) diagnosis, prognosis, and plan of care. No  Prognosis is fair for reaching above PT goals    PHYSICAL THERAPY PLAN OF CARE:    PT POC is established based on physician order and patient diagnosis     Referring provider/PT Order: Noreen Barrera, DO/ PT eval and treat      Current Treatment Recommendations:     [x] Strengthening to improve independence with functional mobility   [] ROM to improve independence with functional mobility   [x] Balance Training to improve static/dynamic balance and to reduce fall risk  [x] Endurance Training to improve activity tolerance during functional mobility   [x] Transfer Training to improve safety and independence with all functional transfers   [x] Gait Training to improve gait mechanics, endurance and assess need for appropriate assistive device  [] Stair Training in preparation for safe discharge home and/or into the community   [] Positioning to prevent skin breakdown and contractures  [x] Safety and Education Training   [x] Patient/Caregiver Education   [] HEP  [] Other     PT long term treatment goals are located in above grid    Frequency of treatments: 2-5x/week x 5 days.     Time in  0815  Time out  0830        Evaluation Time includes thorough review of current medical information, gathering information on past medical history/social history and prior level of function, completion of standardized testing/informal observation of tasks, assessment of data and education on plan of care and goals.       CPT codes:  [x] Low Complexity PT evaluation 01605  [] Moderate Complexity PT evaluation 75856  [] High Complexity PT evaluation 00292  [] PT Re-evaluation 07491  [] Gait training 66251 minutes  [] Manual therapy 90155 minutes  [] Therapeutic activities 88002 minutes  [] Therapeutic exercises 68184 minutes  [] Neuromuscular reeducation 87827 minutes     [facility] PT [id number]

## 2022-03-03 NOTE — H&P
[address] [address]                              HISTORY AND PHYSICAL    PATIENT NAME: Karma Carl                      :        1955  MED REC NO:   [id number]                            ROOM:       0407  ACCOUNT NO:   [de-identified]                           ADMIT DATE: 2022  PROVIDER:     Sam Park DO    HISTORY OF PRESENT ILLNESS:  This is a 80-year-old white male recently  discharged from the hospital after having a workup for weight loss and  weakness. He had EGD and colonoscopy, moderate esophagitis, and  gastritis. Colon showed diverticulosis. No obvious polyps or tumors. He also had a CT of his abdomen notable for cholelithiasis without  cholecystitis. Renal had also seen him for some acute kidney injury,  thought to be due to prerenal azotemia and volume depletion, improved  with fluids. When he was discharged for rehab, seemed to be doing okay;  however, at rehab, there was a question as to whether or not he may have  had a fall, although none was witnessed. They found him wandering  around, needing to be redirected, confused, and sent him into the  hospital for evaluation. White count was slightly elevated at 12. Hemoglobin and hematocrit stable at 9.7 and 29.4 with a high MCV of  118.5. Sodium was normal.  He had a little bit of anion gap. Creatinine was up to 1.5. Serum ammonia level normal at 25. INR was  1.2. Urine was inconclusive. Lactic acid was 1.9. He had a drug  screen which was positive for cannabinoids, although he adamantly denies  having used any lately. There is a strong history of alcohol abuse in  the past and he adamantly denies doing any heavy drinking at this point,  although he has had some to drink. Blood gases showed a pH of 7.407,  pCO2 low at 24, and bicarb low at 15. EKG okay. CT of the head; no  intracranial hemorrhage.   CT facial bones; no fracture. CT cervical  spine; no fracture. Advanced degenerative changes. Chest x-ray was  interpreted as normal.  His vitals on admission were blood pressure  128/73, pulse 110, temperature 97.7, respiratory rate 20, weight 175,  and BMI 22.47. Basically was having periods of agitation and abnormal  behavior with mental status changes. He was subsequently admitted to  evaluate these new symptoms. He also had some swelling and redness of  his left middle finger, certainly looks like gout with tophi. RECENT AND CURRENT MEDICATIONS:  Included acyclovir five times daily,  vitamin C, Carafate 1 gm four times a day, Colace as needed, he was on  some Vibra-Tabs, vitamin D, Tylenol, amlodipine 5, Zyrtec 10, Colace as  needed, folic acid daily, and Protonix 40 daily. SOCIAL HISTORY:  The patient is a previous smoker and previous drinker. Denies the use of narcotics, although admits to marijuana use. FAMILY HISTORY:  Was noted and discussed. REVIEW OF SYSTEMS:  Negative for vertigo, cephalgia, ringing in the  ears, hearing loss, difficult swallowing, hoarseness in his voice, and  bloody noses. He is without chest pain, palpitations, orthopnea, or  paroxysmal nocturnal dyspnea or edema. He does have a cough. He has  wheezing. There is possibility of an aspiration. Swallow evaluation  will be checked. He appears to have some thrush. There is no  hemoptysis or pleuritic pain. He does have a cough. There is no  nausea, vomiting, diarrhea, or constipation, or blood in his stool. No  urgency, frequency, dysuria, or hematuria. The endocrine system is  negative for diabetes or thyroid disorder. Musculoskeletal system  positive for generalized osteoarthrosis. Psychiatric system negative  for anxiety and depression. Neurologic system altered mental status. No focal neurological deficits negative CT scan of the head.     PHYSICAL EXAMINATION:  GENERAL:  Exam shows this to be a 70-year-old white male, in moderate  distress with the above complaints. HEENT:  Head, eyes, ears, nose, and throat examination shows the head to  be normocephalic and atraumatic. The pupils are equal and reactive to  light and accommodation. Extraocular eye muscles are intact. Tympanic  membranes are clear. Ear canals are patent. Oral mucosa pink and  moist.  NECK:  Veins flat and nondistended. No carotid bruits. CHEST:  Symmetrical.  Multiple ecchymotic areas across the upper part of  his chest.  HEART:  Had a regular rate and rhythm without murmur, gallops, or  friction rub. LUNGS:  Showed diffuse wheezing; inspiratory and expiratory. ABDOMEN:  Soft, nontender, and nondistended. Bowel sounds are present  in all four quadrants. EXTERNAL GENITALIA:  Appeared to be intact. MUSCULOSKELETAL:  Peripheral pulses are diminished. Legs without edema. Again, ecchymosis noted in both extremities upper and both extremities  lower. BACK:  Spine shows physiologic curve. ASSESSMENT AND PLAN:  Initial impression at this time is altered mental  status, possibly delirium, possible alcohol withdrawal, possibly related  to some marijuana use. Kidney function is up just a little bit. We  will gently hydrate him. Check chest x-ray. Start him on some DuoNebs  to treat his thrush. Follow him along closely and see how he comes. At  the time of admission, his condition is fair. His prognosis is guarded.         Amanda Olivia DO    D: 03/03/2022 10:52:35       T: 03/03/2022 10:58:19     [email or web address]  Job#: [id number]     Doc#: [id number]    CC:

## 2022-03-04 NOTE — DISCHARGE INSTR - COC
Continuity of Care Form    Patient Name: Jolly Mccallum   :  1952  MRN:  [id number]    Admit date:  3/2/2022  Discharge date:  ***    Code Status Order: Prior   Advance Directives:      Admitting Physician:  Victoria Vallejo DO  PCP: Victoria Vallejo DO    Discharging Nurse: [facility] Unit/Room#: 4538/3353-D  Discharging Unit Phone Number: ***    Emergency Contact:   Extended Emergency Contact Information  Primary Emergency Contact: Chayito Patricio  Address: [address] [place] [address], [address] [place] [address] [address] [facility] Phone: [phone]  Relation: Spouse  Secondary Emergency Contact: Monse Doe, [address] [facility] [address] [facility] Phone: [phone]  Mobile Phone: [phone]  Relation: Brother/Sister    Past Surgical History:  Past Surgical History:   Procedure Laterality Date    COLONOSCOPY N/A 2022    COLONOSCOPY WITH BIOPSY performed by Gavin Taylor MD at [facility] ENDOSCOPY    ECHO COMPL W DOP COLOR FLOW  2015         KNEE ARTHROSCOPY      right    KS LAP, VENTRAL HERNIA REPAIR,REDUCIBLE N/A 10/15/2018    DIAGNOSTIC LAPAROSCOPY,  OPEN VENTRAL HERNIA REPAIR WITH MESH performed by Gavin Taylor MD at [address] N/A 2022    EGD BIOPSY performed by Gavin Taylor MD at [address]       Immunization History:   Immunization History   Administered Date(s) Administered    COVID-19, Pfizer Purple top, DILUTE for use, 12+ yrs, 30mcg/0.3mL dose 2021, 2021       Active Problems:  Patient Active Problem List   Diagnosis Code    Altered mental status R41.82       Isolation/Infection:   Isolation            No Isolation          Patient Infection Status       Infection Onset Added Last Indicated Last Indicated By Review Planned Expiration Resolved Resolved By    None active    Resolved    COVID-19 (Rule Out) 22 COVID-19, Rapid (Ordered)   22 Rule-Out Test Resulted            Nurse Assessment:  Last Vital Signs: BP (!) 148/73   Pulse 107   Temp 98.7 °F (37.1 °C) (Axillary)   Resp 22   Ht 6' 2\" (1.88 m)   Wt 175 lb (79.4 kg)   SpO2 98%   BMI 22.47 kg/m²     Last documented pain score (0-10 scale): Pain Level: 0  Last Weight:   Wt Readings from Last 1 Encounters:   03/02/22 175 lb (79.4 kg)     Mental Status:  {IP PT MENTAL STATUS:20030}    IV Access:  { JORGE IV ACCESS:192468067}    Nursing Mobility/ADLs:  Walking   {[facility] DME GDZJ:940911709}  Transfer  {[facility] DME QCIE:809529158}  Bathing  {[facility] DME KYXZ:265192218}  Dressing  {[facility] DME LUBB:083582274}  Toileting  {[facility] DME FETQ:278806244}  Feeding  {[facility] DME ZQSW:671009443}  Med Admin  {[facility] DME RRBP:747150770}  Med Delivery   {[facility] MED Delivery:288316564}    Wound Care Documentation and Therapy:  Wound 03/02/22 Coccyx open area coccyx (Active)   Dressing/Treatment Other (comment) 03/03/22 2115   Wound Assessment Erythema 03/03/22 2115   Number of days: 1       Wound 03/02/22 Buttocks Left open area left buttocks (Active)   Wound Etiology Other 03/02/22 2015   Dressing Status Other (Comment) 03/03/22 2115   Wound Length (cm) 0.5 cm 03/02/22 1944   Wound Width (cm) 0.5 cm 03/02/22 1944   Wound Surface Area (cm^2) 0.25 cm^2 03/02/22 1944   Wound Assessment Pink/red 03/03/22 2115   Number of days: 1        Elimination:  Continence: Bowel: {YES / CR:99000}  Bladder: {YES / OO:48028}  Urinary Catheter: {Urinary Catheter:034584786}   Colostomy/Ileostomy/Ileal Conduit: {YES / DW:13361}       Date of Last BM: ***    Intake/Output Summary (Last 24 hours) at 3/4/2022 0935  Last data filed at 3/4/2022 0737  Gross per 24 hour   Intake 25 ml   Output --   Net 25 ml     I/O last 3 completed shifts:   In: 61 [P.O.:60]  Out: 225 [Urine:225]    Safety Concerns:     [address] JORGE Safety Concerns:607909908}    Impairments/Disabilities:      [address] PATEL Impairments/Disabilities:125779078}    Nutrition Therapy:  Current Nutrition Therapy: [address] JORGE Diet List:850344063}    Routes of Feeding: {CHP DME Other Feedings:333402306}  Liquids: {Slp liquid thickness:89458}  Daily Fluid Restriction: {CHP DME Yes amt example:891330827}  Last Modified Barium Swallow with Video (Video Swallowing Test): {Done Not Done IKHA:156968684}    Treatments at the Time of Hospital Discharge:   Respiratory Treatments: ***  Oxygen Therapy:  {Therapy; copd oxygen:36137}  Ventilator:    { CC Vent FYKT:939686419}    Rehab Therapies: {THERAPEUTIC INTERVENTION:1117932323}  Weight Bearing Status/Restrictions: 50Dwaine FERRO Weight Bearin}  Other Medical Equipment (for information only, NOT a DME order):  {EQUIPMENT:716801383}  Other Treatments: ***    Patient's personal belongings (please select all that are sent with patient):  {CHP DME Belongings:404627728}    RN SIGNATURE:  {Esignature:857786702}    CASE MANAGEMENT/SOCIAL WORK SECTION    Inpatient Status Date: 3/2/2022    Readmission Risk Assessment Score:  Readmission Risk              Risk of Unplanned Readmission:  13           Discharging to Facility/ Agency   Name: [facility] [address] [place] [address]  Phone:[phone]  Fax:[phone]    Dialysis Facility (if applicable)   Name:  Address:  Dialysis Schedule:  Phone:  Fax:    / signature: Electronically signed by ARUN Hilton on 3/4/2022 at 9:36 AM      PHYSICIAN SECTION    Prognosis: {Prognosis:5712093015}    Condition at Discharge: [address] Otero Patient Condition:764115333}    Rehab Potential (if transferring to Rehab): {Prognosis:2469968480}    Recommended Labs or Other Treatments After Discharge: ***    Physician Certification: I certify the above information and transfer of Rush Darden  is necessary for the continuing treatment of the diagnosis listed and that he requires skilled snf for {ESS:132691164} 30 days.      Update Admission H&P: {CHP DME Changes in RJZX:173883252}    PHYSICIAN SIGNATURE: {[facility]:006143140}

## 2022-03-04 NOTE — PROGRESS NOTES
SPEECH/LANGUAGE PATHOLOGY  VIDEOFLUOROSCOPIC STUDY OF SWALLOWING (MBS)   and PLAN OF CARE    PATIENT NAME:  Sonia Olivier  (male)     MRN:  [id number]    :  1955  (77 y.o.)  STATUS:  Inpatient: Room 0407/0407-A    TODAY'S DATE:  3/4/2022  SPECIFIC PROVIDER ORDER:   22  SLP video swallow  Start:  22,   End:  22,   ONE TIME,   Standing Count:  1 Occurrences,   R         Angelito Vera,    REASON FOR REFERRAL: assess for dysphagia   EVALUATING THERAPIST: Kirby Valenzuela, SLP Graduate Clinician  Mitra YANCYE CCC/SLP [id number]  Speech-Language Pathologist      RESULTS:      DYSPHAGIA DIAGNOSIS:  mild oropharyngeal phase dysphagia     Limited view on this assessment due to body habitus and inadequate body posture. Minimal penetration of nectar thick consistency from a cup. Penetration was transient and completely cleared from the airway. Coughing was present throughout the entire assessment, not related to penetration or aspiration. DIET RECOMMENDATIONS:  Pureed consistency solids (IDDSI level 4) with thin liquids (IDDSI level 0)    FEEDING RECOMMENDATIONS:    Assistance level:  Full assistance is needed during all oral intake     Compensatory strategies recommended: Small bites/sips     Discussed recommendations with nursing and/or faxed report to referring provider: Yes    Laryngeal Penetration and Aspiration:  Penetration WITHOUT aspiration was observed in today's study with mildly thick liquid (nectar). Penetration was minimal, transient and completely cleared from the airway.     SPEECH THERAPY  PLAN OF CARE   The dysphagia POC is established based on physician order and dysphagia diagnosis    Skilled SLP intervention for dysphagia management on acute care 3-5 x per week until goals met, pt plateaus in function and/or discharged from hospital  Ongoing meal time assessment to provide diet modification, upgrade trials with improved cognitive status and compensatory Potential/Prognosis: fair                      ADMITTING DIAGNOSIS: Altered mental status [R41.82]  Altered mental status, unspecified altered mental status type [R41.82]     VISIT DIAGNOSIS:         PATIENT REPORT/COMPLAINT: denies difficulty swallowing. Staff reports consistent coughing/burping during PO intake. PRIOR LEVEL OF SWALLOW FUNCTION:    Past History of Dysphagia?:  none reported    Home diet: Regular consistency solids (IDDSI level 7) with thin liquids (IDDSI level 0)  Current Diet Order:  ADULT DIET; Regular  ADULT ORAL NUTRITION SUPPLEMENT; Breakfast; Clear Liquid Oral Supplement  ADULT ORAL NUTRITION SUPPLEMENT; Lunch, Dinner; Standard High Calorie/High Protein Oral Supplement    PROCEDURE:  Consistencies Administered During the Evaluation   Liquids: thin liquid and nectar thick liquid   Solids:  pureed foods and solid foods      Method of Intake:   cup, straw, spoon  Fed by clinician      Position:   Seated, upright, Lateral plane    INSTRUMENTAL ASSESSMENT:    ORAL PREP/ ORAL PHASE:    Decreased mastication due to: decreased lingual control and cognitive function   Delayed A-P transit due to: reduced lingual strength and cognitive function     Patient needed extended time to masticate the solid food. Patient refused to spit out the solid and required a liquid wash to assist with A-P transit.       PHARYNGEAL PHASE:     ONSET TIME       Delayed initiation of the pharyngeal swallow was noted with swallow reflex triggered at the level of the vallecula        PHARYNGEAL RESIDUALS        Vallecula/Pharyngeal Wall           Reduced tongue base retraction resulting in residuals in vallecula and/or posterior pharyngeal wall for thin liquid and nectar consistency liquid which cleared with spontaneous double swallow       Pyriform Sinuses      No significant residuals were noted in the pyriform sinuses     LARYNGEAL PENETRATION   Laryngeal penetration occurred in the absence of aspiration DURING the swallow for nectar consistency liquid due to  inadequate laryngeal closure which cleared from the laryngeal vestibule spontaneously (transient). Laryngeal penetration was minimal. An inconsistent cough was noted throughout the entire assessment. ASPIRATION  Aspiration was not present during this evaluation    PENETRATION-ASPIRATION SCALE (PAS):  THIN 1 = Material does not enter the airway  MILDLY THICK 2 = Material enters the airway, remains above vocal folds, and is ejected from the airway   MODERATELY THICK item not administered  PUREE 1 = Material does not enter the airway  HARD SOLID 1 = Material does not enter the airway       COMPENSATORY STRATEGIES    Compensatory strategies were not attempted      STRUCTURAL/FUNCTIONAL ANOMALIES   No structural/functional anomalies were noted    CERVICAL ESOPHAGEAL STAGE :     The cervical esophagus appeared adequate          ___________    Cognition:   Did not follow commands consistently and Confusion noted    Oral Peripheral Examination   Generalized oral weakness. Could not complete the entire examination due to patient not following directions. Current Respiratory Status   room air     Parameters of Speech Production  Respiration:  Adequate for speech production  Quality:   Within functional limits  Intensity: Quiet     (speech was very limited during this assessment)    Pain: No pain reported. EDUCATION:   The Speech Language Pathologist (SLP) completed education regarding results of evaluation and that intervention is warranted at this time. Learner: Patient  Education: Reviewed results and recommendations of this evaluation  Evaluation of Education:  Needs further instruction    This plan may be re-evaluated and revised as warranted.         Evaluation Time includes thorough review of current medical information, gathering information on past medical history/social history and prior level of function, completion of standardized testing/informal observation of tasks, assessment of data and education on plan of care and goals. INTERVENTION    Pt/family educated on above results and plan of care. Pt/family trained on compensatory strategies for safe swallow and signs and symptoms of aspiration (throat clearing, coughing/choking, wet vocal quality. , etc.) and encouraged to notify staff if observed. Handouts provided as warranted. Pt/family encouraged to engage in question/answer session. All questions answered and pt/family verbalized understanding of above. [x]The admitting diagnosis and active problem list, have been reviewed prior to initiation of this evaluation. CPT Code: 99285  dysphagia study 99437 dysphagia therapy    ACTIVE PROBLEM LIST:   Patient Active Problem List   Diagnosis    Altered mental status     Nesha Lassiter SLP Graduate Clinician    Supervising SLP was present for evaluation and reviewed results and recommendations with graduate clinician. Ernesto YANCEY CCC/SLP [id number]  Speech-Language Pathologist

## 2022-03-04 NOTE — PROGRESS NOTES
Patient seen oriented but seems delirious. cxr possible aspiration pneumonia. Video swallow not performed will re order. Lungs clearer today still some wheezing. No fever. Results of egd shows herpes esophagitis. Valtrex may have caused some mental status changes. Will have gi see.  Also consult pulmonary for further antibiotics  For aspiration with penicillin allergy

## 2022-03-04 NOTE — CONSULTS
Gastroenterology Consult Note   Tammy Habermann, APRN NP-C with Corinna Jules M.D. Consult Note        Date of Service: 3/4/2022  Reason for Consult: esophageal herpes  Requesting Physician: Dr. Baljeet Guadalupe: Altered mental status and fall    History Obtained From: Patient and EMR    HISTORY OF PRESENT ILLNESS:       Riki Ledesma is a 77 y.o. male with significant past medical history of DVT, hypertension and allergic rhinitis admitted via ED for AMS. Patient was recently admitted on 2/18/2022 for FRANCISCA and abdominal pain, nausea and vomiting. At that time patient had EGD and colonoscopy with Dr. Lopez Screen - biopsies showed herpes esophagitis. Patient was taking Valtrex for treatment. Patient was discharged on 2/23/2022 to skilled nursing facility in stable condition. Patient is currently confused. Per EMR patient was found wandering around skilled nursing facility and unable to be redirected. Patient was sent to ED for evaluation of altered mental status. Patient reports he is tolerating diet. Last bowel movement 2 days ago described as formed and brown. Patient states he is feeling fine and does not understand why they sent him to the hospital. Denies fever, chills, abdominal pain, nausea, vomiting, melena or hematochezia. EGD and Colonoscopy 2/21/22 with Dr. Lopez Screen - Moderate gastritis, superficial duodenal ulcerations and duodenitis, reflux esophagitis rule out Martin's, pandiverticulosis, posterior midline anal fissure. Pathology: Stomach: Focal acute erosive gastritis. Negative for Helicobacter pylori organisms on immunostained sections. B. Duodenum: Acute erosive duodenitis and chronic peptic duodenitis C. Gastroesophageal junction: Ulcerative esophagitis with changes consistent with herpes esophagitis D. Anal verge: Fragments of benign hyperkeratotic squamous mucosa. Negative for dysplasia or neoplasia.  Admission labs Albumin 3.2; bilirubin 1.3; total protein 5.7; CO2 15; creatinine 1.5; glucose 113. Jaron Ravi CT ABDOMEN PELVIS WO CONTRAST-No acute intra-process. 2.  Cholelithiasis without CT evidence of acute cholecystitis. There is mild diffuse hepatic steatosis. 3. Right renal ectopia located in the left lower abdomen/pelvis. 4. Testicles are located in the inguinal canal bilaterally. Consultation for esophageal herpes. Currently, pt reports he feels fine. Tolerating diet. Last bowel movement 2 days ago described as loosely formed and brown. Denies abdominal pain, nausea or vomiting. Labs today sodium 150; potassium 3.2; chloride 108; CO2 16; creatinine 1.5; glucose 163.     Past Medical History:        Diagnosis Date    Allergic rhinitis     DVT (deep venous thrombosis) ([facility] [address] 2003    left leg    Hypertension     Smoking      Past Surgical History:        Procedure Laterality Date    COLONOSCOPY N/A 2/21/2022    COLONOSCOPY WITH BIOPSY performed by Basilio Koo MD at [id number] [facility] ECHO COMPL W DOP COLOR FLOW  7/1/2015         KNEE ARTHROSCOPY      right    IA LAP, VENTRAL HERNIA REPAIR,REDUCIBLE N/A 10/15/2018    DIAGNOSTIC LAPAROSCOPY,  OPEN VENTRAL HERNIA REPAIR WITH MESH performed by Basilio Koo MD at [address] N/A 2/21/2022    EGD BIOPSY performed by Basilio Koo MD at [facility] ENDOSCOPY     Current Medications:    Current Facility-Administered Medications: levoFLOXacin (LEVAQUIN) 500 MG/100ML infusion 500 mg, 500 mg, IntraVENous, Q24H  ipratropium-albuterol (DUONEB) nebulizer solution 1 ampule, 1 ampule, Inhalation, Q4H WA  nystatin (MYCOSTATIN) 308467 UNIT/ML suspension 500,000 Units, 5 mL, Oral, 4x Daily  ondansetron (ZOFRAN) injection 4 mg, 4 mg, IntraVENous, Q6H PRN  sodium bicarbonate 150 mEq in dextrose 5 % 1,000 mL infusion, , IntraVENous, Continuous  acetaminophen (TYLENOL) tablet 650 mg, 650 mg, Oral, Q4H PRN  amLODIPine (NORVASC) tablet 5 mg, 5 mg, Oral, Daily  cetirizine (ZYRTEC) tablet 10 mg, 10 mg, Oral, Daily PRN  docusate sodium (COLACE) capsule 100 mg, 100 mg, Oral, R01U PRN  folic acid (FOLVITE) tablet 1 mg, 1 mg, Oral, Daily  doxycycline hyclate (VIBRAMYCIN) capsule 100 mg, 100 mg, Oral, BID  pantoprazole (PROTONIX) tablet 40 mg, 40 mg, Oral, QAM AC  sucralfate (CARAFATE) tablet 1 g, 1 g, Oral, 4x Daily AC & HS  ascorbic acid (VITAMIN C) tablet 500 mg, 500 mg, Oral, TID  vitamin D (CHOLECALCIFEROL) tablet 1,000 Units, 1,000 Units, Oral, Daily  zinc sulfate (ZINCATE) capsule 50 mg, 50 mg, Oral, Daily  chlordiazePOXIDE (LIBRIUM) capsule 10 mg, 10 mg, Oral, Q12H  white petrolatum ointment, , Topical, BID    Allergies:  Pcn [penicillins]    Social History:    Unable to obtain    Family History:   Unable to obtain    REVIEW OF SYSTEMS:    Aside from what was mentioned in the PMH and HPI, essentially unremarkable, all others negative. PHYSICAL EXAM:      Vitals:    BP (!) 148/73   Pulse 107   Temp 98.7 °F (37.1 °C) (Axillary)   Resp 22   Ht 6' 2\" (1.88 m)   Wt 175 lb (79.4 kg)   SpO2 98%   BMI 22.47 kg/m²       CONSTITUTIONAL:  awake, confused cooperative, no apparent distress, and appears stated age  EYES:  pupils equal, round and reactive to light, sclera anicteric and conjunctiva normal  ENT: normocephalic, oral pharynx with moist mucous membranes  NECK:  supple   HEMATOLOGIC/LYMPHATICS:  no cervical lymphadenopathy and no supraclavicular lymphadenopathy  LUNGS:  No increased work of breathing, good air exchange, clear to auscultation bilaterally.   CARDIOVASCULAR:  Normal apical impulse, regular rate and rhythm, no murmur noted; 2+ pulses; no edema  ABDOMEN:  normal bowel sounds, soft, non-distended, non-tender, no masses palpated, no hepatosplenomegally  MUSCULOSKELETAL:  full range of motion noted  motor strength is 5 out of 5 all extremities bilaterally  NEUROLOGIC:  Mental Status Exam:  Level of Alertness:   awake  Orientation:   person, place, time  Motor Exam:  Motor exam is symmetrical 5 out of 5 all extremities bilaterally  SKIN:  Pale and dry    DATA:    CBC with Differential:    Lab Results   Component Value Date    WBC 14.8 03/03/2022    RBC 2.41 03/03/2022    HGB 9.6 03/03/2022    HCT 28.8 03/03/2022     03/03/2022    .5 03/03/2022    MCH 39.8 03/03/2022    MCHC 33.3 03/03/2022    RDW 13.1 03/03/2022    NRBC 0.0 02/23/2022    LYMPHOPCT 9.3 03/02/2022    PROMYELOPCT 0.9 02/23/2022    MONOPCT 9.3 03/02/2022    BASOPCT 0.3 03/02/2022    MONOSABS 1.12 03/02/2022    LYMPHSABS 1.11 03/02/2022    EOSABS 0.07 03/02/2022    BASOSABS 0.03 03/02/2022     CMP:    Lab Results   Component Value Date     03/04/2022    K 3.2 03/04/2022    K 3.6 03/03/2022     03/04/2022    CO2 16 03/04/2022    BUN 20 03/04/2022    CREATININE 1.5 03/04/2022    GFRAA 57 03/04/2022    LABGLOM 47 03/04/2022    GLUCOSE 163 03/04/2022    PROT 5.5 03/03/2022    LABALBU 3.1 03/03/2022    CALCIUM 8.8 03/04/2022    BILITOT 1.0 03/03/2022    ALKPHOS 102 03/03/2022    AST 32 03/03/2022    ALT 30 03/03/2022     Hepatic Function Panel:    Lab Results   Component Value Date    ALKPHOS 102 03/03/2022    ALT 30 03/03/2022    AST 32 03/03/2022    PROT 5.5 03/03/2022    BILITOT 1.0 03/03/2022    BILIDIR 0.2 07/01/2015    IBILI 0.9 07/01/2015    LABALBU 3.1 03/03/2022     PT/INR:    Lab Results   Component Value Date    PROTIME 12.4 03/02/2022    INR 1.2 03/02/2022     PTT:    Lab Results   Component Value Date    APTT 26.6 03/02/2022   [APTT}  Last 3 Troponin:  No results found for: TROPONINI  TSH:    Lab Results   Component Value Date    TSH 2.230 02/23/2022     VITAMIN B12:   Lab Results   Component Value Date    FLOWVYSI42 238 02/22/2022     FOLATE:    Lab Results   Component Value Date    FOLATE 2.7 02/22/2022     CT ABDOMEN PELVIS WO CONTRAST Additional Contrast? None    Result Date: 2/18/2022  EXAMINATION: CT OF THE ABDOMEN AND PELVIS WITHOUT CONTRAST 2/18/2022 4:33 pm TECHNIQUE: CT of the abdomen and pelvis was performed without the administration of intravenous contrast. Multiplanar reformatted images are provided for review. Dose modulation, iterative reconstruction, and/or weight based adjustment of the mA/kV was utilized to reduce the radiation dose to as low as reasonably achievable. COMPARISON: None. HISTORY: ORDERING SYSTEM PROVIDED HISTORY: abd pain weight loss TECHNOLOGIST PROVIDED HISTORY: Reason for exam:->abd pain weight loss Additional Contrast?->None FINDINGS: Lower Chest: Lung bases are clear. Organs: There is mild hepatic steatosis and evidence of prior granulomatous disease. Gallbladder contains multiple tiny high density calculi dependently. Common bile duct is normal.  Pancreas is normal.  Stomach has a normal configuration. There is a small fat containing hiatal hernia. 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 Left kidney is in normal position. Right kidney is located in the lower left abdomen. No evidence of previous surgery. GI/Bowel: No evidence of bowel obstruction or acute inflammation. The appendix is normal. Pelvis: No free pelvic fluid. Urinary bladder is normal.  There is sigmoid diverticulosis without diverticulitis. Testicles are located in the inguinal canal bilaterally. Peritoneum/Retroperitoneum: No retroperitoneal mass or adenopathy. Aorta is nonaneurysmal. Bones/Soft Tissues: Subcutaneous tissues are within normal limits. Degenerative changes in the lumbar spine. Osseous structures are otherwise unremarkable. 1.  No acute intra-process. 2.  Cholelithiasis without CT evidence of acute cholecystitis. There is mild diffuse hepatic steatosis. 3.  Right renal ectopia located in the left lower abdomen/pelvis. 4.  Testicles are located in the inguinal canal bilaterally. .     XR HAND LEFT (2 VIEWS)    Result Date: 3/2/2022  EXAMINATION: TWO XRAY VIEWS OF THE LEFT HAND 3/2/2022 6:47 pm COMPARISON: None.  HISTORY: ORDERING SYSTEM PROVIDED HISTORY: swelling TECHNOLOGIST PROVIDED HISTORY: Reason for exam:->swelling FINDINGS: DJD noted most evident at D IP joints and 1st CMC. At the 3rd D IP in particular apparently this is advanced with relatively fused joint appearance and flexion. Recommend clinical correlation Soft tissue swelling in conjunction with the history is present     1. Soft tissue swelling, nonspecific, without an acute osseous abnormality identified 2. DJD as described above RECOMMENDATION: Clinical correlation     CT HEAD WO CONTRAST    Result Date: 3/2/2022  EXAMINATION: CT OF THE HEAD WITHOUT CONTRAST  3/2/2022 12:04 pm TECHNIQUE: CT of the head was performed without the administration of intravenous contrast. Dose modulation, iterative reconstruction, and/or weight based adjustment of the mA/kV was utilized to reduce the radiation dose to as low as reasonably achievable. COMPARISON: None. HISTORY: ORDERING SYSTEM PROVIDED HISTORY: altered mental status TECHNOLOGIST PROVIDED HISTORY: Has a \"code stroke\" or \"stroke alert\" been called? ->No Reason for exam:->altered mental status Decision Support Exception - unselect if not a suspected or confirmed emergency medical condition->Emergency Medical Condition (MA) FINDINGS: BRAIN/VENTRICLES: There is no acute intracranial hemorrhage, mass effect or midline shift. No abnormal extra-axial fluid collection. The gray-white differentiation is maintained without evidence of an acute infarct. There is no evidence of hydrocephalus. ORBITS: The visualized portion of the orbits demonstrate no acute abnormality. SINUSES: The visualized paranasal sinuses and mastoid air cells demonstrate no acute abnormality. SOFT TISSUES/SKULL:  No acute abnormality of the visualized skull or soft tissues. No acute intracranial abnormality.   Specifically, there is no acute intracranial hemorrhage     CT FACIAL BONES WO CONTRAST    Result Date: 3/2/2022  EXAMINATION: CT OF THE FACE WITHOUT CONTRAST  3/2/2022 12:04 pm TECHNIQUE: CT of the face was performed without the administration of intravenous contrast. Multiplanar reformatted images are provided for review. Dose modulation, iterative reconstruction, and/or weight based adjustment of the mA/kV was utilized to reduce the radiation dose to as low as reasonably achievable. COMPARISON: None HISTORY: ORDERING SYSTEM PROVIDED HISTORY: fall, r/o fx TECHNOLOGIST PROVIDED HISTORY: Reason for exam:->fall, r/o fx Decision Support Exception - unselect if not a suspected or confirmed emergency medical condition->Emergency Medical Condition (MA) FINDINGS: FACIAL BONES:  The maxilla, pterygoid plates and zygomatic arches are intact. The mandible is intact. The mandibular condyles are normally situated. The nasal bones and maxillary nasal processes are intact. ORBITS:  The globes appear intact. The extraocular muscles, optic nerve sheath complexes and lacrimal glands appear unremarkable. No retrobulbar hematoma or mass is seen. The orbital walls and rims are intact. SINUSES/MASTOIDS:  The paranasal sinuses and mastoid air cells are well aerated. No acute fracture is seen. SOFT TISSUES:  No appreciable facial soft tissue swelling is seen. No acute traumatic injury of the facial bones. CT CHEST WO CONTRAST    Result Date: 2/22/2022  EXAMINATION: CT OF THE CHEST WITHOUT CONTRAST 2/21/2022 7:32 pm TECHNIQUE: CT of the chest was performed without the administration of intravenous contrast. Multiplanar reformatted images are provided for review. Dose modulation, iterative reconstruction, and/or weight based adjustment of the mA/kV was utilized to reduce the radiation dose to as low as reasonably achievable. COMPARISON: CT abdomen and pelvis 02/18/2022 HISTORY: ORDERING SYSTEM PROVIDED HISTORY: weight loss TECHNOLOGIST PROVIDED HISTORY: Reason for exam:->weight loss FINDINGS: Mediastinum: Thyroid is homogeneous in attenuation. No bulky mediastinal adenopathy. Central airways are patent.   Esophagus has normal course and caliber to the distal portion where there is a small fat density surround to the distal esophagus of a likely small fat containing hiatal hernia. There are a few scattered mildly enlarged paraesophageal lymph nodes more conspicuous in number than size with a right paraesophageal 6 mm lymph node series 2, image 82, for example. Atherosclerotic nonaneurysmal thoracic aorta. Cardiac size within normal limits and without pericardial effusion demonstrating coronary calcifications. Lungs/pleura: Lungs are clear without dominant nodule mass or consolidation. Normal pleural appearance without pleural effusion or pleural thickening. Upper Abdomen: Visualized portions of the upper abdomen unremarkable. Soft Tissues/Bones: No acute osseous or soft tissue body wall findings. No bulky axillary adenopathy. Slight tilted sternum of a pectus excavatum or partial pectus configuration. Small fat density surrounding the distal esophagus of a likely fat containing hiatal hernia without distinct irregularity or obvious mass associated with the distal esophagus; however, minimal adjacent paraesophageal lymph nodes subcentimeter more conspicuous in number than size without adjacent adenopathy otherwise in the chest somewhat indeterminate should be correlated with esophagitis or symptomatology. No evidence for intrathoracic metastatic disease or primary malignancy otherwise noted. CT CERVICAL SPINE WO CONTRAST    Result Date: 3/2/2022  EXAMINATION: CT OF THE CERVICAL SPINE WITHOUT CONTRAST 3/2/2022 12:04 pm TECHNIQUE: CT of the cervical spine was performed without the administration of intravenous contrast. Multiplanar reformatted images are provided for review. Dose modulation, iterative reconstruction, and/or weight based adjustment of the mA/kV was utilized to reduce the radiation dose to as low as reasonably achievable. COMPARISON: None.  HISTORY: ORDERING SYSTEM PROVIDED HISTORY: ?fall, r/o fx TECHNOLOGIST PROVIDED HISTORY: Reason for exam:->?fall, r/o fx Decision Support Exception - unselect if not a suspected or confirmed emergency medical condition->Emergency Medical Condition (MA) FINDINGS: The ring of C1 is intact as is the dense. There is no compression fracture of the cervical spine. No jumped or perched facet is noted. Multilevel degenerative disc and degenerative joint disease is noted. The degenerative changes are advanced at the C4-5, C5-6 and C6-7 levels. . The prevertebral soft tissues are unremarkable. The airway is widely patent. Images through the lung apices are negative for a pneumothorax. 1. There is no acute compression fracture or subluxation of the cervical spine. 2. Advanced multilevel degenerative disc and degenerative joint disease. US GALLBLADDER RUQ    Result Date: 3/3/2022  EXAMINATION: RIGHT UPPER QUADRANT ULTRASOUND 3/3/2022 4:17 pm COMPARISON: None. HISTORY: ORDERING SYSTEM PROVIDED HISTORY: nausea/vomiting TECHNOLOGIST PROVIDED HISTORY: Reason for exam:->nausea/vomiting What reading provider will be dictating this exam?->CRC FINDINGS: LIVER:  The liver demonstrates increased echogenicity without evidence of intrahepatic biliary ductal dilatation. BILIARY SYSTEM:  Gallbladder is  without evidence of pericholecystic fluid, wall thickening. There are tiny gallstones. .  Negative sonographic Benson's sign. Common bile duct is within normal limits measuring 3.3 mm. RIGHT KIDNEY: The right kidney is is not visualized in its orthotopic location. PANCREAS:  Limited by bowel gas. OTHER: No evidence of right upper quadrant ascites. Tiny gallstones.      XR CHEST PORTABLE    Result Date: 3/3/2022  EXAMINATION: ONE XRAY VIEW OF THE CHEST 3/3/2022 3:08 pm COMPARISON: 03/02/2022 HISTORY: ORDERING SYSTEM PROVIDED HISTORY: cough TECHNOLOGIST PROVIDED HISTORY: Reason for exam:->cough FINDINGS: There is new mild increased hazy opacity in the medial right lower chest possibly in the right middle lobe suggestive of pneumonia given the clinical history. Left heart is prominent, overall heart size is normal.  No pleural fluid detected. There is no evidence of pneumothorax. Osseous structures are stable. Mild medial right lower chest hazy parenchymal opacity compatible with pneumonia. XR CHEST PORTABLE    Result Date: 3/2/2022  EXAMINATION: ONE XRAY VIEW OF THE CHEST 3/2/2022 11:23 am COMPARISON: 10/08/2018 HISTORY: ORDERING SYSTEM PROVIDED HISTORY: altered mental status TECHNOLOGIST PROVIDED HISTORY: Reason for exam:->altered mental status FINDINGS: The lungs are without acute focal process. There is no effusion or pneumothorax. The cardiomediastinal silhouette is without acute process. The osseous structures are without acute process. No acute process. IMPRESSION:  · AMS  · Herpes esophagitis  · Hepatic steatosis  · Anemia macrocytic  · FRANCISCA- nephrology following    RECOMMENDATIONS:    · EGD Monday with Dr. Lex Padilla Procedure details for EGD discussed in detail. Complications including but not limited to, perforation, bleeding and infection were discussed in great detail. Risks, benefits, and alternatives explained. Pt has understood the information and has agreed to proceed. · Liver serology as ordered  · Colace as ordered  · Diflucan as ordered  · Continue protonix as ordered  · IV fluid per nephrology   · Continue diet as tolerated  · Monitor CBC and CMP daily  · Supportive care  · Continue to monitor    Note: This report was completed utilizing computer voice recognition software. Every effort has been made to ensure accuracy, however; inadvertent computerized transcription errors may be present. Thank you very much for your consultation. We will follow closely with you. Discussed with Dr. Howard Ng developed by Dr. Arely Santos, APRN, NP-C 3/4/2022 12:40 PM for Dr. Bernice Lennox. I HAD A FACE TO FACE ENCOUNTER WITH THE PATIENT.  AGREE WITH THE EXAM, ASSESSMENT, AND PLAN AS OUTLINED ABOVE. ADDITION AND CORRECTIONS WERE DONE AS DEEMED APPROPRIATE. MY EXAM AND PLAN INCLUDE: MARKED CONFUSION, SUDDEN AND IN LIGHT OF HEPATIC ESOPHAGITIS, OFF VALTREX AT THIS TIME. WILL NEED LP AND POSSIBLE ID CONSULT FOR FURTHER TREATMENT. ORAL THRUSH, WILL TREAT WITH DIFLUCAN TILL ID IS ABLE TO SEE PATIENT. PLANNED EGD ON Monday TO EVALUATE STATUS OF HERPETIC ESOPHAGITIS. WILL DISCUSS WITH DR. Mariela Morgan. ORDERS TO FOLLOW.

## 2022-03-04 NOTE — PROGRESS NOTES
Revisit. Rt arm dressing removed. Upper arms wounds dried out. Sitter present. Patient very active. Wife present and wants xeroform to wounds  Orders insoniya Sheppard.  August Query, CNS, Wound Care

## 2022-03-04 NOTE — CONSULTS
[address] [place] [address] [facility]    Consultation Note     Admit Date: 3/2/2022 10:44 AM    Reason for Consult:   HSV Encephalitis     Attending Physician:  Dez Hernandez DO     Chief Complaint: change in mental satus    HISTORY OF PRESENT ILLNESS:   The patient is a 77 y.o. Man not known to the Infectious Diseases service. The patient is admitted thorough the ED because of confusion and fall, beginning prior to arrival.    Sent from the rehab facility for head CT. They found him wandering around the unit and unable to be redirected. They report he was acting confused. There is no alleged report of fall they just report has had worsening kidney function and was sent here for further evaluation. While at ER, appears to have had emesis of coffee ground material and started to cough, continued to have a few episodes of emesis/coughing with po/medication intake. Started on bicarb drip by Renal for metabolic acidosis and FRANCISCA. Currently on RA. He has no fever. He says he feels fine and does not know why they sent him. He denies fever chills. He denies chest pain or shortness of breath. No abdominal pain. No back pain. He denies neck pain. He recently was discharged from the hospital after having a workup for weight loss and  weakness. He had EGD and colonoscopy, moderate esophagitis, and gastritis. Colon showed diverticulosis. No obvious polyps or tumors. He also had a CT of his abdomen notable for cholelithiasis without cholecystitis. Renal had also seen him for some acute kidney injury, thought to be due to prerenal azotemia and volume depletion, improved on discharge. He wa seen by hem onc during last admission for 40 lbs weight loss and macrocytic anemia. Had EGD and colonoscopy c bx 2-21-22 showing esophagus c ulcerative esophagitis with fibrinopurulent material and rare clusters of multinucleated squamous epithelial cells with viral cytopathic effect consistent with herpes.  He was started on valtrex but after dizziness began at [facility] this was stopped. GI has seen pt this admission and notes pt has thrush. H pylori was neg. Other issues include  macrocytic anemia and associated neuropathic findings of paresthesia, ataxia, folate deficiency identified. Patient reports a history of heavy alcohol use that stopped about 10 years ago when he retired. Since admission pt has developed hypernatremia of 947 and has metabolic acidosis with bun and creatine of 20/1. 5. White count on admission was 12K. .Iron and TIBC noted to be 32 ans 103.  2-21-22 CT chest non diagnostic and CT head was non diagnostic on 3-2-22. Hem onc, GI, Pulmonary and geneeral Surg have seen pt. ID consulted to order MRI C contrast and LP to r/o meningo- encephalitis. Osmolarity is approaching upper limits of normal and his tox screen is + for Cannabinoids family also shows diet tolerance drinks for 5 shots of alcohol . Patient's wife was in the room gave me a good history the patient is agitated and flailing his arms although I can get his attention for a second.   He has difficulty clearing his secretions as well.               Past Medical History:        Diagnosis Date    Allergic rhinitis     DVT (deep venous thrombosis) ([address] 2003    left leg    Hypertension     Smoking      Past Surgical History:        Procedure Laterality Date    COLONOSCOPY N/A 2/21/2022    COLONOSCOPY WITH BIOPSY performed by George Lopez MD at [address] ECHO COMPL W DOP COLOR FLOW  7/1/2015         KNEE ARTHROSCOPY      right    NE LAP, VENTRAL HERNIA REPAIR,REDUCIBLE N/A 10/15/2018    DIAGNOSTIC LAPAROSCOPY,  OPEN VENTRAL HERNIA REPAIR WITH MESH performed by George Lopez MD at [address] N/A 2/21/2022    EGD BIOPSY performed by George Lopez MD at [facility] ENDOSCOPY     Current Medications:   Scheduled Meds:   levofloxacin  500 mg IntraVENous Q24H    docusate sodium  100 mg Oral BID    fluconazole  200 mg Oral Daily    LORazepam  1 mg IntraVENous Once    ipratropium-albuterol  1 ampule Inhalation Q4H WA    nystatin  5 mL Oral 4x Daily    amLODIPine  5 mg Oral Daily    folic acid  1 mg Oral Daily    doxycycline hyclate  100 mg Oral BID    pantoprazole  40 mg Oral QAM AC    sucralfate  1 g Oral 4x Daily AC & HS    vitamin C  500 mg Oral TID    Vitamin D  1,000 Units Oral Daily    zinc sulfate  50 mg Oral Daily    chlordiazePOXIDE  10 mg Oral Q12H    white petrolatum   Topical BID     Continuous Infusions:   IV infusion builder 100 mL/hr at 22 1411     PRN Meds:ondansetron, acetaminophen, cetirizine    Allergies:  Pcn [penicillins]    Social History:   Social History     Socioeconomic History    Marital status:      Spouse name: None    Number of children: None    Years of education: None    Highest education level: None   Occupational History    None   Tobacco Use    Smoking status: Former Smoker     Packs/day: 1.00     Start date: 1985     Quit date: 2005     Years since quittin.1    Smokeless tobacco: Never Used   Vaping Use    Vaping Use: Never used   Substance and Sexual Activity    Alcohol use: Yes     Comment: occasional beer    Drug use: No    Sexual activity: None   Other Topics Concern    None   Social History Narrative    None     Social Determinants of Health     Financial Resource Strain:     Difficulty of Paying Living Expenses: Not on file   Food Insecurity:     Worried About Running Out of Food in the Last Year: Not on file    Jo of Food in the Last Year: Not on file   Transportation Needs:     Lack of Transportation (Medical): Not on file    Lack of Transportation (Non-Medical):  Not on file   Physical Activity:     Days of Exercise per Week: Not on file    Minutes of Exercise per Session: Not on file   Stress:     Feeling of Stress : Not on file   Social Connections:     Frequency of Communication with Friends and Family: Not on file    Frequency of Social Gatherings with Friends and Family: Not on file    Attends Worship Services: Not on file    Active Member of Clubs or Organizations: Not on file    Attends Club or Organization Meetings: Not on file    Marital Status: Not on file   Intimate Partner Violence:     Fear of Current or Ex-Partner: Not on file    Emotionally Abused: Not on file    Physically Abused: Not on file    Sexually Abused: Not on file   Housing Stability:     Unable to Pay for Housing in the Last Year: Not on file    Number of [place] in the Last Year: Not on file    Unstable Housing in the Last Year: Not on file       Family History:       Problem Relation Age of Onset    Cancer Mother     Cancer Father    . Otherwise non-pertinent to the chief complaint. REVIEW OF SYSTEMS:    CONSTITUTIONAL:  No chills, fevers or night sweats. No loss of weight. EYES:  No double vision or drainage from eyes, ears or throat. HEENT:  No neck stiffness. No dysphagia. No drainage from eyes, ears or throat  RESPIRATORY:  No cough, productive sputum or hemoptysis. CARDIOVASCULAR:  No chest pain, palpitations, orthopnea or dyspnea on exertion. GASTROINTESTINAL:  No nausea, vomiting, diarrhea or constipation or hematochezia   GENITOURINARY:  No frequency burning dysuria or hematuria. INTEGUMENT/BREAST:  No rash or breast masses. HEMATOLOGIC/LYMPHATIC:  No lymphadenopathy or blood dyscrasics. ALLERGIC/IMMUNOLOGIC:  No anaphylaxis. ENDOCRINE:  No polyuria or polydipsia or temperature intolerance. MUSCULOSKELETAL:  No myalgia or arthralgia. Full ROM. NEUROLOGICAL:  See history of present illness. BEHAVIOR/PSYCH:  No psychosis. PHYSICAL EXAM:    Vitals:    BP (!) 148/73   Pulse 107   Temp 98.7 °F (37.1 °C) (Axillary)   Resp 22   Ht 6' 2\" (1.88 m)   Wt 175 lb (79.4 kg)   SpO2 98%   BMI 22.47 kg/m²   Constitutional: The patient is awake, playful upon  Skin: Warm and dry. No rashes were noted. No jaundice. Patient has a faint rash on her chest wall and abdomen  HEENT: Eyes show round, and reactive pupils. Moist mucous membranes, no ulcerations, no thrush. Neck: Supple to movements. No lymphadenopathy. Chest: No use of accessory muscles to breathe. Symmetrical expansion. Auscultation reveals no wheezing, crackles, or rhonchi. Cardiovascular: S1 and S2 are rhythmic and regular. No murmurs appreciated. Abdomen: Positive bowel sounds to auscultation. Benign to palpation. No masses felt. No hepatosplenomegaly. Genitourinary: male-Crandall  Extremities: No clubbing, no cyanosis, no edema. Musculoskeletal: E/S  Neurological: Chorea type movements of the arms  Lines: peripheral      CBC+dif:  Recent Labs     03/02/22  1109 03/02/22  1109 03/03/22  0228   WBC 12.0*  --  14.8*   HGB 9.7*   < > 9.6*   HCT 29.4*   < > 28.8*   .5*   < > 119.5*      < > 417   NEUTROABS 9.44*  --   --     < > = values in this interval not displayed.      No results found for: CRP  No results found for: CRP  Lab Results   Component Value Date    SEDRATE 40 (H) 03/03/2022     Lab Results   Component Value Date    ALT 30 03/03/2022    AST 32 03/03/2022    ALKPHOS 102 03/03/2022    BILITOT 1.0 03/03/2022     Lab Results   Component Value Date     03/04/2022    K 3.2 03/04/2022    K 3.6 03/03/2022     03/04/2022    CO2 16 03/04/2022    BUN 20 03/04/2022    CREATININE 1.5 03/04/2022    GFRAA 57 03/04/2022    LABGLOM 47 03/04/2022    GLUCOSE 163 03/04/2022    PROT 5.5 03/03/2022    LABALBU 3.1 03/03/2022    CALCIUM 8.8 03/04/2022    BILITOT 1.0 03/03/2022    ALKPHOS 102 03/03/2022    AST 32 03/03/2022    ALT 30 03/03/2022       Lab Results   Component Value Date    PROTIME 12.4 03/02/2022    INR 1.2 03/02/2022       Lab Results   Component Value Date    TSH 1.410 03/04/2022       Lab Results   Component Value Date    COLORU DARK YELLOW 03/02/2022    PHUR 6.0 03/02/2022    WBCUA 1-3 03/02/2022    RBCUA NONE 03/02/2022    BACTERIA RARE 03/02/2022    CLARITYU Clear 03/02/2022    SPECGRAV 1.025 03/02/2022    LEUKOCYTESUR TRACE 03/02/2022    UROBILINOGEN 1.0 03/02/2022    BILIRUBINUR MODERATE 03/02/2022    BLOODU Negative 03/02/2022    GLUCOSEU Negative 03/02/2022       No results found for: Darrell Fortune, G1TAAYQS, PHART, THGBART, [id number], PO2ART, FFP3RQE  Radiology:  Fluoroscopy modified barium swallow with video   Final Result   . Minimal penetration to nectar consistencies of barium contrast.  No   aspiration to different consistencies of barium contrast.      Please see separate speech pathology report for full discussion of findings   and recommendations. RECOMMENDATIONS:   Unavailable         US GALLBLADDER RUQ   Final Result   Tiny gallstones. XR CHEST PORTABLE   Final Result   Mild medial right lower chest hazy parenchymal opacity compatible with   pneumonia. XR HAND LEFT (2 VIEWS)   Final Result   1. Soft tissue swelling, nonspecific, without an acute osseous abnormality   identified   2. DJD as described above      RECOMMENDATION:   Clinical correlation         CT HEAD WO CONTRAST   Final Result   No acute intracranial abnormality. Specifically, there is no acute   intracranial hemorrhage         CT FACIAL BONES WO CONTRAST   Final Result   No acute traumatic injury of the facial bones. CT CERVICAL SPINE WO CONTRAST   Final Result   1. There is no acute compression fracture or subluxation of the cervical   spine. 2. Advanced multilevel degenerative disc and degenerative joint disease. XR CHEST PORTABLE   Final Result   No acute process. XR CHEST PORTABLE    (Results Pending)   MRI BRAIN W WO CONTRAST    (Results Pending)   IR Interventional Radiology Procedure Request    (Results Pending)       Microbiology:  Pending  No results for input(s): BC in the last 72 hours. No results for input(s): ORG in the last 72 hours.   No results for

## 2022-03-04 NOTE — CARE COORDINATION
3/4/2022  Social Work Discharge Planning:Pt has a sitter and is from [facility]. Plan is to return. Will have a swallow eval today. Precert will be needed. Therapy updates will be needed closer to discharge. N-17 generated and transport form is in chart. Electronically signed by ARUN Griffin on 3/4/2022 at 9:33 AM

## 2022-03-04 NOTE — PROGRESS NOTES
Dr Sierra George perfect served message re: pt has no urine output this shift and bladder scan showed 24ml, aloe notiified of scheduled mri and lumbar puncture

## 2022-03-04 NOTE — CONSULTS
[facility]  [address], [address]  [facility], [facility]    Pulmonary Consultation      Reason for Consult:  Change mental status    Requesting Physician:  Renata Hughes DO    CHIEF COMPLAINT:  Change mental status    History Obtained From:  electronic medical record    HISTORY OF PRESENT ILLNESS:                The patient is a 77 y.o. male with significant past medical history of ETOH abuse who presents with increased confusion. Recently discharged from hospital 2/23 for FRANCISCA and poor po intake, EGD that time showed erosive and ulcerative gastritis/esophagitis due to herpes. Mention of confusion and fall in rehab facility, wandering around. EMR doesn't appear to mention problem with cough/congestion/fever. While at ER, appears to have had emesis of coffee ground material and started to cough, continued to have a few episodes of emesis/coughing with po/medication intake. Started on bicarb drip by Renal for metabolic acidosis and FRANCISCA.  Currently on RA, mild ronchorous cough, conversant though confused, moving extremities    Past Medical History:        Diagnosis Date    Allergic rhinitis     DVT (deep venous thrombosis) ([facility] [address] 2003    left leg    Hypertension     Smoking        Past Surgical History:        Procedure Laterality Date    COLONOSCOPY N/A 2/21/2022    COLONOSCOPY WITH BIOPSY performed by Domonique Avalos MD at [address] [facility] ECHO COMPL W DOP COLOR FLOW  7/1/2015         KNEE ARTHROSCOPY      right    [place] LAP, VENTRAL HERNIA REPAIR,REDUCIBLE N/A 10/15/2018    DIAGNOSTIC LAPAROSCOPY,  OPEN VENTRAL HERNIA REPAIR WITH MESH performed by Domonique Avalos MD at [facility] N/A 2/21/2022    EGD BIOPSY performed by Domonique Avalos MD at [facility] ENDOSCOPY       Current Medications:    Current Facility-Administered Medications: ipratropium-albuterol (DUONEB) nebulizer solution 1 ampule, 1 ampule, Inhalation, Q4H WA  nystatin (MYCOSTATIN) 265062 UNIT/ML suspension 500,000 Units, 5 mL, Oral, 4x Daily  ondansetron (ZOFRAN) injection 4 mg, 4 mg, IntraVENous, Q6H PRN  sodium bicarbonate 150 mEq in dextrose 5 % 1,000 mL infusion, , IntraVENous, Continuous  acetaminophen (TYLENOL) tablet 650 mg, 650 mg, Oral, Q4H PRN  amLODIPine (NORVASC) tablet 5 mg, 5 mg, Oral, Daily  cetirizine (ZYRTEC) tablet 10 mg, 10 mg, Oral, Daily PRN  docusate sodium (COLACE) capsule 100 mg, 100 mg, Oral, X07N PRN  folic acid (FOLVITE) tablet 1 mg, 1 mg, Oral, Daily  doxycycline hyclate (VIBRAMYCIN) capsule 100 mg, 100 mg, Oral, BID  pantoprazole (PROTONIX) tablet 40 mg, 40 mg, Oral, QAM AC  sucralfate (CARAFATE) tablet 1 g, 1 g, Oral, 4x Daily AC & HS  ascorbic acid (VITAMIN C) tablet 500 mg, 500 mg, Oral, TID  vitamin D (CHOLECALCIFEROL) tablet 1,000 Units, 1,000 Units, Oral, Daily  zinc sulfate (ZINCATE) capsule 50 mg, 50 mg, Oral, Daily  chlordiazePOXIDE (LIBRIUM) capsule 10 mg, 10 mg, Oral, Q12H  white petrolatum ointment, , Topical, BID    Allergies:  Pcn [penicillins]    Social History:    TOBACCO:   reports that he quit smoking about 17 years ago. He started smoking about 37 years ago. He smoked 1.00 pack per day.  He has never used smokeless tobacco.    Family History:       Problem Relation Age of Onset    Cancer Mother     Cancer Father        REVIEW OF SYSTEMS:    Review of systems not obtained due to patient factors - mental status    PHYSICAL EXAM:      Vitals:    BP (!) 148/73   Pulse 107   Temp 98.7 °F (37.1 °C) (Axillary)   Resp 22   Ht 6' 2\" (1.88 m)   Wt 175 lb (79.4 kg)   SpO2 98%   BMI 22.47 kg/m²     EYES:  Lids and lashes normal, pupils equal, round and reactive to light, extra ocular muscles intact, sclera clear, conjunctiva normal  ENT:  Normocephalic, without obvious abnormality, atraumatic, sinuses nontender on palpation, external ears without lesions, oral pharynx with moist mucus membranes, tonsils without erythema or exudates, gums normal and good dentition. NECK:  Supple, symmetrical, trachea midline, no adenopathy, thyroid symmetric, not enlarged and no tenderness, skin normal  LUNGS:  ronchi bilateral with cough  CARDIOVASCULAR:  Normal apical impulse, regular rate and rhythm, normal S1 and S2, no S3 or S4, and no murmur noted  ABDOMEN:  No scars, normal bowel sounds, soft, non-distended, non-tender, no masses palpated, no hepatosplenomegally  MUSCULOSKELETAL:  There is no redness, warmth, or swelling of the joints. Full range of motion noted. NEUROLOGIC:  Awake, conversant, confused, moving extremities    DATA:    CBC: Recent Labs     03/02/22  1109 03/03/22  0228   WBC 12.0* 14.8*   HGB 9.7* 9.6*   HCT 29.4* 28.8*   .5* 119.5*    417       BMP:  Recent Labs     03/03/22  0228 03/03/22  1600 03/04/22  0920    145 150*   K 3.6 4.1 3.2*    108* 108*   CO2 13* 9* 16*   PHOS  --  4.2 3.0   BUN 15 17 20   CREATININE 1.4* 1.5* 1.5*    ALB:3,BILIDIR:3,BILITOT:3,ALKPHOS:3)@    PT/INR:   Recent Labs     03/02/22  1109   PROTIME 12.4   INR 1.2       ABG:   Recent Labs     03/02/22  1132   PH 7.407   PO2 85.0   PCO2 24.5*   HCO3 15.1*   BE -8.2*   O2SAT 96.8   METHB 0.3   O2HB 96.2   COHB 0.3   O2CON 14.2   HHB 3.2   THB 10.4*             Radiology Review:  CXR reviewed with slightly increased right lower lung field opacity when compared to previous    IMPRESSION/RECOMMENDATIONS:      Change mental status  Herpetic esophagitis  FRANCISCA  Possible aspiration pneumonia    1. Watch fluid balance, as per Renal  2. Observe mental function, (?) will need LP if doesn't improve  3. Cont with nebs, observe respiratory function and cough  4. For swallow evaluation, will see if can be done properly given his mental function  5.  Cont with antibiotics for now        Time at the bedside, reviewing labs and radiographs, reviewing notes and consultations, discussing with staff and family was more than 55 minutes. Thanks for letting us see this patient in consultation. Please contact us with any questions. Office [phone] or after hours through [facility], x [phone].

## 2022-03-04 NOTE — PROGRESS NOTES
Physical Therapy    Facility/Department: [address] INTERNAL MEDICINE 2  Treatment note    NAME: Lisette Fulton  : 1955  MRN: [id number]    Date of Service: 3/4/2022      Patient Diagnosis(es): The encounter diagnosis was Altered mental status, unspecified altered mental status type. has a past medical history of Allergic rhinitis, DVT (deep venous thrombosis) ([address] Hypertension, and Smoking. has a past surgical history that includes ECHO Compl W Dop Color Flow (2015); Knee arthroscopy; Tonsillectomy; pr lap, ventral hernia repair,reducible (N/A, 10/15/2018); Colonoscopy (N/A, 2022); and Upper gastrointestinal endoscopy (N/A, 2022). Evaluating Therapist: Edward Higgins PT    Room #:  2012/9212-W  Diagnosis:  Altered mental status [R41.82]  Altered mental status, unspecified altered mental status type [R41.82]  Precautions:  Falls, alarm      Social:  Pt admitted from [facility]. Pt unable to report prior level of function. Prior to last admit pt lived with wife and ambulated without device. Initial Evaluation  Date: 3/3/22 Treatment  3/4/22    Short Term/ Long Term   Goals   Was pt agreeable to Eval/treatment? Does pt have pain? No c/o pain No complaints    Bed Mobility  Rolling: mod assist  Supine to sit: mod assist of 2  Sit to supine: mod assist of 2  Scooting: mod assist of 2 Rolling: Max A  Supine to sit: Max A  Sit to supine: Max A  Scooting: Max A supine up in bed Min assist   Transfers Sit to stand: mod assist of 2  Stand to sit: mod assist of 2  Stand pivot: NT Sit <> stand: Max A Min assist   Ambulation    NT secondary to poor standing balance.   NT 25 feet with AAd with mod assist   Stair Negotiation  Ascended and descended  NT      LE strength     Does not follow test but at least 3/5        balance      Poor  Max assist of 2 standing balance     AM-PAC Raw score               10/24 10/24          Pt is alert, confused  Balance: poor sitting and standing without A/D    Pt performed therapeutic exercise of the following: NT    Patient education  Pt was educated on UE usage to assist with sitting balance, LE participation with standing    Patient response to education:   Pt verbalized understanding Pt demonstrated skill Pt requires further education in this area   no With prompt yes     ASSESSMENT:   Comments: Nurse ok with rx. Pt found in bed, agreed to sit EOB. Pt assisted to EOB, sat EOB Max A of balance, noted leaning anteriorly. Pt unsafe to sit unsupported. Pt stood off the EOB approx 10 second x 1 and 30 seconds x 1 with max A for balance while bed linen changed. Pt unable to stand fully upright due to weakness. Pt fatigued after activity, assisted back to bed. Treatment: Pt practiced and was instructed in the following treatment: sitting balance, standing tolerance    Pt was left in bed with call light in reach, staff member present. Chair/bed alarm: bed alarm active    Time in 1346   Time out 1400   Total Treatment Time 14 minutes   CPT codes:     Therapeutic activities 40744 14 minutes   Therapeutic exercises 31723 0 minutes       Pt is making no progress toward established Physical Therapy goals. Continue with physical therapy current plan of care.     Mi Coffey PTA   License Number: [id number]

## 2022-03-04 NOTE — PROGRESS NOTES
Spoke with Dr. Saeed Tanner regarding new consult.   Dr. Saeed Tanner spoke with Dr. Cara Douglas and patient is ok for MRI with contrast

## 2022-03-05 NOTE — PROGRESS NOTES
Brought patient to MRI. He was pre-medicated. Upon being slid into scanner, he started thrashing and yelling. Not able to scan this patient. Called RN and there were no more meds avail to use at this time. Sending back to room.

## 2022-03-05 NOTE — PROGRESS NOTES
PROGRESS NOTE        Patient Presents with/Seen in Consultation For      *esophageal herpes  CHIEF COMPLAINT: Altered mental status and fall  Subjective:     Patient seen Katia Wilkes in bed wife at bedside with continued uncontrollable movements, flailing arms, and unable to verbally respond. Plan of care discussed with wife at bedside, verbalizing understanding    Review of Systems  Aside from what was mentioned in the PMH and HPI, essentially unremarkable, all others negative. Objective:     BP (!) 116/98   Pulse 113   Temp 97.5 °F (36.4 °C) (Axillary)   Resp 22   Ht 6' 2\" (1.88 m)   Wt 175 lb (79.4 kg)   SpO2 100%   BMI 22.47 kg/m²     General appearance: laying in bed, irregular uncontrollable body movements  Eyes: conjunctiva pale, sclera anicteric. PERRL.   Lungs: clear to auscultation bilaterally  Heart: regular rate and rhythm, no murmur, 2+ pulses; no edema  Abdomen: soft, non-tender; bowel sounds normal; no masses,  no organomegaly  Extremities: extremities without edema  Pulses: 2+ and symmetric  Skin: Skin color, texture, turgor normal, scattered ecchymosis  Neurologic: Confused    potassium phosphate 30 mmol in dextrose 5 % 500 mL IVPB, Once  dextrose 5 % and 0.45 % sodium chloride infusion, Continuous  meropenem (MERREM) 1,000 mg in sodium chloride 0.9 % 100 mL IVPB (mini-bag), Q8H  fluconazole (DIFLUCAN) in 0.9 % sodium chloride IVPB 400 mg, Q24H  acyclovir (ZOVIRAX) 800 mg in dextrose 5 % 250 mL IVPB, Q12H  meropenem NS Flush, Q8H  etomidate (AMIDATE) 2 MG/ML injection,   fentaNYL 5 mcg/ml in 0.9%  ml infusion, Continuous  midazolam (VERSED) 1 mg/mL in D5W infusion, Continuous  docusate sodium (COLACE) capsule 100 mg, BID  ipratropium-albuterol (DUONEB) nebulizer solution 1 ampule, Q4H WA  nystatin (MYCOSTATIN) 830649 UNIT/ML suspension 500,000 Units, 4x Daily  ondansetron (ZOFRAN) injection 4 mg, Q6H PRN  acetaminophen (TYLENOL) tablet 650 mg, Q4H PRN  amLODIPine (NORVASC) tablet 5 mg, Daily  cetirizine (ZYRTEC) tablet 10 mg, Daily PRN  folic acid (FOLVITE) tablet 1 mg, Daily  pantoprazole (PROTONIX) tablet 40 mg, QAM AC  sucralfate (CARAFATE) tablet 1 g, 4x Daily AC & HS  ascorbic acid (VITAMIN C) tablet 500 mg, TID  vitamin D (CHOLECALCIFEROL) tablet 1,000 Units, Daily  zinc sulfate (ZINCATE) capsule 50 mg, Daily  chlordiazePOXIDE (LIBRIUM) capsule 10 mg, Q12H  white petrolatum ointment, BID         Data Review  CBC:   Lab Results   Component Value Date    WBC 14.8 03/03/2022    RBC 2.41 03/03/2022    HGB 9.6 03/03/2022    HCT 28.8 03/03/2022    .5 03/03/2022    MCH 39.8 03/03/2022    MCHC 33.3 03/03/2022    RDW 13.1 03/03/2022     03/04/2022    MPV 9.8 03/03/2022     CMP:    Lab Results   Component Value Date     03/05/2022    K 3.8 03/05/2022     03/05/2022    CO2 25 03/05/2022    BUN 18 03/05/2022    CREATININE 1.6 03/05/2022    GFRAA 53 03/05/2022    LABGLOM 43 03/05/2022    GLUCOSE 139 03/05/2022    PROT 5.5 03/03/2022    LABALBU 3.1 03/03/2022    CALCIUM 8.6 03/05/2022    BILITOT 1.0 03/03/2022    ALKPHOS 102 03/03/2022    AST 32 03/03/2022    ALT 30 03/03/2022     Hepatic Function Panel:    Lab Results   Component Value Date    ALKPHOS 102 03/03/2022    ALT 30 03/03/2022    AST 32 03/03/2022    PROT 5.5 03/03/2022    BILITOT 1.0 03/03/2022    BILIDIR 0.2 07/01/2015    IBILI 0.9 07/01/2015    LABALBU 3.1 03/03/2022       PT/INR:    Lab Results   Component Value Date    PROTIME 13.4 03/04/2022    INR 1.2 03/04/2022     IRON:    Lab Results   Component Value Date    IRON 32 03/04/2022     Iron Saturation:  No components found for: PERCENTFE  FERRITIN:  No results found for: FERRITIN      Assessment:     Active Problems:  ? AMS  ? Herpes esophagitis; EGD and Colonoscopy 2/21/22 with Dr. Xin Ramos - Moderate gastritis, superficial duodenal ulcerations and duodenitis, reflux esophagitis. pandiverticulosis, posterior midline anal fissure.  Pathology: Stomach: Focal acute erosive gastritis. Negative for Helicobacter pylori organisms on immunostained sections. B. Duodenum: Acute erosive duodenitis and chronic peptic duodenitis C. Gastroesophageal junction: Ulcerative esophagitis with changes consistent with herpes esophagitis D. Anal verge: Fragments of benign hyperkeratotic squamous mucosa. Negative for dysplasia or neoplasia. ? Thrush  ? Hepatic steatosis  ? History of alcohol abuse  ? Anemia macrocytic  ? FRANCISCA- nephrology following    Plan:   ? EGD when stable   ? Liver serology pending  ? Colace as ordered  ? Diflucan as ordered  ? Continue protonix as ordered  ? IV fluid per nephrology   ? Monitor CBC and CMP daily  ? ID following, transferred to ICU for intubation and sedation for MRI and LP  ? Antibiotics per ID  ? Supportive care  ? Continue to monitor      Note: This report was completed utilizing computer voice recognition software. Every effort has been made to ensure accuracy, however; inadvertent computerized transcription errors may be present.      Discussed with Dr. Yoli Nino per Dr. Amanda Murdock APRN-NP-C 3/5/2022  11:31 AM For Dr. Rocco Ferrara

## 2022-03-05 NOTE — PROGRESS NOTES
Pt arrived at approx [phone] intubated by resident Dr Cb Higgins at bedside for intubation, Central line placement and Placed a-line her self    Per Dr Cb Higgins will not preform LP at bedside in ICU, check with IR    Spoke to radiology unable to complete LP today because MD no longer available/in area.   Per report Attempted while on floor pt not appropriate condition for procedure     MRI unable to preform again today, not able to do at 2pm/too late in day, will complete tomorrow    CT of head planned for 3pm

## 2022-03-05 NOTE — PROGRESS NOTES
Patient seen now in unit and intubated. Pneumonia worse, likely aspiration. For ct chest, mri of head and lp today. Possible encephalitis related to herpes. On iv meropenem, acyclovir and diflucan. notable thrush on intubation.  Continue with current treatment and work up

## 2022-03-05 NOTE — CONSULTS
Critical Care Admit/Consult Note         Patient Jose J Martinez   MRN -  [id number]   Acct # - [de-identified]   - 1955      Date of Admission -  3/2/2022 10:44 AM  Date of evaluation -  3/5/2022  0215/021-A   Hospital Day - 3            ADMIT/CONSULT DETAILS     Reason for Admit/Consult     Acute altered mental status  Impending respiratory failure    Consulting Service/Physician   Consulting - Gracia Toribio DO  Primary Care Physician - Gracia Toribio DO         HPI     Patient currently is confused lethargic unable to provide any history. This history has been obtained by extensive review of the medical record chart. The patient is a 77 y.o. male with significant past medical history of hypertension DVT allergic rhinitis who was admitted on March 3 for altered mental status. In reviewing his chart patient was recently admitted to [facility] on 2022 for FRANCISCA and abdominal pain nausea and vomiting. At that time had an EGD and colonoscopy with Dr. Marleny Kirby. Biopsies revealed herpetic esophagitis. Patient was started on Valtrex for treatment. Patient was discharged to skilled nursing facility on  in a stable condition. Per paramedics patient was found altered wandering around the skilled nurse facility and unable to be redirected. Upon admission patient was found to have a FRANCISCA with a creatinine of 1.4. Acidosis with a bicarb of 13. Also there is questionable groundglass emesis with aspiration. Infectious disease was consulted for evaluation patient of altered mental status there was a concern about herpetic encephalitis. Patient was unable to go for any of his imaging due to his agitation and being unstable. He was transferred to the ICU for securing airway and further work-up. Upon arrival to the ICU given patient's impending respiratory failure after talking to the wife and obtaining consent we proceeded with intubating him.          Past Medical History Diagnosis Date    Allergic rhinitis     DVT (deep venous thrombosis) ([facility] [address] 2003    left leg    Hypertension     Smoking         Past Surgical History           Procedure Laterality Date    COLONOSCOPY N/A 2/21/2022    [facility]OPSY [facility] ECHO COMPL W DOP COLOR FLOW  7/1/2015         KNEE ARTHROSCOPY      right    TN LAP, VENTRAL HERNIA REPAIR,REDUCIBLE N/A 10/15/2018    DIAGNOSTIC LAPAROSCOPY,  OPEN VENTRAL HERNIA REPAIR WITH [facility]ESH [facility] N/A 2/21/2022    EGD BIOPSY performed by Kenn Rivas MD at [facility] ENDOSCOPY         Current Medications   Current Medications    potassium phosphate IVPB  30 mmol IntraVENous Once    meropenem  1,000 mg IntraVENous Q8H    fluconazole  400 mg IntraVENous Q24H    acyclovir  10 mg/kg (Ideal) IntraVENous Q12H    potassium chloride  20 mEq IntraVENous Q1H    magnesium sulfate  2,000 mg IntraVENous Once    docusate sodium  100 mg Oral BID    ipratropium-albuterol  1 ampule Inhalation Q4H WA    nystatin  5 mL Oral 4x Daily    amLODIPine  5 mg Oral Daily    folic acid  1 mg Oral Daily    pantoprazole  40 mg Oral QAM AC    sucralfate  1 g Oral 4x Daily AC & HS    vitamin C  500 mg Oral TID    Vitamin D  1,000 Units Oral Daily    zinc sulfate  50 mg Oral Daily    chlordiazePOXIDE  10 mg Oral Q12H    white petrolatum   Topical BID     ondansetron, acetaminophen, cetirizine  IV Drips/Infusions   dextrose 5 % and 0.45 % NaCl 165 mL/hr at 03/05/22 1438    sodium chloride      fentaNYL 5 mcg/ml in 0.9%  ml infusion 25 mcg/hr (03/05/22 1208)    midazolam 2 mg/hr (03/05/22 1209)     Home Medications  Medications Prior to Admission: acyclovir (ZOVIRAX) 800 MG tablet, Take 800 mg by mouth 5 times daily  vitamin C (ASCORBIC ACID) 500 MG tablet, Take 500 mg by mouth 3 times daily  sucralfate (CARAFATE) 1 GM tablet, Take 1 g by mouth 4 times daily (before meals and nightly)  docusate sodium (COLACE) 100 MG capsule, Take 100 mg by mouth every 12 hours as needed for Constipation  doxycycline hyclate (VIBRA-TABS) 100 MG tablet, Take 100 mg by mouth 2 times daily  acetaminophen (TYLENOL) 325 MG tablet, Take 650 mg by mouth every 4 hours as needed (DISCOMFORT;PAIN/ TEMP >100.4 F)  vitamin D (CHOLECALCIFEROL) 25 MCG (1000 UT) TABS tablet, Take 1,000 Units by mouth daily  amLODIPine (NORVASC) 5 MG tablet, Take 1 tablet by mouth daily  folic acid (FOLVITE) 1 MG tablet, Take 1 tablet by mouth daily  zinc sulfate (ZINCATE) 220 (50 Zn) MG capsule, Take 1 capsule by mouth daily  pantoprazole (PROTONIX) 40 MG tablet, Take 1 tablet by mouth every morning (before breakfast)  cetirizine (ZYRTEC) 10 MG tablet, Take 10 mg by mouth daily as needed for Allergies     Diet/Nutrition   ADULT ORAL NUTRITION SUPPLEMENT; Breakfast; Clear Liquid Oral Supplement  ADULT ORAL NUTRITION SUPPLEMENT; Lunch, Dinner; Standard High Calorie/High Protein Oral Supplement  ADULT DIET; Dysphagia - Pureed    Allergies   Pcn [penicillins]    Social History   Tobacco   reports that he quit smoking about 17 years ago. He started smoking about 37 years ago. He smoked 1.00 pack per day. He has never used smokeless tobacco.    Alcohol     reports current alcohol use.     Occupational history :    Family History         Problem Relation Age of Onset    Cancer Mother     Cancer Father        Sleep History   n/a    ROS     REVIEW OF SYSTEMS:  Unable to obtain    Lines and Devices      We placed a right IJ central line and also a femoral arterial line    Mechanical Ventilation Data   VENT SETTINGS (Comprehensive)  Vent Information  $Ventilation: $Initial Day  Equipment ID: [id number]  Vent Type: 980  Vent Mode: AC/VC  SpO2: 100 %  Humidification Source: Heated wire  Humidification Temp: 37  Humidification Temp Measured: 37  Circuit Condensation: Drained  Additional Respiratory Assessments  Pulse: 97  Resp: 14  SpO2: 100 %  End Tidal CO2: 26 (%)  Position: Semi-Callejas's  Humidification Source: Heated wire  Humidification Temp: 37  Circuit Condensation: Drained    ABG  Lab Results   Component Value Date    PH 7.407 2022    PCO2 24.5 2022    PO2 85.0 2022    HCO3 15.1 2022    O2SAT 96.8 2022     Lab Results   Component Value Date    MODE RA 2022           Vitals    height is 6' 2\" (1.88 m) and weight is 175 lb (79.4 kg). His axillary temperature is 97.5 °F (36.4 °C). His blood pressure is 133/77 and his pulse is 97. His respiration is 14 and oxygen saturation is 100%. Temperature Range: Temp: 97.5 °F (36.4 °C) Temp  Av.9 °F (36.6 °C)  Min: 97.5 °F (36.4 °C)  Max: 98.2 °F (36.8 °C)  BP Range:  Systolic (37EYV), GNI:285 , Min:95 , UFT:225     Diastolic (44BUT), VOM:37, Min:65, Max:98    Pulse Range: Pulse  Av.7  Min: 76  Max: 113  Respiration Range: Resp  Av.8  Min: 14  Max: 24  Current Pulse Ox[de-identified]  SpO2: 100 %  24HR Pulse Ox Range:  SpO2  Av %  Min: 100 %  Max: 100 %  Oxygen Amount and Delivery:        I/O (24 Hours)    Patient Vitals for the past 8 hrs:   BP Pulse Resp SpO2   22 1400 -- 97 14 100 %   22 1300 -- 99 15 100 %   22 1200 133/77 101 -- 100 %       Intake/Output Summary (Last 24 hours) at 3/5/2022 1607  Last data filed at 3/5/2022 1400  Gross per 24 hour   Intake 376 ml   Output 260 ml   Net 116 ml     I/O last 3 completed shifts:   In: 125 [P.O.:125]  Out: 200 [Urine:200]   Date 22 0000 - 22 2359   Shift 8380-3704 7083-2864 6673-9158 24 Hour Total   INTAKE   I.V.(mL/kg)  26(0.3)  26(0.3)   IV Piggyback(mL/kg)  250(3.1)  250(3.1)   Shift Total(mL/kg)  276(3.5)  276(3.5)   OUTPUT   Urine(mL/kg/hr)  60(0.1)  60   Shift Total(mL/kg)  60(0.8)  60(0.8)   Weight (kg) 79.4 79.4 79.4 79.4     Patient Vitals for the past 96 hrs (Last 3 readings):   Weight   22 1053 175 lb (79.4 kg) Drains/Tubes Outputs    Exam         PHYSICAL EXAM:  CONSTITUTIONAL: Altered confused moving all arms and legs spontaneously  HEENT: Atraumatic, normocephalic, extraocular muscles are intact, pupils are equal react light accommodation,  LUNGS: Coarse bilateral bronchial sounds  CARDIOVASCULAR: Regular rate and rhythm no murmur gallop or thrill MUSCULOSKELETAL: Negative edema positive peripheral pulses  NEUROLOGIC: Annual nerves II to XII are grossly intact  Skin has numerous lacerations on arms and leg                Data   Old records and images have been reviewed    Lab Results   CBC     Lab Results   Component Value Date    WBC 14.8 03/03/2022    RBC 2.41 03/03/2022    HGB 9.6 03/03/2022    HCT 28.8 03/03/2022     03/04/2022    .5 03/03/2022    MCH 39.8 03/03/2022    MCHC 33.3 03/03/2022    RDW 13.1 03/03/2022    NRBC 0.0 02/23/2022    LYMPHOPCT 9.3 03/02/2022    PROMYELOPCT 0.9 02/23/2022    MONOPCT 9.3 03/02/2022    BASOPCT 0.3 03/02/2022    MONOSABS 1.12 03/02/2022    LYMPHSABS 1.11 03/02/2022    EOSABS 0.07 03/02/2022    BASOSABS 0.03 03/02/2022       BMP   Lab Results   Component Value Date     03/05/2022    K 2.6 03/05/2022    K 3.8 03/05/2022     03/05/2022    CO2 26 03/05/2022    BUN 18 03/05/2022    CREATININE 1.6 03/05/2022    GLUCOSE 166 03/05/2022    CALCIUM 8.6 03/05/2022       LFTS  Lab Results   Component Value Date    ALKPHOS 102 03/03/2022    ALT 30 03/03/2022    AST 32 03/03/2022    PROT 5.5 03/03/2022    BILITOT 1.0 03/03/2022    BILIDIR 0.2 07/01/2015    IBILI 0.9 07/01/2015    LABALBU 3.1 03/03/2022       INR  Recent Labs     03/04/22  1850   PROTIME 13.4*   INR 1.2       APTT  Recent Labs     03/04/22  1850   APTT 24.3*       Lactic Acid  Lab Results   Component Value Date    LACTA 2.0 02/18/2022    LACTA 0.7 06/29/2015    LACTA 2.5 06/27/2015        BNP   No results for input(s): BNP in the last 72 hours.      Cultures     No results for input(s): BC in the last 72 hours. No results for input(s): Sylvain Meiers in the last 72 hours. No results for input(s): LABURIN in the last 72 hours. Radiology   CXR    Impression   1.  Endotracheal tube in good position.       2.  NG tube in good position.       3.  Right internal jugular central venous catheter tip in the SVC.       4.  No pneumothorax on the right on the left.       5.  Some mild improvement of the widespread infiltrate in the right lung   since March 5 same day performed at 06:29 a.mNeville Chen, it can be due   better aeration of the right lung following the endotracheal tube placement.               SYSTEMS ASSESSMENT    Neuro   Altered mental status  Differential diagnosis include meningitis versus herpetic encephalitis    Patient is scheduled for LP in IR. Now that patient has a secure airway we will obtain an MRI of his brain with and without contrast  Patient has been started on acyclovir, meropenem and Diflucan per infectious disease    Respiratory     Status post intubation due to impending respiratory failure  Probable aspiration pneumonia  We will obtain respiratory cultures  CT of the chest without contrast  Continue antibiotics per infectious disease  Patient has horrendous oral thrush  DuoNebs      Cardiovascular     Patient currently is in normal sinus rhythm no acute issues    Gastrointestinal     Partially treated herpetic esophagitis. GI on board.   Plan for endoscopy on Monday  Continue acyclovir  PPI    Renal     Chronic kidney disease stage III AAA  High anion gap metabolic acidosis improved with bicarb drip  Severe electrolyte abnormality including hypophosphatemia hypokalemia hypernatremia  Patient was positive for cannabis on drug screen    Continue D5 half-normal saline at 165 cc an hour per nephrology recommendations  Orders placed for replacement of potassium phosphorus and magnesium  Monitor I's and O's closely  Renal has cleared patient to receive gadolinium with MRI Infectious Disease     Please refer to neurology and respiratory section    Hematology/Oncology     Patient with macrocytic anemia  Patient has a significant history of heavy alcohol use in the past.  He had low folic acid levels back in February. Hematology oncology was consulted at that time. Recommended to replace folic acid 1 mg daily also recommended to add zinc.    Endocrine     Monitor blood glucose per protocol    Social/Spiritual/DNR/Other     Patient currently is a full code. MECRED    \"Thank you for asking us to see this complex patient. \"    Total critical care time caring for this patient with life threatening, unstable organ failure, including direct patient contact, management of life support systems, review of data including imaging and labs, discussions with other team members and physicians at least 61' Min so far today, excluding procedures. Please feel free to call with questions or concerns.

## 2022-03-05 NOTE — PROGRESS NOTES
[facility] MD Sawyer Herrera, MD Margret Allen, MD Babak Gaston, MD Monica Tejeda, CNP   Harriet Groves, OBED  Progress Note    3/5/2022    SUBJECTIVE:   3/4: Off the floor. Discussed case with bedside RN, Dr. Jimy Herr. Unable to perform diagnostic radiographic studies due to agitation, movement    3/5 transfer to the ICU intubated via ETT, ventilated. Hemodynamically stable. Sedated appears comfortable on vent. IV K-Phos infusion ordered this morning, still not delivered to nursing unit for administration. IV fluid stopped yesterday for unknown period of time, held today for 2 to 3 hours, reasons not clear though apparently IV access was not lost.    PROBLEM LIST:    Principal Problem:    Altered mental status  Resolved Problems:    * No resolved hospital problems. *         DIET:    ADULT ORAL NUTRITION SUPPLEMENT; Breakfast; Clear Liquid Oral Supplement  ADULT ORAL NUTRITION SUPPLEMENT; Lunch, Dinner; Standard High Calorie/High Protein Oral Supplement  ADULT DIET;  Dysphagia - Pureed     MEDS (scheduled):    potassium phosphate IVPB  30 mmol IntraVENous Once    meropenem  1,000 mg IntraVENous Q8H    fluconazole  400 mg IntraVENous Q24H    acyclovir  10 mg/kg (Ideal) IntraVENous Q12H    docusate sodium  100 mg Oral BID    ipratropium-albuterol  1 ampule Inhalation Q4H WA    nystatin  5 mL Oral 4x Daily    amLODIPine  5 mg Oral Daily    folic acid  1 mg Oral Daily    pantoprazole  40 mg Oral QAM AC    sucralfate  1 g Oral 4x Daily AC & HS    vitamin C  500 mg Oral TID    Vitamin D  1,000 Units Oral Daily    zinc sulfate  50 mg Oral Daily    chlordiazePOXIDE  10 mg Oral Q12H    white petrolatum   Topical BID       MEDS (infusions):   dextrose 5 % and 0.45 % NaCl 165 mL/hr at 03/05/22 1438    sodium chloride      fentaNYL 5 mcg/ml in 0.9%  ml infusion 25 mcg/hr (03/05/22 1208)    midazolam 2 mg/hr (03/05/22 1209)       MEDS (prn):  ondansetron, acetaminophen, cetirizine    PHYSICAL EXAM:     Patient Vitals for the past 24 hrs:   BP Temp Temp src Pulse Resp SpO2   03/05/22 1400 -- -- -- 97 14 100 %   03/05/22 1300 -- -- -- 99 15 100 %   03/05/22 1200 133/77 -- -- 101 -- 100 %   03/05/22 0800 (!) 116/98 97.5 °F (36.4 °C) Axillary 113 22 100 %   03/05/22 0115 (!) 104/90 98.2 °F (36.8 °C) Axillary 76 24 100 %   03/04/22 2015 95/65 98.1 °F (36.7 °C) Axillary 112 24 100 %   @      Intake/Output Summary (Last 24 hours) at 3/5/2022 1453  Last data filed at 3/4/2022 1859  Gross per 24 hour   Intake 100 ml   Output 200 ml   Net -100 ml         Wt Readings from Last 3 Encounters:   03/02/22 175 lb (79.4 kg)   02/23/22 186 lb 12.8 oz (84.7 kg)   11/27/18 215 lb (97.5 kg)       Constitutional:  in no acute distress  HEENT: NC/AT, EOMI, sclera and conjunctiva are clear and anicteric, mucus membranes moist  Neck: Trachea midline, no JVD  Cardiovascular: S1, S2 regular rhythm, no murmur,or rub  Respiratory:  No crackles, no wheeze  Gastrointestinal:  Soft, nontender, nondistended, NABS  Ext: no edema, feet warm  Skin: dry, no rash  Neuro: awake, alert, interactive      DATA:    Recent Labs     03/03/22 0228 03/04/22  1850   WBC 14.8*  --    HGB 9.6*  --    HCT 28.8*  --    .5*  --     392     Recent Labs     03/03/22 0228 03/03/22  0228 03/03/22  1600 03/03/22  1600 03/04/22  0920 03/04/22  1850 03/05/22  0525      < > 145   < > 150* 150* 154*   K 3.6  --  4.1   < > 3.2* 3.0* 3.8      < > 108*   < > 108* 105 112*   CO2 13*   < > 9*   < > 16* 23 25   MG  --   --  1.5*  --  1.5*  --   --    PHOS  --   --  4.2  --  3.0  --  1.5*   BUN 15   < > 17   < > 20 20 18   CREATININE 1.4*   < > 1.5*   < > 1.5* 1.6* 1.6*   ALT 30  --   --   --   --   --   --    AST 32  --   --   --   --   --   --    BILITOT 1.0  --   --   --   --   --   --    ALKPHOS 102  --   --   --   --   --   --     < > = values in this interval not displayed. No results found for: LABPROT    ASSESSMENT    77 y.o. gentleman known to service, followed for FRANCISCA in January of this year. Admitted with acute mental status change, myoclonic jerks and unusual movements.     1. Chronic kidney disease, stage IIIa. Creatinine at baseline     2. Metabolic acidosis, wide anion gap. Resolved with bicarbonate drip    3. Cannabis on drug screen    4. Hypophosphatemia. 5. Hypernatremia, dehydration due to increased insensible losses, poor intake/input    6. Hypokalemia, normalized with supplement     IV fluids stopped for unknown period of time yesterday, then stopped again this morning/early afternoon for 2 to 3 hours.   IV fluid, changed to D5 1/2 NS, rate increased by me this morning   IV K-Phos ordered this morning, still not administered    Sodium at 1:20 , improving      RECOMMENDATIONS  --> Continue D5 1/2 NS at 165 cc an hour  --> Administer K-Phos once pharmacy delivers it to the ICU for administration  --> Continue supportive care  --> Follow labs, UO  --> With ongoing IV fluid resuscitation, renal function is adequate for him to receive gadolinium with the MRI, and as we use class II agents here, the risk for nephrogenic systemic fibrosis would be extremely low  --> Continue supportive care  --> Follow labs, UO      Electronically signed by Sukhwinder Ocasio MD on 3/5/2022

## 2022-03-05 NOTE — CONSULTS
[facility] MD Yasir Hermosillo, MD Monique Colón MD Deitra Ferris, MOE Mendoza, ANP  Consultation    Patient's Name: Laurie Lopez  3/3/2022    Nephrologist: Yasir Damon MD    Reason for Consult: Acute kidney injury, chronic kidney disease  Requesting Physician:  Crissie Estimable, DO    Chief Complaint: Acute mental status change, strange movements    History Obtained From: Patient, chart    History of Present Ilness:    Mr. Peña Palencia is a 70-year-old gentleman known to me from prior admission where I followed him for FRANCISCA prior to his discharge to rehab facility. He was admitted yesterday to the ER status post fall, with a several day history of worsening confusion, strange movements of his upper and lower extremities consisting of myoclonic jerks, swinging his arms and legs uncontrollably. He is confused and unable to provide a meaningful history. Denies all complaint. Had an episode of emesis in the ER. Not known whether he had this prior to presentation. Per wife at bedside, he has not been eating very well lately. BUN/creatinine on presentation (3/2) were 14 and 1.5, respectively, which compares with 12 and 1.2 at the time of his discharge 2/23. BUN and creatinine have improved somewhat to 15 and 1.4, as of this morning.     Past Medical History:   Diagnosis Date    Allergic rhinitis     DVT (deep venous thrombosis) ([facility] [address] 2003    left leg    Hypertension     Smoking        Past Surgical History:   Procedure Laterality Date    COLONOSCOPY N/A 2/21/2022    COLONOSCOPY WITH BIOPSY performed by Ramiro Knox MD at [address] ECHO COMPL W DOP COLOR FLOW  7/1/2015         KNEE ARTHROSCOPY      right    NH LAP, VENTRAL HERNIA REPAIR,REDUCIBLE N/A 10/15/2018    DIAGNOSTIC LAPAROSCOPY,  OPEN VENTRAL HERNIA REPAIR WITH MESH performed by Ramiro Knox MD at [address] 2/21/2022    EGD BIOPSY performed by Boogie Arguelles MD at [facility] ENDOSCOPY       Family History   Problem Relation Age of Onset    Cancer Mother     Cancer Father         reports that he quit smoking about 17 years ago. He started smoking about 37 years ago. He smoked 1.00 pack per day. He has never used smokeless tobacco. He reports current alcohol use. He reports that he does not use drugs. Allergies:  Pcn [penicillins]    Current Medications:    potassium phosphate 30 mmol in dextrose 5 % 500 mL IVPB, Once  dextrose 5 % and 0.45 % sodium chloride infusion, Continuous  meropenem (MERREM) 1,000 mg in sodium chloride 0.9 % 100 mL IVPB (mini-bag), Q8H  fluconazole (DIFLUCAN) in 0.9 % sodium chloride IVPB 400 mg, Q24H  acyclovir (ZOVIRAX) 800 mg in dextrose 5 % 250 mL IVPB, Q12H  meropenem NS Flush, Q8H  fentaNYL 5 mcg/ml in 0.9%  ml infusion, Continuous  midazolam (VERSED) 1 mg/mL in D5W infusion, Continuous  docusate sodium (COLACE) capsule 100 mg, BID  ipratropium-albuterol (DUONEB) nebulizer solution 1 ampule, Q4H WA  nystatin (MYCOSTATIN) 207871 UNIT/ML suspension 500,000 Units, 4x Daily  ondansetron (ZOFRAN) injection 4 mg, Q6H PRN  acetaminophen (TYLENOL) tablet 650 mg, Q4H PRN  amLODIPine (NORVASC) tablet 5 mg, Daily  cetirizine (ZYRTEC) tablet 10 mg, Daily PRN  folic acid (FOLVITE) tablet 1 mg, Daily  pantoprazole (PROTONIX) tablet 40 mg, QAM AC  sucralfate (CARAFATE) tablet 1 g, 4x Daily AC & HS  ascorbic acid (VITAMIN C) tablet 500 mg, TID  vitamin D (CHOLECALCIFEROL) tablet 1,000 Units, Daily  zinc sulfate (ZINCATE) capsule 50 mg, Daily  chlordiazePOXIDE (LIBRIUM) capsule 10 mg, Q12H  white petrolatum ointment, BID        Review of Systems:   Review of systems not obtained due to patient factors. Delirium.  Wife at bedside though she was not present with him at the [facility]    Physical exam:   Vital signs reviewed  age-appropriate white gentleman in no apparent distress  NC/AT EOMI sclera conjunctive are clear and anicteric.  Mucous membranes are dry  Neck soft supple trachea midline no JVD no bruit  CTA bilaterally though poor air entry bilaterally at the bases  Regular rhythm normal S1 and S2. No murmur heard   Abdomen soft nontender nondistended normal active bowel sounds no mass, no hepatosplenomegaly  Distal extremities are without edema. Pulses 1-2 +x2   No rash or lesion on visible portions of integument  Moves all 4 extremities  Cranial nerves II through XII grossly intact  Awake, minimally alert, interactive but answers are not appropriate. Theresa Mclean Uncontrolled myoclonic jerks    Data:   Reviewed     Assessment  77 y.o. gentleman known to service, followed for FRANCISCA in January of this year. Admitted with acute mental status change, myoclonic jerks and unusual movements.     1. Chronic kidney disease, stage IIIa. Creatinine at baseline     2. Metabolic acidosis, wide anion gap     3. Cannabis on drug screen     Recommendations  Isotonic bicarbonate drip  Urinanalysis  Urine protein creatinine ratio, electrolytes, urine osmolality  Repeat BMP, adjust IV fluid as warranted  Follow labs, UO  Continue supportive care      Thank you for the opportunity to participate in the care of your pleasant patient. We look forward to following along with you.         Electronically signed by Rebel Castro MD

## 2022-03-05 NOTE — PROCEDURES
Procedure: Right femoral arterial line placement. Indications: Continuous monitoring of blood pressure in a patient with hypotension +/- shock, on Levophed. Anesthesia: Local infiltration of 1% lidocaine. Consent:  Informed written obtained. Technique:  Procedure was done using strict aseptic technique. Right femoral  site was cleaned with chloraprep and draped. femoral artery was identified, then Lidocaine 1% was infiltrated locally. Femoral  arterial line was inserted, a good blood flow was obtained, after which guidewire was inserted all the way with no resistance. Then the canula was inserted and needle with guidewire was withdrawn. Pulsatile bright red blood flow was observed. The canula was connected to BP monitoring apparatus and a good waveform was noted. Then the canula was secured with 2 stay sutures of 3-0 silk after Lidocaine infiltration, following which dressing was applied. Number of sticks: 1. Number of Kits used: 1. Complications: No immediate complication. Estimated blood loss: About 1 ml. Comment: Patient tolerated the procedure well.      Erin Ewing MD

## 2022-03-05 NOTE — PROGRESS NOTES
Pt not able to follow commands this am. Opens eyes briefly when spoken to. Gross involuntary movements of all extremities.  Will continue to monitor

## 2022-03-05 NOTE — PROGRESS NOTES
Crandall placed and was not removed. Pt had 200 ml urine return. immediately. . bmp, and anti coags drawn and sent to lab  Pt tolerated well

## 2022-03-06 NOTE — PROGRESS NOTES
Patient seen in icu  An d sedated on the kevan t. Vss, abg good. cxr some improvement. Tolerating antifungals. Abs and antivirals. For mri  Today. Ct of head was negative.

## 2022-03-06 NOTE — PATIENT CARE CONFERENCE
Intensive Care Daily Quality Rounding Checklist      ICU Team Members: Bedside Nurse, ,  and Nursing Unit Leadership    ICU Day #: NUMBER: 1    Intubation Date: March 5    Ventilator Day #: NUMBER: 1    Central Line Insertion Date: March 5        Day #: NUMBER: 1     Arterial Line Insertion Date: March 3      Day #: NUMBER: 5    Temporary Hemodialysis Catheter Insertion Date:     Day #    DVT Prophylaxis: SCD    GI Prophylaxis: Protonix    Crandall Catheter Insertion Date: March 5       Day #: 1      Continued need (if yes, reason documented and discussed with physician): yes    Skin Issues/ Wounds and ordered treatment discussed on rounds:     Goals/ Plans for the Day: Daily labs and replace/transfuse as needed, wean vent, wean sedation as able, LP/MRI/CT chest when able. Continue critical care management.

## 2022-03-06 NOTE — PROGRESS NOTES
Critical Care Team - Daily Progress Note         Date and time: 3/6/2022 1:57 PM  Patient's name:  Riki Ledesma  Medical Record Number: [id number]  Patient's account/billing number: [de-identified]  Patient's YOB: 1955  Age: 77 y.o. Date of Admission: 3/2/2022 10:44 AM  Length of stay during current admission: 4      Primary Care Physician: Cherry Aguilar DO  ICU Attending Physician: Dr. Catrachita Bernabe Status: Prior    Reason for ICU admission:     Altered mental status  Impending respiratory failure      SUBJECTIVE:     OVERNIGHT EVENTS: Patient remains intubated sedated. Hypotensive had to start on pressors. Scheduled for brain MRI today.   CURRENT VENTILATION STATUS:     [x] Ventilator  [] BIPAP  [] Nasal Cannula [] Room Air      IF INTUBATED, ET TUBE MARKING AT LOWER LIP:       cms    SECRETIONS Amount:  [] Small [] Moderate  [] Large  [] None  Color:     [] White [] Colored  [] Bloody    SEDATION:  RAAS Score:  [] Fentanyl gtt  [x] Versed gtt  [] Ativan gtt   [] No Sedation    PARALYZED:  [x] No    [] Yes      VASOPRESSORS:  [] No    [x] Yes    If yes -   [x] Levophed       [] Dopamine     [] Vasopressin       [] Dobutamine  [] Phenylephrine         [] Epinephrine    CENTRAL LINES:     [x] No   [] Yes   (Date of Insertion:   )           If yes -     [] Right IJ     [] Left IJ [] Right Femoral [] Left Femoral                   [] Right Subclavian [] Left Subclavian       FORBES'S CATHETER:   [x] No has nephrostomy tube  [] Yes  (Date of Insertion:   )     URINE OUTPUT:            [x] Good   [] Low              [] Anuric      OBJECTIVE:     VITAL SIGNS:  BP (!) 85/46   Pulse 101   Temp 97.5 °F (36.4 °C) (Axillary)   Resp 10   Ht 6' 2\" (1.88 m)   Wt 175 lb (79.4 kg)   SpO2 100%   BMI 22.47 kg/m²   Tmax over 24 hours:  Temp (24hrs), Av.5 °F (36.4 °C), Min:97.2 °F (36.2 °C), Max:97.7 °F (36.5 °C)      Patient Vitals for the past 6 hrs:   BP Pulse Resp SpO2   22 1300 (!) 85/46 101 10 100 %   03/06/22 1200 (!) 91/48 95 15 100 %   03/06/22 1100 (!) 99/49 93 16 --   03/06/22 1000 (!) 96/43 91 16 100 %   03/06/22 0900 (!) 106/49 85 16 100 %   03/06/22 0800 (!) 95/42 84 15 100 %         Intake/Output Summary (Last 24 hours) at 3/6/2022 1357  Last data filed at 3/6/2022 1208  Gross per 24 hour   Intake 5449.49 ml   Output 300 ml   Net 5149.49 ml     Wt Readings from Last 2 Encounters:   03/02/22 175 lb (79.4 kg)   02/23/22 186 lb 12.8 oz (84.7 kg)     Body mass index is 22.47 kg/m². PHYSICAL EXAMINATION:    GENERAL: Intubated and sedated,  HEENT: Atraumatic. Normocephalic. Extraocular muscles are intact. Pupils are equal, round and reactive to light and accommodation. Sclera is nonicteric. NECK: Supple. No JVD. No adenopathy. No bruits. CARDIOVASCULAR: Regular rate and rhythm. No murmur, gallop or thrills. LUNGS: Clear to auscultation bilateral   ABDOMEN:  soft, nontender. No distention or organomegaly. EXTREMITIES: No clubbing, no cellulitis. Positive peripheral pulses, equal bilaterally.   Has multiple bilateral skin breaks       Any additional physical findings:    MEDICATIONS:    Scheduled Meds:   potassium phosphate IVPB  30 mmol IntraVENous Once    meropenem  1,000 mg IntraVENous Q8H    fluconazole  400 mg IntraVENous Q24H    acyclovir  10 mg/kg (Ideal) IntraVENous Q12H    docusate sodium  100 mg Oral BID    ipratropium-albuterol  1 ampule Inhalation Q4H WA    nystatin  5 mL Oral 4x Daily    amLODIPine  5 mg Oral Daily    folic acid  1 mg Oral Daily    pantoprazole  40 mg Oral QAM AC    sucralfate  1 g Oral 4x Daily AC & HS    vitamin C  500 mg Oral TID    Vitamin D  1,000 Units Oral Daily    zinc sulfate  50 mg Oral Daily    chlordiazePOXIDE  10 mg Oral Q12H    white petrolatum   Topical BID     Continuous Infusions:   sodium chloride 10 mL/hr at 03/06/22 1208    norepinephrine 5 mcg/min (03/06/22 1350)    dextrose 5 % and 0.45 % NaCl 165 mL/hr at 03/05/22 0538  sodium chloride 33.3 mL/hr at 03/06/22 8114    fentaNYL 5 mcg/ml in 0.9%  ml infusion 25 mcg/hr (03/06/22 0809)    midazolam 1 mg/hr (03/06/22 0810)     PRN Meds:   ondansetron, 4 mg, Q6H PRN  acetaminophen, 650 mg, Q4H PRN  cetirizine, 10 mg, Daily PRN          VENT SETTINGS (Comprehensive) (if applicable):  Vent Information  $Ventilation: $Subsequent Day  Skin Assessment: Clean, dry, & intact  Equipment ID: [id number]  Vent Type: [phone]  Vent Mode: AC/VC  Vt Ordered: 500 mL  Rate Set: (S) 12 bmp  Peak Flow: 60 L/min  Pressure Support: 0 cmH20  FiO2 : 40 %  SpO2: 100 %  SpO2/FiO2 ratio: 250  Sensitivity: 3  PEEP/CPAP: 8  I Time/ I Time %: 0 s  Humidification Source: Heated wire  Humidification Temp: 37  Humidification Temp Measured: 36.9  Circuit Condensation: Drained  Additional Respiratory  Assessments  Pulse: 101  Resp: 10  SpO2: 100 %  End Tidal CO2: 26 (%)  Position: Semi-Callejas's  Humidification Source: Heated wire  Humidification Temp: 37  Circuit Condensation: Drained  Oral Care: Mouth swabbed,Mouth moisturizer,Mouth suctioned,Suction toothette  Subglottic Suction Done?: Yes  Airway Type: ET  Airway Size: 8    ABGs:   Recent Labs     03/06/22  0516   PH 7.511*   PCO2 31.9*   PO2 161.6*   HCO3 24.9   BE 2.1   O2SAT 99.3*       Laboratory findings:    Complete Blood Count:   Recent Labs     03/04/22  1850 03/06/22  0500   WBC  --  11.0   HGB  --  8.0*   HCT  --  23.0*    238        Last 3 Blood Glucose:   Recent Labs     03/05/22  1320 03/06/22  0500 03/06/22  1300   GLUCOSE 166* 214* 221*        PT/INR:    Lab Results   Component Value Date    PROTIME 13.4 03/04/2022    INR 1.2 03/04/2022     PTT:    Lab Results   Component Value Date    APTT 24.3 03/04/2022       Comprehensive Metabolic Profile:   Recent Labs     03/05/22  1320 03/06/22  0500 03/06/22  1300   * 144 141   K 2.6* 2.5* 3.7   * 108* 107   CO2 26 22 22   BUN 18 15 14   CREATININE 1.6* 1.5* 1.4*   GLUCOSE 166* 214* 221* CALCIUM 8.6 7.7* 7.2*      Magnesium:   Lab Results   Component Value Date    MG 2.3 03/06/2022     Phosphorus:   Lab Results   Component Value Date    PHOS 2.1 03/06/2022     Ionized Calcium: No results found for: CAION     Urinalysis:     Troponin: No results for input(s): TROPONINI in the last 72 hours. Microbiology:    Cultures during this admission:     Blood cultures:                 [] None drawn      [x] Negative             []  Positive (Details:  )  Urine Culture:                   [] None drawn      [] Negative             []  Positive (Details:  )  Sputum Culture:               [] None drawn       [] Negative             []  Positive (Details:  )   Endotracheal aspirate:     [] None drawn       [] Negative             []  Positive (Details:  )     Other pertinent Labs:       Radiology/Imaging:     Chest Xray (3/6/2022): Impression   1.  Endotracheal tube in good position.       2.  NG tube in good position.       3.  Right internal jugular central venous catheter tip in the SVC.       4.  No pneumothorax on the right on the left.       5.  Some mild improvement of the widespread infiltrate in the right lung   since March 5 same day performed at 06:29 a.m. Eloy Zimmerman, it can be due   better aeration of the right lung following the endotracheal tube placement.             ASSESSMENT:     Principal Problem:    Altered mental status differential diagnosis includes meningitis versus herpetic encephalitis  Respiratory failure secondary to possible aspiration pneumonia  Herpetic esophagitis  Macrocytic anemia  History of alcohol abuse in the past          Additional assessment:            PLAN:     WEAN PER PROTOCOL:  [x] No   [] Yes  [] N/A    DISCONTINUE ANY LABS:   [x] No   [] Yes    ICU PROPHYLAXIS:  Stress ulcer:  [x] PPI Agent  [] [id number] [] Sucralfate  [] Other:  VTE:   [] Enoxaparin  [] Unfract.  Heparin Subcut  [x] EPC Cuffs    NUTRITION:  [] NPO [] Tube Feeding (Specify: ) [] TPN  [] PO (Diet:

## 2022-03-06 NOTE — PROGRESS NOTES
PROGRESS NOTE    Patient Presents with/Seen in Consultation For      *esophageal herpes  CHIEF COMPLAINT: Altered mental status and fall    Subjective:     Patient seen Rick Severin in bed with wife at bedside. Patient intubated and sedated. Per BS RN, no bowel movement today. Review of Systems  Aside from what was mentioned in the PMH and HPI, essentially unremarkable, all others negative. Objective:     BP (!) 95/42   Pulse 84   Temp 97.5 °F (36.4 °C) (Axillary)   Resp 15   Ht 6' 2\" (1.88 m)   Wt 175 lb (79.4 kg)   SpO2 100%   BMI 22.47 kg/m²     General appearance: laying in bed, intubated and sedated  Eyes: conjunctiva pale, sclera anicteric. PERRL.   Lungs: clear to auscultation bilaterally  Heart: regular rate and rhythm, no murmur, 2+ pulses; no edema  Abdomen: soft, non-tender; bowel sounds normal; no masses,  no organomegaly  Extremities: extremities without edema  Pulses: 2+ and symmetric  Skin: Skin color, texture, turgor normal, scattered ecchymosis  Neurologic:  Sedated    potassium chloride 20 mEq/50 mL IVPB (Central Line), Q1H  dextrose 5 % and 0.45 % sodium chloride infusion, Continuous  meropenem (MERREM) 1,000 mg in sodium chloride 0.9 % 100 mL IVPB (mini-bag), Q8H  fluconazole (DIFLUCAN) in 0.9 % sodium chloride IVPB 400 mg, Q24H  acyclovir (ZOVIRAX) 800 mg in dextrose 5 % 250 mL IVPB, Q12H  meropenem NS Flush, Q8H  fentaNYL 5 mcg/ml in 0.9%  ml infusion, Continuous  midazolam (VERSED) 1 mg/mL in D5W infusion, Continuous  docusate sodium (COLACE) capsule 100 mg, BID  ipratropium-albuterol (DUONEB) nebulizer solution 1 ampule, Q4H WA  nystatin (MYCOSTATIN) 213988 UNIT/ML suspension 500,000 Units, 4x Daily  ondansetron (ZOFRAN) injection 4 mg, Q6H PRN  acetaminophen (TYLENOL) tablet 650 mg, Q4H PRN  amLODIPine (NORVASC) tablet 5 mg, Daily  cetirizine (ZYRTEC) tablet 10 mg, Daily PRN  folic acid (FOLVITE) tablet 1 mg, Daily  pantoprazole (PROTONIX) tablet 40 mg, QAM AC  sucralfate (CARAFATE) tablet 1 g, 4x Daily AC & HS  ascorbic acid (VITAMIN C) tablet 500 mg, TID  vitamin D (CHOLECALCIFEROL) tablet 1,000 Units, Daily  zinc sulfate (ZINCATE) capsule 50 mg, Daily  chlordiazePOXIDE (LIBRIUM) capsule 10 mg, Q12H  white petrolatum ointment, BID         Data Review  CBC:   Lab Results   Component Value Date    WBC 11.0 03/06/2022    RBC 2.02 03/06/2022    HGB 8.0 03/06/2022    HCT 23.0 03/06/2022    .9 03/06/2022    MCH 39.6 03/06/2022    MCHC 34.8 03/06/2022    RDW 13.6 03/06/2022     03/06/2022    MPV 9.8 03/06/2022     CMP:    Lab Results   Component Value Date     03/06/2022    K 2.5 03/06/2022    K 3.8 03/05/2022     03/06/2022    CO2 22 03/06/2022    BUN 15 03/06/2022    CREATININE 1.5 03/06/2022    GFRAA 57 03/06/2022    LABGLOM 47 03/06/2022    GLUCOSE 214 03/06/2022    PROT 5.5 03/03/2022    LABALBU 3.1 03/03/2022    CALCIUM 7.7 03/06/2022    BILITOT 1.0 03/03/2022    ALKPHOS 102 03/03/2022    AST 32 03/03/2022    ALT 30 03/03/2022     Hepatic Function Panel:    Lab Results   Component Value Date    ALKPHOS 102 03/03/2022    ALT 30 03/03/2022    AST 32 03/03/2022    PROT 5.5 03/03/2022    BILITOT 1.0 03/03/2022    BILIDIR 0.2 07/01/2015    IBILI 0.9 07/01/2015    LABALBU 3.1 03/03/2022     PT/INR:    Lab Results   Component Value Date    PROTIME 13.4 03/04/2022    INR 1.2 03/04/2022     IRON:    Lab Results   Component Value Date    IRON 32 03/04/2022       Assessment:   Active Problems:  ? AMS  ? Herpes esophagitis; EGD and Colonoscopy 2/21/22 with Dr. Ladarius Cervantes - Moderate gastritis, superficial duodenal ulcerations and duodenitis, reflux esophagitis. pandiverticulosis, posterior midline anal fissure. Pathology: Stomach: Focal acute erosive gastritis. Negative for Helicobacter pylori organisms on immunostained sections. B. Duodenum: Acute erosive duodenitis and chronic peptic duodenitis C.  Gastroesophageal junction: Ulcerative esophagitis with changes consistent with herpes esophagitis D. Anal verge: Fragments of benign hyperkeratotic squamous mucosa. Negative for dysplasia or neoplasia. ? Thrush  ? Hepatic steatosis  ? History of alcohol abuse  ? Anemia macrocytic  ? FRANCISCA- nephrology following    Plan:   ? Critical care per ICU  ? EGD when stable   ? Liver serology pending, negative this far  ? Colace as ordered  ? Diflucan as ordered  ? Continue protonix as ordered  ? IV fluid per nephrology   ? Monitor CBC and CMP daily  ? ID following, transferred to ICU for intubation and sedation for MRI and LP  ? Antibiotics per ID  ? Supportive care  ? Continue to monitor      Note: This report was completed utilizing computer voice recognition software. Every effort has been made to ensure accuracy, however; inadvertent computerized transcription errors may be present.      Discussed with Dr. Mike Bradley per Dr. Erika Shipman, APRN-NP-C 3/6/2022  9:02 AM For Dr. Beverley Saini

## 2022-03-06 NOTE — PROGRESS NOTES
[address] [place] [address] [facility]  Progress Note      Chief Complaint   Patient presents with    Altered Mental Status     sent by physician at [facility] for head CT. Pt wandering around unit, could not redirect. SUBJECTIVE:  Patient is tolerating medications. No reported adverse drug reactions. No nausea, vomiting, diarrhea. Intubated 40% FiO2 afebrile  Sedated with fentanyl and Versed  Pressure support with Levophed          Review of systems:  As stated above in the chief complaint, otherwise negative. Medications:  Scheduled Meds:   potassium phosphate IVPB  30 mmol IntraVENous Once    meropenem  1,000 mg IntraVENous Q8H    fluconazole  400 mg IntraVENous Q24H    acyclovir  10 mg/kg (Ideal) IntraVENous Q12H    docusate sodium  100 mg Oral BID    ipratropium-albuterol  1 ampule Inhalation Q4H WA    nystatin  5 mL Oral 4x Daily    [Held by provider] amLODIPine  5 mg Oral Daily    folic acid  1 mg Oral Daily    pantoprazole  40 mg Oral QAM AC    sucralfate  1 g Oral 4x Daily AC & HS    vitamin C  500 mg Oral TID    Vitamin D  1,000 Units Oral Daily    zinc sulfate  50 mg Oral Daily    white petrolatum   Topical BID     Continuous Infusions:   sodium chloride 10 mL/hr at 22 1208    norepinephrine 7 mcg/min (22 1400)    dextrose 5 % and 0.45 % NaCl 165 mL/hr at 22 1438    sodium chloride 33.3 mL/hr at 22 0613    fentaNYL 5 mcg/ml in 0.9%  ml infusion 25 mcg/hr (22 0809)    midazolam 1 mg/hr (22 0810)     PRN Meds:gadoteridol, ondansetron, acetaminophen, cetirizine    OBJECTIVE:  BP (!) 85/46   Pulse 103   Temp 97.5 °F (36.4 °C) (Axillary)   Resp 19   Ht 6' 2\" (1.88 m)   Wt 175 lb (79.4 kg)   SpO2 100%   BMI 22.47 kg/m²   Temp  Av.4 °F (36.3 °C)  Min: 97.2 °F (36.2 °C)  Max: 97.6 °F (36.4 °C)  Constitutional: The patient is awake, alert, and oriented. Skin: Warm and dry. No rashes were noted.    HEENT: Round and reactive pupils. Moist mucous membranes. No ulcerations or thrush. Neck: Supple to movements. Chest: No use of accessory muscles to breathe. Symmetrical expansion. No wheezing, crackles or rhonchi. Cardiovascular: S1 and S2 are rhythmic and regular. No murmurs appreciated. Abdomen: Positive bowel sounds to auscultation. Benign to palpation. No masses felt. No hepatosplenomegaly. Genitourinary: Male  Extremities: No clubbing, no cyanosis, no edema.   Lines: peripheral    Laboratory and Tests Review:  Lab Results   Component Value Date    WBC 11.0 03/06/2022    WBC 14.8 (H) 03/03/2022    WBC 12.0 (H) 03/02/2022    HGB 8.0 (L) 03/06/2022    HCT 23.0 (L) 03/06/2022    .9 (H) 03/06/2022     03/06/2022     Lab Results   Component Value Date    NEUTROABS 9.44 (H) 03/02/2022    NEUTROABS 9.37 (H) 02/23/2022    NEUTROABS 6.92 02/18/2022     No results found for: RUST  Lab Results   Component Value Date    ALT 30 03/03/2022    AST 32 03/03/2022    ALKPHOS 102 03/03/2022    BILITOT 1.0 03/03/2022     Lab Results   Component Value Date     03/06/2022    K 3.7 03/06/2022     03/06/2022    CO2 22 03/06/2022    BUN 14 03/06/2022    CREATININE 1.4 03/06/2022    CREATININE 1.5 03/06/2022    CREATININE 1.6 03/05/2022    GFRAA >60 03/06/2022    LABGLOM 51 03/06/2022    GLUCOSE 221 03/06/2022    PROT 5.5 03/03/2022    LABALBU 3.1 03/03/2022    CALCIUM 7.2 03/06/2022    BILITOT 1.0 03/03/2022    ALKPHOS 102 03/03/2022    AST 32 03/03/2022    ALT 30 03/03/2022     Lab Results   Component Value Date    CRP 15.1 (H) 03/05/2022     Lab Results   Component Value Date    SEDRATE 47 (H) 03/05/2022    SEDRATE 40 (H) 03/03/2022     Radiology:  CT of the chest reviewed demonstrating patchy groundglass infiltrates; gallbladder  Monitor declined  MRI of the brain completed but not available yet on the system    Microbiology:   3/5/2022 blood cultures pending  3/5/2022 respiratory cultures pending  3/5/2022 urine for Legionella and strep pneumo negative          No results found for: BC, ORG  No results found for: BLOODCULT2, ORG  No results found for: WNDABS  Smear, Respiratory   Date Value Ref Range Status   03/05/2022   Final    Moderate Polymorphonuclear leukocytes  Epithelial cells not seen  No organisms seen       No results found for: MPNEUMO, CLAMYDCU, LABLEGI, AFBCX, FUNGSM, LABFUNG  No results found for: CULTRESP  No results found for: CXCATHTIP  No results found for: BFCS  No results found for: CXSURG  Urine Culture, Routine   Date Value Ref Range Status   03/02/2022 Growth not present  Final     No results found for: [facility]    ASSESSMENT:  · Change in mental status rule out encephalitis in a patient with myelodysplastic syndrome  · HSV and Candida esophagitis  · Aspiration pneumonia  · Leukocytosis related to above-improved    PLAN:  · Continue acyclovir, fluconazole, meropenem pending cultures  · Check final cultures  · Monitor labs    Sam Albarran MD  4:30 PM  3/6/2022

## 2022-03-07 NOTE — PROGRESS NOTES
[facility] MD Nicky Naylor, MD John Ingram, MD Fabricio Marinelli, MD Ernestine Moore, MOE Groves, OBED  Progress Note    3/7/2022    SUBJECTIVE:   3/4: Off the floor. Discussed case with bedside RN, Dr. Casey Scott. Unable to perform diagnostic radiographic studies due to agitation, movement    3/5 transfer to the ICU intubated via ETT, ventilated. Hemodynamically stable. Sedated appears comfortable on vent. IV K-Phos infusion ordered this morning, still not delivered to nursing unit for administration. IV fluid stopped yesterday for unknown period of time, held today for 2 to 3 hours, reasons not clear though apparently IV access was not lost.    3/6: Remains critically ill. Intubated via ETT. Vent setting stable. Minimally responsive without sedation. Urine output poor over the course of today. BP rather low, though hemodynamically stable. 3/7 : seen in his room , intubated , mechanically ventilated , BP stable . UO on low side . Vent setting stable     PROBLEM LIST:    Principal Problem:    Altered mental status  Resolved Problems:    * No resolved hospital problems.  *         DIET:    Diet NPO     MEDS (scheduled):    chlorhexidine  15 mL Mouth/Throat BID    pantoprazole  40 mg IntraVENous Daily    meropenem  1,000 mg IntraVENous Q8H    fluconazole  400 mg IntraVENous Q24H    acyclovir  10 mg/kg (Ideal) IntraVENous Q12H    docusate sodium  100 mg Oral BID    ipratropium-albuterol  1 ampule Inhalation Q4H WA    nystatin  5 mL Oral 4x Daily    [Held by provider] amLODIPine  5 mg Oral Daily    folic acid  1 mg Oral Daily    sucralfate  1 g Oral 4x Daily AC & HS    vitamin C  500 mg Oral TID    Vitamin D  1,000 Units Oral Daily    zinc sulfate  50 mg Oral Daily    white petrolatum   Topical BID       MEDS (infusions):   sodium chloride 10 mL/hr at 03/07/22 1700    dextrose 5 % and 0.45 % NaCl 165 mL/hr at 03/07/22 1625    sodium chloride 33.3 mL/hr at 03/06/22 2154    fentaNYL 5 mcg/ml in 0.9%  ml infusion 25 mcg/hr (03/07/22 1626)       MEDS (prn):  gadoteridol, ondansetron, acetaminophen, cetirizine    PHYSICAL EXAM:     Patient Vitals for the past 24 hrs:   BP Temp Temp src Pulse Resp SpO2   03/07/22 1800 -- -- -- 109 12 100 %   03/07/22 1700 -- -- -- 110 11 100 %   03/07/22 1622 -- -- -- 105 9 100 %   03/07/22 1600 -- -- -- 105 10 100 %   03/07/22 1500 -- -- -- 105 10 100 %   03/07/22 1408 127/80 -- -- 113 12 100 %   03/07/22 1358 106/69 -- -- 112 12 100 %   03/07/22 1300 -- -- -- 109 9 100 %   03/07/22 1200 -- -- -- 109 8 99 %   03/07/22 1140 -- -- -- 107 9 98 %   03/07/22 1100 -- -- -- 110 8 100 %   03/07/22 1000 -- -- -- 111 12 100 %   03/07/22 0900 -- -- -- 113 20 100 %   03/07/22 0821 -- -- -- 105 10 100 %   03/07/22 0819 -- -- -- -- 15 100 %   03/07/22 0700 -- -- -- 104 11 --   03/07/22 0600 -- -- -- 104 13 --   03/07/22 0500 -- -- -- 104 16 --   03/07/22 0413 -- -- -- 103 10 100 %   03/07/22 0400 (!) 95/58 98.4 °F (36.9 °C) Axillary 103 10 100 %   03/07/22 0300 -- -- -- 103 11 --   03/07/22 0200 -- -- -- 104 14 --   03/07/22 0100 -- -- -- 103 11 --   03/07/22 0005 -- -- -- 101 11 100 %   03/07/22 0000 (!) 87/52 98.2 °F (36.8 °C) Axillary 102 10 100 %   03/06/22 2300 -- -- -- 101 12 --   03/06/22 2200 -- -- -- 102 16 --   03/06/22 2100 -- -- -- 101 15 --   03/06/22 2000 102/62 97 °F (36.1 °C) Axillary 99 12 100 %   @      Intake/Output Summary (Last 24 hours) at 3/7/2022 1859  Last data filed at 3/7/2022 1700  Gross per 24 hour   Intake 4243.35 ml   Output 175 ml   Net 4068. 35 ml         Wt Readings from Last 3 Encounters:   03/02/22 175 lb (79.4 kg)   02/23/22 186 lb 12.8 oz (84.7 kg)   11/27/18 215 lb (97.5 kg)       Constitutional:  in no acute distress  HEENT: NC/AT, EOMI, sclera and conjunctiva are clear and anicteric, mucus membranes moist  Neck: Trachea midline, no JVD  Cardiovascular: S1, S2 regular rhythm, no murmur,or rub  Respiratory: Coarse breath sounds throughout all lung fields no crackles, no wheeze  Gastrointestinal:  Soft, nontender, nondistended, NABS  Ext: no edema, feet warm  Skin: dry, no rash  Neuro: awake, alert, interactive      DATA:    Recent Labs     03/06/22  0500 03/07/22  0535   WBC 11.0 16.8*   HGB 8.0* 8.3*   HCT 23.0* 24.6*   .9* 117.1*    212     Recent Labs     03/05/22  0525 03/05/22  1320 03/05/22  1320 03/06/22  0500 03/06/22  1300 03/06/22  1800 03/07/22  0535   NA   < > 152*   < > 144 141 141 139   K   < > 2.6*   < > 2.5* 3.7 3.6 4.4   CL   < > 110*   < > 108* 107 107 107   CO2   < > 26   < > 22 22 20* 17*   MG  --   --   --  1.8 2.3  --  1.9   PHOS  --  1.0*  --  2.1*  --   --  3.6   BUN   < > 18   < > 15 14 14 14   CREATININE   < > 1.6*   < > 1.5* 1.4* 1.5* 1.6*   ALT  --   --   --   --   --   --  23   AST  --   --   --   --   --   --  29   BILITOT  --   --   --   --   --   --  0.5   ALKPHOS  --   --   --   --   --   --  105    < > = values in this interval not displayed. No results found for: LABPROT    ASSESSMENT    77 y.o. gentleman known to service, followed for FR[facility]ISCA in January of this year. Admitted with acute mental status change, myoclonic jerks and unusual movements.     1. Chronic kidney disease, stage IIIa. Creatinine at baseline, 1.4 to 1.6 mg/dL     2.  Metabolic acidosis, wide anion gap. Resolved with bicarbonate drip    3. Cannabis on drug screen    4. Hypophosphatemia. 5. Hypernatremia, dehydration due to increased insensible losses, poor intake/input. Resolved    6. Hypokalemia, normalized with supplement      Plan :    Cr relatively stable   oligouric   Metabolic acidosis noted    His urine lytes continue to support decrease effective volume thus would continue isotonic fluid infusion in form of NS for now (vent settings stable). Check bmp this evening .    D/w ICU RN     Electronically signed by Katheryn Henriquez MD on 3/7/2022

## 2022-03-07 NOTE — PATIENT CARE CONFERENCE
Intensive Care Daily Quality Rounding Checklist        ICU Team Members: Bedside Nurse, ,  and Nursing Unit Leadership     ICU Day #: NUMBER: 3     Intubation Date: March 5     Ventilator Day #: NUMBER: 3     Central Line Insertion Date: March 5                                                    Day #: NUMBER: 3      Arterial Line Insertion Date: March 5                             Day #: NUMBER: 3     Temporary Hemodialysis Catheter Insertion Date:                           Day #     DVT Prophylaxis: SCD    GI Prophylaxis: Protonix     Crandall Catheter Insertion Date: March 5                                        Day #: 3                             Continued need (if yes, reason documented and discussed with physician): yes     Skin Issues/ Wounds and ordered treatment discussed on rounds:      Goals/ Plans for the Day: Daily labs and replace/transfuse as needed, wean vent, wean sedation as able, LP today, Continue critical care management, ivf p/nephrology,

## 2022-03-07 NOTE — PROGRESS NOTES
Patient seen in the icu sedated and on vent. Mri done , not interpreted. vss  Afebrile. bp a little low on norepi. Wbc  A bit elevated. Tolerating abs, anti fungals and antivirals. Possible lumbar puncture today. Blood gases good with current settings.  Continue with current care

## 2022-03-07 NOTE — PROGRESS NOTES
PROGRESS NOTE      Patient Presents with/Seen in Consultation For      *esophageal herpes  CHIEF COMPLAINT: Altered mental status and fall    Subjective:     Patient seen Ashley Pitt in bed in ICU remains intubated and sedated. For LP today. Review of Systems  Aside from what was mentioned in the PMH and HPI, essentially unremarkable, all others negative. Objective:     BP (!) 95/58   Pulse 105   Temp 98.4 °F (36.9 °C) (Axillary)   Resp 10   Ht 6' 2\" (1.88 m)   Wt 175 lb (79.4 kg)   SpO2 100%   BMI 22.47 kg/m²     General appearance: laying in bed, intubated and sedated  Eyes: conjunctiva pale, sclera anicteric. PERRL.   Lungs: clear to auscultation bilaterally  Heart: regular rate and rhythm, no murmur, 2+ pulses; no edema  Abdomen: soft, non-tender; bowel sounds normal; no masses,  no organomegaly  Extremities: extremities without edema  Pulses: 2+ and symmetric  Skin: Skin color, texture, turgor normal, scattered ecchymosis  Neurologic:  Sedated    chlorhexidine (PERIDEX) 0.12 % solution 15 mL, BID  pantoprazole (PROTONIX) injection 40 mg, Daily  0.9 % sodium chloride infusion, Continuous  gadoteridol (PROHANCE) injection 16 mL, ONCE PRN  dextrose 5 % and 0.45 % sodium chloride infusion, Continuous  meropenem (MERREM) 1,000 mg in sodium chloride 0.9 % 100 mL IVPB (mini-bag), Q8H  fluconazole (DIFLUCAN) in 0.9 % sodium chloride IVPB 400 mg, Q24H  acyclovir (ZOVIRAX) 800 mg in dextrose 5 % 250 mL IVPB, Q12H  meropenem NS Flush, Q8H  fentaNYL 5 mcg/ml in 0.9%  ml infusion, Continuous  docusate sodium (COLACE) capsule 100 mg, BID  ipratropium-albuterol (DUONEB) nebulizer solution 1 ampule, Q4H WA  nystatin (MYCOSTATIN) 988102 UNIT/ML suspension 500,000 Units, 4x Daily  ondansetron (ZOFRAN) injection 4 mg, Q6H PRN  acetaminophen (TYLENOL) tablet 650 mg, Q4H PRN  [Held by provider] amLODIPine (NORVASC) tablet 5 mg, Daily  cetirizine (ZYRTEC) tablet 10 mg, Daily PRN  folic acid (FOLVITE) tablet 1 mg, Daily  sucralfate (CARAFATE) tablet 1 g, 4x Daily AC & HS  ascorbic acid (VITAMIN C) tablet 500 mg, TID  vitamin D (CHOLECALCIFEROL) tablet 1,000 Units, Daily  zinc sulfate (ZINCATE) capsule 50 mg, Daily  white petrolatum ointment, BID         Data Review  CBC:   Lab Results   Component Value Date    WBC 16.8 03/07/2022    RBC 2.10 03/07/2022    HGB 8.3 03/07/2022    HCT 24.6 03/07/2022    .1 03/07/2022    MCH 39.5 03/07/2022    MCHC 33.7 03/07/2022    RDW 13.7 03/07/2022     03/07/2022    MPV 9.8 03/07/2022     CMP:    Lab Results   Component Value Date     03/07/2022    K 4.4 03/07/2022    K 3.7 03/06/2022     03/07/2022    CO2 17 03/07/2022    BUN 14 03/07/2022    CREATININE 1.6 03/07/2022    GFRAA 52 03/07/2022    LABGLOM 43 03/07/2022    GLUCOSE 145 03/07/2022    PROT 4.2 03/07/2022    LABALBU 2.4 03/07/2022    CALCIUM 7.0 03/07/2022    BILITOT 0.5 03/07/2022    ALKPHOS 105 03/07/2022    AST 29 03/07/2022    ALT 23 03/07/2022     Hepatic Function Panel:    Lab Results   Component Value Date    ALKPHOS 105 03/07/2022    ALT 23 03/07/2022    AST 29 03/07/2022    PROT 4.2 03/07/2022    BILITOT 0.5 03/07/2022    BILIDIR 0.2 07/01/2015    IBILI 0.9 07/01/2015    LABALBU 2.4 03/07/2022       PT/INR:    Lab Results   Component Value Date    PROTIME 13.4 03/04/2022    INR 1.2 03/04/2022     IRON:    Lab Results   Component Value Date    IRON 32 03/04/2022         Assessment:     Active Problems:  ? AMS  ? Herpes esophagitis; EGD and Colonoscopy 2/21/22 with Dr. Phyllis White - Moderate gastritis, superficial duodenal ulcerations and duodenitis, reflux esophagitis. pandiverticulosis, posterior midline anal fissure. Pathology: Stomach: Focal acute erosive gastritis. Negative for Helicobacter pylori organisms on immunostained sections. B. Duodenum: Acute erosive duodenitis and chronic peptic duodenitis C.  Gastroesophageal junction: Ulcerative esophagitis with changes consistent with herpes esophagitis D. Anal verge: Fragments of benign hyperkeratotic squamous mucosa. Negative for dysplasia or neoplasia. ? Thrush  ? Hepatic steatosis  ? History of alcohol abuse  ? Anemia macrocytic  ? FRANCISCA- nephrology following  ? Leukocytosis       Plan:   ? Critical care management per ICU  ? LP today per ID   ? Liver serology pending, negative this far  ? Colace as ordered  ? Antibiotics/antivirals per ID  ? Continue protonix as ordered  ? IV fluid per nephrology   ? Monitor CBC and CMP daily  ? Antibiotics per ID  ? Supportive care  ? Continue to monitor      Note: This report was completed utilizing computer voice recognition software. Every effort has been made to ensure accuracy, however; inadvertent computerized transcription errors may be present. Discussed with Dr. Anastacia Zamudio per Dr. Kathreine QUINONEZ-NP-RADHA 3/7/2022  10:21 AM For Dr. Juan Manuel Slade. I HAD A FACE TO FACE ENCOUNTER WITH THE PATIENT. AGREE WITH THE EXAM, ASSESSMENT, AND PLAN AS OUTLINED ABOVE. ADDITION AND CORRECTIONS WERE DONE AS DEEMED APPROPRIATE. MY EXAM AND PLAN INCLUDE: LP WAS JUST PERFORMED, AWAITING RESULTS. SLEEPING COMFORTABLY STILL INTUBATED AND SEDATED. FORM GI POV DIAGNOSIS WAS ESTABLISHED PER PREVIOUS EGD. TREATMENT IS INSTITUTED SO NO PLANS FOR ENDOSCOPIC PROCEDURES. HOWEVER IN THE FUTURE WILL NEED FOLLOW UP EGD WHEN TREATMENT COMPLETED.

## 2022-03-07 NOTE — PROGRESS NOTES
[facility] MD Morgan Kline, MD Lauren Shields, MD Monica Eid, MD Darrell Venegas, CNP   Harriet Groves, OBED  Progress Note    3/6/2022    SUBJECTIVE:   3/4: Off the floor. Discussed case with bedside RN, Dr. Scarlett Franco. Unable to perform diagnostic radiographic studies due to agitation, movement    3/5 transfer to the ICU intubated via ETT, ventilated. Hemodynamically stable. Sedated appears comfortable on vent. IV K-Phos infusion ordered this morning, still not delivered to nursing unit for administration. IV fluid stopped yesterday for unknown period of time, held today for 2 to 3 hours, reasons not clear though apparently IV access was not lost.    3/6: Remains critically ill. Intubated via ETT. Vent setting stable. Minimally responsive without sedation. Urine output poor over the course of today. BP rather low, though hemodynamically stable. PROBLEM LIST:    Principal Problem:    Altered mental status  Resolved Problems:    * No resolved hospital problems.  *         DIET:    Diet NPO     MEDS (scheduled):    meropenem  1,000 mg IntraVENous Q8H    fluconazole  400 mg IntraVENous Q24H    acyclovir  10 mg/kg (Ideal) IntraVENous Q12H    docusate sodium  100 mg Oral BID    ipratropium-albuterol  1 ampule Inhalation Q4H WA    nystatin  5 mL Oral 4x Daily    [Held by provider] amLODIPine  5 mg Oral Daily    folic acid  1 mg Oral Daily    pantoprazole  40 mg Oral QAM AC    sucralfate  1 g Oral 4x Daily AC & HS    vitamin C  500 mg Oral TID    Vitamin D  1,000 Units Oral Daily    zinc sulfate  50 mg Oral Daily    white petrolatum   Topical BID       MEDS (infusions):   sodium chloride 10 mL/hr at 03/06/22 1208    norepinephrine 10 mcg/min (03/06/22 1716)    dextrose 5 % and 0.45 % NaCl 165 mL/hr at 03/06/22 1748    sodium chloride 33.3 mL/hr at 03/06/22 0613    fentaNYL 5 mcg/ml in 0.9%  ml infusion 25 mcg/hr (03/06/22 0809)    midazolam Stopped (03/06/22 1330)       MEDS (prn):  gadoteridol, ondansetron, acetaminophen, cetirizine    PHYSICAL EXAM:     Patient Vitals for the past 24 hrs:   BP Temp Temp src Pulse Resp SpO2   03/06/22 1700 -- -- -- 104 9 100 %   03/06/22 1600 -- 97.9 °F (36.6 °C) -- 106 9 100 %   03/06/22 1540 -- -- -- 103 19 --   03/06/22 1500 105/64 -- -- -- -- --   03/06/22 1400 105/64 -- -- 103 11 100 %   03/06/22 1300 (!) 85/46 98.2 °F (36.8 °C) -- 101 10 100 %   03/06/22 1200 (!) 91/48 -- -- 95 15 100 %   03/06/22 1100 (!) 99/49 -- -- 93 16 --   03/06/22 1000 (!) 96/43 -- -- 91 16 100 %   03/06/22 0900 (!) 106/49 -- -- 85 16 100 %   03/06/22 0800 (!) 95/42 98.4 °F (36.9 °C) -- 84 15 100 %   03/06/22 0700 -- -- -- 82 15 100 %   03/06/22 0600 -- -- -- 82 15 99 %   03/06/22 0500 -- -- -- 81 16 100 %   03/06/22 0425 -- -- -- 80 22 100 %   03/06/22 0400 -- 97.5 °F (36.4 °C) Axillary 80 16 100 %   03/06/22 0300 -- -- -- 79 16 100 %   03/06/22 0200 -- -- -- 81 16 100 %   03/06/22 0100 -- -- -- 80 16 100 %   03/06/22 0020 -- -- -- 79 16 100 %   03/06/22 0000 -- 97.2 °F (36.2 °C) Axillary 82 16 100 %   03/05/22 2300 -- -- -- 85 18 100 %   03/05/22 2200 -- -- -- 83 12 100 %   03/05/22 2100 -- -- -- 80 17 100 %   03/05/22 2045 -- -- -- 81 15 100 %   03/05/22 2000 -- 97.6 °F (36.4 °C) Axillary 83 14 100 %   @      Intake/Output Summary (Last 24 hours) at 3/6/2022 1937  Last data filed at 3/6/2022 1748  Gross per 24 hour   Intake 6198.49 ml   Output 280 ml   Net 5918.49 ml         Wt Readings from Last 3 Encounters:   03/02/22 175 lb (79.4 kg)   02/23/22 186 lb 12.8 oz (84.7 kg)   11/27/18 215 lb (97.5 kg)       Constitutional:  in no acute distress  HEENT: NC/AT, EOMI, sclera and conjunctiva are clear and anicteric, mucus membranes moist  Neck: Trachea midline, no JVD  Cardiovascular: S1, S2 regular rhythm, no murmur,or rub  Respiratory: Coarse breath sounds throughout all lung fields no crackles, no wheeze  Gastrointestinal:  Soft, receive gadolinium with the MRI, and as we use class II gadolinium agents here, the risk for nephrogenic systemic fibrosis is extremely low      Electronically signed by Cassi Vincent MD on 3/6/2022

## 2022-03-07 NOTE — PROGRESS NOTES
Critical Care Team - Daily Progress Note         Date and time: 3/7/2022 10:29 AM  Patient's name:  Franky Almeida  Medical Record Number: [id number]  Patient's account/billing number: [de-identified]  Patient's YOB: 1955  Age: 77 y.o. Date of Admission: 3/2/2022 10:44 AM  Length of stay during current admission: 5      Primary Care Physician: Mahsa Jacinto DO  ICU Attending Physician: Dr. Irma Beltran    Code Status: Prior    Reason for ICU admission:   Altered mental status  Impending respiratory failure      SUBJECTIVE:     OVERNIGHT EVENTS:         3/7: Vent Day 3. Patient remains intubated and sedated and remains on ventilator. Patient is to go for LP today by IR. Time of procedure is unknown. Patient's dvt ppx is held at this time in anticipation for procedure. MRI 3/6 negative for evidence of herpetic encephalopathy. It showed only chronic microvascular disease. Patient is off Levo. Patient continues on acyclovir, diflucan, merrem, and nystatin. 3/6: Vent Day 2. Remains intubated and sedated. Patient was hypotensive and had to start on pressors. MRI brain scheduled. 3/5: Patient admitted to the ICU for AMS and impending respiratory failure. Patient was intubated. History significant for herpetic esophagitis ,HTN, DVT, allergic rhinitis.     CURRENT VENTILATION STATUS:     [x] Ventilator  [] BIPAP  [] Nasal Cannula [] Room Air      IF INTUBATED, ET TUBE MARKING AT LOWER LIP:     SECRETIONS  Amount:  [] Small [] Moderate  [] Large  [] None  Color:     [] White [] Colored  [] Bloody    SEDATION:  RAAS Score:  [] Propofol gtt  [] Versed gtt  [x] Fentanyl gtt [] Ativan gtt   [] No Sedation    PARALYZED:  [x] No    [] Yes      VASOPRESSORS:  [x] No    [] Yes    If yes -   [] Levophed       [] Dopamine     [] Vasopressin       [] Dobutamine  [] Phenylephrine         [] Epinephrine    CENTRAL LINES:     [] No   [x] Yes   (Date of Insertion: 3/5 )           If yes -     [x] Right IJ     [] Left IJ [] Right Femoral [] Left Femoral                   [] Right Subclavian [] Left Subclavian       FORBES'S CATHETER:   [] No   [x] Yes  (Date of Insertion: 3/4)     URINE OUTPUT:            [] Good   [x] Low              [] Anuric      OBJECTIVE:     VITAL SIGNS:  BP (!) 95/58   Pulse 105   Temp 98.4 °F (36.9 °C) (Axillary)   Resp 10   Ht 6' 2\" (1.88 m)   Wt 175 lb (79.4 kg)   SpO2 100%   BMI 22.47 kg/m²   Tmax over 24 hours:  Temp (24hrs), Av.9 °F (36.6 °C), Min:97 °F (36.1 °C), Max:98.4 °F (36.9 °C)      Patient Vitals for the past 6 hrs:   Pulse Resp SpO2   22 0821 105 10 100 %   22 0819 -- 15 100 %   22 0700 104 11 --   22 0600 104 13 --   22 0500 104 16 --         Intake/Output Summary (Last 24 hours) at 3/7/2022 1029  Last data filed at 3/7/2022 0928  Gross per 24 hour   Intake 4693.81 ml   Output 185 ml   Net 4508.81 ml     Wt Readings from Last 2 Encounters:   22 175 lb (79.4 kg)   22 186 lb 12.8 oz (84.7 kg)     Body mass index is 22.47 kg/m².         PHYSICAL EXAMINATION:    General appearance - ill-appearing  Mental status - intubated, sedated  Eyes - not examined  Ears - not examined  Nose -not examined  Mouth - not examined  Neck - supple, no significant adenopathy  Chest - clear to auscultation, no wheezes, rales or rhonchi, symmetric air entry  Heart - normal rate and regular rhythm  Abdomen - soft, nontender, nondistended, no masses or organomegaly  Neurological -intubated, sedated  Extremities - no pedal edema noted  Skin -wound present on sacrum and buttocks     Any additional physical findings: n/a    MEDICATIONS:    Scheduled Meds:   chlorhexidine  15 mL Mouth/Throat BID    pantoprazole  40 mg IntraVENous Daily    meropenem  1,000 mg IntraVENous Q8H    fluconazole  400 mg IntraVENous Q24H    acyclovir  10 mg/kg (Ideal) IntraVENous Q12H    docusate sodium  100 mg Oral BID    ipratropium-albuterol  1 ampule Inhalation Q4H WA    nystatin  5 mL Oral 4x Daily    [Held by provider] amLODIPine  5 mg Oral Daily    folic acid  1 mg Oral Daily    sucralfate  1 g Oral 4x Daily AC & HS    vitamin C  500 mg Oral TID    Vitamin D  1,000 Units Oral Daily    zinc sulfate  50 mg Oral Daily    white petrolatum   Topical BID     Continuous Infusions:   sodium chloride 10 mL/hr at 03/07/22 0928    dextrose 5 % and 0.45 % NaCl 165 mL/hr at 03/07/22 0928    sodium chloride 33.3 mL/hr at 03/06/22 2154    fentaNYL 5 mcg/ml in 0.9%  ml infusion 25 mcg/hr (03/07/22 0928)     PRN Meds:   gadoteridol, 16 mL, ONCE PRN  ondansetron, 4 mg, Q6H PRN  acetaminophen, 650 mg, Q4H PRN  cetirizine, 10 mg, Daily PRN          VENT SETTINGS (Comprehensive) (if applicable):  Vent Information  $Ventilation: $Subsequent Day  Skin Assessment: Clean, dry, & intact  Equipment ID: [id number]  Vent Type: [phone]  Vent Mode: AC/VC  Vt Ordered: 500 mL  Rate Set: 12 bmp  Peak Flow: 60 L/min  Pressure Support: 0 cmH20  FiO2 : 40 %  SpO2: 100 %  SpO2/FiO2 ratio: 250  Sensitivity: 3  PEEP/CPAP: 8  I Time/ I Time %: 0 s  Humidification Source: Heated wire  Humidification Temp: 37  Humidification Temp Measured: 37  Circuit Condensation: Not drained  Additional Respiratory  Assessments  Pulse: 105  Resp: 10  SpO2: 100 %  End Tidal CO2: 26 (%)  Position: Semi-Callejas's  Humidification Source: Heated wire  Humidification Temp: 37  Circuit Condensation: Not drained  Oral Care: Mouth swabbed,Mouth suctioned  Subglottic Suction Done?: Yes  Airway Type: ET  Airway Size: 8  Cuff Pressure (cm H2O): 29 cm H2O    ABGs:   Recent Labs     03/07/22  0550   PH 7.445   PCO2 24.6*   PO2 158.4*   HCO3 16.5*   BE -6.1*   O2SAT 99.3*       Laboratory findings:    Complete Blood Count:   Recent Labs     03/04/22  1850 03/06/22  0500 03/07/22  0535   WBC  --  11.0 16.8*   HGB  --  8.0* 8.3*   HCT  --  23.0* 24.6*    238 212        Last 3 Blood Glucose:   Recent Labs     03/06/22  1300 03/06/22  1800 03/07/22  0535 Heparin Subcut  [] EPC Cuffs    NUTRITION:  [x] NPO [] Tube Feeding (Specify: ) [] TPN  [] PO (Diet: Diet NPO)    HOME MEDICATIONS RECONCILED: [] No  [x] Yes    INSULIN DRIP:   [x] No   [] Yes    CONSULTATION NEEDED:  [x] No   [] Yes    FAMILY UPDATED:    [] No   [x] Yes    TRANSFER OUT OF ICU:   [] No   [x] Yes    SYSTEMS ASSESSMENT    Neuro     Intubated and sedated day 3  Fentanyl 25 mcg/hr    Respiratory     Vent day 3  Vent settings: AC/VC, FiO2 40, PEEP 8, rate 12, vent set 500   ABG: pH 7.445, PCO2 24.6, PO2 158.4, HCO3 16.5  Patient will likely be weaned off vent after LP is done 3/7  Wean oxygen as tolerated.  Keep O2 sat 90-92%    Cardiovascular     Hypotension, stable  Levophed weaned  Maintain maps above 65  Continue to monitor     Gastrointestinal     Patient can start tube feeds    GI PPx  Carafate 1 g 4 times daily  Protonix 40 mg IV daily  GI following, appreciate input    Renal     CKD stage IIIb, stable  Baseline creatinine 1.5  Creatinine 1.6, BUN 14  Nephrology following, appreciate input     Infectious Disease     HSV and Candida esophagitis  Diflucan 400 mg IV Q24H (Day 3)  Acyclovir 800 mg twice daily (Day 3)  Nystatin 500,000 units 4 times daily (Day 5)  MRI negative for any findings of hepatic encephalopathy  Lumbar picture pending    Aspiration pneumonia  Merrem 1 g every 8 hours stopped per ID note  Respiratory culture 3/5 negative  Blood culture x2 negative 3/5  Legionella urine negative 3/5  Urine culture negative 3/5  Continue to follow final culture    Leukocytosis, improving  Due to infectious processes listed above    Hematology/Oncology     Chronic macrocytic anemia  Baseline hemoglobin around 11  Folic acid supplementation  Continue to monitor  Transfuse for hemoglobin < 7    Endocrine     No acute concerns  Continue to monitor blood glucose while in hospital    Social/Spiritual/DNR/Other     Full code    Lin Nolan DO, PGY-1            3/7/2022, 10:29 AM      NOTE: This report, in part or full, may have been transcribed using voice recognition software. Every effort was made to ensure accuracy; however, inadvertent computerized transcription errors may be present. Please excuse any transcriptional grammatical or spelling errors that may have escaped my editorial review. I personally saw, examined and provided care for the patient. Radiographs, labs and medication list were reviewed by me independently. Review of Residents documentation was conducted and revisions were made as appropriate. I agree with the above documented exam, problem list and plan of care.         CCT excluding procedures 37 minutes    Christine Garcia, DO

## 2022-03-07 NOTE — PROGRESS NOTES
[address] [place] [address] [facility]  [facility]  Progress Note      Chief Complaint   Patient presents with    Altered Mental Status     sent by physician at [facility] for head CT. Pt wandering around unit, could not redirect. SUBJECTIVE:  Patient is tolerating medications. No reported adverse drug reactions. No nausea, vomiting, diarrhea. Intubated 40% FiO2 afebrile  Sedated with fentanyl   Levophed-stop  Afebrile 40% FiO2      Wife is present in the room: Discussed  MRI was nondiagnostic; LP to be performed today  Other data recovered over the weekend HIV negative, hepatitis panel negative, RPR negative      Review of systems:  As stated above in the chief complaint, otherwise negative.     Medications:  Scheduled Meds:   chlorhexidine  15 mL Mouth/Throat BID    pantoprazole  40 mg IntraVENous Daily    meropenem  1,000 mg IntraVENous Q8H    fluconazole  400 mg IntraVENous Q24H    acyclovir  10 mg/kg (Ideal) IntraVENous Q12H    docusate sodium  100 mg Oral BID    ipratropium-albuterol  1 ampule Inhalation Q4H WA    nystatin  5 mL Oral 4x Daily    [Held by provider] amLODIPine  5 mg Oral Daily    folic acid  1 mg Oral Daily    sucralfate  1 g Oral 4x Daily AC & HS    vitamin C  500 mg Oral TID    Vitamin D  1,000 Units Oral Daily    zinc sulfate  50 mg Oral Daily    white petrolatum   Topical BID     Continuous Infusions:   sodium chloride 10 mL/hr at 22 0928    dextrose 5 % and 0.45 % NaCl 165 mL/hr at 22 1222    sodium chloride 33.3 mL/hr at 22 2154    fentaNYL 5 mcg/ml in 0.9%  ml infusion 25 mcg/hr (22)     PRN Meds:gadoteridol, ondansetron, acetaminophen, cetirizine    OBJECTIVE:  BP (!) 95/58   Pulse 107   Temp 98.4 °F (36.9 °C) (Axillary)   Resp 9   Ht 6' 2\" (1.88 m)   Wt 175 lb (79.4 kg)   SpO2 98%   BMI 22.47 kg/m²   Temp  Av.9 °F (36.6 °C)  Min: 97 °F (36.1 °C)  Max: 98.4 °F (36.9 °C)  Constitutional: The patient is sedated and intubated  Skin: Warm and dry. No rashes were noted. HEENT: Round and reactive pupils. Moist mucous membranes. No ulcerations or thrush. Neck: Supple to movements. Chest: No use of accessory muscles to breathe. Symmetrical expansion. No wheezing, crackles or rhonchi. Cardiovascular: S1 and S2 are rhythmic and regular. No murmurs appreciated. Abdomen: Positive bowel sounds to auscultation. Benign to palpation. No masses felt. No hepatosplenomegaly. Genitourinary: Male  Extremities: No clubbing, no cyanosis, no edema.   Lines: peripheral    Laboratory and Tests Review:  Lab Results   Component Value Date    WBC 16.8 (H) 03/07/2022    WBC 11.0 03/06/2022    WBC 14.8 (H) 03/03/2022    HGB 8.3 (L) 03/07/2022    HCT 24.6 (L) 03/07/2022    .1 (H) 03/07/2022     03/07/2022     Lab Results   Component Value Date    NEUTROABS 9.44 (H) 03/02/2022    NEUTROABS 9.37 (H) 02/23/2022    NEUTROABS 6.92 02/18/2022     No results found for: [facility]  Lab Results   Component Value Date    ALT 23 03/07/2022    AST 29 03/07/2022    ALKPHOS 105 03/07/2022    BILITOT 0.5 03/07/2022     Lab Results   Component Value Date     03/07/2022    K 4.4 03/07/2022    K 3.7 03/06/2022     03/07/2022    CO2 17 03/07/2022    BUN 14 03/07/2022    CREATININE 1.6 03/07/2022    CREATININE 1.5 03/06/2022    CREATININE 1.4 03/06/2022    GFRAA 52 03/07/2022    LABGLOM 43 03/07/2022    GLUCOSE 145 03/07/2022    PROT 4.2 03/07/2022    LABALBU 2.4 03/07/2022    CALCIUM 7.0 03/07/2022    BILITOT 0.5 03/07/2022    ALKPHOS 105 03/07/2022    AST 29 03/07/2022    ALT 23 03/07/2022     Lab Results   Component Value Date    CRP 15.1 (H) 03/05/2022     Lab Results   Component Value Date    SEDRATE 47 (H) 03/05/2022    SEDRATE 40 (H) 03/03/2022     Radiology:  CT of the chest reviewed demonstrating patchy groundglass infiltrates; gallbladder  Monitor declined  MRI of the brain completed-nondiagnostic  Microbiology:   3/5/2022 blood cultures pending  3/5/2022 respiratory cultures pending-  3/5/2022 urine for Legionella and strep pneumo negative          Lab Results   Component Value Date    BC 24 Hours no growth 03/05/2022     Lab Results   Component Value Date    BLOODCULT2 24 Hours no growth 03/05/2022     No results found for: WNDABS  Smear, Respiratory   Date Value Ref Range Status   03/05/2022   Final    Moderate Polymorphonuclear leukocytes  Epithelial cells not seen  No organisms seen       No results found for: MPNEUMO, CLAMYDCU, LABLEGI, AFBCX, FUNGSM, LABFUNG  CULTURE, RESPIRATORY   Date Value Ref Range Status   03/05/2022 Oral Pharyngeal Lorraine absent  Preliminary     No results found for: CXCATHTIP  No results found for: BFCS  No results found for: CXSURG  Urine Culture, Routine   Date Value Ref Range Status   03/02/2022 Growth not present  Final     No results found for: [address]     ASSESSMENT:  · Change in mental status rule out encephalitis in a patient with myelodysplastic syndrome  · HSV and Candida esophagitis  · Aspiration pneumonia  · Leukocytosis related to above-improved    PLAN:  · Continue acyclovir, fluconazole, stop meropenem pending lumbar puncture  · Check final cultures  · Discussed with ICU staff  · Lara Payton MD  1:04 PM  3/7/2022

## 2022-03-07 NOTE — PLAN OF CARE
Problem: Falls - Risk of:  Goal: Will remain free from falls  Description: Will remain free from falls  Outcome: Met This Shift  Goal: Absence of physical injury  Description: Absence of physical injury  Outcome: Met This Shift     Problem: Non-Violent Restraints  Goal: No harm/injury to patient while restraints in use  Outcome: Met This Shift  Goal: Patient's dignity will be maintained  Outcome: Met This Shift     Problem: Skin Integrity:  Goal: Will show no infection signs and symptoms  Description: Will show no infection signs and symptoms  Outcome: Ongoing  Goal: Absence of new skin breakdown  Description: Absence of new skin breakdown  Outcome: Ongoing     Problem: Non-Violent Restraints  Goal: Removal from restraints as soon as assessed to be safe  Outcome: Not Met This Shift

## 2022-03-07 NOTE — PROGRESS NOTES
IRFLOWSHEET    Date: 3/7/2022    Time: 2:37 PM     Exam: Image guided lumbar puncture    Radiologist Performing Procedure: Dr. Chayito Myers    Nurse Reporting/Phone: [phone]    Nurse Receiving: Jalaine Sicard, RN    Permit signed:      Yes    ID Band Checklist: Yes    Allergies: Pcn [penicillins]     TIME OUT: 2893    PROCEDURE START TIME: 8447    Puncture Site: lower back    Puncture Time: 1400    Catheters: 20 gauge x 3 1/2 inch    LABS:   Lab Results   Component Value Date    INR 1.2 03/04/2022    PROTIME 13.4 (H) 03/04/2022           Lab Results   Component Value Date    CREATININE 1.6 (H) 03/07/2022    BUN 14 03/07/2022          Lab Results   Component Value Date    HGB 8.3 (L) 03/07/2022    HCT 24.6 (L) 03/07/2022     03/07/2022         PROCEDURE END TIME: 1406    Total Contrast: N/A    Fluoroscopy Time: 0.4 minutes    Complications:  None    Comments: Patient arrival from ICU to radiology for lumbar puncture. Vitals taken, consent signed, and Dr. Chayito Myers in to speak with the patient about the procedure. Patient placed prone on Fluoroscopy table, patient prepped and draped. Using fluoroscopy imaging, needle placed to lower back by Dr. Chayito Myers @ [phone]. 10 cc of clear, colorless fluid removed from spine and sent to lab. Needle removed, site cleansed, and band aid applied to site. Report given to ICU RN who was present in room during procedure.

## 2022-03-08 NOTE — CARE COORDINATION
ICU; int/vent FI 02 40%; iv invanz;LP done 3/7; results pended. R/o herpetic encephalitis. From [facility]; not a bed hold. Plan for now is return to [facility]. Needs TBD. Eugene Jackson.

## 2022-03-08 NOTE — PROGRESS NOTES
Patient intubated. bilateral arm dressings changed per wifes request. Ellouise Chipley, kelrex and xeroform removed. Multiple skin tears. Several small new ones since last week. All dressing with xeroform, abds, kerlex and ace wraps  buttocks    Bilateral opposing pink areas remain as seen last week. consistent with chronic sitting. Left middle finger red, small scabbed area. No drainage at this time. Staff reports purulent drainage this am  Jessica Guzman.  Berry Baird, CNS, Wound Care

## 2022-03-08 NOTE — PROGRESS NOTES
Occupational Therapy   Per chart review, pt intubated and sedated. Will continue to monitor for when pt able to participate in therapy.   Romario Dalton MARTHA/L [id number]

## 2022-03-08 NOTE — PROGRESS NOTES
[address] [place] [address] [facility]  [facility]  Progress Note      Chief Complaint   Patient presents with    Altered Mental Status     sent by physician at [facility] for head CT. Pt wandering around unit, could not redirect. SUBJECTIVE:  Patient is tolerating medications. No reported adverse drug reactions. No nausea, vomiting, diarrhea. Intubated 30% FiO2 afebrile  Sedated with fentanyl-stopped  Levophed-stop  Afebrile 30% FiO2  Opens eyes to deep stimulation    Wife is present in the room: Discussed  MRI was nondiagnostic; LP-nondiagnostic  Other data recovered over the weekend HIV negative, hepatitis panel negative, RPR negative      Review of systems:  As stated above in the chief complaint, otherwise negative. Medications:  Scheduled Meds:   docusate  100 mg Per NG tube BID    enoxaparin  40 mg SubCUTAneous Daily    chlorhexidine  15 mL Mouth/Throat BID    pantoprazole  40 mg IntraVENous Daily    meropenem  1,000 mg IntraVENous Q8H    fluconazole  400 mg IntraVENous Q24H    acyclovir  10 mg/kg (Ideal) IntraVENous Q12H    ipratropium-albuterol  1 ampule Inhalation Q4H WA    nystatin  5 mL Oral 4x Daily    [Held by provider] amLODIPine  5 mg Oral Daily    folic acid  1 mg Oral Daily    sucralfate  1 g Oral 4x Daily AC & HS    vitamin C  500 mg Oral TID    Vitamin D  1,000 Units Oral Daily    zinc sulfate  50 mg Oral Daily    white petrolatum   Topical BID     Continuous Infusions:   IV infusion builder 150 mL/hr at 22 1044    sodium chloride 33.3 mL/hr at 22 2154     PRN Meds:fentanNYL, gadoteridol, ondansetron, acetaminophen, cetirizine    OBJECTIVE:  BP (!) 104/59   Pulse 110   Temp 97.8 °F (36.6 °C) (Axillary)   Resp 11   Ht 6' 2\" (1.88 m)   Wt 175 lb (79.4 kg)   SpO2 100%   BMI 22.47 kg/m²   Temp  Av °F (36.7 °C)  Min: 97.6 °F (36.4 °C)  Max: 98.6 °F (37 °C)  Constitutional: The patient is off sedation arousable and on 30% FiO2  Skin: Warm and dry.  No rashes were noted. HEENT: Round and reactive pupils. Moist mucous membranes. No ulcerations or thrush. Neck: Supple to movements. Chest: No use of accessory muscles to breathe. Symmetrical expansion. No wheezing, crackles or rhonchi. Cardiovascular: S1 and S2 are rhythmic and regular. No murmurs appreciated. Abdomen: Positive bowel sounds to auscultation. Benign to palpation. No masses felt. No hepatosplenomegaly. Genitourinary: Male  Extremities: No clubbing, no cyanosis, no edema.   Lines: peripheral    Laboratory and Tests Review:  Lab Results   Component Value Date    WBC 20.6 (H) 03/08/2022    WBC 16.8 (H) 03/07/2022    WBC 11.0 03/06/2022    HGB 8.1 (L) 03/08/2022    HCT 23.7 (L) 03/08/2022    .9 (H) 03/08/2022     03/08/2022     Lab Results   Component Value Date    NEUTROABS 19.78 (H) 03/08/2022    NEUTROABS 9.44 (H) 03/02/2022    NEUTROABS 9.37 (H) 02/23/2022     No results found for: [facility]  Lab Results   Component Value Date    ALT 23 03/07/2022    AST 29 03/07/2022    ALKPHOS 105 03/07/2022    BILITOT 0.5 03/07/2022     Lab Results   Component Value Date     03/08/2022    K 5.1 03/08/2022    K 3.7 03/06/2022     03/08/2022    CO2 16 03/08/2022    BUN 19 03/08/2022    CREATININE 1.8 03/08/2022    CREATININE 1.7 03/07/2022    CREATININE 1.6 03/07/2022    GFRAA 46 03/08/2022    LABGLOM 38 03/08/2022    GLUCOSE 116 03/08/2022    PROT 4.2 03/07/2022    LABALBU 2.4 03/07/2022    CALCIUM 6.6 03/08/2022    BILITOT 0.5 03/07/2022    ALKPHOS 105 03/07/2022    AST 29 03/07/2022    ALT 23 03/07/2022     Lab Results   Component Value Date    CRP 15.1 (H) 03/05/2022     Lab Results   Component Value Date    SEDRATE 47 (H) 03/05/2022    SEDRATE 40 (H) 03/03/2022     Radiology:  CT of the chest reviewed demonstrating patchy groundglass infiltrates; gallbladder  Monitor declined  MRI of the brain completed-nondiagnostic  Microbiology:   3/5/2022 blood cultures pending  3/5/2022 respiratory cultures pending-  3/5/2022 urine for Legionella and strep pneumo negative          Lab Results   Component Value Date    BC 24 Hours no growth 03/05/2022     Lab Results   Component Value Date    BLOODCULT2 24 Hours no growth 03/05/2022     No results found for: WNDABS  Smear, Respiratory   Date Value Ref Range Status   03/05/2022   Final    Moderate Polymorphonuclear leukocytes  Epithelial cells not seen  No organisms seen       No results found for: MPNEUMO, CLAMYDCU, LABLEGI, AFBCX, FUNGSM, LABFUNG  CULTURE, RESPIRATORY   Date Value Ref Range Status   03/05/2022 Oral Pharyngeal Lorraine absent  Final     No results found for: CXCATHTIP  No results found for: BFCS  No results found for: CXSURG  Urine Culture, Routine   Date Value Ref Range Status   03/02/2022 Growth not present  Final     No results found for: [facility]    ASSESSMENT:  · Change in mental status-no evidence to support t encephalitis or meningitis in a patient with myelodysplastic syndrome  · HSV and Candida esophagitis-now can be treated with orals  · Aspiration pneumonia  · Leukocytosis related to above-improved    PLAN:  · Continue acyclovir, fluconazole, p.o.    · Change Merrem to [employer]  · Check final cultures  · Discussed with ICU staff  · Marylene Guernsey, MD  11:35 AM  3/8/2022

## 2022-03-08 NOTE — PROGRESS NOTES
Patient seen in icu still on vent and sedated but somewhat arouseable. vss afebrile. Tolerated lp. No herpes at this pont. cxr clearing . Tolerating meds. Wbc still elevated .  Cultures pending, continue with current care for now

## 2022-03-08 NOTE — PROGRESS NOTES
Critical Care Team - Daily Progress Note         Date and time: 3/8/2022 8:32 AM  Patient's name:  Dean Oreilly  Medical Record Number: [id number]  Patient's account/billing number: [de-identified]  Patient's YOB: 1955  Age: 77 y.o. Date of Admission: 3/2/2022 10:44 AM  Length of stay during current admission: 6      Primary Care Physician: Maria Elena Bruno DO  ICU Attending Physician: Dr. Valencia Coleman    Code Status: Prior    Reason for ICU admission:   Altered mental status  Impending respiratory failure      SUBJECTIVE:     OVERNIGHT EVENTS:         3/8: Vent day 4. Patient remains intubated and sedated on the ventilator. LP was done on 3/7. Patient experienced no problems overnight per RN. Patient is weaned off levo. Patient is still on fentanyl but this will now be ordered prn. Patient continues on acyclovir, Diflucan, Merrem, nystatin. Patient will be started on tube feeds today. 3/7: Vent Day 3. Patient remains intubated and sedated and remains on ventilator. Patient is to go for LP today by IR. Time of procedure is unknown. Patient's dvt ppx is held at this time in anticipation for procedure. MRI 3/6 negative for evidence of herpetic encephalopathy. It showed only chronic microvascular disease. Patient is off Levo. Patient continues on acyclovir, diflucan, merrem, and nystatin. 3/6: Vent Day 2. Remains intubated and sedated. Patient was hypotensive and had to start on pressors. MRI brain scheduled. 3/5: Patient admitted to the ICU for AMS and impending respiratory failure. Patient was intubated. History significant for herpetic esophagitis ,HTN, DVT, allergic rhinitis.     CURRENT VENTILATION STATUS:     [x] Ventilator  [] BIPAP  [] Nasal Cannula [] Room Air      IF INTUBATED, ET TUBE MARKING AT LOWER LIP:     SECRETIONS  Amount:  [] Small [] Moderate  [] Large  [] None  Color:     [] White [] Colored  [] Bloody    SEDATION:  RAAS Score:  [] Propofol gtt  [] Versed gtt  [x] Fentanyl gtt [] Ativan gtt   [] No Sedation    PARALYZED:  [x] No    [] Yes      VASOPRESSORS:  [x] No    [] Yes    If yes -   [] Levophed       [] Dopamine     [] Vasopressin       [] Dobutamine  [] Phenylephrine         [] Epinephrine    CENTRAL LINES:     [] No   [x] Yes   (Date of Insertion: 3/5 )           If yes -     [x] Right IJ     [] Left IJ [] Right Femoral [] Left Femoral                   [] Right Subclavian [] Left Subclavian       FORBES'S CATHETER:   [] No   [x] Yes  (Date of Insertion: 3/4)     URINE OUTPUT:            [] Good   [x] Low              [] Anuric      OBJECTIVE:     VITAL SIGNS:  BP (!) 104/59   Pulse 107   Temp 97.8 °F (36.6 °C) (Axillary)   Resp 13   Ht 6' 2\" (1.88 m)   Wt 175 lb (79.4 kg)   SpO2 100%   BMI 22.47 kg/m²   Tmax over 24 hours:  Temp (24hrs), Av °F (36.7 °C), Min:97.6 °F (36.4 °C), Max:98.6 °F (37 °C)      Patient Vitals for the past 6 hrs:   BP Temp Temp src Pulse Resp SpO2   22 0800 (!) 104/59 97.8 °F (36.6 °C) Axillary 107 13 100 %   22 0757 -- -- -- 106 11 99 %   22 0756 -- -- -- -- 13 100 %   22 0500 -- -- -- 113 23 --   22 0400 106/66 97.6 °F (36.4 °C) Axillary 108 11 100 %   22 0304 -- -- -- 111 10 98 %         Intake/Output Summary (Last 24 hours) at 3/8/2022 0832  Last data filed at 3/8/2022 0800  Gross per 24 hour   Intake 3862.79 ml   Output 287 ml   Net 3575.79 ml     Wt Readings from Last 2 Encounters:   22 175 lb (79.4 kg)   22 186 lb 12.8 oz (84.7 kg)     Body mass index is 22.47 kg/m².         PHYSICAL EXAMINATION:    General appearance - ill-appearing  Mental status - intubated, sedated; opens eyes but does not follow commands  Eyes - not examined  Ears - not examined  Nose -not examined  Mouth - not examined  Neck - supple, no significant adenopathy  Chest - clear to auscultation, no wheezes, rales or rhonchi, symmetric air entry  Heart - normal rate and regular rhythm  Abdomen - soft, nontender, nondistended, no masses or organomegaly  Neurological -intubated, sedated  Extremities - no pedal edema noted  Skin -wound present on sacrum and buttocks     Any additional physical findings: n/a    MEDICATIONS:    Scheduled Meds:   chlorhexidine  15 mL Mouth/Throat BID    pantoprazole  40 mg IntraVENous Daily    meropenem  1,000 mg IntraVENous Q8H    fluconazole  400 mg IntraVENous Q24H    acyclovir  10 mg/kg (Ideal) IntraVENous Q12H    docusate sodium  100 mg Oral BID    ipratropium-albuterol  1 ampule Inhalation Q4H WA    nystatin  5 mL Oral 4x Daily    [Held by provider] amLODIPine  5 mg Oral Daily    folic acid  1 mg Oral Daily    sucralfate  1 g Oral 4x Daily AC & HS    vitamin C  500 mg Oral TID    Vitamin D  1,000 Units Oral Daily    zinc sulfate  50 mg Oral Daily    white petrolatum   Topical BID     Continuous Infusions:   sodium chloride 125 mL/hr at 03/08/22 0814    sodium chloride Stopped (03/08/22 0557)    dextrose 5 % and 0.45 % NaCl Stopped (03/07/22 2243)    sodium chloride 33.3 mL/hr at 03/06/22 2154    fentaNYL 5 mcg/ml in 0.9%  ml infusion 75 mcg/hr (03/08/22 0600)     PRN Meds:   gadoteridol, 16 mL, ONCE PRN  ondansetron, 4 mg, Q6H PRN  acetaminophen, 650 mg, Q4H PRN  cetirizine, 10 mg, Daily PRN          VENT SETTINGS (Comprehensive) (if applicable):  Vent Information  $Ventilation: $Subsequent Day  Skin Assessment: Clean, dry, & intact  Equipment ID: [id number]  Vent Type: [phone]  Vent Mode: AC/VC+  Vt Ordered: 500 mL  Rate Set: 12 bmp  Peak Flow: 0 L/min  Pressure Support: 0 cmH20  FiO2 : 30 %  SpO2: 100 %  SpO2/FiO2 ratio: 333.33  Sensitivity: 3  PEEP/CPAP: 8  I Time/ I Time %: 0.9 s  Humidification Source: Heated wire  Humidification Temp: 37  Humidification Temp Measured: 36.2  Circuit Condensation: Drained  Additional Respiratory  Assessments  Pulse: 107  Resp: 13  SpO2: 100 %  End Tidal CO2: 26 (%)  Position: Semi-Callejas's  Humidification Source: Heated wire  Humidification Temp: 37  Circuit Condensation: Drained  Oral Care: Mouth swabbed,Mouth suctioned  Subglottic Suction Done?: Yes  Airway Type: ET  Airway Size: 8  Cuff Pressure (cm H2O): 29 cm H2O    ABGs:   Recent Labs     03/08/22 0612   PH 7.412   PCO2 26.6*   PO2 109.8*   HCO3 16.6*   BE -7.0*   O2SAT 98.5       Laboratory findings:    Complete Blood Count:   Recent Labs     03/06/22  0500 03/07/22  0535 03/08/22  0540   WBC 11.0 16.8* 20.6*   HGB 8.0* 8.3* 8.1*   HCT 23.0* 24.6* 23.7*    212 165        Last 3 Blood Glucose:   Recent Labs     03/07/22  0535 03/07/22 2028 03/08/22  0540   GLUCOSE 145* 140* 116*        PT/INR:    Lab Results   Component Value Date    PROTIME 13.4 03/04/2022    INR 1.2 03/04/2022     PTT:    Lab Results   Component Value Date    APTT 24.3 03/04/2022       Comprehensive Metabolic Profile:   Recent Labs     03/07/22  0535 03/07/22 2028 03/08/22  0540    134 135   K 4.4 4.5 5.1*    105 106   CO2 17* 14* 16*   BUN 14 17 19   CREATININE 1.6* 1.7* 1.8*   GLUCOSE 145* 140* 116*   CALCIUM 7.0* 6.5* 6.6*   PROT 4.2*  --   --    LABALBU 2.4*  --   --    BILITOT 0.5  --   --    ALKPHOS 105  --   --    AST 29  --   --    ALT 23  --   --       Magnesium:   Lab Results   Component Value Date    MG 1.7 03/08/2022     Phosphorus:   Lab Results   Component Value Date    PHOS 3.9 03/08/2022     Ionized Calcium: No results found for: CAION     Urinalysis: No results found for: UA     Troponin: No results for input(s): TROPONINI in the last 72 hours.     Microbiology:   Culture, Blood 2   Date Value Ref Range Status   03/05/2022 24 Hours no growth  Preliminary     CULTURE, RESPIRATORY   Date Value Ref Range Status   03/05/2022 Oral Pharyngeal Lorraine absent  Final       Cultures during this admission:     Blood cultures:  [] None drawn      [x] Negative             []  Positive (Details:  )  Urine Culture:   [] None drawn      [] Negative             [x]  Positive (Details: Legionella antigen 3/5)  Sputum Culture:  [] None drawn      [x] Negative             []  Positive (Details:  )   Endotracheal aspirate: [x] None drawn      [] Negative             []  Positive (Details:  )     Other pertinent Labs:       Radiology/Imaging:     Chest Xray (3/8/2022):  n/a    ASSESSMENT:     Principal Problem:    Altered mental status  Resolved Problems:    * No resolved hospital problems. *      PLAN:     WEAN PER PROTOCOL:  [] No   [x] Yes  [] N/A    DISCONTINUE ANY LABS:   [x] No   [] Yes    ICU PROPHYLAXIS:  Stress ulcer:  [x] PPI Agent  [] [id number] [] Sucralfate  [] Other:  VTE:   [] Enoxaparin  [] Unfract. Heparin Subcut  [] EPC Cuffs    NUTRITION:  [x] NPO [] Tube Feeding (Specify: ) [] TPN  [] PO (Diet: Diet NPO)    HOME MEDICATIONS RECONCILED: [] No  [x] Yes    INSULIN DRIP:   [x] No   [] Yes    CONSULTATION NEEDED:  [x] No   [] Yes    FAMILY UPDATED:    [] No   [x] Yes    TRANSFER OUT OF ICU:   [] No   [x] Yes    SYSTEMS ASSESSMENT    Neuro     Intubated and sedated day 4  Will open eyes but does not follow commands  Fentanyl 5 mcg Q2H for pain, wean as tolerated  Might try pressure support    Respiratory     Vent day 4    Metabolic acidosis with respiratory alkalosis  Vent settings adjusted  Vent settings: AC/VC, FiO2 40, PEEP 8, rate 10, vent set 480   ABG: pH 7.445, PCO2 24.6, PO2 158.4, HCO3 16.5  Will trial pressure support today  Wean oxygen as tolerated.  Keep O2 sat 90-92%  Sodium Bicarb 150 mL/hr    Cardiovascular     Hypotension, stable  Maintain maps above 65  Continue to monitor     Gastrointestinal     Patient can start tube feeds    GI PPx  Carafate 1 g 4 times daily  Protonix 40 mg IV daily  GI following, appreciate input    Renal     CKD stage IIIb, stable  Baseline creatinine 1.5  Creatinine 1.8, BUN 19  Nephrology following, appreciate input    Electrolyte Abnormalities  Sodium Bicarb 150 mL/hr     Infectious Disease     HSV and Candida esophagitis  Diflucan 400 mg IV Q24H (Day 4)  Acyclovir 800 mg twice daily (Day 4)  Nystatin 500,000 units 4 times daily (Day 6)  MRI negative for any findings of hepatic encephalopathy  S/p Lumbar puncture 3/7  CSF viral PCR negative 3/7  Gram stain CSF negative 3/7  VDRL CSF cx pending 3/7    Aspiration pneumonia  Merrem 1 g every 8 hours (Day 4)  Respiratory culture 3/5 negative  Blood culture x2 negative 3/5  Legionella urine negative 3/5  Urine culture negative 3/5  Continue to follow final culture    Leukocytosis, improving  Due to infectious processes listed above    Hematology/Oncology     Chronic macrocytic anemia  Baseline hemoglobin around 11  Folic acid supplementation  Continue to monitor  Transfuse for hemoglobin < 7    Endocrine     No acute concerns  Continue to monitor blood glucose while in hospital    Social/Spiritual/DNR/Other     Full code    Tyrone Torres DO, PGY-1            3/8/2022, 8:32 AM     I personally saw, examined and provided care for the patient. Radiographs, labs and medication list were reviewed by me independently. Review of Residents documentation was conducted and revisions were made as appropriate. I agree with the above documented exam, problem list and plan of care. Patient with history of EtOH, DVT hospitalized few weeks ago found to have herpes esophagitis. Was discharged to [facility]. Returned for altered mental status. Required intubation for airway protection    Assessment:    1. Acute respiratory failure secondary to AMS requiring mechanical ventilation  2. Metabolic encephalopathy, LP, MRI unrevealing  3. HSV and Candida esophagitis  4. Sepsis, possible aspiration pneumonia  5. Increased leukocytosis  6. FRANCISCA on CKD  7. Metabolic acidosis    Consultants: ID, GI, nephro     Continue mechanical ventilation, vent adjusted.   PSV trials as tolerated   Decrease sedation, fentanyl as needed   Start bicarb drip, discussed with nephrology   Start tube feeds   Acyclovir, meropenem, fluconazole per ID    GI/DVT - SUP/Lovenox 40 mg      CCT excluding procedures 38 minutes    Layla Lob, DO

## 2022-03-08 NOTE — CONSULTS
3/8/2022  3:09 PM      Comprehensive Nutrition Assessment    Type and Reason for Visit:  Consult,Reassess (TF Ordering and Management)    Nutrition Recommendations/Plan: Continue current TF, as tolerated    TF ORDERED: Immune Enhancing (Pivot 1.5) to goal rate 50 ml/hr and 30 ml water flush Q6 hr  This will provide: 1200 ml/d, 1800 michelle, 113 g pro, 1031 ml total free water  This regimen will meet: ~ 100% calorie and protein needs    Nutrition Assessment:  Pt now intubated/sedated. FRANCISCA on CKD 3. Hx. ETOH abuse. Multiple open wounds noted. Will order TF to meet nutrient needs and promote wound healing    Malnutrition Assessment:  Malnutrition Status: Moderate malnutrition    Context:  Chronic Illness     Findings of the 6 clinical characteristics of malnutrition:  Energy Intake:  Mild decrease in energy intake (Comment)  Weight Loss:  7 - 5% over 1 month     Body Fat Loss:  No significant body fat loss     Muscle Mass Loss:  No significant muscle mass loss    Fluid Accumulation:  Unable to assess     Strength:  Not Performed    Estimated Daily Nutrient Needs:  Energy (kcal):  ; Weight Used for Energy Requirements:  Current     Protein (g):   (1.2-1.4 g/kg as naveen w/CKD 3); Weight Used for Protein Requirements:  Current        Fluid (ml/day):  per Renal or Critical Care management; Method Used for Fluid Requirements:  Other (Comment)      Nutrition Related Findings:  intubated, OGT clamped, +1-+2 edema, +I/O 12L, soft abd w/hypo BS, hyperkalemia, off pressors      Wounds:  Multiple,Skin Tears,Open Wounds (per wound care consult)       Current Nutrition Therapies:    Diet NPO  ADULT TUBE FEEDING; Orogastric; Immune Enhancing; Continuous; 10; Yes; 10; Q 4 hours; 50; 30;  Q 6 hours    Anthropometric Measures:  · Height: 6' 2\" (188 cm)  · Current Body Weight: 173 lb (78.5 kg) (3/3 actual wt/admit wt)   · Admission Body Weight: 173 lb (78.5 kg) (3/3)    · Usual Body Weight: 184 lb 8 oz (83.7 kg) (1/10) · Ideal Body Weight: 190 lbs; % Ideal Body Weight 91.1 %   · BMI: 22.2  · BMI Categories: Normal Weight (BMI 22.0 to 24.9) age over 72       Nutrition Diagnosis:   · Moderate malnutrition,In context of chronic illness related to increase demand for energy/nutrients as evidenced by wounds,poor intake prior to admission,weight loss greater than or equal to 5% in 1 month      Nutrition Interventions:   Food and/or Nutrient Delivery:  Continue NPO,Modify Tube Feeding (Immune Enhancing TF to meet needs and promote wound healing)  Nutrition Education/Counseling:  No recommendation at this time   Coordination of Nutrition Care:  Continue to monitor while inpatient    Goals:  Pt naveen EN at goal rate       Nutrition Monitoring and Evaluation:   Behavioral-Environmental Outcomes:  None Identified   Food/Nutrient Intake Outcomes:  Enteral Nutrition Intake/Tolerance  Physical Signs/Symptoms Outcomes:  Biochemical Data,GI Status,Nutrition Focused Physical Findings,Fluid Status or Edema,Skin,Weight     Discharge Planning:     Too soon to determine     Electronically signed by India Hall RD, CNSC, LD on 3/8/22 at 3:09 PM EST    Contact: .[phone]

## 2022-03-08 NOTE — PLAN OF CARE
Problem: Skin Integrity:  Goal: Will show no infection signs and symptoms  Description: Will show no infection signs and symptoms  Outcome: Met This Shift  Goal: Absence of new skin breakdown  Description: Absence of new skin breakdown  Outcome: Met This Shift     Problem: Non-Violent Restraints  Goal: No harm/injury to patient while restraints in use  Outcome: Met This Shift  Goal: Patient's dignity will be maintained  Outcome: Met This Shift     Problem: Falls - Risk of:  Goal: Will remain free from falls  Description: Will remain free from falls  Outcome: Ongoing  Goal: Absence of physical injury  Description: Absence of physical injury  Outcome: Ongoing

## 2022-03-08 NOTE — PROGRESS NOTES
PROGRESS NOTE      Patient Presents with/Seen in Consultation For      *esophageal herpes  CHIEF COMPLAINT: Altered mental status and fall    Subjective:     Patient seen remains intubated and sedated, off pressors. LP results pending. Review of Systems  Aside from what was mentioned in the PMH and HPI, essentially unremarkable, all others negative. Objective:     BP (!) 104/59   Pulse 107   Temp 97.8 °F (36.6 °C) (Axillary)   Resp 13   Ht 6' 2\" (1.88 m)   Wt 175 lb (79.4 kg)   SpO2 100%   BMI 22.47 kg/m²     General appearance: laying in bed, intubated and sedated  Eyes: conjunctiva pale, sclera anicteric. PERRL.   Lungs: clear to auscultation bilaterally  Heart: regular rate and rhythm, no murmur, 2+ pulses; no edema  Abdomen: soft, non-tender; bowel sounds normal; no masses,  no organomegaly  Extremities: extremities without edema  Pulses: 2+ and symmetric  Skin: Skin color, texture, turgor normal, scattered ecchymosis  Neurologic:  Sedated    chlorhexidine (PERIDEX) 0.12 % solution 15 mL, BID  pantoprazole (PROTONIX) injection 40 mg, Daily  0.9 % sodium chloride infusion, Continuous  0.9 % sodium chloride infusion, Continuous  gadoteridol (PROHANCE) injection 16 mL, ONCE PRN  dextrose 5 % and 0.45 % sodium chloride infusion, Continuous  meropenem (MERREM) 1,000 mg in sodium chloride 0.9 % 100 mL IVPB (mini-bag), Q8H  fluconazole (DIFLUCAN) in 0.9 % sodium chloride IVPB 400 mg, Q24H  acyclovir (ZOVIRAX) 800 mg in dextrose 5 % 250 mL IVPB, Q12H  meropenem NS Flush, Q8H  fentaNYL 5 mcg/ml in 0.9%  ml infusion, Continuous  docusate sodium (COLACE) capsule 100 mg, BID  ipratropium-albuterol (DUONEB) nebulizer solution 1 ampule, Q4H WA  nystatin (MYCOSTATIN) 945839 UNIT/ML suspension 500,000 Units, 4x Daily  ondansetron (ZOFRAN) injection 4 mg, Q6H PRN  acetaminophen (TYLENOL) tablet 650 mg, Q4H PRN  [Held by provider] amLODIPine (NORVASC) tablet 5 mg, Daily  cetirizine (ZYRTEC) tablet 10 mg, Daily PRN  folic acid (FOLVITE) tablet 1 mg, Daily  sucralfate (CARAFATE) tablet 1 g, 4x Daily AC & HS  ascorbic acid (VITAMIN C) tablet 500 mg, TID  vitamin D (CHOLECALCIFEROL) tablet 1,000 Units, Daily  zinc sulfate (ZINCATE) capsule 50 mg, Daily  white petrolatum ointment, BID         Data Review  CBC:   Lab Results   Component Value Date    WBC 20.6 03/08/2022    RBC 2.01 03/08/2022    HGB 8.1 03/08/2022    HCT 23.7 03/08/2022    .9 03/08/2022    MCH 40.3 03/08/2022    MCHC 34.2 03/08/2022    RDW 13.7 03/08/2022     03/08/2022    MPV 10.2 03/08/2022     CMP:    Lab Results   Component Value Date     03/08/2022    K 5.1 03/08/2022    K 3.7 03/06/2022     03/08/2022    CO2 16 03/08/2022    BUN 19 03/08/2022    CREATININE 1.8 03/08/2022    GFRAA 46 03/08/2022    LABGLOM 38 03/08/2022    GLUCOSE 116 03/08/2022    PROT 4.2 03/07/2022    LABALBU 2.4 03/07/2022    CALCIUM 6.6 03/08/2022    BILITOT 0.5 03/07/2022    ALKPHOS 105 03/07/2022    AST 29 03/07/2022    ALT 23 03/07/2022     Hepatic Function Panel:    Lab Results   Component Value Date    ALKPHOS 105 03/07/2022    ALT 23 03/07/2022    AST 29 03/07/2022    PROT 4.2 03/07/2022    BILITOT 0.5 03/07/2022    BILIDIR 0.2 07/01/2015    IBILI 0.9 07/01/2015    LABALBU 2.4 03/07/2022       PT/INR:    Lab Results   Component Value Date    PROTIME 13.4 03/04/2022    INR 1.2 03/04/2022     IRON:    Lab Results   Component Value Date    IRON 32 03/04/2022       Assessment:     Active Problems:  ? AMS  ? Herpes esophagitis with candida esophagitis; EGD and Colonoscopy 2/21/22 with Dr. Constantin Sood - Moderate gastritis, superficial duodenal ulcerations and duodenitis, reflux esophagitis. pandiverticulosis, posterior midline anal fissure. Pathology: Stomach: Focal acute erosive gastritis. Negative for Helicobacter pylori organisms on immunostained sections. B. Duodenum: Acute erosive duodenitis and chronic peptic duodenitis C.  Gastroesophageal junction: Ulcerative esophagitis with changes consistent with herpes esophagitis D. Anal verge: Fragments of benign hyperkeratotic squamous mucosa. Negative for dysplasia or neoplasia. ? Hepatic steatosis  ? History of alcohol abuse  ? Anemia, macrocytic  ? FRANCISCA- nephrology following  ? Leukocytosis       Plan:   ? Critical care management per ICU  ? LP 3/7/22- results pending   ? Liver serology negative   ? Antibiotics/antivirals per ID  ? Continue protonix as ordered  ? IV fluid per nephrology/ICU   ? Monitor CBC and CMP daily  ? Antibiotics per ID  ? Supportive care  ? Continue to monitor      Note: This report was completed utilizing computer voice recognition software. Every effort has been made to ensure accuracy, however; inadvertent computerized transcription errors may be present.      Discussed with Dr. Rakel Mcintosh per Dr. Louisa Reddy APRN-NP-C 3/8/2022  8:55 AM For Dr. Lex Padilla

## 2022-03-08 NOTE — PROGRESS NOTES
[facility] Josh YANCEY [facility] MD Jessica Thomas, MD Juan Plata, MD Bre Em, MD Juvenal Erwin, CNP   Harriet Groves, OBED  Progress Note    3/8/2022    SUBJECTIVE:   3/4: Off the floor. Discussed case with bedside RN, Dr. Shane Isaacs. Unable to perform diagnostic radiographic studies due to agitation, movement    3/5 transfer to the ICU intubated via ETT, ventilated. Hemodynamically stable. Sedated appears comfortable on vent. IV K-Phos infusion ordered this morning, still not delivered to nursing unit for administration. IV fluid stopped yesterday for unknown period of time, held today for 2 to 3 hours, reasons not clear though apparently IV access was not lost.    3/6: Remains critically ill. Intubated via ETT. Vent setting stable. Minimally responsive without sedation. Urine output poor over the course of today. BP rather low, though hemodynamically stable. 3/7 : seen in his room , intubated , mechanically ventilated , BP stable . UO on low side . Vent setting stable     3/8 seen earlier today , intubated fio2 30  Low UO . No pressors   D/w Dr Julian Denise at bedside     PROBLEM LIST:    Principal Problem:    Altered mental status  Resolved Problems:    * No resolved hospital problems.  *         DIET:    Diet NPO  ADULT TUBE FEEDING; Nasogastric; Standard with Fiber; Continuous; 10; Yes; 10; Q 4 hours; 45; 30; Q 6 hours     MEDS (scheduled):    docusate  100 mg Per NG tube BID    enoxaparin  40 mg SubCUTAneous Daily    chlorhexidine  15 mL Mouth/Throat BID    pantoprazole  40 mg IntraVENous Daily    meropenem  1,000 mg IntraVENous Q8H    fluconazole  400 mg IntraVENous Q24H    acyclovir  10 mg/kg (Ideal) IntraVENous Q12H    ipratropium-albuterol  1 ampule Inhalation Q4H WA    nystatin  5 mL Oral 4x Daily    [Held by provider] amLODIPine  5 mg Oral Daily    folic acid  1 mg Oral Daily    sucralfate  1 g Oral 4x Daily AC & HS    vitamin C  500 mg Oral TID   Aurelia Jade Vitamin D  1,000 Units Oral Daily    zinc sulfate  50 mg Oral Daily    white petrolatum   Topical BID       MEDS (infusions):   IV infusion builder      sodium chloride 33.3 mL/hr at 03/06/22 2154       MEDS (prn):  fentanNYL, gadoteridol, ondansetron, acetaminophen, cetirizine    PHYSICAL EXAM:     Patient Vitals for the past 24 hrs:   BP Temp Temp src Pulse Resp SpO2   03/08/22 0900 -- -- -- 110 17 --   03/08/22 0800 (!) 104/59 97.8 °F (36.6 °C) Axillary 107 13 100 %   03/08/22 0757 -- -- -- 106 11 99 %   03/08/22 0756 -- -- -- -- 13 100 %   03/08/22 0500 -- -- -- 113 23 --   03/08/22 0400 106/66 97.6 °F (36.4 °C) Axillary 108 11 100 %   03/08/22 0304 -- -- -- 111 10 98 %   03/08/22 0200 -- -- -- 113 17 --   03/08/22 0100 -- -- -- 109 17 --   03/08/22 0000 (!) 87/59 97.9 °F (36.6 °C) Axillary 108 14 97 %   03/07/22 2302 -- -- -- 111 17 100 %   03/07/22 2300 -- -- -- 109 8 --   03/07/22 2200 -- -- -- 108 10 --   03/07/22 2100 -- -- -- 108 8 --   03/07/22 2000 (!) 97/53 98.6 °F (37 °C) Axillary 106 15 100 %   03/07/22 1954 -- -- -- 105 10 100 %   03/07/22 1803 -- -- -- 108 11 100 %   03/07/22 1800 -- -- -- 109 12 100 %   03/07/22 1700 -- -- -- 110 11 100 %   03/07/22 1622 -- -- -- 105 9 100 %   03/07/22 1600 -- -- -- 105 10 100 %   03/07/22 1500 -- -- -- 105 10 100 %   03/07/22 1408 127/80 -- -- 113 12 100 %   03/07/22 1358 106/69 -- -- 112 12 100 %   03/07/22 1300 -- -- -- 109 9 100 %   03/07/22 1200 -- -- -- 109 8 99 %   03/07/22 1140 -- -- -- 107 9 98 %   03/07/22 1100 -- -- -- 110 8 100 %   @      Intake/Output Summary (Last 24 hours) at 3/8/2022 1014  Last data filed at 3/8/2022 9634  Gross per 24 hour   Intake 3736.83 ml   Output 287 ml   Net 3449.83 ml         Wt Readings from Last 3 Encounters:   03/02/22 175 lb (79.4 kg)   02/23/22 186 lb 12.8 oz (84.7 kg)   11/27/18 215 lb (97.5 kg)       Constitutional:  in no acute distress  HEENT: NC/AT, EOMI, sclera and conjunctiva are clear and anicteric, mucus membranes moist  Neck: Trachea midline, no JVD  Cardiovascular: S1, S2 regular rhythm, no murmur,or rub  Respiratory: Coarse breath sounds throughout all lung fields no crackles, no wheeze  Gastrointestinal:  Soft, nontender, nondistended, NABS  Ext: no edema, feet warm  Skin: dry, no rash  Neuro: awake, alert, interactive      DATA:    Recent Labs     03/06/22  0500 03/07/22  0535 03/08/22  0540   WBC 11.0 16.8* 20.6*   HGB 8.0* 8.3* 8.1*   HCT 23.0* 24.6* 23.7*   .9* 117.1* 117.9*    212 165     Recent Labs     03/06/22  0500 03/06/22  0500 03/06/22  1300 03/06/22  1800 03/07/22  0535 03/07/22 2028 03/08/22  0540      < > 141   < > 139 134 135   K 2.5*  --  3.7   < > 4.4 4.5 5.1*   *   < > 107   < > 107 105 106   CO2 22   < > 22   < > 17* 14* 16*   MG 1.8   < > 2.3  --  1.9  --  1.7   PHOS 2.1*  --   --   --  3.6  --  3.9   BUN 15   < > 14   < > 14 17 19   CREATININE 1.5*   < > 1.4*   < > 1.6* 1.7* 1.8*   ALT  --   --   --   --  23  --   --    AST  --   --   --   --  29  --   --    BILITOT  --   --   --   --  0.5  --   --    ALKPHOS  --   --   --   --  105  --   --     < > = values in this interval not displayed. No results found for: LABPROT    ASSESSMENT    77 y.o. gentleman known to service, followed for FRANCISCA in January of this year. Admitted with acute mental status change, myoclonic jerks and unusual movements.     1. Chronic kidney disease, stage IIIa. Creatinine at baseline, 1.4 to 1.6 mg/dL     2.  Metabolic acidosis, wide anion gap. Resolved with bicarbonate drip    3. Cannabis on drug screen    4. Hypophosphatemia. 5. Hypernatremia, dehydration due to increased insensible losses, poor intake/input. Resolved    6.  Hypokalemia, normalized with supplement      Plan :    Cr slowly creeping up  oligouric   Metabolic acidosis noted( sepsis , liver disease )    Switch iv fluids to bicarbonate drip   Flush rodriguez cath/make sure it is in accurate place   Bmp later on today F/u need for RRT/HD    Electronically signed by Lisa Guerra MD on 3/8/2022

## 2022-03-08 NOTE — PATIENT CARE CONFERENCE
Intensive Care Daily Quality Rounding Checklist        ICU Team Members: Bedside Nurse, ,  and Nursing Unit Leadership     ICU Day #: NUMBER: 4     Intubation Date: March 5     Ventilator Day #: NUMBER: 4     Central Line Insertion Ondina Lutz                                                    Day #: NUMBER: 4      Arterial Line Insertion Date: Yessica Larsenmodesto                             TSL #: NUMBER: 4     Temporary Hemodialysis Catheter Insertion Date:                           GPU #     DVT Prophylaxis: SCD    GI Prophylaxis: Protonix     Crandall Catheter Insertion Date: March 5                                        Day #: 4                             Continued need (if yes, reason documented and discussed with physician): yes     Skin Issues/ Wounds and ordered treatment discussed on rounds:      Goals/ Plans for the Day: Daily labs and replace/transfuse as needed, wean vent as able, Continue critical care management, ivf change to LR, stop fentanyl gtt and change to prn pushes, start tube feedings today

## 2022-03-09 NOTE — PLAN OF CARE
Problem: Falls - Risk of:  Goal: Will remain free from falls  Description: Will remain free from falls  Outcome: Met This Shift  Goal: Absence of physical injury  Description: Absence of physical injury  Outcome: Met This Shift     Problem: Skin Integrity:  Goal: Absence of new skin breakdown  Description: Absence of new skin breakdown  Outcome: Met This Shift     Problem: Malnutrition  (NI-5.2)  Goal: Food and/or Nutrient Delivery  Description: Individualized approach for food/nutrient provision.   3/8/2022 1509 by Ha Nam RD, CNSC, LD  Outcome: Met This Shift     Problem: Non-Violent Restraints  Goal: No harm/injury to patient while restraints in use  Outcome: Met This Shift  Goal: Patient's dignity will be maintained  Outcome: Met This Shift     Problem: Skin Integrity:  Goal: Will show no infection signs and symptoms  Description: Will show no infection signs and symptoms  Outcome: Not Met This Shift     Problem: Non-Violent Restraints  Goal: Removal from restraints as soon as assessed to be safe  Outcome: Not Met This Shift

## 2022-03-09 NOTE — PROGRESS NOTES
Critical Care Team - Daily Progress Note         Date and time: 3/9/2022 1:37 PM  Patient's name:  Maria Alejandra Dawkins  Medical Record Number: [id number]  Patient's account/billing number: [de-identified]  Patient's YOB: 1955  Age: 77 y.o. Date of Admission: 3/2/2022 10:44 AM  Length of stay during current admission: 7      Primary Care Physician: Ben Ohara DO  ICU Attending Physician: Dr. Cyrus Neal    Code Status: Prior    Reason for ICU admission:   Altered mental status  Impending respiratory failure      SUBJECTIVE:     OVERNIGHT EVENTS:         3/9: Vent Day 5, intubated, weaning sedation. Patient on tube feeds, had episode of emesis while being turned. Tube feeds held. 3/8: Vent day 4. Patient remains intubated and sedated on the ventilator. LP was done on 3/7. Patient experienced no problems overnight per RN. Patient is weaned off levo. Patient is still on fentanyl but this will now be ordered prn. Patient continues on acyclovir, Diflucan, Merrem, nystatin. Patient will be started on tube feeds today. 3/7: Vent Day 3. Patient remains intubated and sedated and remains on ventilator. Patient is to go for LP today by IR. Time of procedure is unknown. Patient's dvt ppx is held at this time in anticipation for procedure. MRI 3/6 negative for evidence of herpetic encephalopathy. It showed only chronic microvascular disease. Patient is off Levo. Patient continues on acyclovir, diflucan, merrem, and nystatin. 3/6: Vent Day 2. Remains intubated and sedated. Patient was hypotensive and had to start on pressors. MRI brain scheduled. 3/5: Patient admitted to the ICU for AMS and impending respiratory failure. Patient was intubated. History significant for herpetic esophagitis ,HTN, DVT, allergic rhinitis.     CURRENT VENTILATION STATUS:     [x] Ventilator  [] BIPAP  [] Nasal Cannula [] Room Air      IF INTUBATED, ET TUBE MARKING AT LOWER LIP:     SECRETIONS  Amount:  [] Small [] Moderate  [] Large  [] None  Color:     [] White [] Colored  [] Bloody    SEDATION:  RAAS Score:  [] Propofol gtt  [] Versed gtt  [x] Fentanyl gtt [] Ativan gtt   [] No Sedation    PARALYZED:  [x] No    [] Yes      VASOPRESSORS:  [x] No    [] Yes    If yes -   [] Levophed       [] Dopamine     [] Vasopressin       [] Dobutamine  [] Phenylephrine         [] Epinephrine    CENTRAL LINES:     [] No   [x] Yes   (Date of Insertion: 3/5 )           If yes -     [x] Right IJ     [] Left IJ [] Right Femoral [] Left Femoral                   [] Right Subclavian [] Left Subclavian       FORBES'S CATHETER:   [] No   [x] Yes  (Date of Insertion: 3/4)     URINE OUTPUT:            [] Good   [x] Low              [] Anuric      OBJECTIVE:     VITAL SIGNS:  /62   Pulse 109   Temp 97.9 °F (36.6 °C) (Axillary)   Resp 16   Ht 6' 2\" (1.88 m)   Wt 209 lb 10.5 oz (95.1 kg)   SpO2 94%   BMI 26.92 kg/m²   Tmax over 24 hours:  Temp (24hrs), Av.8 °F (36.6 °C), Min:97.4 °F (36.3 °C), Max:97.9 °F (36.6 °C)      Patient Vitals for the past 6 hrs:   BP Temp src Pulse Resp SpO2   22 1146 -- -- 109 16 94 %   22 1143 -- -- -- 14 96 %   22 1044 -- -- 108 14 100 %   22 0900 116/62 -- 109 12 98 %   22 0854 -- -- 108 9 98 %   22 0852 -- -- -- 12 99 %   22 0800 121/67 Axillary 108 15 98 %         Intake/Output Summary (Last 24 hours) at 3/9/2022 1337  Last data filed at 3/9/2022 1000  Gross per 24 hour   Intake 4190.03 ml   Output 425 ml   Net 3765.03 ml     Wt Readings from Last 2 Encounters:   22 209 lb 10.5 oz (95.1 kg)   02/23/22 186 lb 12.8 oz (84.7 kg)     Body mass index is 26.92 kg/m².         PHYSICAL EXAMINATION:    General appearance - ill-appearing  Mental status - intubated, ; opens eyes but has difficulty following commands  Eyes - not examined  Ears - not examined  Nose -not examined  Mouth - not examined  Neck - supple, no significant adenopathy  Chest - clear to auscultation, no wheezes, rales or rhonchi, symmetric air entry  Heart - normal rate and regular rhythm  Abdomen - soft, nontender, nondistended, no masses or organomegaly  Neurological -intubated, sedated  Extremities - no pedal edema noted  Skin -wound present on sacrum and buttocks     Any additional physical findings: n/a    MEDICATIONS:    Scheduled Meds:   docusate  100 mg Per NG tube BID    enoxaparin  40 mg SubCUTAneous Daily    acyclovir  400 mg Oral TID    fluconazole  200 mg Oral Daily    ertapenem (INVanz) IVPB  1,000 mg IntraVENous Q24H    chlorhexidine  15 mL Mouth/Throat BID    pantoprazole  40 mg IntraVENous Daily    ipratropium-albuterol  1 ampule Inhalation Q4H WA    nystatin  5 mL Oral 4x Daily    [Held by provider] amLODIPine  5 mg Oral Daily    folic acid  1 mg Oral Daily    sucralfate  1 g Oral 4x Daily AC & HS    vitamin C  500 mg Oral TID    Vitamin D  1,000 Units Oral Daily    zinc sulfate  50 mg Oral Daily    white petrolatum   Topical BID     Continuous Infusions:   sodium chloride 70 mL/hr at 03/09/22 1000     PRN Meds:   fentanNYL, 25 mcg, Q2H PRN  gadoteridol, 16 mL, ONCE PRN  ondansetron, 4 mg, Q6H PRN  acetaminophen, 650 mg, Q4H PRN  cetirizine, 10 mg, Daily PRN          VENT SETTINGS (Comprehensive) (if applicable):  Vent Information  $Ventilation: $Subsequent Day  Skin Assessment: Clean, dry, & intact  Equipment ID: [id number]  Vent Type: [phone]  Vent Mode: AC/VC+  Vt Ordered: 450 mL  Rate Set: 10 bmp  Peak Flow: 0 L/min  Pressure Support: 0 cmH20  FiO2 : 30 %  SpO2: 94 %  SpO2/FiO2 ratio: 313.33  Sensitivity: 3  PEEP/CPAP: 8  I Time/ I Time %: 0.9 s  Humidification Source: Heated wire  Humidification Temp: 37  Humidification Temp Measured: 37.3  Circuit Condensation: Drained  Additional Respiratory  Assessments  Pulse: 109  Resp: 16  SpO2: 94 %  End Tidal CO2: 26 (%)  Position: Semi-Callejas's  Humidification Source: Heated wire  Humidification Temp: 37  Circuit Condensation: Drained  Oral Care: Mouth swabbed,Mouth moisturizer,Mouth suctioned  Subglottic Suction Done?: Yes  Airway Type: ET  Airway Size: 8  Cuff Pressure (cm H2O): 29 cm H2O    ABGs:   Recent Labs     03/09/22 0644   PH 7.513*   PCO2 31.6*   PO2 109.7*   HCO3 24.8   BE 2.0   O2SAT 98.7*       Laboratory findings:    Complete Blood Count:   Recent Labs     03/07/22  0535 03/08/22  0540 03/09/22  0638   WBC 16.8* 20.6* 15.8*   HGB 8.3* 8.1* 7.7*   HCT 24.6* 23.7* 22.7*    165 142        Last 3 Blood Glucose:   Recent Labs     03/08/22  0540 03/08/22 1700 03/09/22  0638   GLUCOSE 116* 136* 150*        PT/INR:    Lab Results   Component Value Date    PROTIME 13.4 03/04/2022    INR 1.2 03/04/2022     PTT:    Lab Results   Component Value Date    APTT 24.3 03/04/2022       Comprehensive Metabolic Profile:   Recent Labs     03/07/22  0535 03/07/22 2028 03/08/22  0540 03/08/22  1700 03/09/22  0638      < > 135 135 134   K 4.4   < > 5.1* 4.7 4.3      < > 106 102 98   CO2 17*   < > 16* 19* 25   BUN 14   < > 19 22 25*   CREATININE 1.6*   < > 1.8* 1.8* 1.7*   GLUCOSE 145*   < > 116* 136* 150*   CALCIUM 7.0*   < > 6.6* 6.5* 6.5*   PROT 4.2*  --   --   --   --    LABALBU 2.4*  --   --   --   --    BILITOT 0.5  --   --   --   --    ALKPHOS 105  --   --   --   --    AST 29  --   --   --   --    ALT 23  --   --   --   --     < > = values in this interval not displayed. Magnesium:   Lab Results   Component Value Date    MG 1.8 03/09/2022     Phosphorus:   Lab Results   Component Value Date    PHOS 3.4 03/09/2022     Ionized Calcium: No results found for: CAION     Urinalysis: No results found for: UA     Troponin: No results for input(s): TROPONINI in the last 72 hours.     Microbiology:   Culture, Blood 2   Date Value Ref Range Status   03/05/2022 24 Hours no growth  Preliminary     CULTURE, RESPIRATORY   Date Value Ref Range Status   03/05/2022 Oral Pharyngeal Lorraine absent  Final       Cultures during this admission:     Blood cultures:  [] None drawn      [x] Negative             []  Positive (Details:  )  Urine Culture:   [] None drawn      [] Negative             [x]  Positive (Details: Legionella antigen 3/5)  Sputum Culture:  [] None drawn      [x] Negative             []  Positive (Details:  )   Endotracheal aspirate: [x] None drawn      [] Negative             []  Positive (Details:  )     Other pertinent Labs:       Radiology/Imaging:     Chest Xray (3/9/2022):  n/a    ASSESSMENT:     Principal Problem:    Altered mental status  Resolved Problems:    * No resolved hospital problems. *      PLAN:     WEAN PER PROTOCOL:  [] No   [x] Yes  [] N/A    DISCONTINUE ANY LABS:   [x] No   [] Yes    ICU PROPHYLAXIS:  Stress ulcer:  [x] PPI Agent  [] G2Lqadp [] Sucralfate  [] Other:  VTE:   [] Enoxaparin  [] Unfract. Heparin Subcut  [] EPC Cuffs    NUTRITION:  [x] NPO [] Tube Feeding (Specify: ) [] TPN  [] PO (Diet: Diet NPO  ADULT TUBE FEEDING; Orogastric; Immune Enhancing; Continuous; 10; Yes; 10; Q 4 hours; 50; 30; Q 6 hours)    HOME MEDICATIONS RECONCILED: [] No  [x] Yes    INSULIN DRIP:   [x] No   [] Yes    CONSULTATION NEEDED:  [x] No   [] Yes    FAMILY UPDATED:    [] No   [x] Yes    TRANSFER OUT OF ICU:   [] No   [x] Yes    SYSTEMS ASSESSMENT    Neuro     Intubated day 5  Will open eyes, difficulty with commands  Fentanyl 5 mcg Q2H for pain, wean as tolerated  Patient given Might try pressure support    Respiratory     Vent day 5    Metabolic acidosis with respiratory alkalosis  Vent settings adjusted  Vent settings: AC/VC, FiO2 30, PEEP 8, rate 10, vent set 450   ABG: pH 7.513, PCO2 31.6, PO2 109.7 HCO3 24.8  Wean oxygen as tolerated.  Keep O2 sat 90-92%  Off Sodium Bicarb 150 mL/hr  On NS 70ml/hr  Cardiovascular     Hypotension, stable  Maintain maps above 65  Continue to monitor     Gastrointestinal     Patient can start tube feeds    GI PPx  Carafate 1 g 4 times daily  Protonix 40 mg IV daily  GI following, appreciate input    Renal CKD stage IIIb, stable  Baseline creatinine 1.5  Creatinine 1.7, BUN 25  Nephrology following, appreciate input  OLIGURIC     Electrolyte Abnormalities  Off Sodium Bicarb 150 mL/hr  On NS    Infectious Disease     HSV and Candida esophagitis  Diflucan 200 mg oral Q24H (Day 5)  Acyclovir 400 mg 3 times daily PO(Day 5)  Nystatin 500,000 units 4 times daily (Day 7)  MRI negative for any findings of hepatic encephalopathy  S/p Lumbar puncture 3/7  CSF viral PCR negative 3/7  Gram stain CSF negative 3/7  VDRL CSF cx pending 3/7    Aspiration pneumonia  INVANZ  Respiratory culture 3/5 negative  Blood culture x2 negative 3/5  Legionella urine negative 3/5  Urine culture negative 3/5  Continue to follow final culture    Leukocytosis, improving  Due to infectious processes listed above    Hematology/Oncology     Chronic macrocytic anemia  Baseline hemoglobin around 11  Folic acid supplementation  Continue to monitor  Transfuse for hemoglobin < 7    Endocrine     Continue to monitor blood glucose while in hospital    Social/Spiritual/DNR/Other     Full code    Claudell Brush, DO, PGY-1            3/9/2022, 1:37 PM       I personally saw, examined and provided care for the patient. Radiographs, labs and medication list were reviewed by me independently. Review of Residents documentation was conducted and revisions were made as appropriate. I agree with the above documented exam, problem list and plan of care. Patient with history of EtOH, DVT hospitalized few weeks ago found to have herpes esophagitis. Was discharged to [facility]. Returned for altered mental status. Required intubation for airway protection     Assessment:     1. Acute respiratory failure secondary to AMS requiring mechanical ventilation  2. Metabolic encephalopathy, LP, MRI unrevealing  3. HSV and Candida esophagitis  4. Sepsis, possible aspiration pneumonia  5. Increased leukocytosis  6. FRANCISCA on CKD  7.  Metabolic acidosis     Consultants: ID, GI, nephro     · Continue mechanical ventilation, vent adjusted.   PSV trials as tolerated  · Decrease sedation, fentanyl as needed, precedex  · Continue IVF, discussed with Nephro  · Start tube feeds  · Acyclovir, meropenem, fluconazole per ID     GI/DVT - SUP/Lovenox 40 mg    CCT excluding procedures 33 minutes    Jimmie Mae,

## 2022-03-09 NOTE — PATIENT CARE CONFERENCE
Intensive Care Daily Quality Rounding Checklist      ICU Team Members:  Dr. Carroll Norton, residents, charge nurse, bedside nurse, clinical pharmacist and respiratory therapy    ICU Day #: NUMBER: 5    Intubation Date: March 5    Ventilator Day #: NUMBER: 5    Central Line Insertion Date: March 5        Day #: NUMBER: 5     Arterial Line Insertion Date: March 5      Day #: NUMBER: 5    Temporary Hemodialysis Catheter Insertion Date: n/a      Day # n/a    DVT Prophylaxis: scd's    GI Prophylaxis: protonix     Crandall Catheter Insertion Date: March 5       Day #: 5      Continued need (if yes, reason documented and discussed with physician): yes, strict I/O     Skin Issues/ Wounds and ordered treatment discussed on rounds: yes     Goals/ Plans for the Day: monitor labs and vitals. Abdominal x-ray to check for ileus.  IV fluids per nephrology, PSV

## 2022-03-09 NOTE — PROGRESS NOTES
PROGRESS NOTE        Patient Presents with/Seen in Consultation For      *esophageal herpes  CHIEF COMPLAINT: Altered mental status and fall    Subjective:     Patient seen remains intubated and sedated. LP results pending. No GI complaints reported. Review of Systems  Aside from what was mentioned in the PMH and HPI, essentially unremarkable, all others negative. Objective:     /62   Pulse 109   Temp 97.9 °F (36.6 °C) (Axillary)   Resp 12   Ht 6' 2\" (1.88 m)   Wt 209 lb 10.5 oz (95.1 kg)   SpO2 98%   BMI 26.92 kg/m²     General appearance: laying in bed, intubated and sedated  Eyes: conjunctiva pale, sclera anicteric. PERRL.   Lungs: clear to auscultation bilaterally  Heart: regular rate and rhythm, no murmur, 2+ pulses; no edema  Abdomen: soft, non-tender; bowel sounds normal; no masses,  no organomegaly  Extremities: extremities without edema, bilateral wraps in place  Pulses: 2+ and symmetric  Skin: Skin color, texture, turgor normal, scattered ecchymosis  Neurologic:  Sedated    0.9 % sodium chloride infusion, Continuous  fentaNYL (SUBLIMAZE) injection 25 mcg, Q2H PRN  docusate (COLACE) 50 MG/5ML liquid 100 mg, BID  enoxaparin (LOVENOX) injection 40 mg, Daily  acyclovir (ZOVIRAX) capsule 400 mg, TID  fluconazole (DIFLUCAN) tablet 200 mg, Daily  ertapenem (INVanz) 1000 mg IVPB minibag, Q24H  chlorhexidine (PERIDEX) 0.12 % solution 15 mL, BID  pantoprazole (PROTONIX) injection 40 mg, Daily  gadoteridol (PROHANCE) injection 16 mL, ONCE PRN  ipratropium-albuterol (DUONEB) nebulizer solution 1 ampule, Q4H WA  nystatin (MYCOSTATIN) 646205 UNIT/ML suspension 500,000 Units, 4x Daily  ondansetron (ZOFRAN) injection 4 mg, Q6H PRN  acetaminophen (TYLENOL) tablet 650 mg, Q4H PRN  [Held by provider] amLODIPine (NORVASC) tablet 5 mg, Daily  cetirizine (ZYRTEC) tablet 10 mg, Daily PRN  folic acid (FOLVITE) tablet 1 mg, Daily  sucralfate (CARAFATE) tablet 1 g, 4x Daily AC & HS  ascorbic acid (VITAMIN C) tablet 500 mg, TID  vitamin D (CHOLECALCIFEROL) tablet 1,000 Units, Daily  zinc sulfate (ZINCATE) capsule 50 mg, Daily  white petrolatum ointment, BID         Data Review  CBC:   Lab Results   Component Value Date    WBC 15.8 03/09/2022    RBC 1.99 03/09/2022    HGB 7.7 03/09/2022    HCT 22.7 03/09/2022    .1 03/09/2022    MCH 38.7 03/09/2022    MCHC 33.9 03/09/2022    RDW 13.6 03/09/2022     03/09/2022    MPV 10.4 03/09/2022     CMP:    Lab Results   Component Value Date     03/09/2022    K 4.3 03/09/2022    K 3.7 03/06/2022    CL 98 03/09/2022    CO2 25 03/09/2022    BUN 25 03/09/2022    CREATININE 1.7 03/09/2022    GFRAA 49 03/09/2022    LABGLOM 40 03/09/2022    GLUCOSE 150 03/09/2022    PROT 4.2 03/07/2022    LABALBU 2.4 03/07/2022    CALCIUM 6.5 03/09/2022    BILITOT 0.5 03/07/2022    ALKPHOS 105 03/07/2022    AST 29 03/07/2022    ALT 23 03/07/2022     Hepatic Function Panel:    Lab Results   Component Value Date    ALKPHOS 105 03/07/2022    ALT 23 03/07/2022    AST 29 03/07/2022    PROT 4.2 03/07/2022    BILITOT 0.5 03/07/2022    BILIDIR 0.2 07/01/2015    IBILI 0.9 07/01/2015    LABALBU 2.4 03/07/2022     PT/INR:    Lab Results   Component Value Date    PROTIME 13.4 03/04/2022    INR 1.2 03/04/2022     IRON:    Lab Results   Component Value Date    IRON 32 03/04/2022       Assessment:     Active Problems:  ? AMS  ? Herpes esophagitis with candida esophagitis; EGD and Colonoscopy 2/21/22 with Dr. Brigid Schmidt - Moderate gastritis, superficial duodenal ulcerations and duodenitis, reflux esophagitis. pandiverticulosis, posterior midline anal fissure. Pathology: Stomach: Focal acute erosive gastritis. Negative for Helicobacter pylori organisms on immunostained sections. B. Duodenum: Acute erosive duodenitis and chronic peptic duodenitis C. Gastroesophageal junction: Ulcerative esophagitis with changes consistent with herpes esophagitis D.  Anal verge: Fragments of benign hyperkeratotic squamous

## 2022-03-09 NOTE — PROGRESS NOTES
[facility] MD Jackson Puente, MD Esme Franco MD Garland Pay, MOE Groves, OBED  Progress Note    3/9/2022    SUBJECTIVE:   3/4: Off the floor. Discussed case with bedside RN, Dr. Saeed Tanner. Unable to perform diagnostic radiographic studies due to agitation, movement    3/5 transfer to the ICU intubated via ETT, ventilated. Hemodynamically stable. Sedated appears comfortable on vent. IV K-Phos infusion ordered this morning, still not delivered to nursing unit for administration. IV fluid stopped yesterday for unknown period of time, held today for 2 to 3 hours, reasons not clear though apparently IV access was not lost.    3/6: Remains critically ill. Intubated via ETT. Vent setting stable. Minimally responsive without sedation. Urine output poor over the course of today. BP rather low, though hemodynamically stable. 3/7 : seen in his room , intubated , mechanically ventilated , BP stable . UO on low side . Vent setting stable     3/8 seen earlier today , intubated fio2 30  Low UO . No pressors   D/w Dr Nathalia Vicente at bedside   3/9:  Remains critically ill. Intubated on vent,  Fio2- 30% peep-8. Remains off pressors. Urinoe output continue to be low. Minimally responsive. PROBLEM LIST:    Principal Problem:    Altered mental status  Resolved Problems:    * No resolved hospital problems. *         DIET:    Diet NPO  ADULT TUBE FEEDING; Orogastric; Immune Enhancing; Continuous; 10; Yes; 10; Q 4 hours; 50; 30;  Q 6 hours     MEDS (scheduled):    docusate  100 mg Per NG tube BID    enoxaparin  40 mg SubCUTAneous Daily    acyclovir  400 mg Oral TID    fluconazole  200 mg Oral Daily    ertapenem (INVanz) IVPB  1,000 mg IntraVENous Q24H    chlorhexidine  15 mL Mouth/Throat BID    pantoprazole  40 mg IntraVENous Daily    ipratropium-albuterol  1 ampule Inhalation Q4H WA    nystatin  5 mL Oral 4x Daily    [Held by provider] amLODIPine  5 mg Oral Daily    folic acid  1 mg Oral Daily    sucralfate  1 g Oral 4x Daily AC & HS    vitamin C  500 mg Oral TID    Vitamin D  1,000 Units Oral Daily    zinc sulfate  50 mg Oral Daily    white petrolatum   Topical BID       MEDS (infusions):   sodium chloride 70 mL/hr at 03/09/22 1000       MEDS (prn):  fentanNYL, gadoteridol, ondansetron, acetaminophen, cetirizine    PHYSICAL EXAM:     Patient Vitals for the past 24 hrs:   BP Temp Temp src Pulse Resp SpO2 Height Weight   03/09/22 1146 -- -- -- 109 16 94 % -- --   03/09/22 1143 -- -- -- -- 14 96 % -- --   03/09/22 1044 -- -- -- 108 14 100 % -- --   03/09/22 0900 116/62 -- -- 109 12 98 % -- --   03/09/22 0854 -- -- -- 108 9 98 % -- --   03/09/22 0852 -- -- -- -- 12 99 % -- --   03/09/22 0800 121/67 -- Axillary 108 15 98 % -- --   03/09/22 0700 -- -- -- 107 10 93 % -- 209 lb 10.5 oz (95.1 kg)   03/09/22 0600 -- -- -- 103 14 98 % -- --   03/09/22 0500 -- -- -- 108 19 100 % -- --   03/09/22 0400 (!) 142/79 97.9 °F (36.6 °C) Axillary 137 13 95 % -- --   03/09/22 0300 -- -- -- 107 11 99 % -- --   03/09/22 0255 -- -- -- 108 13 99 % -- --   03/09/22 0200 -- -- -- 109 10 99 % -- --   03/09/22 0100 -- -- -- 113 24 98 % -- --   03/09/22 0000 114/61 97.9 °F (36.6 °C) Axillary 109 15 100 % -- --   03/08/22 2354 -- -- -- 111 11 100 % -- --   03/08/22 2300 -- -- -- 110 13 99 % -- --   03/08/22 2200 -- -- -- 112 23 91 % -- --   03/08/22 2100 -- -- -- 112 11 100 % -- --   03/08/22 2041 -- -- -- 109 11 100 % -- --   03/08/22 2000 109/66 97.4 °F (36.3 °C) Axillary 111 16 99 % -- --   03/08/22 1900 -- -- -- 109 9 100 % -- --   03/08/22 1800 -- -- -- 109 11 -- -- --   03/08/22 1706 -- -- -- 108 10 99 % -- --   03/08/22 1700 -- -- -- 112 14 -- -- --   03/08/22 1600 113/63 97.9 °F (36.6 °C) Axillary 107 8 -- -- --   03/08/22 1500 -- -- -- 110 13 -- -- --   03/08/22 1457 -- -- -- -- -- -- -- 173 lb (78.5 kg)   03/08/22 1455 -- -- -- -- -- -- 6' 2\" (1.88 m) -- 03/08/22 1259 -- -- -- -- 24 100 % -- --   03/08/22 1257 -- -- -- 116 17 98 % -- --   @      Intake/Output Summary (Last 24 hours) at 3/9/2022 1218  Last data filed at 3/9/2022 1000  Gross per 24 hour   Intake 4521.34 ml   Output 425 ml   Net 4096.34 ml         Wt Readings from Last 3 Encounters:   03/09/22 209 lb 10.5 oz (95.1 kg)   02/23/22 186 lb 12.8 oz (84.7 kg)   11/27/18 215 lb (97.5 kg)       Constitutional:  Intubated on vent  HEENT: NC/AT, EOMI, sclera and conjunctiva are clear and anicteric, mucus membranes moist  Neck: Trachea midline, no JVD  Cardiovascular: S1, S2 regular rhythm, no murmur,or rub  Respiratory: Coarse breath sounds throughout all lung fields no crackles, no wheeze  Gastrointestinal:  Soft, nontender, nondistended, NABS  Ext: no edema, feet warm  Skin: dry, no rash  Neuro:  Minimally responsive      DATA:    Recent Labs     03/07/22  0535 03/08/22  0540 03/09/22  0638   WBC 16.8* 20.6* 15.8*   HGB 8.3* 8.1* 7.7*   HCT 24.6* 23.7* 22.7*   .1* 117.9* 114.1*    165 142     Recent Labs     03/07/22  0535 03/07/22 2028 03/08/22  0540 03/08/22  1700 03/09/22  0638      < > 135 135 134   K 4.4   < > 5.1* 4.7 4.3      < > 106 102 98   CO2 17*   < > 16* 19* 25   MG 1.9  --  1.7  --  1.8   PHOS 3.6  --  3.9  --  3.4   BUN 14   < > 19 22 25*   CREATININE 1.6*   < > 1.8* 1.8* 1.7*   ALT 23  --   --   --   --    AST 29  --   --   --   --    BILITOT 0.5  --   --   --   --    ALKPHOS 105  --   --   --   --     < > = values in this interval not displayed. No results found for: LABPROT    ASSESSMENT    77 y.o. gentleman known to service, followed for [facility] in January of this year. Admitted with acute mental status change, myoclonic jerks and unusual movements.     1. Chronic kidney disease, stage IIIa. Creatinine at baseline, 1.4 to 1.6 mg/dL     2.  Metabolic acidosis, wide anion gap. Resolved with bicarbonate drip    3. Cannabis on drug screen    4. Hypophosphatemia. 5. Hypernatremia, dehydration due to increased insensible losses, poor intake/input. Resolved    6.  Hypokalemia, normalized with supplement      Plan :  Creat- 1.7 mg/dl   CO2- 25- stop bicarbonate drip    Switch iv fluids to NSS at 70/hr  -remains oligouric  F/u need for RRT/HD  Monitor labs  Monitor Output  Avoid Nephrotoxins    Electronically signed by CRISTIANO Reese CNP on 3/9/2022

## 2022-03-09 NOTE — PROGRESS NOTES
[address] [place] [address] [facility]  Progress Note      Chief Complaint   Patient presents with    Altered Mental Status     sent by physician at [facility] for head CT. Pt wandering around unit, could not redirect. SUBJECTIVE:  Patient is tolerating medications. No reported adverse drug reactions. No nausea, vomiting, diarrhea. Off sedation   Levophed-stop  Afebrile 30% FiO2, PEEP of  Opens eyes to deep stimulation  MRI was nondiagnostic; LP-nondiagnostic  Other data recovered over the weekend HIV negative, hepatitis panel negative, RPR negative      Review of systems:  As stated above in the chief complaint, otherwise negative. Medications:  Scheduled Meds:   docusate  100 mg Per NG tube BID    enoxaparin  40 mg SubCUTAneous Daily    acyclovir  400 mg Oral TID    fluconazole  200 mg Oral Daily    ertapenem (INVanz) IVPB  1,000 mg IntraVENous Q24H    chlorhexidine  15 mL Mouth/Throat BID    pantoprazole  40 mg IntraVENous Daily    ipratropium-albuterol  1 ampule Inhalation Q4H WA    nystatin  5 mL Oral 4x Daily    [Held by provider] amLODIPine  5 mg Oral Daily    folic acid  1 mg Oral Daily    sucralfate  1 g Oral 4x Daily AC & HS    vitamin C  500 mg Oral TID    Vitamin D  1,000 Units Oral Daily    zinc sulfate  50 mg Oral Daily    white petrolatum   Topical BID     Continuous Infusions:   sodium chloride 70 mL/hr at 22 1000     PRN Meds:fentanNYL, gadoteridol, ondansetron, acetaminophen, cetirizine    OBJECTIVE:  /62   Pulse 109   Temp 97.9 °F (36.6 °C) (Axillary)   Resp 16   Ht 6' 2\" (1.88 m)   Wt 209 lb 10.5 oz (95.1 kg)   SpO2 94%   BMI 26.92 kg/m²   Temp  Av.8 °F (36.6 °C)  Min: 97.4 °F (36.3 °C)  Max: 97.9 °F (36.6 °C)  Constitutional: The patient is off sedation arousable and on 30% FiO2  Skin: Warm and dry. No rashes were noted. HEENT: Round and reactive pupils. Moist mucous membranes. No ulcerations or thrush. Neck: Supple to movements. Chest: No use of accessory muscles to breathe. Symmetrical expansion. No wheezing, crackles or rhonchi. Cardiovascular: S1 and S2 are rhythmic and regular. No murmurs appreciated. Abdomen: Positive bowel sounds to auscultation. Benign to palpation. No masses felt. No hepatosplenomegaly. Genitourinary: Male  Extremities: No clubbing, no cyanosis, no edema.   Lines: peripheral    Laboratory and Tests Review:  Lab Results   Component Value Date    WBC 15.8 (H) 03/09/2022    WBC 20.6 (H) 03/08/2022    WBC 16.8 (H) 03/07/2022    HGB 7.7 (L) 03/09/2022    HCT 22.7 (L) 03/09/2022    .1 (H) 03/09/2022     03/09/2022     Lab Results   Component Value Date    NEUTROABS 14.06 (H) 03/09/2022    NEUTROABS 19.78 (H) 03/08/2022    NEUTROABS 9.44 (H) 03/02/2022     No results found for: [facility]  Lab Results   Component Value Date    ALT 23 03/07/2022    AST 29 03/07/2022    ALKPHOS 105 03/07/2022    BILITOT 0.5 03/07/2022     Lab Results   Component Value Date     03/09/2022    K 4.3 03/09/2022    K 3.7 03/06/2022    CL 98 03/09/2022    CO2 25 03/09/2022    BUN 25 03/09/2022    CREATININE 1.7 03/09/2022    CREATININE 1.8 03/08/2022    CREATININE 1.8 03/08/2022    GFRAA 49 03/09/2022    LABGLOM 40 03/09/2022    GLUCOSE 150 03/09/2022    PROT 4.2 03/07/2022    LABALBU 2.4 03/07/2022    CALCIUM 6.5 03/09/2022    BILITOT 0.5 03/07/2022    ALKPHOS 105 03/07/2022    AST 29 03/07/2022    ALT 23 03/07/2022     Lab Results   Component Value Date    CRP 15.1 (H) 03/05/2022     Lab Results   Component Value Date    SEDRATE 47 (H) 03/05/2022    SEDRATE 40 (H) 03/03/2022     Radiology:  CT of the chest reviewed demonstrating patchy groundglass infiltrates; gallbladder  Monitor declined  MRI of the brain completed-nondiagnostic  Microbiology:   3/5/2022 blood cultures pending  3/5/2022 respiratory cultures pending-  3/5/2022 urine for Legionella and strep pneumo negative          Lab Results   Component Value Date    [facility] 24 Hours no growth 03/05/2022     Lab Results   Component Value Date    BLOODCULT2 24 Hours no growth 03/05/2022     No results found for: WNDABS  Smear, Respiratory   Date Value Ref Range Status   03/05/2022   Final    Moderate Polymorphonuclear leukocytes  Epithelial cells not seen  No organisms seen       No results found for: MPNEUMO, CLAMYDCU, LABLEGI, AFBCX, FUNGSM, LABFUNG  CULTURE, RESPIRATORY   Date Value Ref Range Status   03/05/2022 Oral Pharyngeal Lorraine absent  Final     No results found for: CXCATHTIP  No results found for: BFCS  No results found for: CXSURG  Urine Culture, Routine   Date Value Ref Range Status   03/02/2022 Growth not present  Final     No results found for: [facility]    ASSESSMENT:  · Change in mental status-no evidence to support t encephalitis or meningitis in a patient with myelodysplastic syndrome  · HSV and Candida esophagitis-now can be treated with orals  · Aspiration pneumonia  · Leukocytosis related to above-improved    PLAN:  · Continue acyclovir, fluconazole, p.o.    · Change [employer] to [employer]  · Check final cultures  · Discussed with ICU staff  · Monitor labs    Gemma Cummings MD  12:08 PM  3/9/2022

## 2022-03-10 NOTE — CARE COORDINATION
Reviewed chart, visited unit and spoke with resident on ICU service. Precedex has been weaned to off; having decresaed urine output, will dose IV albumin as per order. From [facility], not a bed hold. Clearly not appropriate for return at this juncture. Advised to continue to monitor for developments which would allow for more detailed discharge planning and to hold on LTAC referral at present. Rachael Abraham.  Cassandra, MSN, RN  [facility] Case Management  [phone]

## 2022-03-10 NOTE — PLAN OF CARE
Problem: Falls - Risk of:  Goal: Will remain free from falls  Description: Will remain free from falls  Outcome: Met This Shift  Goal: Absence of physical injury  Description: Absence of physical injury  Outcome: Met This Shift     Problem: Skin Integrity:  Goal: Absence of new skin breakdown  Description: Absence of new skin breakdown  Outcome: Met This Shift     Problem: Non-Violent Restraints  Goal: No harm/injury to patient while restraints in use  Outcome: Met This Shift  Goal: Patient's dignity will be maintained  Outcome: Met This Shift

## 2022-03-10 NOTE — PROGRESS NOTES
Patient seen still in icu. Difficult wean secondary to encephalopathy. vss  although a bit tachycardic. No fever. Tolerating meds and vet with good 02 sats.  Continue with current care

## 2022-03-10 NOTE — PATIENT CARE CONFERENCE
Intensive Care Daily Quality Rounding Checklist      ICU Team Members: Dr. Percell Lesches, residents, charge nurse, bedside nurse, respiratory therapy and clinical pharmacist    ICU Day #: NUMBER: 6    Intubation Date: March 5    Ventilator Day #: NUMBER: 6    Central Line Insertion Date: March 5        Day #: NUMBER: 6     Arterial Line Insertion Date: March 5      Day #: NUMBER: 6    Temporary Hemodialysis Catheter Insertion Date: n/a      Day # n/a    DVT Prophylaxis: scd's    GI Prophylaxis: protonix     Crandall Catheter Insertion Date: March 5       Day #: 6      Continued need (if yes, reason documented and discussed with physician): yes, strict I/O     Skin Issues/ Wounds and ordered treatment discussed on rounds: yes, wound care following     Goals/ Plans for the Day: Monitor labs and vitals.  Trickle TF, PSV on vent and add senakot

## 2022-03-10 NOTE — PROGRESS NOTES
[address] [place] [address] [facility]  Progress Note      Chief Complaint   Patient presents with    Altered Mental Status     sent by physician at [place] for head CT. Pt wandering around unit, could not redirect. SUBJECTIVE:  Patient is tolerating medications. No reported adverse drug reactions. No nausea, vomiting, diarrhea. Off sedation   Levophed-stop  Afebrile 30% FiO2, PEEP of  Opens eyes to deep stimulation  More alert today    Review of systems:  As stated above in the chief complaint, otherwise negative. Medications:  Scheduled Meds:   senna  1 tablet Oral Nightly    albumin human  25 g IntraVENous Q8H    bumetanide  1 mg IntraVENous Q8H    docusate  100 mg Per NG tube BID    enoxaparin  40 mg SubCUTAneous Daily    acyclovir  400 mg Oral TID    fluconazole  200 mg Oral Daily    ertapenem (INVanz) IVPB  1,000 mg IntraVENous Q24H    chlorhexidine  15 mL Mouth/Throat BID    pantoprazole  40 mg IntraVENous Daily    ipratropium-albuterol  1 ampule Inhalation Q4H WA    nystatin  5 mL Oral 4x Daily    [Held by provider] amLODIPine  5 mg Oral Daily    folic acid  1 mg Oral Daily    sucralfate  1 g Oral 4x Daily AC & HS    vitamin C  500 mg Oral TID    Vitamin D  1,000 Units Oral Daily    zinc sulfate  50 mg Oral Daily    white petrolatum   Topical BID     Continuous Infusions:   sodium chloride 70 mL/hr at 03/10/22 0647    dexmedetomidine (PRECEDEX) IV infusion Stopped (03/10/22 0000)     PRN Meds:fentanNYL, gadoteridol, ondansetron, acetaminophen, cetirizine    OBJECTIVE:  BP (!) 96/55   Pulse 101   Temp 96.8 °F (36 °C) (Axillary)   Resp 19   Ht 6' 2\" (1.88 m)   Wt 211 lb 6.4 oz (95.9 kg)   SpO2 99%   BMI 27.14 kg/m²   Temp  Av.5 °F (36.4 °C)  Min: 96.7 °F (35.9 °C)  Max: 98.4 °F (36.9 °C)  Constitutional: The patient is off sedation arousable and on 30% FiO2  Skin: Warm and dry. No rashes were noted. HEENT: Round and reactive pupils.   Moist mucous membranes. No ulcerations or thrush. Neck: Supple to movements. Chest: No use of accessory muscles to breathe. Symmetrical expansion. No wheezing, crackles or rhonchi. Cardiovascular: S1 and S2 are rhythmic and regular. No murmurs appreciated. Abdomen: Positive bowel sounds to auscultation. Benign to palpation. No masses felt. No hepatosplenomegaly. Genitourinary: Male  Extremities: No clubbing, no cyanosis, no edema.   Lines: peripheral    Laboratory and Tests Review:  Lab Results   Component Value Date    WBC 10.3 03/10/2022    WBC 15.8 (H) 03/09/2022    WBC 20.6 (H) 03/08/2022    HGB 7.4 (L) 03/10/2022    HCT 21.7 (L) 03/10/2022    .2 (H) 03/10/2022     03/10/2022     Lab Results   Component Value Date    NEUTROABS 9.27 (H) 03/10/2022    NEUTROABS 14.06 (H) 03/09/2022    NEUTROABS 19.78 (H) 03/08/2022     No results found for: [facility]  Lab Results   Component Value Date    ALT 23 03/07/2022    AST 29 03/07/2022    ALKPHOS 105 03/07/2022    BILITOT 0.5 03/07/2022     Lab Results   Component Value Date     03/10/2022    K 4.2 03/10/2022    K 3.7 03/06/2022    CL 98 03/10/2022    CO2 26 03/10/2022    BUN 27 03/10/2022    CREATININE 1.7 03/10/2022    CREATININE 1.7 03/09/2022    CREATININE 1.8 03/08/2022    GFRAA 49 03/10/2022    LABGLOM 40 03/10/2022    GLUCOSE 106 03/10/2022    PROT 4.2 03/07/2022    LABALBU 2.4 03/07/2022    CALCIUM 6.6 03/10/2022    BILITOT 0.5 03/07/2022    ALKPHOS 105 03/07/2022    AST 29 03/07/2022    ALT 23 03/07/2022     Lab Results   Component Value Date    CRP 15.1 (H) 03/05/2022     Lab Results   Component Value Date    SEDRATE 47 (H) 03/05/2022    SEDRATE 40 (H) 03/03/2022     Radiology:  CT of the chest reviewed demonstrating patchy groundglass infiltrates; gallbladder  Monitor declined  MRI of the brain completed-nondiagnostic  Microbiology:   3/5/2022 blood cultures pending  3/5/2022 respiratory cultures pending-  3/5/2022 urine for Legionella and strep pneumo negative          Lab Results   Component Value Date    [facility] 24 Hours no growth 03/05/2022     Lab Results   Component Value Date    BLOODCULT2 24 Hours no growth 03/05/2022     No results found for: WNDABS  Smear, Respiratory   Date Value Ref Range Status   03/05/2022   Final    Moderate Polymorphonuclear leukocytes  Epithelial cells not seen  No organisms seen       No results found for: MPNEUMO, CLAMYDCU, LABLEGI, AFBCX, FUNGSM, LABFUNG  CULTURE, RESPIRATORY   Date Value Ref Range Status   03/05/2022 Oral Pharyngeal Lorraine absent  Final     No results found for: CXCATHTIP  No results found for: BFCS  No results found for: CXSURG  Urine Culture, Routine   Date Value Ref Range Status   03/02/2022 Growth not present  Final     No results found for: [facility]    ASSESSMENT:  · Change in mental status-no evidence to support t encephalitis or meningitis in a patient with myelodysplastic syndrome  · HSV and Candida esophagitis-now can be treated with orals  · Aspiration pneumonia  · Leukocytosis related to above-improved    PLAN:  · Continue acyclovir, fluconazole, p.o.    · Change [employer] to [employer]  · Check final cultures  · Discussed with ICU staff  · Monitor labs    Rachelle Chilel MD  12:04 PM  3/10/2022

## 2022-03-10 NOTE — PROGRESS NOTES
Critical Care Team - Daily Progress Note         Date and time: 3/10/2022 7:44 AM  Patient's name:  Billy Wilkins  Medical Record Number: [id number]  Patient's account/billing number: [de-identified]  Patient's YOB: 1955  Age: 77 y.o. Date of Admission: 3/2/2022 10:44 AM  Length of stay during current admission: 8      Primary Care Physician: Shannon Bullock DO  ICU Attending Physician: Dr. Rika Harper    Code Status: Prior    Reason for ICU admission:   Altered mental status  Impending respiratory failure      SUBJECTIVE:     OVERNIGHT EVENTS:         3/10: Vent day 6. Patient is off Precedex per night RN. Patient also has had decreased urinary output overnight. LP 3/7 negative. This morning they will patient did have dark brown contents coming out through suction. 3/9: Vent Day 5, intubated, weaning sedation. Patient on tube feeds, had episode of emesis while being turned. Tube feeds held. 3/8: Vent day 4. Patient remains intubated and sedated on the ventilator. LP was done on 3/7. Patient experienced no problems overnight per RN. Patient is weaned off levo. Patient is still on fentanyl but this will now be ordered prn. Patient continues on acyclovir, Diflucan, Merrem, nystatin. Patient will be started on tube feeds today. 3/7: Vent Day 3. Patient remains intubated and sedated and remains on ventilator. Patient is to go for LP today by IR. Time of procedure is unknown. Patient's dvt ppx is held at this time in anticipation for procedure. MRI 3/6 negative for evidence of herpetic encephalopathy. It showed only chronic microvascular disease. Patient is off Levo. Patient continues on acyclovir, diflucan, merrem, and nystatin. 3/6: Vent Day 2. Remains intubated and sedated. Patient was hypotensive and had to start on pressors. MRI brain scheduled. 3/5: Patient admitted to the ICU for AMS and impending respiratory failure. Patient was intubated.  History significant for herpetic esophagitis ,HTN, DVT, allergic rhinitis.     CURRENT VENTILATION STATUS:     [x] Ventilator  [] BIPAP  [] Nasal Cannula [] Room Air      IF INTUBATED, ET TUBE MARKING AT LOWER LIP:     SECRETIONS  Amount:  [x] Small [] Moderate  [] Large  [] None  Color:     [] White [] Colored  [] Bloody    SEDATION:  RAAS Score:  [] Propofol gtt  [] Versed gtt  [] Fentanyl gtt [] Ativan gtt   [x] No Sedation    PARALYZED:  [x] No    [] Yes      VASOPRESSORS:  [x] No    [] Yes    If yes -   [] Levophed       [] Dopamine     [] Vasopressin       [] Dobutamine  [] Phenylephrine         [] Epinephrine    CENTRAL LINES:     [] No   [x] Yes   (Date of Insertion: 3/5 )           If yes -     [x] Right IJ     [] Left IJ [] Right Femoral [] Left Femoral                   [] Right Subclavian [] Left Subclavian       FORBES'S CATHETER:   [] No   [x] Yes  (Date of Insertion: 3/4)     URINE OUTPUT:            [] Good   [x] Low              [] Anuric      OBJECTIVE:     VITAL SIGNS:  BP (!) 96/55   Pulse 103   Temp 96.8 °F (36 °C) (Axillary)   Resp 10   Ht 6' 2\" (1.88 m)   Wt 211 lb 6.4 oz (95.9 kg)   SpO2 97%   BMI 27.14 kg/m²   Tmax over 24 hours:  Temp (24hrs), Av.9 °F (36.6 °C), Min:96.7 °F (35.9 °C), Max:99.4 °F (37.4 °C)      Patient Vitals for the past 6 hrs:   BP Temp Temp src Pulse Resp SpO2 Weight   03/10/22 0700 -- -- -- 103 10 97 % --   03/10/22 0600 -- -- -- 101 12 99 % 211 lb 6.4 oz (95.9 kg)   03/10/22 0500 -- -- -- 100 18 99 % --   03/10/22 0437 -- -- -- 100 12 100 % --   03/10/22 0400 (!) 96/55 96.8 °F (36 °C) Axillary 97 13 99 % --   03/10/22 0300 -- -- -- 97 11 100 % --   03/10/22 0200 -- -- -- 95 9 98 % --         Intake/Output Summary (Last 24 hours) at 3/10/2022 0744  Last data filed at 3/10/2022 0647  Gross per 24 hour   Intake 1107.41 ml   Output 975 ml   Net 132.41 ml     Wt Readings from Last 2 Encounters:   03/10/22 211 lb 6.4 oz (95.9 kg)   22 186 lb 12.8 oz (84.7 kg)     Body mass index is 27.14 kg/m².        PHYSICAL EXAMINATION:    General appearance - ill-appearing  Mental status - intubated, ; opens eyes but has difficulty following commands  Eyes - not examined  Ears - not examined  Nose -not examined  Mouth - not examined  Neck - supple, no significant adenopathy  Chest - clear to auscultation, no wheezes, rales or rhonchi, symmetric air entry  Heart - normal rate and regular rhythm  Abdomen - soft, nontender, nondistended, no masses or organomegaly  Neurological -intubated, sedated  Extremities - no pedal edema noted  Skin -wound present on sacrum and buttocks     Any additional physical findings: n/a    MEDICATIONS:    Scheduled Meds:   docusate  100 mg Per NG tube BID    enoxaparin  40 mg SubCUTAneous Daily    acyclovir  400 mg Oral TID    fluconazole  200 mg Oral Daily    ertapenem (INVanz) IVPB  1,000 mg IntraVENous Q24H    chlorhexidine  15 mL Mouth/Throat BID    pantoprazole  40 mg IntraVENous Daily    ipratropium-albuterol  1 ampule Inhalation Q4H WA    nystatin  5 mL Oral 4x Daily    [Held by provider] amLODIPine  5 mg Oral Daily    folic acid  1 mg Oral Daily    sucralfate  1 g Oral 4x Daily AC & HS    vitamin C  500 mg Oral TID    Vitamin D  1,000 Units Oral Daily    zinc sulfate  50 mg Oral Daily    white petrolatum   Topical BID     Continuous Infusions:   sodium chloride 70 mL/hr at 03/10/22 0647    dexmedetomidine (PRECEDEX) IV infusion Stopped (03/10/22 0000)     PRN Meds:   fentanNYL, 25 mcg, Q2H PRN  gadoteridol, 16 mL, ONCE PRN  ondansetron, 4 mg, Q6H PRN  acetaminophen, 650 mg, Q4H PRN  cetirizine, 10 mg, Daily PRN          VENT SETTINGS (Comprehensive) (if applicable):  Vent Information  $Ventilation: $Subsequent Day  Skin Assessment: Clean, dry, & intact  Equipment ID: [id number]  Vent Type: [phone]  Vent Mode: AC/VC+  Vt Ordered: 450 mL  Rate Set: 10 bmp  Peak Flow: 0 L/min  Pressure Support: 0 cmH20  FiO2 : 30 %  SpO2: 97 %  SpO2/FiO2 ratio: 323.33  Sensitivity: 3  PEEP/CPAP: 8  I Time/ I Time %: 0.9 s  Humidification Source: Heated wire  Humidification Temp: 37  Humidification Temp Measured: 37  Circuit Condensation: Drained  Additional Respiratory  Assessments  Pulse: 103  Resp: 10  SpO2: 97 %  End Tidal CO2: 26 (%)  Position: Semi-Callejas's  Humidification Source: Heated wire  Humidification Temp: 37  Circuit Condensation: Drained  Oral Care: Mouth suctioned,Mouth swabbed  Subglottic Suction Done?: Yes  Airway Type: ET  Airway Size: 8  Cuff Pressure (cm H2O): 29 cm H2O    ABGs:   Recent Labs     03/10/22  0648   PH 7.515*   PCO2 34.5*   PO2 102.0*   HCO3 27.2*   BE 4.1*   O2SAT 98.3       Laboratory findings:    Complete Blood Count:   Recent Labs     03/08/22  0540 03/09/22  0638 03/10/22  0644   WBC 20.6* 15.8* 10.3   HGB 8.1* 7.7* 7.4*   HCT 23.7* 22.7* 21.7*    142 135        Last 3 Blood Glucose:   Recent Labs     03/08/22  1700 03/09/22  0638 03/10/22  0644   GLUCOSE 136* 150* 106*        PT/INR:    Lab Results   Component Value Date    PROTIME 13.4 03/04/2022    INR 1.2 03/04/2022     PTT:    Lab Results   Component Value Date    APTT 24.3 03/04/2022       Comprehensive Metabolic Profile:   Recent Labs     03/08/22  1700 03/09/22  0638 03/10/22  0644    134 136   K 4.7 4.3 4.2    98 98   CO2 19* 25 26   BUN 22 25* 27*   CREATININE 1.8* 1.7* 1.7*   GLUCOSE 136* 150* 106*   CALCIUM 6.5* 6.5* 6.6*      Magnesium:   Lab Results   Component Value Date    MG 1.8 03/10/2022     Phosphorus:   Lab Results   Component Value Date    PHOS 3.5 03/10/2022     Ionized Calcium: No results found for: CAION     Urinalysis: No results found for: UA     Troponin: No results for input(s): TROPONINI in the last 72 hours.     Microbiology:   Culture, Blood 2   Date Value Ref Range Status   03/05/2022 24 Hours no growth  Preliminary     CULTURE, RESPIRATORY   Date Value Ref Range Status   03/05/2022 Oral Pharyngeal Lorraine absent  Final       Cultures during this admission:     Blood cultures:  [] None drawn      [x] Negative             []  Positive (Details:  )  Urine Culture:   [] None drawn      [] Negative             [x]  Positive (Details: Legionella antigen 3/5)  Sputum Culture:  [] None drawn      [x] Negative             []  Positive (Details:  )   Endotracheal aspirate: [x] None drawn      [] Negative             []  Positive (Details:  )     Other pertinent Labs:       Radiology/Imaging:     Chest Xray (3/10/2022): The 1 slight interval clearing of the hazy airspace disease within the right   upper lobe and right lower lobe. ASSESSMENT:     Principal Problem:    Altered mental status  Resolved Problems:    * No resolved hospital problems. *      PLAN:     WEAN PER PROTOCOL:  [] No   [x] Yes  [] N/A    DISCONTINUE ANY LABS:   [x] No   [] Yes    ICU PROPHYLAXIS:  Stress ulcer:  [x] PPI Agent  [] Y2Juicx [] Sucralfate  [] Other:  VTE:   [] Enoxaparin  [] Unfract. Heparin Subcut  [] EPC Cuffs    NUTRITION:  [x] NPO [x] Tube Feeding (Specify: ) [] TPN  [] PO (Diet: Diet NPO  ADULT TUBE FEEDING; Orogastric; Immune Enhancing; Continuous; 10; Yes; 10; Q 4 hours; 50; 30; Q 6 hours)    HOME MEDICATIONS RECONCILED: [] No  [x] Yes    INSULIN DRIP:   [x] No   [] Yes    CONSULTATION NEEDED:  [x] No   [] Yes    FAMILY UPDATED:    [] No   [x] Yes    TRANSFER OUT OF ICU:   [] No   [x] Yes    SYSTEMS ASSESSMENT    Neuro     Intubated day 6  Will open eyes, difficulty with commands  Fentanyl 5 mcg Q2H for pain, wean as tolerated  Patient given Might try pressure support    Respiratory     Vent day 6    Metabolic acidosis with respiratory alkalosis  Vent settings adjusted  Vent settings: AC/VC, FiO2 30, PEEP 8, rate 10, vent set 450  Likely trial spontaneous breathing today  ABG: pH 7.515, PCO2 3456, PO2 102.0, HCO3 27.2  Wean oxygen as tolerated.  Keep O2 sat 90-92%  NS 70ml/hr    Cardiovascular     Hypotension, stable  Maintain maps above 65  Hold Norvasc 5 mg daily  Continue to monitor     Gastrointestinal     Patient can be on trickle feeds  Large residual tube feeds     GI PPx  Carafate 1 g 4 times daily  Protonix 40 mg IV daily  GI following, appreciate input    Bowel Regimen  Colace 100 mg twice daily  Senna added    Renal     CKD stage IIIb, stable  Baseline creatinine 1.5  Creatinine 1.7, BUN 27  Nephrology following, appreciate input  OLIGURIC     Electrolyte Abnormalities  Off Sodium Bicarb 150 mL/hr  On NS 70 mL/hr    Infectious Disease     HSV and Candida esophagitis  Diflucan 200 mg oral Q24H (Day 5)  Acyclovir 400 mg 3 times daily PO(Day 5)  Nystatin 500,000 units 4 times daily (Day 7)  MRI negative for any findings of hepatic encephalopathy  S/p Lumbar puncture 3/7  CSF viral PCR negative 3/7  Gram stain CSF negative 3/7  VDRL CSF cx pending 3/7    Aspiration pneumonia  Invanz 1g Q24H (Day 3)  Respiratory culture 3/5 negative  Blood culture x2 negative 3/5  Legionella urine negative 3/5  Urine culture negative 3/5  Continue to follow final culture    Leukocytosis, improving  Due to infectious processes listed above    Hematology/Oncology     Chronic macrocytic anemia  Baseline hemoglobin around 11  Folic acid supplementation  Continue to monitor  Transfuse for hemoglobin < 7    Endocrine     Continue to monitor blood glucose while in hospital    Social/Spiritual/DNR/Other     Full code    Leo Glass DO, PGY-1            3/10/2022, 7:44 AM       I personally saw, examined and provided care for the patient. Radiographs, labs and medication list were reviewed by me independently. Review of Residents documentation was conducted and revisions were made as appropriate. I agree with the above documented exam, problem list and plan of care. Patient with history of EtOH, DVT hospitalized few weeks ago found to have herpes esophagitis.  Was discharged to [facility].  Returned for altered mental status.  Required intubation for airway protection     Assessment:     1. Acute respiratory failure secondary to AMS requiring mechanical ventilation  2. Metabolic encephalopathy, LP, MRI unrevealing  3. HSV and Candida esophagitis  4. Sepsis, possible aspiration pneumonia  5. Increased leukocytosis  6. FRANCISCA on CKD  7. Metabolic acidosis     Consultants: ID, GI, nephro     · Continue mechanical ventilation.  PSV trials as tolerated  · Decrease sedation, fentanyl as needed, precedex  · Continue IVF, discussed with Nephro  · High dose thiamine  · Start tube feeds  · Acyclovir, meropenem, fluconazole per ID     GI/DVT - SUP/Lovenox 40 mg    CCT excluding procedures 36 minutes    Yan Underwood DO

## 2022-03-10 NOTE — PLAN OF CARE
Problem: Falls - Risk of:  Goal: Will remain free from falls  Description: Will remain free from falls  3/10/2022 0444 by Mandie Parikh RN  Outcome: Met This Shift  3/9/2022 1859 by David Jacome RN  Outcome: Met This Shift  Goal: Absence of physical injury  Description: Absence of physical injury  3/10/2022 0444 by Earl Parikh RN  Outcome: Met This Shift  3/9/2022 1859 by David Jacome RN  Outcome: Met This Shift     Problem: Skin Integrity:  Goal: Absence of new skin breakdown  Description: Absence of new skin breakdown  3/10/2022 0444 by Earl Parikh RN  Outcome: Met This Shift  3/9/2022 1859 by David Jacome RN  Outcome: Met This Shift     Problem: Non-Violent Restraints  Goal: No harm/injury to patient while restraints in use  3/10/2022 0444 by Mandie Parikh RN  Outcome: Met This Shift  3/9/2022 1859 by David Jacome RN  Outcome: Met This Shift  Goal: Patient's dignity will be maintained  3/9/2022 1859 by David Jacome RN  Outcome: Met This Shift     Problem: Skin Integrity:  Goal: Will show no infection signs and symptoms  Description: Will show no infection signs and symptoms  Outcome: Not Met This Shift

## 2022-03-11 NOTE — PATIENT CARE CONFERENCE
Intensive Care Daily Quality Rounding Checklist      ICU Team Members: Bedside Nurse, ,  and Nursing Unit Leadership    ICU Day #: NUMBER: 7    Intubation Date: March 5    Ventilator Day #: NUMBER: 7    Central Line Insertion Date: March 5        Day #: NUMBER: 7     Arterial Line Insertion Date: March 5      Day #: NUMBER: 7    Temporary Hemodialysis Catheter Insertion Date:        Day #     DVT Prophylaxis:scd's    GI Prophylaxis:protonix    [place] Catheter Insertion Date: March 5       Day #: 7      Continued need (if yes, reason documented and discussed with physician): yes,     Skin Issues/ Wounds and ordered treatment discussed on rounds: yes    Goals/ Plans for the Day:  Monitor labs and vitals.  PSV, give bumex, 1 unit of PRBC's, repeat BMP

## 2022-03-11 NOTE — CARE COORDINATION
3/11/2022  Social Work Discharge Planning:Pt continues on vent-day 7. Pt came from [facility] -skilled stay;however, at this point not appropriate for [facility]. Pt was originally from home with spouse and was to [facility] pending bed availability-not a bed hold. Discharge plan to be determined. Electronically signed by ARUN Coleman on 3/11/2022 at 8:14 AM

## 2022-03-11 NOTE — PROGRESS NOTES
Physical Therapy    Facility/Department: [address] ICU    NAME: Maria Alejandra Dawkins  : 1955  MRN: [id number]    Date of Service: 3/11/2022    Pt with declined in medical status since PT evaluation was completed. Will discontinue PT at this time, please re-order when appropriate.    Natalia PT [id number]

## 2022-03-11 NOTE — PROGRESS NOTES
[facility] MD Jackson Puente, MD Esme Franco MD Garland Pay, MOE Groves, OBED  Progress Note    3/11/2022    SUBJECTIVE:   3/4: Off the floor. Discussed case with bedside RN, Dr. Saeed Tanner. Unable to perform diagnostic radiographic studies due to agitation, movement    3/5 transfer to the ICU intubated via ETT, ventilated. Hemodynamically stable. Sedated appears comfortable on vent. IV K-Phos infusion ordered this morning, still not delivered to nursing unit for administration. IV fluid stopped yesterday for unknown period of time, held today for 2 to 3 hours, reasons not clear though apparently IV access was not lost.    3/6: Remains critically ill. Intubated via ETT. Vent setting stable. Minimally responsive without sedation. Urine output poor over the course of today. BP rather low, though hemodynamically stable. 3/7 : seen in his room , intubated , mechanically ventilated , BP stable . UO on low side . Vent setting stable     3/8 seen earlier today , intubated fio2 30  Low UO . No pressors   D/w Dr Nathalia Vicente at bedside   3/9:  Remains critically ill. Intubated on vent,  Fio2- 30% peep-8. Remains off pressors. Urinoe output continue to be low. Minimally responsive. 3/10 : seen in his room , intubated , mechaincally ventilated fio2 30 . No pressors comfortable 2+ edema in LE     3/11 : seen in his room intubated mechanically ventiated no pressors fio2 30 peep 8 good UO in response to lasix /spa . Still with 2-3+ LE edema b/l    PROBLEM LIST:    Principal Problem:    Altered mental status  Resolved Problems:    * No resolved hospital problems. *         DIET:    Diet NPO  ADULT TUBE FEEDING; Orogastric; Immune Enhancing; Continuous; 10; Yes; 10; Q 4 hours; 50; 30;  Q 6 hours     MEDS (scheduled):    albumin human  25 g IntraVENous Q8H    furosemide  40 mg IntraVENous Q8H    [Held by provider] senna  1 tablet Oral Nightly  thiamine (VITAMIN B1) IVPB  500 mg IntraVENous Q24H    docusate  100 mg Per NG tube BID    enoxaparin  40 mg SubCUTAneous Daily    acyclovir  400 mg Oral TID    fluconazole  200 mg Oral Daily    ertapenem (INVanz) IVPB  1,000 mg IntraVENous Q24H    chlorhexidine  15 mL Mouth/Throat BID    pantoprazole  40 mg IntraVENous Daily    ipratropium-albuterol  1 ampule Inhalation Q4H WA    nystatin  5 mL Oral 4x Daily    [Held by provider] amLODIPine  5 mg Oral Daily    [Held by provider] folic acid  1 mg Oral Daily    sucralfate  1 g Oral 4x Daily AC & HS    [Held by provider] vitamin C  500 mg Oral TID    [Held by provider] Vitamin D  1,000 Units Oral Daily    [Held by provider] zinc sulfate  50 mg Oral Daily    white petrolatum   Topical BID       MEDS (infusions):   sodium chloride         MEDS (prn):  sodium chloride, fentanNYL, gadoteridol, ondansetron, acetaminophen, cetirizine    PHYSICAL EXAM:     Patient Vitals for the past 24 hrs:   Temp Temp src Pulse Resp SpO2   03/11/22 1330 98.3 °F (36.8 °C) Axillary 109 9 98 %   03/11/22 1300 -- -- 107 16 98 %   03/11/22 1203 -- -- 108 18 98 %   03/11/22 1200 98.3 °F (36.8 °C) Axillary 108 12 99 %   03/11/22 1129 -- -- 108 10 99 %   03/11/22 1117 98.6 °F (37 °C) Axillary 109 13 98 %   03/11/22 1100 -- -- 107 25 97 %   03/11/22 1051 -- -- 107 -- --   03/11/22 1048 98 °F (36.7 °C) Axillary 106 13 100 %   03/11/22 1000 -- -- 108 8 98 %   03/11/22 0904 -- -- 111 9 98 %   03/11/22 0900 98.3 °F (36.8 °C) Axillary 110 13 97 %   03/11/22 0800 98.3 °F (36.8 °C) Axillary 108 19 99 %   03/11/22 0758 -- -- 108 8 97 %   03/11/22 0700 -- -- 106 10 99 %   03/11/22 0412 -- -- -- -- 98 %   03/11/22 0013 -- -- 109 18 98 %   03/11/22 0000 98.7 °F (37.1 °C) Axillary -- -- --   03/10/22 2015 -- -- 108 22 98 %   03/10/22 2000 98.5 °F (36.9 °C) Axillary -- -- --   03/10/22 1800 -- -- 107 10 96 %   03/10/22 1700 -- -- 109 14 99 %   03/10/22 1600 -- -- 110 24 96 %   03/10/22 1517 -- -- -- 12 --   03/10/22 1514 -- -- 108 24 98 %   03/10/22 1500 -- -- 108 17 98 %   @      Intake/Output Summary (Last 24 hours) at 3/11/2022 1414  Last data filed at 3/11/2022 1330  Gross per 24 hour   Intake 3403.25 ml   Output 3325 ml   Net 78.25 ml         Wt Readings from Last 3 Encounters:   03/10/22 211 lb 6.4 oz (95.9 kg)   02/23/22 186 lb 12.8 oz (84.7 kg)   11/27/18 215 lb (97.5 kg)       Constitutional:  Intubated on vent  HEENT: NC/AT, EOMI, sclera and conjunctiva are clear and anicteric, mucus membranes moist  Neck: Trachea midline, no JVD  Cardiovascular: S1, S2 regular rhythm, no murmur,or rub  Respiratory: Coarse breath sounds throughout all lung fields no crackles, no wheeze  Gastrointestinal:  Soft, nontender, nondistended, NABS  Ext: no edema, feet warm  Skin: dry, no rash  Neuro:  Minimally responsive      DATA:    Recent Labs     03/09/22  0638 03/10/22  0644 03/11/22  0526   WBC 15.8* 10.3 8.8   HGB 7.7* 7.4* 6.3*   HCT 22.7* 21.7* 19.2*   .1* 114.2* 115.7*    135 115*     Recent Labs     03/09/22  0638 03/10/22  0644 03/11/22  0526    136 137   K 4.3 4.2 3.4*   CL 98 98 98   CO2 25 26 25   MG 1.8 1.8 1.6   PHOS 3.4 3.5 3.1   BUN 25* 27* 28*   CREATININE 1.7* 1.7* 1.6*       No results found for: LABPROT    ASSESSMENT    77 y.o. gentleman known to service, followed for FRANCISCA in January of this year. Admitted with acute mental status change, myoclonic jerks and unusual movements.     1. Chronic kidney disease, stage IIIa. Creatinine at baseline, 1.4 to 1.6 mg/dL     2.  Metabolic acidosis, wide anion gap. Resolved with bicarbonate drip    3. Cannabis on drug screen    4. Hypophosphatemia. 5. Hypernatremia, dehydration due to increased insensible losses, poor intake/input. Resolved    6.  Hypokalemia, normalized with supplement      Plan :    Cr stable/better   Edema noted     Re challenge with iv lasix/albumin   Am labs     Electronically signed by Rodolfo Meraz MD on 3/11/2022

## 2022-03-11 NOTE — PROGRESS NOTES
Critical Care Team - Daily Progress Note         Date and time: 3/11/2022 7:19 AM  Patient's name:  Ronald Van  Medical Record Number: [id number]  Patient's account/billing number: [de-identified]  Patient's YOB: 1955  Age: 77 y.o. Date of Admission: 3/2/2022 10:44 AM  Length of stay during current admission: 9      Primary Care Physician: Marleny Tatum DO  ICU Attending Physician: Dr. Juliann Ortega    Code Status: Full Code    Reason for ICU admission:   Altered mental status  Impending respiratory failure      SUBJECTIVE:     OVERNIGHT EVENTS:         3/11: Vent day #7. Per overnight RN, patient is still tolerating pressure support really well. He is off all pressors. He is opening his eyes and following commands. However patient's left side is weaker than his right. Per overnight RN patient is also very swollen and third spacing significantly. Patient still has not had a bowel movement. 3/10: Vent day 6. Patient is off Precedex per night RN. Patient also has had decreased urinary output overnight. LP 3/7 negative. This morning they will patient did have dark brown contents coming out through suction. 3/9: Vent Day 5, intubated, weaning sedation. Patient on tube feeds, had episode of emesis while being turned. Tube feeds held. 3/8: Vent day 4. Patient remains intubated and sedated on the ventilator. LP was done on 3/7. Patient experienced no problems overnight per RN. Patient is weaned off levo. Patient is still on fentanyl but this will now be ordered prn. Patient continues on acyclovir, Diflucan, Merrem, nystatin. Patient will be started on tube feeds today. 3/7: Vent Day 3. Patient remains intubated and sedated and remains on ventilator. Patient is to go for LP today by IR. Time of procedure is unknown. Patient's dvt ppx is held at this time in anticipation for procedure. MRI 3/6 negative for evidence of herpetic encephalopathy.  It showed only chronic microvascular disease. Patient is off Levo. Patient continues on acyclovir, diflucan, merrem, and nystatin. 3/6: Vent Day 2. Remains intubated and sedated. Patient was hypotensive and had to start on pressors. MRI brain scheduled. 3/5: Patient admitted to the ICU for AMS and impending respiratory failure. Patient was intubated. History significant for herpetic esophagitis ,HTN, DVT, allergic rhinitis.     CURRENT VENTILATION STATUS:     [x] Ventilator  [] BIPAP  [] Nasal Cannula [] Room Air      IF INTUBATED, ET TUBE MARKING AT LOWER LIP:     SECRETIONS  Amount:  [x] Small [] Moderate  [] Large  [] None  Color:     [] White [] Colored  [] Bloody    SEDATION:  RAAS Score:  [] Propofol gtt  [] Versed gtt  [] Fentanyl gtt [] Ativan gtt   [x] No Sedation    PARALYZED:  [x] No    [] Yes      VASOPRESSORS:  [x] No    [] Yes    If yes -   [] Levophed       [] Dopamine     [] Vasopressin       [] Dobutamine  [] Phenylephrine         [] Epinephrine    CENTRAL LINES:     [] No   [x] Yes   (Date of Insertion: 3/5 )           If yes -     [x] Right IJ     [] Left IJ [] Right Femoral [] Left Femoral                   [] Right Subclavian [] Left Subclavian       FORBES'S CATHETER:   [] No   [x] Yes  (Date of Insertion: 3/4)     URINE OUTPUT:            [] Good   [x] Low              [] Anuric      OBJECTIVE:     VITAL SIGNS:  BP (!) 96/55   Pulse 109   Temp 98.7 °F (37.1 °C) (Axillary)   Resp 18   Ht 6' 2\" (1.88 m)   Wt 211 lb 6.4 oz (95.9 kg)   SpO2 98%   BMI 27.14 kg/m²   Tmax over 24 hours:  Temp (24hrs), Av.6 °F (37 °C), Min:98.5 °F (36.9 °C), Max:98.7 °F (37.1 °C)      Patient Vitals for the past 6 hrs:   SpO2   22 0412 98 %         Intake/Output Summary (Last 24 hours) at 3/11/2022 0719  Last data filed at 3/11/2022 0600  Gross per 24 hour   Intake 2700.75 ml   Output 3100 ml   Net -399.25 ml     Wt Readings from Last 2 Encounters:   03/10/22 211 lb 6.4 oz (95.9 kg)   22 186 lb 12.8 oz (84.7 kg)     Body mass index is 27.14 kg/m².         PHYSICAL EXAMINATION:    General appearance - ill-appearing  Mental status - intubated, ; opens eyes but has difficulty following commands  Eyes - not examined  Ears - not examined  Nose -not examined  Mouth - not examined  Neck - supple, no significant adenopathy  Chest - clear to auscultation, no wheezes, rales or rhonchi, symmetric air entry  Heart - normal rate and regular rhythm  Abdomen - soft, nontender, nondistended, no masses or organomegaly  Neurological -intubated, sedated  Extremities - no pedal edema noted  Skin -wound present on sacrum and buttocks     Any additional physical findings: n/a    MEDICATIONS:    Scheduled Meds:   potassium chloride  20 mEq IntraVENous Q1H    magnesium sulfate  2,000 mg IntraVENous Once    senna  1 tablet Oral Nightly    thiamine (VITAMIN B1) IVPB  500 mg IntraVENous Q24H    docusate  100 mg Per NG tube BID    enoxaparin  40 mg SubCUTAneous Daily    acyclovir  400 mg Oral TID    fluconazole  200 mg Oral Daily    ertapenem (INVanz) IVPB  1,000 mg IntraVENous Q24H    chlorhexidine  15 mL Mouth/Throat BID    pantoprazole  40 mg IntraVENous Daily    ipratropium-albuterol  1 ampule Inhalation Q4H WA    nystatin  5 mL Oral 4x Daily    [Held by provider] amLODIPine  5 mg Oral Daily    folic acid  1 mg Oral Daily    sucralfate  1 g Oral 4x Daily AC & HS    vitamin C  500 mg Oral TID    Vitamin D  1,000 Units Oral Daily    zinc sulfate  50 mg Oral Daily    white petrolatum   Topical BID     Continuous Infusions:   sodium chloride      dexmedetomidine (PRECEDEX) IV infusion Stopped (03/10/22 0000)     PRN Meds:   sodium chloride, , PRN  fentanNYL, 25 mcg, Q2H PRN  gadoteridol, 16 mL, ONCE PRN  ondansetron, 4 mg, Q6H PRN  acetaminophen, 650 mg, Q4H PRN  cetirizine, 10 mg, Daily PRN          VENT SETTINGS (Comprehensive) (if applicable):  Vent Information  $Ventilation: $Subsequent Day  Skin Assessment: Clean, dry, & intact  Equipment ID: [id number]  Vent Type: [id number]  Vent Mode: PS  Vt Ordered: 0 mL  Rate Set: 0 bmp  Peak Flow: 0 L/min  Pressure Support: 8 cmH20  FiO2 : 30 %  SpO2: 98 %  SpO2/FiO2 ratio: 326.67  Sensitivity: 3  PEEP/CPAP: 8  I Time/ I Time %: 0 s  Humidification Source: Heated wire  Humidification Temp: 37  Humidification Temp Measured: 37  Circuit Condensation: Drained  Additional Respiratory  Assessments  Pulse: 109  Resp: 18  SpO2: 98 %  End Tidal CO2: 26 (%)  Position: Semi-Callejas's  Humidification Source: Heated wire  Humidification Temp: 37  Circuit Condensation: Drained  Oral Care: Mouth suctioned,Mouth swabbed  Subglottic Suction Done?: Yes  Airway Type: ET  Airway Size: 8  Cuff Pressure (cm H2O): 29 cm H2O    ABGs:   Recent Labs     03/11/22 0526   PH 7.521*   PCO2 36.5   PO2 85.7   HCO3 29.2*   BE 5.9*   O2SAT 97.3       Laboratory findings:    Complete Blood Count:   Recent Labs     03/09/22  0638 03/10/22  0644 03/11/22  0526   WBC 15.8* 10.3 8.8   HGB 7.7* 7.4* 6.3*   HCT 22.7* 21.7* 19.2*    135 115*        Last 3 Blood Glucose:   Recent Labs     03/09/22  0638 03/10/22  0644 03/11/22  0526   GLUCOSE 150* 106* 87        PT/INR:    Lab Results   Component Value Date    PROTIME 13.4 03/04/2022    INR 1.2 03/04/2022     PTT:    Lab Results   Component Value Date    APTT 24.3 03/04/2022       Comprehensive Metabolic Profile:   Recent Labs     03/09/22  0638 03/10/22  0644 03/11/22  0526    136 137   K 4.3 4.2 3.4*   CL 98 98 98   CO2 25 26 25   BUN 25* 27* 28*   CREATININE 1.7* 1.7* 1.6*   GLUCOSE 150* 106* 87   CALCIUM 6.5* 6.6* 7.2*      Magnesium:   Lab Results   Component Value Date    MG 1.6 03/11/2022     Phosphorus:   Lab Results   Component Value Date    PHOS 3.1 03/11/2022     Ionized Calcium: No results found for: CAION     Urinalysis: No results found for: UA     Troponin: No results for input(s): TROPONINI in the last 72 hours.     Microbiology:   Culture, Blood 2   Date Value Ref Range Status 03/05/2022 5 Days no growth  Final     CULTURE, RESPIRATORY   Date Value Ref Range Status   03/05/2022 Oral Pharyngeal Lorraine absent  Final       Cultures during this admission:     Blood cultures:  [] None drawn      [x] Negative             []  Positive (Details:  )  Urine Culture:   [] None drawn      [] Negative             [x]  Positive (Details: Legionella antigen 3/5)  Sputum Culture:  [] None drawn      [x] Negative             []  Positive (Details:  )   Endotracheal aspirate: [x] None drawn      [] Negative             []  Positive (Details:  )     Other pertinent Labs:       Radiology/Imaging:     Chest Xray (3/11/2022): Worsening bilateral airspace disease. ASSESSMENT:     Principal Problem:    Altered mental status  Resolved Problems:    * No resolved hospital problems. *      PLAN:     WEAN PER PROTOCOL:  [] No   [x] Yes  [] N/A    DISCONTINUE ANY LABS:   [x] No   [] Yes    ICU PROPHYLAXIS:  Stress ulcer:  [x] PPI Agent  [] U4Hdcww [] Sucralfate  [] Other:  VTE:   [] Enoxaparin  [] Unfract. Heparin Subcut  [] EPC Cuffs    NUTRITION:  [x] NPO [x] Tube Feeding (Specify: ) [] TPN  [] PO (Diet: Diet NPO  ADULT TUBE FEEDING; Orogastric; Immune Enhancing; Continuous; 10; Yes; 10; Q 4 hours; 50; 30;  Q 6 hours)    HOME MEDICATIONS RECONCILED: [] No  [x] Yes    INSULIN DRIP:   [x] No   [] Yes    CONSULTATION NEEDED:  [x] No   [] Yes    FAMILY UPDATED:    [] No   [x] Yes    TRANSFER OUT OF ICU:   [] No   [x] Yes    SYSTEMS ASSESSMENT    Neuro     Intubated day 7  Will open eyes, difficulty with commands  Diffuse left-sided weakness present  Fentanyl 5 mcg Q2H for pain, wean as tolerated    Metabolic Encephalopathy   Concern for potential Wernicke's   LP negative  MRI negative  High dose thiamine    Respiratory     Vent day 7    Metabolic acidosis with respiratory alkalosis, improving   Vent settings: Pressure support, FiO2 30, PEEP 8,  Likely trial spontaneous breathing today  ABG: pH 7.4, PCO2 39.4, PO2 94.2, HCO3 26  Wean oxygen as tolerated. Keep O2 sat 90-92%  NS 70ml/hr  May tolerate extubation today    Cardiovascular     Hypotension, stable  Maintain maps above 65  Hold Norvasc 5 mg daily  Continue to monitor     Gastrointestinal     Tube feeds held overnight- resume      GI PPx  Carafate 1 g 4 times daily  Protonix 40 mg IV daily  GI following, appreciate input    Bowel Regimen  Colace 100 mg twice daily  Senna daily  Consider adding MiraLAX versus increasing senna    Renal     CKD stage IIIb, stable  Baseline creatinine 1.5  Creatinine 1.6, BUN 28  Nephrology following, appreciate input    Electrolyte Abnormalities  Off Sodium Bicarb 150 mL/hr  On NS 70 mL/hr  Replete as needed  Continue to monitor    Fluid Overload/Third Spacing  Decreased urinary output  Bumex 1 mg x3 doses per Nephro  Nephro following, appreciate input    Infectious Disease     HSV and Candida esophagitis  Diflucan 200 mg oral Q24H (Day 8)  Acyclovir 400 mg 3 times daily PO(Day 9)  Nystatin 500,000 units 4 times daily (Day 9)  MRI negative for any findings of hepatic encephalopathy  S/p Lumbar puncture 3/7  CSF viral PCR negative 3/7  Gram stain CSF negative 3/7  VDRL CSF cx pending 3/7    Aspiration pneumonia  Invanz 1g Q24H (Day 4)  Respiratory culture 3/5 negative  Blood culture x2 negative 3/5  Legionella urine negative 3/5  Urine culture negative 3/5  Continue to follow final culture    Leukocytosis, improving  Due to infectious processes listed above    Hematology/Oncology     Chronic macrocytic anemia  Baseline hemoglobin around 11  1 unit RBC 6/38  Folic acid supplementation  Continue to monitor  Transfuse for hemoglobin < 7    Endocrine     Continue to monitor blood glucose while in hospital    Social/Spiritual/DNR/Other     Full code    Primo Mart DO, PGY-1            3/11/2022, 7:19 AM       I personally saw, examined and provided care for the patient.  Radiographs, labs and medication list were reviewed by me independently. Review of Residents documentation was conducted and revisions were made as appropriate. I agree with the above documented exam, problem list and plan of care. Patient with history of EtOH, DVT hospitalized few weeks ago found to have herpes esophagitis.  Was discharged to [facility]. Returned for altered mental status.  Required intubation for airway protection     Assessment:     1. Acute respiratory failure secondary to AMS requiring mechanical ventilation  2. Metabolic encephalopathy, LP, MRI unrevealing. ?etoh  3. HSV and Candida esophagitis  4. Sepsis, possible aspiration pneumonia  5. Increased leukocytosis  6. FRANCISCA on CKD  7. anemia     Consultants: ID, GI, nephro     · Continue mechanical ventilation.  PSV trials as tolerated  · Decrease sedation, fentanyl as needed, precedex  · Continue IVF, discussed with Nephro  · High dose thiamine  · transfuse 1 unit pRBC  · Start tube feeds  · Acyclovir, meropenem, fluconazole per ID     GI/DVT - SUP/SCDs, hold pharm for anemia    CCT excluding procedures 35 minutes    Marj Pink, DO

## 2022-03-11 NOTE — PROGRESS NOTES
Patient seen still in icu on vent. vss afebrile. Difficult wean but seems to be a liitle better and considering  Trying to extubate. Tolerating meds . given  High dose thiamine  To help mental status. Renal function remains stable. H/  Low , will likely transfuse . continue to monitor in the icu

## 2022-03-11 NOTE — PROGRESS NOTES
[address] [place] [address] [facility]  Progress Note      Chief Complaint   Patient presents with    Altered Mental Status     sent by physician at [facility] for head CT. Pt wandering around unit, could not redirect. SUBJECTIVE:  Patient is tolerating medications. No reported adverse drug reactions. No nausea, vomiting, diarrhea. Off sedation   Levophed-stop  Afebrile 30% FiO2, PEEP of  Opens eyes to deep stimulation  More alert today  Patient has developed an ileus and tube feedings are on hold  Review of systems:  As stated above in the chief complaint, otherwise negative.     Medications:  Scheduled Meds:   albumin human  25 g IntraVENous Q8H    furosemide  40 mg IntraVENous Q8H    [Held by provider] senna  1 tablet Oral Nightly    thiamine (VITAMIN B1) IVPB  500 mg IntraVENous Q24H    docusate  100 mg Per NG tube BID    enoxaparin  40 mg SubCUTAneous Daily    acyclovir  400 mg Oral TID    fluconazole  200 mg Oral Daily    ertapenem (INVanz) IVPB  1,000 mg IntraVENous Q24H    chlorhexidine  15 mL Mouth/Throat BID    pantoprazole  40 mg IntraVENous Daily    ipratropium-albuterol  1 ampule Inhalation Q4H WA    nystatin  5 mL Oral 4x Daily    [Held by provider] amLODIPine  5 mg Oral Daily    [Held by provider] folic acid  1 mg Oral Daily    sucralfate  1 g Oral 4x Daily AC & HS    [Held by provider] vitamin C  500 mg Oral TID    [Held by provider] Vitamin D  1,000 Units Oral Daily    [Held by provider] zinc sulfate  50 mg Oral Daily    white petrolatum   Topical BID     Continuous Infusions:   sodium chloride       PRN Meds:sodium chloride, fentanNYL, gadoteridol, ondansetron, acetaminophen, cetirizine    OBJECTIVE:  BP (!) 96/55   Pulse 109   Temp 98.3 °F (36.8 °C) (Axillary)   Resp 9   Ht 6' 2\" (1.88 m)   Wt 211 lb 6.4 oz (95.9 kg)   SpO2 98%   BMI 27.14 kg/m²   Temp  Av.4 °F (36.9 °C)  Min: 98 °F (36.7 °C)  Max: 98.7 °F (37.1 °C)  Constitutional: The patient is off sedation arousable and on 30% FiO2  Skin: Warm and dry. No rashes were noted. HEENT: Round and reactive pupils. Moist mucous membranes. No ulcerations or thrush. Neck: Supple to movements. Chest: No use of accessory muscles to breathe. Symmetrical expansion. No wheezing, crackles or rhonchi. Cardiovascular: S1 and S2 are rhythmic and regular. No murmurs appreciated. Abdomen: Positive bowel sounds to auscultation. Benign to palpation. No masses felt. No hepatosplenomegaly. Genitourinary: Male  Extremities: No clubbing, no cyanosis, no edema.   Lines: peripheral    Laboratory and Tests Review:  Lab Results   Component Value Date    WBC 8.8 03/11/2022    WBC 10.3 03/10/2022    WBC 15.8 (H) 03/09/2022    HGB 6.3 (LL) 03/11/2022    HCT 19.2 (L) 03/11/2022    .7 (H) 03/11/2022     (L) 03/11/2022     Lab Results   Component Value Date    NEUTROABS 7.48 (H) 03/11/2022    NEUTROABS 9.27 (H) 03/10/2022    NEUTROABS 14.06 (H) 03/09/2022     No results found for: [facility]  Lab Results   Component Value Date    ALT 23 03/07/2022    AST 29 03/07/2022    ALKPHOS 105 03/07/2022    BILITOT 0.5 03/07/2022     Lab Results   Component Value Date     03/11/2022    K 3.4 03/11/2022    K 3.7 03/06/2022    CL 98 03/11/2022    CO2 25 03/11/2022    BUN 28 03/11/2022    CREATININE 1.6 03/11/2022    CREATININE 1.7 03/10/2022    CREATININE 1.7 03/09/2022    GFRAA 52 03/11/2022    LABGLOM 43 03/11/2022    GLUCOSE 87 03/11/2022    PROT 4.2 03/07/2022    LABALBU 2.4 03/07/2022    CALCIUM 7.2 03/11/2022    BILITOT 0.5 03/07/2022    ALKPHOS 105 03/07/2022    AST 29 03/07/2022    ALT 23 03/07/2022     Lab Results   Component Value Date    CRP 15.1 (H) 03/05/2022     Lab Results   Component Value Date    SEDRATE 47 (H) 03/05/2022    SEDRATE 40 (H) 03/03/2022     Radiology:  CT of the chest reviewed demonstrating patchy groundglass infiltrates; gallbladder  Monitor declined  MRI of the brain completed-nondiagnostic  Microbiology:   3/5/2022 blood cultures pending  3/5/2022 respiratory cultures pending-  3/5/2022 urine for Legionella and strep pneumo negative          Lab Results   Component Value Date    BC 5 Days no growth 03/05/2022     Lab Results   Component Value Date    BLOODCULT2 5 Days no growth 03/05/2022     No results found for: WNDABS  Smear, Respiratory   Date Value Ref Range Status   03/05/2022   Final    Moderate Polymorphonuclear leukocytes  Epithelial cells not seen  No organisms seen           Component Value Date/Time    FUNGSM No Fungal elements seen 03/07/2022 1403     CULTURE, RESPIRATORY   Date Value Ref Range Status   03/05/2022 Oral Pharyngeal Lorraine absent  Final     No results found for: CXCATHTIP  No results found for: BFCS  No results found for: CXSURG  Urine Culture, Routine   Date Value Ref Range Status   03/02/2022 Growth not present  Final     No results found for: [facility]    ASSESSMENT:  · Change in mental status-no evidence to support t encephalitis or meningitis in a patient with myelodysplastic syndrome  · HSV and Candida esophagitis-now can be treated with orals  · Aspiration pneumonia  · Leukocytosis related to above-improved    PLAN:  · N.p.o.   · Stop Invanz-day 7  · Stop acyclovir  · Change Diflucan to IV  · Check final cultures  · Discussed with ICU staff  · Josse Curran MD  2:40 PM  3/11/2022

## 2022-03-12 NOTE — PROGRESS NOTES
Critical Care Team - Daily Progress Note         Date and time: 3/12/2022 12:08 PM  Patient's name:  Isha Emerson  Medical Record Number: [id number]  Patient's account/billing number: [de-identified]  Patient's YOB: 1955  Age: 77 y.o. Date of Admission: 3/2/2022 10:44 AM  Length of stay during current admission: 10      Primary Care Physician: Jacqueline Jerez DO  ICU Attending Physician: Lana Elena DO    Code Status: Full Code    Reason for ICU admission:     Patient with history of EtOH, DVT hospitalized few weeks ago found to have herpes esophagitis.  Was [facility]. Returned for altered mental status.  Required intubation for airway protection      SUBJECTIVE     Patient more alert today and following intermittent commands. OBJECTIVE     Vital signs:   height is 6' 2\" (1.88 m) and weight is 211 lb 6.4 oz (95.9 kg). His axillary temperature is 98 °F (36.7 °C). His blood pressure is 133/67 and his pulse is 108. His respiration is 15 and oxygen saturation is 100%. I/O last 3 completed shifts: In: 1922.8 [I.V.:512.1;  Blood:652.5; NG/GT:50; IV Piggyback:708.2]  Out: 8899 [Urine:5725; Emesis/NG output:150]      PHYSICAL EXAM:    Physical Exam      MEDICATIONS:  Scheduled Meds:   magnesium sulfate  1,000 mg IntraVENous Once    albumin human  25 g IntraVENous Q8H    furosemide  40 mg IntraVENous Q8H    fluconazole  200 mg IntraVENous Q24H    [Held by provider] senna  1 tablet Oral Nightly    thiamine (VITAMIN B1) IVPB  500 mg IntraVENous Q24H    docusate  100 mg Per NG tube BID    enoxaparin  40 mg SubCUTAneous Daily    chlorhexidine  15 mL Mouth/Throat BID    pantoprazole  40 mg IntraVENous Daily    ipratropium-albuterol  1 ampule Inhalation Q4H WA    nystatin  5 mL Oral 4x Daily    [Held by provider] amLODIPine  5 mg Oral Daily    [Held by provider] folic acid  1 mg Oral Daily    sucralfate  1 g Oral 4x Daily AC & HS    [Held by provider] vitamin C  500 mg Oral TID    [Held by provider] Vitamin D  1,000 Units Oral Daily    [Held by provider] zinc sulfate  50 mg Oral Daily    white petrolatum   Topical BID     Continuous Infusions:   sodium chloride       PRN Meds:sodium chloride, fentanNYL, gadoteridol, ondansetron, acetaminophen, cetirizine    PERTINENT LAB RESULTS:  Labs reviewed. DIAGNOSTICS:  Imaging reviewed. ASSESMENT/PLAN     Assessment:     1. Acute respiratory failure secondary to AMS requiring mechanical ventilation  2. Metabolic encephalopathy, LP, MRI unrevealing. ?etoh related, Warnicke's  3. HSV and Candida esophagitis  4. Sepsis, possible aspiration pneumonia completed course of antibiotic with ertapenem  5. FRANCISCA on CKD  6. anemia     Consultants: ID, GI, nephro     · Continue mechanical ventilation. PSV trials as tolerated  · Decrease sedation, fentanyl as needed, precedex  · Diuresing well, discussed with nephro. Continue further diuresis.   · High dose thiamine 500 mg a day for x3 days then to 50 mg  · transfuse 1 unit pRBC  · Start tube feeds  · Acyclovir, fluconazole per ID     GI/DVT - SUP/SCDs, hold pharm for anemia        Code Status: Full Code    CCT excluding procedures 33 minutes    ELECTRONICALLY SIGNED:  Lovette Bosworth, DO

## 2022-03-12 NOTE — PROGRESS NOTES
[facility] MD Darian Allen, MD Hiral Ro, MD Vonnie Mcclain, MD Janie Merino, MOE Groves, OBED  Progress Note    3/12/2022    SUBJECTIVE:   3/4: Off the floor. Discussed case with bedside RN, Dr. Joana Awad. Unable to perform diagnostic radiographic studies due to agitation, movement    3/5 transfer to the ICU intubated via ETT, ventilated. Hemodynamically stable. Sedated appears comfortable on vent. IV K-Phos infusion ordered this morning, still not delivered to nursing unit for administration. IV fluid stopped yesterday for unknown period of time, held today for 2 to 3 hours, reasons not clear though apparently IV access was not lost.    3/6: Remains critically ill. Intubated via ETT. Vent setting stable. Minimally responsive without sedation. Urine output poor over the course of today. BP rather low, though hemodynamically stable. 3/7 : seen in his room , intubated , mechanically ventilated , BP stable . UO on low side . Vent setting stable     3/8 seen earlier today , intubated fio2 30  Low UO . No pressors   D/w Dr Anastacio Jordan at bedside   3/9:  Remains critically ill. Intubated on vent,  Fio2- 30% peep-8. Remains off pressors. Urinoe output continue to be low. Minimally responsive. 3/10 : seen in his room , intubated , mechaincally ventilated fio2 30 . No pressors comfortable 2+ edema in LE     3/11 : seen in his room intubated mechanically ventiated no pressors fio2 30 peep 8 good UO in response to lasix /spa . Still with 2-3+ LE edema b/l      3/12 : seen in his room , intubated mechanically ventilated . Good UO , fio2 30 peep 8   Awake . Still very weak   D/w Dr Anastacio Jordan at bedside      PROBLEM LIST:    Principal Problem:    Altered mental status  Resolved Problems:    * No resolved hospital problems. *         DIET:    Diet NPO  ADULT TUBE FEEDING; Orogastric; Immune Enhancing; Continuous; 10; Yes; 10; Q 4 hours; 50; 30;  Q 6 hours MEDS (scheduled):    magnesium sulfate  1,000 mg IntraVENous Once    potassium chloride  20 mEq IntraVENous Q1H    albumin human  25 g IntraVENous Q8H    furosemide  40 mg IntraVENous Q8H    fluconazole  200 mg IntraVENous Q24H    [Held by provider] senna  1 tablet Oral Nightly    thiamine (VITAMIN B1) IVPB  500 mg IntraVENous Q24H    docusate  100 mg Per NG tube BID    enoxaparin  40 mg SubCUTAneous Daily    chlorhexidine  15 mL Mouth/Throat BID    pantoprazole  40 mg IntraVENous Daily    ipratropium-albuterol  1 ampule Inhalation Q4H WA    nystatin  5 mL Oral 4x Daily    [Held by provider] amLODIPine  5 mg Oral Daily    [Held by provider] folic acid  1 mg Oral Daily    sucralfate  1 g Oral 4x Daily AC & HS    [Held by provider] vitamin C  500 mg Oral TID    [Held by provider] Vitamin D  1,000 Units Oral Daily    [Held by provider] zinc sulfate  50 mg Oral Daily    white petrolatum   Topical BID       MEDS (infusions):   sodium chloride         MEDS (prn):  sodium chloride, fentanNYL, gadoteridol, ondansetron, acetaminophen, cetirizine    PHYSICAL EXAM:     Patient Vitals for the past 24 hrs:   BP Temp Temp src Pulse Resp SpO2   03/12/22 1100 -- 97.9 °F (36.6 °C) Axillary 105 24 98 %   03/12/22 1045 -- -- -- 114 21 99 %   03/12/22 1030 -- -- -- 111 21 --   03/12/22 1005 126/67 98 °F (36.7 °C) Axillary 112 24 99 %   03/12/22 0833 -- -- -- 105 12 99 %   03/12/22 0800 132/62 98.1 °F (36.7 °C) Axillary -- -- 100 %   03/12/22 0449 -- -- -- 110 13 100 %   03/12/22 0400 -- 98.7 °F (37.1 °C) Axillary -- -- --   03/12/22 0130 -- -- -- 114 26 100 %   03/12/22 0037 -- -- -- 112 22 100 %   03/12/22 0030 -- -- -- 112 23 100 %   03/12/22 0000 -- 98.5 °F (36.9 °C) Axillary -- -- --   03/11/22 2330 -- -- -- 110 25 100 %   03/11/22 2230 -- -- -- 113 30 100 %   03/11/22 2130 -- -- -- 119 18 100 %   03/11/22 2058 -- -- -- -- 23 100 %   03/11/22 2030 -- -- -- 119 28 100 %   03/11/22 2024 -- -- -- -- 21 100 % 03/11/22 2023 -- -- -- 114 14 100 %   03/11/22 2000 -- 98.1 °F (36.7 °C) Oral -- -- --   03/11/22 1930 -- -- -- 112 21 100 %   03/11/22 1700 -- -- -- 112 8 100 %   03/11/22 1630 -- -- -- -- 8 99 %   03/11/22 1629 -- -- -- 104 16 99 %   03/11/22 1600 -- 98.6 °F (37 °C) Axillary 104 23 98 %   03/11/22 1500 -- -- -- 109 13 99 %   03/11/22 1400 -- -- -- 107 12 100 %   03/11/22 1330 -- 98.3 °F (36.8 °C) Axillary 109 9 98 %   03/11/22 1300 -- -- -- 107 16 98 %   03/11/22 1203 -- -- -- 108 18 98 %   03/11/22 1200 -- 98.3 °F (36.8 °C) Axillary 108 12 99 %   03/11/22 1129 -- -- -- 108 10 99 %   @      Intake/Output Summary (Last 24 hours) at 3/12/2022 1118  Last data filed at 3/12/2022 1005  Gross per 24 hour   Intake 1337.72 ml   Output 5200 ml   Net -3862.28 ml         Wt Readings from Last 3 Encounters:   03/10/22 211 lb 6.4 oz (95.9 kg)   02/23/22 186 lb 12.8 oz (84.7 kg)   11/27/18 215 lb (97.5 kg)       Constitutional:  Intubated on vent  HEENT: NC/AT, EOMI, sclera and conjunctiva are clear and anicteric, mucus membranes moist  Neck: Trachea midline, no JVD  Cardiovascular: S1, S2 regular rhythm, no murmur,or rub  Respiratory: Coarse breath sounds throughout all lung fields no crackles, no wheeze  Gastrointestinal:  Soft, nontender, nondistended, NABS  Ext: no edema, feet warm  Skin: dry, no rash  Neuro:  Minimally responsive      DATA:    Recent Labs     03/10/22  0644 03/10/22  0644 03/11/22  0526 03/11/22  1605 03/12/22  0350   WBC 10.3  --  8.8  --  9.7   HGB 7.4*   < > 6.3* 7.2* 7.0*   HCT 21.7*   < > 19.2* 21.8* 20.8*   .2*  --  115.7*  --  107.8*     --  115*  --  126*    < > = values in this interval not displayed.      Recent Labs     03/10/22  0644 03/10/22  0644 03/11/22  0526 03/11/22  0526 03/11/22  1605 03/11/22  2000 03/12/22  0350      < > 137  --  139  --  139   K 4.2   < > 3.4*   < > 3.8 3.8 3.6   CL 98   < > 98  --  99  --  98   CO2 26   < > 25  --  25  --  25   MG 1.8  --  1.6 --   --   --  1.9   PHOS 3.5  --  3.1  --   --   --  2.9   BUN 27*   < > 28*  --  26*  --  26*   CREATININE 1.7*   < > 1.6*  --  1.5*  --  1.6*   ALT  --   --   --   --   --   --  15   AST  --   --   --   --   --   --  49*   BILITOT  --   --   --   --   --   --  1.1   ALKPHOS  --   --   --   --   --   --  85    < > = values in this interval not displayed. No results found for: LABPROT    ASSESSMENT    77 y.o. gentleman known to service, followed for FRANCISCA in January of this year. Admitted with acute mental status change, myoclonic jerks and unusual movements.     1. Chronic kidney disease, stage IIIa. Creatinine at baseline, 1.4 to 1.6 mg/dL     2.  Metabolic acidosis, wide anion gap. Resolved with bicarbonate drip    3. Cannabis on drug screen    4. Hypophosphatemia. 5. Hypernatremia, dehydration due to increased insensible losses, poor intake/input. Resolved    6.  Hypokalemia, normalized with supplement      Plan :    Cr stable/better   Good UO    Continue lasix 40 iv q8hr along with SPA   F/u UO , BMP   D/w Dr Trisha Toddor at bedside    Electronically signed by Jessica Jacinto MD on 3/12/2022

## 2022-03-12 NOTE — PROGRESS NOTES
[address] [place] [address] [facility]  Progress Note      Chief Complaint   Patient presents with    Altered Mental Status     sent by physician at [facility] for head CT. Pt wandering around unit, could not redirect. SUBJECTIVE:  The patient is still in the ICU. He is still on the ventilator. He is tolerating antibiotics. No fevers. FiO2 30%. PEEP 8. Oral medications are still on hold. Review of systems:  As stated above in the chief complaint, otherwise negative. Medications:  Scheduled Meds:   magnesium sulfate  1,000 mg IntraVENous Once    potassium chloride  20 mEq IntraVENous Q1H    furosemide  40 mg IntraVENous Q8H    fluconazole  200 mg IntraVENous Q24H    [Held by provider] senna  1 tablet Oral Nightly    thiamine (VITAMIN B1) IVPB  500 mg IntraVENous Q24H    docusate  100 mg Per NG tube BID    enoxaparin  40 mg SubCUTAneous Daily    chlorhexidine  15 mL Mouth/Throat BID    pantoprazole  40 mg IntraVENous Daily    ipratropium-albuterol  1 ampule Inhalation Q4H WA    nystatin  5 mL Oral 4x Daily    [Held by provider] amLODIPine  5 mg Oral Daily    [Held by provider] folic acid  1 mg Oral Daily    sucralfate  1 g Oral 4x Daily AC & HS    [Held by provider] vitamin C  500 mg Oral TID    [Held by provider] Vitamin D  1,000 Units Oral Daily    [Held by provider] zinc sulfate  50 mg Oral Daily    white petrolatum   Topical BID     Continuous Infusions:   sodium chloride       PRN Meds:sodium chloride, fentanNYL, gadoteridol, ondansetron, acetaminophen, cetirizine    OBJECTIVE:  /67   Pulse 111   Temp 98 °F (36.7 °C) (Axillary)   Resp 21   Ht 6' 2\" (1.88 m)   Wt 211 lb 6.4 oz (95.9 kg)   SpO2 99%   BMI 27.14 kg/m²   Temp  Av.3 °F (36.8 °C)  Min: 98 °F (36.7 °C)  Max: 98.7 °F (37.1 °C)  Constitutional: The patient is lying in bed in the ICU. He is intubated. Opens eyes. Does not communicate. Skin: Warm and dry. No rashes were noted.    HEENT: Round and reactive pupils. Moist mucous membranes. No ulcerations or thrush. ET tube. OG tube to suction. Neck: Supple to movements. Chest: No respiratory distress. No crackles. Cardiovascular: Heart sounds rhythmic, regular and tachycardic. Abdomen: Diminished bowel sounds to auscultation. Benign to palpation. No masses felt. Genitourinary: Male  Extremities: Minimal edema. Lines: Right femoral arterial line 3/5/2022. Right IJ TLC 3/5/2022. Crandall catheter.     Laboratory and Tests Review:  Lab Results   Component Value Date    WBC 9.7 03/12/2022    WBC 8.8 03/11/2022    WBC 10.3 03/10/2022    HGB 7.0 (L) 03/12/2022    HCT 20.8 (L) 03/12/2022    .8 (H) 03/12/2022     (L) 03/12/2022     Lab Results   Component Value Date    NEUTROABS 7.52 (H) 03/12/2022    NEUTROABS 7.48 (H) 03/11/2022    NEUTROABS 9.27 (H) 03/10/2022     No results found for: CRP  Lab Results   Component Value Date    ALT 15 03/12/2022    AST 49 (H) 03/12/2022    ALKPHOS 85 03/12/2022    BILITOT 1.1 03/12/2022     Lab Results   Component Value Date     03/12/2022    K 3.6 03/12/2022    K 3.7 03/06/2022    CL 98 03/12/2022    CO2 25 03/12/2022    BUN 26 03/12/2022    CREATININE 1.6 03/12/2022    CREATININE 1.5 03/11/2022    CREATININE 1.6 03/11/2022    GFRAA 52 03/12/2022    LABGLOM 43 03/12/2022    GLUCOSE 76 03/12/2022    PROT 5.0 03/12/2022    LABALBU 3.2 03/12/2022    CALCIUM 8.4 03/12/2022    BILITOT 1.1 03/12/2022    ALKPHOS 85 03/12/2022    AST 49 03/12/2022    ALT 15 03/12/2022     Lab Results   Component Value Date    CRP 15.1 (H) 03/05/2022     Lab Results   Component Value Date    SEDRATE 47 (H) 03/05/2022    SEDRATE 40 (H) 03/03/2022     Radiology:  CT of the chest reviewed demonstrating patchy groundglass infiltrates; gallbladder  Monitor declined  MRI of the brain completed-nondiagnostic    Microbiology:   3/2/2022: Urine culture negative  3/5/2022 blood cultures negative x2  3/5/2022 respiratory cultures OP abdullahi absent  3/5/2022 urine for Legionella and strep pneumo negative  3/7/2022: Respiratory panel negative  3/7/2022: CSF negative. VDRL nonreactive    ASSESSMENT:  · Change in mental status. There was no evidence to support encephalitis or meningitis in a patient with myelodysplastic syndrome  · Candida esophagitis  · Aspiration pneumonia.   Completed 7 days of Ertapenem  · Leukocytosis related to above-improved    PLAN:  · N.p.o.   · Continue IV Fluconazole  · Check final cultures  · Discussed with ICU staff  · We will follow with you    Amie Cornejo MD  10:32 AM  3/12/2022

## 2022-03-13 NOTE — PROGRESS NOTES
ventilator. Patient is to go for LP today by IR. Time of procedure is unknown. Patient's dvt ppx is held at this time in anticipation for procedure. MRI 3/6 negative for evidence of herpetic encephalopathy. It showed only chronic microvascular disease. Patient is off Levo. Patient continues on acyclovir, diflucan, merrem, and nystatin. 3/6: Vent Day 2. Remains intubated and sedated. Patient was hypotensive and had to start on pressors. MRI brain scheduled. 3/5: Patient admitted to the ICU for AMS and impending respiratory failure. Patient was intubated. History significant for herpetic esophagitis ,HTN, DVT, allergic rhinitis.     CURRENT VENTILATION STATUS:     [x] Ventilator  [] BIPAP  [] Nasal Cannula [] Room Air      IF INTUBATED, ET TUBE MARKING AT LOWER LIP:     SECRETIONS  Amount:  [x] Small [] Moderate  [] Large  [] None  Color:     [] White [] Colored  [] Bloody    SEDATION:  RAAS Score:  [] Propofol gtt  [] Versed gtt  [] Fentanyl gtt [] Ativan gtt   [x] No Sedation    PARALYZED:  [x] No    [] Yes      VASOPRESSORS:  [x] No    [] Yes    If yes -   [] Levophed       [] Dopamine     [] Vasopressin       [] Dobutamine  [] Phenylephrine         [] Epinephrine    CENTRAL LINES:     [] No   [x] Yes   (Date of Insertion: 3/5 )           If yes -     [x] Right IJ     [] Left IJ [] Right Femoral [] Left Femoral                   [] Right Subclavian [] Left Subclavian       FORBES'S CATHETER:   [] No   [x] Yes  (Date of Insertion: 3/4)     URINE OUTPUT:            [] Good   [x] Low              [] Anuric      OBJECTIVE:     VITAL SIGNS:  /65   Pulse 108   Temp 98.6 °F (37 °C) (Axillary)   Resp 10   Ht 6' 2\" (1.88 m)   Wt 211 lb 6.4 oz (95.9 kg)   SpO2 96%   BMI 27.14 kg/m²   Tmax over 24 hours:  Temp (24hrs), Av.1 °F (36.7 °C), Min:97.9 °F (36.6 °C), Max:98.6 °F (37 °C)      Patient Vitals for the past 6 hrs:   Temp Temp src Pulse Resp SpO2   22 0840 -- -- 108 10 96 %   22 0530 -- -- 111 15 97 %   03/13/22 0430 -- -- 112 15 --   03/13/22 0400 98.6 °F (37 °C) Axillary -- -- --   03/13/22 0354 -- -- 111 14 99 %   03/13/22 0330 -- -- 111 24 99 %         Intake/Output Summary (Last 24 hours) at 3/13/2022 0850  Last data filed at 3/13/2022 0610  Gross per 24 hour   Intake 1095.58 ml   Output 5050 ml   Net -3954.42 ml     Wt Readings from Last 2 Encounters:   03/10/22 211 lb 6.4 oz (95.9 kg)   02/23/22 186 lb 12.8 oz (84.7 kg)     Body mass index is 27.14 kg/m².         PHYSICAL EXAMINATION:    General appearance - ill-appearing  Mental status - intubated, ; opens eyes but has difficulty following commands  Eyes - not examined  Ears - not examined  Nose -not examined  Mouth - not examined  Neck - supple, no significant adenopathy  Chest - clear to auscultation, no wheezes, rales or rhonchi, symmetric air entry  Heart - normal rate and regular rhythm  Abdomen - soft, nontender, nondistended, no masses or organomegaly  Neurological -intubated, sedated  Extremities - no pedal edema noted  Skin -wound present on sacrum and buttocks     Any additional physical findings: n/a    MEDICATIONS:    Scheduled Meds:   thiamine (VITAMIN B1) IVPB  250 mg IntraVENous Q24H    furosemide  40 mg IntraVENous Q8H    fluconazole  200 mg IntraVENous Q24H    [Held by provider] senna  1 tablet Oral Nightly    docusate  100 mg Per NG tube BID    enoxaparin  40 mg SubCUTAneous Daily    chlorhexidine  15 mL Mouth/Throat BID    pantoprazole  40 mg IntraVENous Daily    ipratropium-albuterol  1 ampule Inhalation Q4H WA    nystatin  5 mL Oral 4x Daily    [Held by provider] amLODIPine  5 mg Oral Daily    [Held by provider] folic acid  1 mg Oral Daily    sucralfate  1 g Oral 4x Daily AC & HS    [Held by provider] vitamin C  500 mg Oral TID    [Held by provider] Vitamin D  1,000 Units Oral Daily    [Held by provider] zinc sulfate  50 mg Oral Daily    white petrolatum   Topical BID     Continuous Infusions:   sodium chloride       PRN Meds:   sodium chloride, , PRN  fentanNYL, 25 mcg, Q2H PRN  gadoteridol, 16 mL, ONCE PRN  ondansetron, 4 mg, Q6H PRN  acetaminophen, 650 mg, Q4H PRN  cetirizine, 10 mg, Daily PRN          VENT SETTINGS (Comprehensive) (if applicable):  Vent Information  $Ventilation: $Subsequent Day  Skin Assessment: Clean, dry, & intact  Equipment ID: [id number]  Vent Type: [id number]  Vent Mode: PS  Vt Ordered: 0 mL  Rate Set: 0 bmp  Peak Flow: 0 L/min  Pressure Support: 8 cmH20  FiO2 : 30 %  SpO2: 96 %  SpO2/FiO2 ratio: 320  Sensitivity: 3  PEEP/CPAP: 8  I Time/ I Time %: 0 s  Humidification Source: Heated wire  Humidification Temp: 37  Humidification Temp Measured: 36.4  Circuit Condensation: Drained  Additional Respiratory  Assessments  Pulse: 108  Resp: 10  SpO2: 96 %  End Tidal CO2: 26 (%)  Position: Semi-Callejas's  Humidification Source: Heated wire  Humidification Temp: 37  Circuit Condensation: Drained  Oral Care: Mouth suctioned,Mouth swabbed  Subglottic Suction Done?: Yes  Airway Type: ET  Airway Size: 8  Cuff Pressure (cm H2O): 29 cm H2O    ABGs:   Recent Labs     03/13/22 0526   PH 7.511*   PCO2 36.2   PO2 79.8   HCO3 28.3*   BE 5.0*   O2SAT 96.5       Laboratory findings:    Complete Blood Count:   Recent Labs     03/11/22  0526 03/11/22  1605 03/12/22  0350 03/12/22  1615 03/13/22  0349   WBC 8.8  --  9.7  --  9.9   HGB 6.3*   < > 7.0* 7.7* 7.5*   HCT 19.2*   < > 20.8* 22.9* 21.9*   *  --  126*  --  108*    < > = values in this interval not displayed.         Last 3 Blood Glucose:   Recent Labs     03/11/22  1605 03/12/22  0350 03/13/22  0349   GLUCOSE 77 76 77        PT/INR:    Lab Results   Component Value Date    PROTIME 13.4 03/04/2022    INR 1.2 03/04/2022     PTT:    Lab Results   Component Value Date    APTT 24.3 03/04/2022       Comprehensive Metabolic Profile:   Recent Labs     03/11/22  1605 03/11/22  1605 03/11/22  2000 03/12/22  0350 03/12/22  0350 03/13/22  0349     --   --  139 --  139   K 3.8   < > 3.8 3.6  --  3.2*   CL 99  --   --  98  --  95*   CO2 25  --   --  25  --  28   BUN 26*  --   --  26*  --  25*   CREATININE 1.5*  --   --  1.6*  --  1.6*   GLUCOSE 77  --   --  76  --  77   CALCIUM 7.6*  --   --  8.4*  --  9.1   PROT  --   --   --  5.0*   < > 5.4*   LABALBU  --   --   --  3.2*   < > 3.5   BILITOT  --   --   --  1.1   < > 1.3*   ALKPHOS  --   --   --  85   < > 79   AST  --   --   --  49*   < > 43*   ALT  --   --   --  15   < > 15    < > = values in this interval not displayed. Magnesium:   Lab Results   Component Value Date    MG 1.7 03/13/2022     Phosphorus:   Lab Results   Component Value Date    PHOS 2.9 03/13/2022     Ionized Calcium: No results found for: CAION     Urinalysis: No results found for: UA     Troponin: No results for input(s): TROPONINI in the last 72 hours. Microbiology:   Culture, Blood 2   Date Value Ref Range Status   03/05/2022 5 Days no growth  Final     CULTURE, RESPIRATORY   Date Value Ref Range Status   03/05/2022 Oral Pharyngeal Lorraine absent  Final       Cultures during this admission:     Blood cultures:  [] None drawn      [x] Negative             []  Positive (Details:  )  Urine Culture:   [] None drawn      [] Negative             [x]  Positive (Details: Legionella antigen 3/5)  Sputum Culture:  [] None drawn      [x] Negative             []  Positive (Details:  )   Endotracheal aspirate: [x] None drawn      [] Negative             []  Positive (Details:  )     Other pertinent Labs:       Radiology/Imaging:     Chest Xray (3/13/2022): Mild improvement in right lung infiltrates.  No other significant change. Continued follow-up recommended. ASSESSMENT:     Principal Problem:    Altered mental status  Resolved Problems:    * No resolved hospital problems.  *      PLAN:     WEAN PER PROTOCOL:  [] No   [x] Yes  [] N/A    DISCONTINUE ANY LABS:   [x] No   [] Yes    ICU PROPHYLAXIS:  Stress ulcer:  [x] PPI Agent  [] [id number] [] Sucralfate  [] Other:  VTE:   [] Enoxaparin  [] Unfract. Heparin Subcut  [] EPC Cuffs    NUTRITION:  [x] NPO [x] Tube Feeding (Specify: ) [] TPN  [] PO (Diet: Diet NPO  ADULT TUBE FEEDING; Orogastric; Immune Enhancing; Continuous; 10; Yes; 10; Q 4 hours; 50; 30; Q 6 hours)    HOME MEDICATIONS RECONCILED: [] No  [x] Yes    INSULIN DRIP:   [x] No   [] Yes    CONSULTATION NEEDED:  [x] No   [] Yes    FAMILY UPDATED:    [] No   [x] Yes    TRANSFER OUT OF ICU:   [] No   [x] Yes    SYSTEMS ASSESSMENT    Neuro     Intubated day 9  Will open eyes, difficulty with commands  Diffuse left-sided weakness present  Fentanyl 5 mcg Q2H for pain, wean as tolerated    Metabolic Encephalopathy   Concern for potential Wernicke's   LP negative  MRI negative  High dose thiamine    Respiratory     Vent day 9    Metabolic acidosis with respiratory alkalosis, improving   Vent settings: Pressure support, FiO2 30, PEEP 8,  Likely extubate today  Wean oxygen as tolerated.  Keep O2 sat 90-92%    Cardiovascular     Hypotension, stable  Maintain maps above 65  Hold Norvasc 5 mg daily  Continue to monitor     Gastrointestinal     Tube feeds resumed, tolerating well    GI PPx  Carafate 1 g 4 times daily  Protonix 40 mg IV daily  GI following, appreciate input    Bowel Regimen  Colace 100 mg twice daily  Senna daily  Consider adding MiraLAX versus increasing senna    Renal     CKD stage IIIb, stable  Baseline creatinine 1.5  Nephrology following, appreciate input    Electrolyte Abnormalities  Off Sodium Bicarb 150 mL/hr  On NS 70 mL/hr  Replete as needed  Continue to monitor    Fluid Overload/Third Spacing  Decreased urinary output  Bumex 1 mg x3 doses per Nephro  Nephro following, appreciate input    Infectious Disease     HSV and Candida esophagitis  Diflucan 200 mg Q24H (Day 10)  S/P Acyclovir 400 mg 3 times daily PO(9 Days)  Nystatin 500,000 units 4 times daily (Day 11)  MRI negative for any findings of hepatic encephalopathy  S/p Lumbar puncture

## 2022-03-13 NOTE — PROGRESS NOTES
Patient was extubated to 6 liters/min via nasal cannula. Breath Sounds post extubation were rhonchi/diminished. Stridor was not present post extubation. SPO2 was 97%.       Performed by  Moon Faria RCP

## 2022-03-13 NOTE — PATIENT CARE CONFERENCE
Intensive Care Daily Quality Rounding Checklist      ICU Team Members: Dr. Juliann Ortega, Dr. Slade Cardenas, bedside nurse, charge nurse, RT    ICU Day #: NUMBER: 9    Intubation Date: March 5    Ventilator Day #: NUMBER: 9    Central Line Insertion Date: March 5        Day #: NUMBER: 9     Arterial Line Insertion Date: March 5      Day #: NUMBER: 8    Temporary Hemodialysis Catheter Insertion Date:      Day #     DVT Prophylaxis: scd    GI Prophylaxis:protonix, carafate    Crandall Catheter Insertion Date: March 5       Day #: 9      Continued need (if yes, reason documented and discussed with physician): yes,     Skin Issues/ Wounds and ordered treatment discussed on rounds: y    Goals/ Plans for the Day: Daily labs and replace/transfuse as needed. Wean vent to extubate.  Continue critical care management

## 2022-03-13 NOTE — PROGRESS NOTES
[address] [place] [address] [facility]  Progress Note      Chief Complaint   Patient presents with    Altered Mental Status     sent by physician at [facility] for head CT. Pt wandering around unit, could not redirect. SUBJECTIVE:  The patient is still in the ICU. He is still on the ventilator. He is tolerating antibiotics. No fevers. FiO2 30%. PEEP 8. No changes. Review of systems:  As stated above in the chief complaint, otherwise negative. Medications:  Scheduled Meds:   potassium chloride  20 mEq IntraVENous Q1H    magnesium sulfate  1,000 mg IntraVENous Once    thiamine (VITAMIN B1) IVPB  250 mg IntraVENous Q24H    furosemide  40 mg IntraVENous Q8H    fluconazole  200 mg IntraVENous Q24H    [Held by provider] senna  1 tablet Oral Nightly    docusate  100 mg Per NG tube BID    enoxaparin  40 mg SubCUTAneous Daily    chlorhexidine  15 mL Mouth/Throat BID    pantoprazole  40 mg IntraVENous Daily    ipratropium-albuterol  1 ampule Inhalation Q4H WA    nystatin  5 mL Oral 4x Daily    [Held by provider] amLODIPine  5 mg Oral Daily    [Held by provider] folic acid  1 mg Oral Daily    sucralfate  1 g Oral 4x Daily AC & HS    [Held by provider] vitamin C  500 mg Oral TID    [Held by provider] Vitamin D  1,000 Units Oral Daily    [Held by provider] zinc sulfate  50 mg Oral Daily    white petrolatum   Topical BID     Continuous Infusions:   sodium chloride       PRN Meds:sodium chloride, fentanNYL, gadoteridol, ondansetron, acetaminophen, cetirizine    OBJECTIVE:  /65   Pulse 108   Temp 100 °F (37.8 °C) (Esophageal)   Resp 10   Ht 6' 2\" (1.88 m)   Wt 211 lb 6.4 oz (95.9 kg)   SpO2 96%   BMI 27.14 kg/m²   Temp  Av.4 °F (36.9 °C)  Min: 97.9 °F (36.6 °C)  Max: 100 °F (37.8 °C)  Constitutional: The patient is lying in bed in the ICU. He is intubated. Opens eyes. Does not communicate. Skin: Warm and dry. No rashes were noted.    HEENT: Round and reactive pupils. Moist mucous membranes. No ulcerations or thrush. ET tube. OG tube to suction. Neck: Supple to movements. Chest: No respiratory distress. No crackles. Cardiovascular: Heart sounds rhythmic, regular and tachycardic. Abdomen: Diminished bowel sounds to auscultation. Benign to palpation. No masses felt. Genitourinary: Male  Extremities: Minimal edema. Lines: Right femoral arterial line 3/5/2022. Right IJ TLC 3/5/2022. Crandall catheter.     Laboratory and Tests Review:  Lab Results   Component Value Date    WBC 9.9 03/13/2022    WBC 9.7 03/12/2022    WBC 8.8 03/11/2022    HGB 7.5 (L) 03/13/2022    HCT 21.9 (L) 03/13/2022    .9 (H) 03/13/2022     (L) 03/13/2022     Lab Results   Component Value Date    NEUTROABS 8.21 (H) 03/13/2022    NEUTROABS 7.52 (H) 03/12/2022    NEUTROABS 7.48 (H) 03/11/2022     No results found for: CRP  Lab Results   Component Value Date    ALT 15 03/13/2022    AST 43 (H) 03/13/2022    ALKPHOS 79 03/13/2022    BILITOT 1.3 (H) 03/13/2022     Lab Results   Component Value Date     03/13/2022    K 3.2 03/13/2022    K 3.7 03/06/2022    CL 95 03/13/2022    CO2 28 03/13/2022    BUN 25 03/13/2022    CREATININE 1.6 03/13/2022    CREATININE 1.6 03/12/2022    CREATININE 1.5 03/11/2022    GFRAA 52 03/13/2022    LABGLOM 43 03/13/2022    GLUCOSE 77 03/13/2022    PROT 5.4 03/13/2022    LABALBU 3.5 03/13/2022    CALCIUM 9.1 03/13/2022    BILITOT 1.3 03/13/2022    ALKPHOS 79 03/13/2022    AST 43 03/13/2022    ALT 15 03/13/2022     Lab Results   Component Value Date    CRP 15.1 (H) 03/05/2022     Lab Results   Component Value Date    SEDRATE 47 (H) 03/05/2022    SEDRATE 40 (H) 03/03/2022     Radiology:  CT of the chest reviewed demonstrating patchy groundglass infiltrates; gallbladder  Monitor declined  MRI of the brain completed-nondiagnostic    Microbiology:   3/2/2022: Urine culture negative  3/5/2022 blood cultures negative x2  3/5/2022 respiratory cultures OP abdullahi absent  3/5/2022 urine for Legionella and strep pneumo negative  3/7/2022: Respiratory panel negative  3/7/2022: CSF negative. VDRL nonreactive    ASSESSMENT:  · Change in mental status. There was no evidence to support encephalitis or meningitis in a patient with myelodysplastic syndrome  · Candida esophagitis  · Aspiration pneumonia.   Completed 7 days of Ertapenem  · Leukocytosis related to above-improved    PLAN:  · Continue IV Fluconazole  · Discussed with ICU staff  · We will follow with you    Spoke with nursing    Amei Cornejo MD  10:14 AM  3/13/2022

## 2022-03-14 NOTE — PROGRESS NOTES
Patient seen in icu now extubated. Attempting to converse, but still weak. Left upper ext weakness. But moves all extremities. Labs stable. cxr essentially the same. Continue current care. as long as he progresses will need rehab.

## 2022-03-14 NOTE — PROGRESS NOTES
SPEECH/LANGUAGE PATHOLOGY  CLINICAL ASSESSMENT OF SWALLOWING FUNCTION   and PLAN OF CARE    PATIENT NAME:  Isha Emerson  (male)     MRN:  [id number]    :  1955  (77 y.o.)  STATUS:  Inpatient: Room 02155-A    TODAY'S DATE:  3/14/2022  REFERRING PROVIDER:   Og Hammond DO  SPECIFIC PROVIDER ORDER:     22 0900   SLP swallowing-dysphagia evaluation and treatment ONE TIME Complete Discontinue        REASON FOR REFERRAL: Swallow evaluation    EVALUATING THERAPIST: MELVIN Osborne                 RESULTS:    DYSPHAGIA DIAGNOSIS:   Clinical indicators of severe  oropharyngeal phase dysphagia       DIET RECOMMENDATIONS:  NPO including medications to be given via alternate method             FEEDING RECOMMENDATIONS:     Assistance level:  Not applicable      Compensatory strategies recommended: Not applicable      Discussed recommendations with nursing and/or faxed report to referring provider: Yes    SPEECH THERAPY  PLAN OF CARE   The dysphagia POC is established based on physician order, dysphagia diagnosis and results of clinical assessment     Skilled SLP intervention for dysphagia management on acute care 3-5 x per week until goals met, pt plateaus in function and/or discharged from hospital    Conditions Requiring Skilled Therapeutic Intervention for dysphagia:    Patient is performing below functional baseline d/t  current acute condition, Multiple diagnoses, multiple medications, and increased dependency upon caregivers.   Reduced oral control of bolus   Oral motor strength/coordination impairment  Wet vocal quality during PO intake  Coughing during PO intake      Specific dysphagia interventions to include:     ongoing skilled PO analysis to determine if PO diet can be initiated     Specific instructions for next treatment:  ongoing skilled PO analysis to determine if PO diet can be initiated   Patient Treatment Goals:    Short Term Goals:  Pt will participate in ongoing evaluation of swallow function to determine when PO diet can be safely initiated    Long Term Goals:   Pt will maintain adequate nutrition/hydration via PO intake of the least restrictive oral diet with implementation of safe swallow/ compensatory strategies and decrease signs/symptoms of aspiration to less than 1 x/day. A Video Swallow Study (MBSS) is recommended and requires a physician order when medical status permits transport to xray department      Patient/family Goal:    Did not state. Will further assess during treatment. Plan of care discussed with Patient   The Patient did not demonstrate complete understanding of the diagnosis, prognosis and plan of care     Rehabilitation Potential/Prognosis: good                    ADMITTING DIAGNOSIS: Altered mental status [R41.82]  Altered mental status, unspecified altered mental status type [R41.82]      PATIENT REPORT/COMPLAINT: patient currently NPO pending results of this evaluation. He was extubated 3/13/22  RN cleared patient for participation in assessment     yes     PRIOR LEVEL OF SWALLOW FUNCTION:    PAST HISTORY OF DYSPHAGIA?: yes    Home diet: Regular consistency solids (IDDSI level 7) with  thin liquids (IDDSI level 0)  Current Diet Order:  Diet NPO  ADULT TUBE FEEDING; Orogastric; Immune Enhancing; Continuous; 10; Yes; 10; Q 4 hours; 50; 30;  Q 6 hours    PROCEDURE:  Consistencies Administered During the Evaluation   Liquids: honey thick liquid and pudding thick liquid   Solids:  pureed foods      Method of Intake:   spoon  Fed by clinician      Position:   Seated, reclined     CLINICAL ASSESSMENT:  Oral Stage:      Impaired oral initiation  Delayed A-P transit due to: decreased ability for initiation   , reduced lingual strength  and cognitive function     Bit down on spoon- required cueing to release     Pharyngeal Stage:    Immediate wet cough was noted after presentation of honey consistency liquid and pudding consistency liquid  Wet respirations were noted after presentation of honey consistency liquid and pudding consistency liquid  Multiple swallows were noted after presentation of pudding consistency liquid  Delayed initiation of the pharyngeal swallow noted    Cognition:   Did not follow commands (inconsistent) and Confusion noted    Oral Peripheral Examination   Generalized oral weakness- did not follow commands consistently    Current Respiratory Status    1 liter nasal cannula     Parameters of Speech Production  Respiration:  Inadequate for speech production  Quality:   Breathy  Intensity: Quiet    Volitional Swallow: present     Volitional Cough:   present     Pain: No pain reported. EDUCATION:   The Speech Language Pathologist (SLP) completed education regarding results of evaluation and that intervention is warranted at this time. Learner: Patient  Education: Reviewed results and recommendations of this evaluation  Evaluation of Education:  Needs further instruction    This plan may be re-evaluated and revised as warranted. Evaluation Time includes thorough review of current medical information, gathering information on past medical history/social history and prior level of function, completion of standardized testing/informal observation of tasks, assessment of data and education on plan of care and goals. [x]The admitting diagnosis and active problem list, have been reviewed prior to initiation of this evaluation.         ACTIVE PROBLEM LIST:   Patient Active Problem List   Diagnosis    Altered mental status         CPT code:  93975  bedside swallow blaire Joseph M.SN[place] [address]  3/14/2022

## 2022-03-14 NOTE — PROGRESS NOTES
Critical Care Team - Daily Progress Note         Date and time: 3/14/2022 8:36 AM  Patient's name:  Dean Oreilly  Medical Record Number: [id number]  Patient's account/billing number: [de-identified]  Patient's YOB: 1955  Age: 77 y.o. Date of Admission: 3/2/2022 10:44 AM  Length of stay during current admission: 12      Primary Care Physician: Maria Elena Bruno DO  ICU Attending Physician: Dr. Sharan Smith    Code Status: Full Code    Reason for ICU admission:   Altered mental status  Impending respiratory failure      SUBJECTIVE:     OVERNIGHT EVENTS:         3/14: Patient was extubated 3/13. He had no acute problems overnight. He is very raspy and is trying to cough up secretions. Patient was tolerating nasal cannula well. He is still somewhat confused. 3/13: Vent day 9. Patient starting to pass gas. Still no bowel movements. More awake and alert today. Following simple commands. Can likely be extubated today. 3/12: Patient more alert today and following intermittent commands. 3/11: Vent day #7. Per overnight RN, patient is still tolerating pressure support really well. He is off all pressors. He is opening his eyes and following commands. However patient's left side is weaker than his right. Per overnight RN patient is also very swollen and third spacing significantly. Patient still has not had a bowel movement. 3/10: Vent day 6. Patient is off Precedex per night RN. Patient also has had decreased urinary output overnight. LP 3/7 negative. This morning they will patient did have dark brown contents coming out through suction. 3/9: Vent Day 5, intubated, weaning sedation. Patient on tube feeds, had episode of emesis while being turned. Tube feeds held. 3/8: Vent day 4. Patient remains intubated and sedated on the ventilator. LP was done on 3/7. Patient experienced no problems overnight per RN. Patient is weaned off levo.   Patient is still on fentanyl but this will now be ordered prn. Patient continues on acyclovir, Diflucan, Merrem, nystatin. Patient will be started on tube feeds today. 3/7: Vent Day 3. Patient remains intubated and sedated and remains on ventilator. Patient is to go for LP today by IR. Time of procedure is unknown. Patient's dvt ppx is held at this time in anticipation for procedure. MRI 3/6 negative for evidence of herpetic encephalopathy. It showed only chronic microvascular disease. Patient is off Levo. Patient continues on acyclovir, diflucan, merrem, and nystatin. 3/6: Vent Day 2. Remains intubated and sedated. Patient was hypotensive and had to start on pressors. MRI brain scheduled. 3/5: Patient admitted to the ICU for AMS and impending respiratory failure. Patient was intubated. History significant for herpetic esophagitis ,HTN, DVT, allergic rhinitis.     CURRENT VENTILATION STATUS:     [] Ventilator  [] BIPAP  [x] Nasal Cannula [] Room Air      IF INTUBATED, ET TUBE MARKING AT LOWER LIP:     SECRETIONS  Amount:  [] Small [] Moderate  [] Large  [x] None  Color:     [] White [] Colored  [] Bloody    SEDATION:  RAAS Score:  [] Propofol gtt  [] Versed gtt  [] Fentanyl gtt [] Ativan gtt   [x] No Sedation    PARALYZED:  [x] No    [] Yes      VASOPRESSORS:  [x] No    [] Yes    If yes -   [] Levophed       [] Dopamine     [] Vasopressin       [] Dobutamine  [] Phenylephrine         [] Epinephrine    CENTRAL LINES:     [] No   [x] Yes   (Date of Insertion: 3/5 )           If yes -     [x] Right IJ     [] Left IJ [] Right Femoral [] Left Femoral                   [] Right Subclavian [] Left Subclavian       FORBES'S CATHETER:   [] No   [x] Yes  (Date of Insertion: 3/4)     URINE OUTPUT:            [] Good   [x] Low              [] Anuric      OBJECTIVE:     VITAL SIGNS:  BP (!) 140/68   Pulse 112   Temp 99.4 °F (37.4 °C) (Axillary)   Resp 20   Ht 6' 2\" (1.88 m)   Wt 211 lb 6.4 oz (95.9 kg)   SpO2 98%   BMI 27.14 kg/m²   Tmax over 24 hours:  Temp (24hrs), Av.2 °F (37.3 °C), Min:98.9 °F (37.2 °C), Max:99.4 °F (37.4 °C)      Patient Vitals for the past 6 hrs:   BP Temp Temp src Pulse Resp SpO2   22 0800 (!) 140/68 -- Axillary 112 20 98 %   22 0700 (!) 152/63 -- -- 114 (!) 33 98 %   22 0600 -- -- -- 116 19 96 %   22 0500 -- -- -- 116 14 97 %   22 0400 132/66 99.4 °F (37.4 °C) Axillary 112 16 96 %   22 0300 -- -- -- 115 (!) 31 95 %         Intake/Output Summary (Last 24 hours) at 3/14/2022 0836  Last data filed at 3/14/2022 0759  Gross per 24 hour   Intake 758.56 ml   Output 2885 ml   Net -2126.44 ml     Wt Readings from Last 2 Encounters:   03/10/22 211 lb 6.4 oz (95.9 kg)   22 186 lb 12.8 oz (84.7 kg)     Body mass index is 27.14 kg/m².         PHYSICAL EXAMINATION:    General appearance - ill-appearing  Mental status -  slightly confused, but awake and more alert   Eyes - not examined  Ears - not examined  Nose -not examined  Mouth - not examined  Neck - supple, no significant adenopathy  Chest - clear to auscultation, no wheezes, rales or rhonchi, symmetric air entry  Heart - normal rate and regular rhythm  Abdomen - soft, nontender, nondistended, no masses or organomegaly  Neurological -intubated, sedated  Extremities - no pedal edema noted  Skin -wound present on sacrum and buttocks     Any additional physical findings: n/a    MEDICATIONS:    Scheduled Meds:   magnesium sulfate  2,000 mg IntraVENous Once    potassium chloride        potassium chloride  40 mEq IntraVENous Q2H    magnesium sulfate  2,000 mg IntraVENous Once    albumin human        thiamine (VITAMIN B1) IVPB  250 mg IntraVENous Q24H    furosemide  40 mg IntraVENous Q8H    fluconazole  200 mg IntraVENous Q24H    [Held by provider] senna  1 tablet Oral Nightly    docusate  100 mg Per NG tube BID    enoxaparin  40 mg SubCUTAneous Daily    chlorhexidine  15 mL Mouth/Throat BID    pantoprazole  40 mg IntraVENous Daily    ipratropium-albuterol  1 ampule Inhalation Q4H WA    nystatin  5 mL Oral 4x Daily    [Held by provider] amLODIPine  5 mg Oral Daily    [Held by provider] folic acid  1 mg Oral Daily    sucralfate  1 g Oral 4x Daily AC & HS    [Held by provider] vitamin C  500 mg Oral TID    [Held by provider] Vitamin D  1,000 Units Oral Daily    [Held by provider] zinc sulfate  50 mg Oral Daily    white petrolatum   Topical BID     Continuous Infusions:   sodium chloride       PRN Meds:   sodium chloride, , PRN  fentanNYL, 25 mcg, Q2H PRN  gadoteridol, 16 mL, ONCE PRN  ondansetron, 4 mg, Q6H PRN  acetaminophen, 650 mg, Q4H PRN  cetirizine, 10 mg, Daily PRN          VENT SETTINGS (Comprehensive) (if applicable):  Vent Information  $Ventilation: Off Vent  Skin Assessment: Clean, dry, & intact  Equipment ID: [id number]  Vent Type: [id number]  Vent Mode: PS  Vt Ordered: 0 mL  Rate Set: 0 bmp  Peak Flow: 0 L/min  Pressure Support: 8 cmH20  FiO2 : 30 %  SpO2: 98 %  SpO2/FiO2 ratio: 316.67  Sensitivity: 3  PEEP/CPAP: 8  I Time/ I Time %: 0 s  Humidification Source: Heated wire  Humidification Temp: 37  Humidification Temp Measured: 37  Circuit Condensation: Drained  Additional Respiratory  Assessments  Pulse: 112  Resp: 20  SpO2: 98 %  End Tidal CO2: 26 (%)  Position: Semi-Callejas's  Humidification Source: Heated wire  Humidification Temp: 37  Circuit Condensation: Drained  Oral Care: Mouth suctioned,Mouth swabbed  Subglottic Suction Done?: Yes  Airway Type: ET  Airway Size: 8  Cuff Pressure (cm H2O): 29 cm H2O    ABGs:   Recent Labs     03/14/22  0542   PH 7.527*   PCO2 39.7   PO2 84.5   HCO3 32.2*   BE 8.7*   O2SAT 97.2       Laboratory findings:    Complete Blood Count:   Recent Labs     03/12/22  0350 03/12/22  0350 03/12/22  1615 03/13/22  0349 03/14/22  0530   WBC 9.7  --   --  9.9 14.6*   HGB 7.0*   < > 7.7* 7.5* 8.3*   HCT 20.8*   < > 22.9* 21.9* 24.9*   *  --   --  108* 137    < > = values in this interval not displayed. Last 3 Blood Glucose:   Recent Labs     03/12/22  0350 03/13/22 0349 03/14/22  0530   GLUCOSE 76 77 78        PT/INR:    Lab Results   Component Value Date    PROTIME 13.4 03/04/2022    INR 1.2 03/04/2022     PTT:    Lab Results   Component Value Date    APTT 24.3 03/04/2022       Comprehensive Metabolic Profile:   Recent Labs     03/12/22  0350 03/12/22  0350 03/13/22 0349 03/13/22 0349 03/14/22  0530     --  139  --  143   K 3.6  --  3.2*  --  2.9*   CL 98  --  95*  --  95*   CO2 25  --  28  --  32*   BUN 26*  --  25*  --  27*   CREATININE 1.6*  --  1.6*  --  1.3*   GLUCOSE 76  --  77  --  78   CALCIUM 8.4*  --  9.1  --  9.5   PROT 5.0*   < > 5.4*   < > 5.4*   LABALBU 3.2*   < > 3.5   < > 3.2*   BILITOT 1.1   < > 1.3*   < > 1.2   ALKPHOS 85   < > 79   < > 89   AST 49*   < > 43*   < > 44*   ALT 15   < > 15   < > 18    < > = values in this interval not displayed. Magnesium:   Lab Results   Component Value Date    MG 1.5 03/14/2022     Phosphorus:   Lab Results   Component Value Date    PHOS 2.7 03/14/2022     Ionized Calcium: No results found for: CAION     Urinalysis: No results found for: UA     Troponin: No results for input(s): TROPONINI in the last 72 hours. Microbiology:   Culture, Blood 2   Date Value Ref Range Status   03/05/2022 5 Days no growth  Final     CULTURE, RESPIRATORY   Date Value Ref Range Status   03/05/2022 Oral Pharyngeal Lorraine absent  Final       Cultures during this admission:     Blood cultures:  [] None drawn      [x] Negative             []  Positive (Details:  )  Urine Culture:   [] None drawn      [] Negative             [x]  Positive (Details: Legionella antigen 3/5)  Sputum Culture:  [] None drawn      [x] Negative             []  Positive (Details:  )   Endotracheal aspirate: [x] None drawn      [] Negative             []  Positive (Details:  )     Other pertinent Labs:       Radiology/Imaging:     Chest Xray (3/14/2022):   Worsening bilateral airspace disease, more so on the right than on the left. ASSESSMENT:     Principal Problem:    Altered mental status  Resolved Problems:    * No resolved hospital problems. *      PLAN:     WEAN PER PROTOCOL:  [] No   [x] Yes  [] N/A    DISCONTINUE ANY LABS:   [x] No   [] Yes    ICU PROPHYLAXIS:  Stress ulcer:  [x] PPI Agent  [] [id number] [] Sucralfate  [] Other:  VTE:   [] Enoxaparin  [] Unfract. Heparin Subcut  [] EPC Cuffs    NUTRITION:  [x] NPO [x] Tube Feeding (Specify: ) [] TPN  [] PO (Diet: Diet NPO  ADULT TUBE FEEDING; Orogastric; Immune Enhancing; Continuous; 10; Yes; 10; Q 4 hours; 50; 30; Q 6 hours)    HOME MEDICATIONS RECONCILED: [] No  [x] Yes    INSULIN DRIP:   [x] No   [] Yes    CONSULTATION NEEDED:  [x] No   [] Yes    FAMILY UPDATED:    [] No   [x] Yes    TRANSFER OUT OF ICU:   [] No   [x] Yes    SYSTEMS ASSESSMENT    Neuro     Extubated 3/13  Diffuse left-sided weakness present  Fentanyl 5 mcg Q2H for pain, wean as tolerated    Metabolic Encephalopathy   Concern for potential Wernicke's   LP negative  MRI negative  High dose thiamine 500 mg a day for x3 days, now 250 mg x 3 days then 100 mg  Vitamin supplementation    Respiratory     S/p 9 days of intubation    Metabolic acidosis with respiratory alkalosis, improving   Extubated 3/13  3L O2 NC  Duoneb 1 amp Q4H while awake  Wean oxygen as tolerated.  Keep O2 sat 90-92%      Cardiovascular     Hypotension, stable  Norvasc 5 mg daily  Continue to monitor     Gastrointestinal     NG removed  Swallow eval ordered    GI PPx  Carafate 1 g 4 times daily  Protonix 40 mg IV daily  GI following, appreciate input    Bowel Regimen  Colace 100 mg twice daily  Senna daily    Renal     CKD stage IIIb, stable  Baseline creatinine 1.5  Nephrology following, appreciate input    Electrolyte Abnormalities  Replete as needed  Continue to monitor    Fluid Overload/Third Spacing  Decreased urinary output  Lasix 40 mg Q8H  Nephro following, appreciate input    Infectious Disease     HSV and Candida esophagitis  Diflucan 200 mg Q24H (Day 11)  S/P Acyclovir 400 mg 3 times daily PO(9 Days)  Nystatin 500,000 units 4 times daily (Day 12)  MRI negative for any findings of hepatic encephalopathy  S/p Lumbar puncture 3/7  CSF viral PCR negative 3/7  Gram stain CSF negative 3/7  VDRL CSF cx nonreactive 3/7    Aspiration pneumonia   S/p Invanz 1g Q24H (4 days)  Respiratory culture 3/5 negative  Blood culture x2 negative 3/5  Legionella urine negative 3/5  Urine culture negative 3/5  Continue to follow final culture    Leukocytosis, improving  Due to infectious processes listed above    Hematology/Oncology     Chronic macrocytic anemia  Baseline hemoglobin around 11  1 unit RBC 5/58  Folic acid supplementation  Continue to monitor  Transfuse for hemoglobin < 7     DVT ppx  Lovenox 40 mg daily    Endocrine     Continue to monitor blood glucose while in hospital    Social/Spiritual/DNR/Other     Full code    Kadi Velazquez DO, PGY-1            3/14/2022, 8:36 AM     Attending Physician Attestation: Dr. Elgin Wu    Thank you very much for allowing me to see this patient in consultation and follow up. I personally saw, examined and provided care for the patient. Radiographs, labs and medication list were reviewed by me independently. I spoke with bedside nursing, respiratory therapists and consultants. Critical care services and times documented are independent of procedures and multidisciplinary rounds with Residents. Additionally comprehensive, multidisciplinary rounds were conducted with the MICU team. The case was discussed in detail and plans for care were established. Review of Residents documentation was conducted and revisions were made as appropriate. I agree with the the above documented information.      Physical Examination:  Vitals:   Vitals:    03/14/22 1000 03/14/22 1100 03/14/22 1200 03/14/22 1231   BP:  130/64     Pulse: 117 109     Resp: 22 26     Temp:   98.7 °F (37.1 °C) TempSrc:   Axillary    SpO2: 99% 97%  94%   Weight:       Height:          General: No Acute Distress  HEENT: normocephalic, atruamatic  CVS: RRR, S1, S2, No S3 or S4  Respiratory: decreased breath sounds at lung bases, no focal wheezes noted  Extremities: no clubbing/cyanosis/edema    LP 3/7/2022:      ASSESSMENT:  1.) Acute Encephalopathy   - There was no evidence to support encephalitis or meningitis in a patient with myelodysplastic syndrome  - no abnormal ammonia in serum   - immunoglobulins, ZION, hepatitis, AFP, HIV all negative   2.) Respiratory Failure with Hypoxia  3.) Candida Esophagitis   4.) HSV Esophagitis   5.) Aspiration Pneumonia     In addition the following applies:    Check: N/A  Medication Alterations: N/A  Procedures: N/A  Imaging: reviewed  New Consultations: N/A  VENT: N/A    Access: Right IJ TLC, Right Femoral Arterial Line   Consults: ID, Pulmonary, GI, Nephrology ammoni  Drips: N/A  ABX/Antifungals: Fluconazole   Completed ABX/Anti-Fungals/Anti-Virals: Acyclovir     - OK to floors with pulmonary to continue to follow patient     Thank you for allowing me to participate in the care of this patient. Care reviewed with nursing staff, medical and surgical specialty care, primary care and the patient's family as available. Restraints are ordered when the patient can do harm to him/herself by pulling out devices. Critical Care Time: > 35 minutes excluding procedures    Kelton Mendez M.D.     Kelton Mendez MD  3/14/2022  1:33 PM

## 2022-03-14 NOTE — PROGRESS NOTES
[address] [place] [address] [facility]  [facility]  Progress Note      Chief Complaint   Patient presents with    Altered Mental Status     sent by physician at [facility], [place], operated by [facility] for head CT. Pt wandering around unit, could not redirect. SUBJECTIVE:  The patient is still in the ICU. He is still on the ventilator. He is tolerating antibiotics. No fevers. Extubated and awake  2 L nasal cannula  Review of systems:  As stated above in the chief complaint, otherwise negative. Medications:  Scheduled Meds:   potassium chloride  40 mEq IntraVENous Q2H    lidocaine PF  5 mL IntraDERmal Once    sodium chloride flush  5-40 mL IntraVENous 2 times per day    heparin flush  3 mL IntraVENous 2 times per day    thiamine (VITAMIN B1) IVPB  250 mg IntraVENous Q24H    furosemide  40 mg IntraVENous Q8H    fluconazole  200 mg IntraVENous Q24H    senna  1 tablet Oral Nightly    docusate  100 mg Per NG tube BID    enoxaparin  40 mg SubCUTAneous Daily    chlorhexidine  15 mL Mouth/Throat BID    pantoprazole  40 mg IntraVENous Daily    ipratropium-albuterol  1 ampule Inhalation Q4H WA    nystatin  5 mL Oral 4x Daily    amLODIPine  5 mg Oral Daily    folic acid  1 mg Oral Daily    sucralfate  1 g Oral 4x Daily AC & HS    vitamin C  500 mg Oral TID    Vitamin D  1,000 Units Oral Daily    zinc sulfate  50 mg Oral Daily    white petrolatum   Topical BID     Continuous Infusions:   sodium chloride      sodium chloride       PRN Meds:sodium chloride flush, sodium chloride, heparin flush, sodium chloride, fentanNYL, gadoteridol, ondansetron, acetaminophen, cetirizine    OBJECTIVE:  /64   Pulse 109   Temp 99.5 °F (37.5 °C) (Axillary)   Resp 26   Ht 6' 2\" (1.88 m)   Wt 211 lb 6.4 oz (95.9 kg)   SpO2 97%   BMI 27.14 kg/m²   Temp  Av.2 °F (37.3 °C)  Min: 98.9 °F (37.2 °C)  Max: 99.5 °F (37.5 °C)  Constitutional: The patient is lying in bed in the ICU. Now extubated, opens eyes,.   communicates.   Skin: Warm and dry. No rashes were noted. HEENT: Round and reactive pupils. Moist mucous membranes. No ulcerations or thrush. ET tube. OG tube to suction. Neck: Supple to movements. Chest: No respiratory distress. No crackles. Cardiovascular: Heart sounds rhythmic, regular and tachycardic. Abdomen: Diminished bowel sounds to auscultation. Benign to palpation. No masses felt. Genitourinary: Male  Extremities: Minimal edema. Lines: Right femoral arterial line 3/5/2022. Right IJ TLC 3/5/2022. Crandall catheter.     Laboratory and Tests Review:  Lab Results   Component Value Date    WBC 14.6 (H) 03/14/2022    WBC 9.9 03/13/2022    WBC 9.7 03/12/2022    HGB 8.3 (L) 03/14/2022    HCT 24.9 (L) 03/14/2022    .5 (H) 03/14/2022     03/14/2022     Lab Results   Component Value Date    NEUTROABS 12.59 (H) 03/14/2022    NEUTROABS 8.21 (H) 03/13/2022    NEUTROABS 7.52 (H) 03/12/2022     No results found for: CRPHS  Lab Results   Component Value Date    ALT 18 03/14/2022    AST 44 (H) 03/14/2022    ALKPHOS 89 03/14/2022    BILITOT 1.2 03/14/2022     Lab Results   Component Value Date     03/14/2022    K 2.9 03/14/2022    K 3.7 03/06/2022    CL 95 03/14/2022    CO2 32 03/14/2022    BUN 27 03/14/2022    CREATININE 1.3 03/14/2022    CREATININE 1.6 03/13/2022    CREATININE 1.6 03/12/2022    GFRAA >60 03/14/2022    LABGLOM 55 03/14/2022    GLUCOSE 78 03/14/2022    PROT 5.4 03/14/2022    LABALBU 3.2 03/14/2022    CALCIUM 9.5 03/14/2022    BILITOT 1.2 03/14/2022    ALKPHOS 89 03/14/2022    AST 44 03/14/2022    ALT 18 03/14/2022     Lab Results   Component Value Date    CRP 15.1 (H) 03/05/2022     Lab Results   Component Value Date    SEDRATE 47 (H) 03/05/2022    SEDRATE 40 (H) 03/03/2022     Radiology:  CT of the chest reviewed demonstrating patchy groundglass infiltrates; gallbladder  Monitor declined  MRI of the brain completed-nondiagnostic    Microbiology:   3/2/2022: Urine culture negative  3/5/2022 blood cultures negative x2  3/5/2022 respiratory cultures OP abdullahi absent  3/5/2022 urine for Legionella and strep pneumo negative  3/7/2022: Respiratory panel negative  3/7/2022: CSF negative. VDRL nonreactive    ASSESSMENT:  · Change in mental status. There was no evidence to support encephalitis or meningitis in a patient with myelodysplastic syndrome  · Candida esophagitis  · Aspiration pneumonia. Completed 7 days of Ertapenem  · Leukocytosis related to above-improved    PLAN:  · Continue  Fluconazole p.o.   · Remove the art line  · Discussed with ICU staff  · We will follow with you    Spoke with Ata Mederos MD  12:20 PM   3/14/2022      3/14/2022

## 2022-03-14 NOTE — PROGRESS NOTES
3/14/2022  1:43 PM      Comprehensive Nutrition Assessment    Type and Reason for Visit:  Reassess    Nutrition Recommendations/Plan: ADAT per SLP recommendation    Nutrition Assessment:  Pt status improving. Extubated 3/13 and awaiting Swallow Eval to start PO. Will continue to monitor SLP rec and add ONS when PO is ordered    Malnutrition Assessment:  Malnutrition Status: Moderate malnutrition    Context:  Chronic Illness     Findings of the 6 clinical characteristics of malnutrition:  Energy Intake:  Mild decrease in energy intake (Comment)  Weight Loss:  7 - 5% over 1 month     Body Fat Loss:  No significant body fat loss     Muscle Mass Loss:  No significant muscle mass loss    Fluid Accumulation:  Unable to assess     Strength:  Not Performed    Estimated Daily Nutrient Needs:  Energy (kcal):  ; Weight Used for Energy Requirements:  Admission     Protein (g):   (1.2-1.4 g/kg as naveen w/CKD 3); Weight Used for Protein Requirements:  Admission        Fluid (ml/day):  per Renal or Critical Care management; Method Used for Fluid Requirements:  Other (Comment)      Nutrition Related Findings:  extubated, confused/expressive aphasia, +2-+3 edema, distended abd w/hypo BS, hypokalemia, on NC, +I/O 5.9L      Wounds:  Multiple,Skin Tears,Open Wounds (per wound care consult)       Current Nutrition Therapies:    Diet NPO  ADULT TUBE FEEDING; Orogastric; Immune Enhancing; Continuous; 10; Yes; 10; Q 4 hours; 50; 30;  Q 6 hours    Anthropometric Measures:  · Height: 6' 2\" (188 cm)  · Current Body Weight: 211 lb 6.4 oz (95.9 kg) [facility]   · Admission Body Weight: 173 lb (78.5 kg) (3/3)    · Usual Body Weight: 184 lb 8 oz (83.7 kg) (1/10)     · Ideal Body Weight: 190 lbs; % Ideal Body Weight 91.1 %   · BMI: 27.1  · BMI Categories: Normal Weight (BMI 22.0 to 24.9) age over 72       Nutrition Diagnosis:   · Moderate malnutrition,In context of chronic illness related to increase demand for energy/nutrients as evidenced by wounds,poor intake prior to admission,weight loss greater than or equal to 5% in 1 month      Nutrition Interventions:   Food and/or Nutrient Delivery:  Continue NPO (PO diet per SLP rec and will add ONS at that time, as needed)  Nutrition Education/Counseling:  No recommendation at this time   Coordination of Nutrition Care:  Continue to monitor while inpatient    Goals:  Nutrition progression       Nutrition Monitoring and Evaluation:   Behavioral-Environmental Outcomes:  None Identified   Food/Nutrient Intake Outcomes:  Diet Advancement/Tolerance  Physical Signs/Symptoms Outcomes:  GI Status,Biochemical Data,Fluid Status or Edema,Nutrition Focused Physical Findings,Skin,Weight,Chewing or Swallowing     Discharge Planning:     Too soon to determine     Electronically signed by Cedric Hansen RD, CNSC, LD on 3/14/22 at 1:43 PM EDT    Contact: [phone]

## 2022-03-14 NOTE — CARE COORDINATION
Social work:    Reviewed chart notes and updated by unit RN. Huber Lamar was extubated yesterday and more alert but still confused possibly due to alcohol withdrawal.  Huber Lamar is presently on 4 liters of 02. Social work attempted to meet with Huber Lamar but he was sleeping and his wife was not present in his room. Huber Lamar was in short term rehab at [address] [place] [address] prior to admission, is not a bedhold, and may be able to return depending on bed availability and his needs. Social work will need to confirm discharge plans closer to discharge and after therapy evaluations.      Electronically signed by ARUN Saenz on 3/14/2022 at 2:00 PM

## 2022-03-14 NOTE — PROGRESS NOTES
[facility] MD Karen Ortez, MD Faviola Chamorro, MD Diana Curry, MD Damian Bess, MOE [place], OBED  Progress Note    3/14/2022    SUBJECTIVE:   3/4: Off the floor. Discussed case with bedside RN, Dr. Luther Blount. Unable to perform diagnostic radiographic studies due to agitation, movement    3/5 transfer to the ICU intubated via ETT, ventilated. Hemodynamically stable. Sedated appears comfortable on vent. IV K-Phos infusion ordered this morning, still not delivered to nursing unit for administration. IV fluid stopped yesterday for unknown period of time, held today for 2 to 3 hours, reasons not clear though apparently IV access was not lost.    3/6: Remains critically ill. Intubated via ETT. Vent setting stable. Minimally responsive without sedation. Urine output poor over the course of today. BP rather low, though hemodynamically stable. 3/7 : seen in his room , intubated , mechanically ventilated , BP stable . UO on low side . Vent setting stable     3/8 seen earlier today , intubated fio2 30  Low UO . No pressors   D/w Dr Gross Mode at bedside   3/9:  Remains critically ill. Intubated on vent,  Fio2- 30% peep-8. Remains off pressors. Urinoe output continue to be low. Minimally responsive. 3/10 : seen in his room , intubated , mechaincally ventilated fio2 30 . No pressors comfortable 2+ edema in LE     3/11 : seen in his room intubated mechanically ventiated no pressors fio2 30 peep 8 good UO in response to lasix /spa . Still with 2-3+ LE edema b/l      3/12 : seen in his room , intubated mechanically ventilated . Good UO , fio2 30 peep 8   Awake . Still very weak   D/w Dr Gross Mode at bedside      3/13 : excellent UO on lasix/spa . CXR improving . Extubated today   On o2/nc     3/14: Resting comfortably. On nasal cannula. BP stable. No new event.       PROBLEM LIST:    Principal Problem:    Altered mental status  Resolved Problems:    * No resolved hospital problems. *         DIET:    Diet NPO  ADULT TUBE FEEDING; Orogastric; Immune Enhancing; Continuous; 10; Yes; 10; Q 4 hours; 50; 30;  Q 6 hours     MEDS (scheduled):    lidocaine PF  5 mL IntraDERmal Once    sodium chloride flush  5-40 mL IntraVENous 2 times per day    heparin flush  3 mL IntraVENous 2 times per day    [START ON 3/15/2022] fluconazole  200 mg Oral Daily    thiamine (VITAMIN B1) IVPB  250 mg IntraVENous Q24H    furosemide  40 mg IntraVENous Q8H    senna  1 tablet Oral Nightly    docusate  100 mg Per NG tube BID    enoxaparin  40 mg SubCUTAneous Daily    chlorhexidine  15 mL Mouth/Throat BID    pantoprazole  40 mg IntraVENous Daily    ipratropium-albuterol  1 ampule Inhalation Q4H WA    nystatin  5 mL Oral 4x Daily    amLODIPine  5 mg Oral Daily    folic acid  1 mg Oral Daily    sucralfate  1 g Oral 4x Daily AC & HS    vitamin C  500 mg Oral TID    Vitamin D  1,000 Units Oral Daily    zinc sulfate  50 mg Oral Daily    white petrolatum   Topical BID       MEDS (infusions):   sodium chloride      sodium chloride         MEDS (prn):  sodium chloride flush, sodium chloride, heparin flush, sodium chloride, fentanNYL, gadoteridol, ondansetron, acetaminophen, cetirizine    PHYSICAL EXAM:     Patient Vitals for the past 24 hrs:   BP Temp Temp src Pulse Resp SpO2   03/14/22 1231 -- -- -- -- -- 94 %   03/14/22 1100 130/64 -- -- 109 26 97 %   03/14/22 1000 -- -- -- 117 22 99 %   03/14/22 0900 -- -- -- 119 21 98 %   03/14/22 0800 (!) 140/68 99.5 °F (37.5 °C) Axillary 112 20 98 %   03/14/22 0700 (!) 152/63 -- -- 114 (!) 33 98 %   03/14/22 0600 -- -- -- 116 19 96 %   03/14/22 0500 -- -- -- 116 14 97 %   03/14/22 0400 132/66 99.4 °F (37.4 °C) Axillary 112 16 96 %   03/14/22 0300 -- -- -- 115 (!) 31 95 %   03/14/22 0200 -- -- -- 114 29 99 %   03/14/22 0100 -- -- -- 113 30 --   03/14/22 0000 -- 99.2 °F (37.3 °C) Axillary 110 25 99 %   03/13/22 2300 -- -- -- 125 21 99 %   03/13/22 2200 -- -- -- 108 13 97 %   03/13/22 2100 -- -- -- 105 15 98 %   03/13/22 2000 -- 99.1 °F (37.3 °C) Axillary 105 30 98 %   03/13/22 1959 -- -- -- -- 20 97 %   03/13/22 1900 -- -- -- 111 19 98 %   03/13/22 1800 -- -- -- 107 16 98 %   03/13/22 1700 -- -- -- 109 17 91 %   03/13/22 1600 -- -- -- 107 18 98 %   03/13/22 1543 -- 98.9 °F (37.2 °C) Oral -- -- --   03/13/22 1500 -- -- -- 107 15 99 %   03/13/22 1400 -- -- -- 110 (!) 31 98 %   03/13/22 1300 -- -- -- 111 15 100 %   @      Intake/Output Summary (Last 24 hours) at 3/14/2022 1258  Last data filed at 3/14/2022 0900  Gross per 24 hour   Intake 444.29 ml   Output 2885 ml   Net -2440.71 ml         Wt Readings from Last 3 Encounters:   03/10/22 211 lb 6.4 oz (95.9 kg)   02/23/22 186 lb 12.8 oz (84.7 kg)   11/27/18 215 lb (97.5 kg)       Constitutional:  Intubated on vent  HEENT: NC/AT, EOMI, sclera and conjunctiva are clear and anicteric, mucus membranes moist  Neck: Trachea midline, no JVD  Cardiovascular: S1, S2 regular rhythm, no murmur,or rub  Respiratory: Coarse breath sounds throughout all lung fields no crackles, no wheeze  Gastrointestinal:  Soft, nontender, nondistended, NABS  Ext: no edema, feet warm  Skin: dry, no rash  Neuro:  Minimally responsive      DATA:    Recent Labs     03/12/22  0350 03/12/22  0350 03/12/22  1615 03/13/22  0349 03/14/22  0530   WBC 9.7  --   --  9.9 14.6*   HGB 7.0*   < > 7.7* 7.5* 8.3*   HCT 20.8*   < > 22.9* 21.9* 24.9*   .8*  --   --  101.9* 102.5*   *  --   --  108* 137    < > = values in this interval not displayed.      Recent Labs     03/12/22  0350 03/13/22  0349 03/14/22  0530    139 143   K 3.6 3.2* 2.9*   CL 98 95* 95*   CO2 25 28 32*   MG 1.9 1.7 1.5*   PHOS 2.9 2.9 2.7   BUN 26* 25* 27*   CREATININE 1.6* 1.6* 1.3*   ALT 15 15 18   AST 49* 43* 44*   BILITOT 1.1 1.3* 1.2   ALKPHOS 85 79 89       No results found for: LABPROT    Assessment  77 y.o. ilda known to service, followed for FR[facility]CA in January of this year. Admitted with acute mental status change, myoclonic jerks and unusual movements.     1. Chronic kidney disease, stage IIIa. Creatinine at baseline, 1.4 to 1.6 mg/dL     2.  Metabolic acidosis, wide anion gap. Resolved with bicarbonate drip    3. Cannabis on drug screen    4. Hypophosphatemia. 5. Hypernatremia, dehydration due to increased insensible losses, poor intake/input. Resolved    6.  Hypokalemia, normalized with supplement      Cr stable/better   excellent UO with negative balance  Extubated 3/13    Recommendations  Supplement potassium (already done)  Decrease Lasix to 40 mg IV daily y  F/u UO , labs  Continue intensive supportive care      Electronically signed by Ignacio Blair MD on 3/14/2022

## 2022-03-15 NOTE — PROGRESS NOTES
[facility]  [address], [address]  [facility], [address] [place] [address]      Pulmonary Progress Note      SUBJECTIVE:  Out of ICU, on 6 li NC after being extubated on 3/14, occ nodding to questions thought non-verbal, unclear comprehension, ronchorous cough.     OBJECTIVE    Medications    Continuous Infusions:   sodium chloride      sodium chloride         Scheduled Meds:   [START ON 3/16/2022] lansoprazole  30 mg Per NG tube QAM AC    sodium chloride flush  5-40 mL IntraVENous 2 times per day    heparin flush  3 mL IntraVENous 2 times per day    fluconazole  200 mg Oral Daily    furosemide  40 mg IntraVENous Daily    thiamine (VITAMIN B1) IVPB  250 mg IntraVENous Q24H    senna  1 tablet Oral Nightly    docusate  100 mg Per NG tube BID    enoxaparin  40 mg SubCUTAneous Daily    ipratropium-albuterol  1 ampule Inhalation Q4H WA    nystatin  5 mL Oral 4x Daily    amLODIPine  5 mg Oral Daily    folic acid  1 mg Oral Daily    sucralfate  1 g Oral 4x Daily AC & HS    vitamin C  500 mg Oral TID    Vitamin D  1,000 Units Oral Daily    zinc sulfate  50 mg Oral Daily    white petrolatum   Topical BID       PRN Meds:sodium chloride flush, sodium chloride, heparin flush, medicated lip balm, sodium chloride, gadoteridol, ondansetron, acetaminophen, cetirizine    Physical    VITALS:  BP (!) 99/52   Pulse 115   Temp 98.9 °F (37.2 °C) (Axillary)   Resp 17   Ht 6' 2\" (1.88 m)   Wt 211 lb 6.4 oz (95.9 kg)   SpO2 94%   BMI 27.14 kg/m²     24HR INTAKE/OUTPUT:      Intake/Output Summary (Last 24 hours) at 3/15/2022 1439  Last data filed at 3/15/2022 1247  Gross per 24 hour   Intake 771.67 ml   Output 825 ml   Net -53.33 ml       24HR PULSE OXIMETRY RANGE:    SpO2  Av.6 %  Min: 89 %  Max: 100 %    General appearance: alert and appears stated age, (+) NGT  Lungs: rhonchi bilaterally worse with cough  Heart: regular rate and rhythm, S1, S2 normal, no murmur, click, rub or gallop  Abdomen: soft, non-tender; bowel sounds normal; no masses,  no organomegaly  Extremities: edema bipedal minimal  Neurologic: Mental status: awake, occ nodding though unclear comprehension, moving extremities, non-verbal    Data    CBC:   Recent Labs     03/13/22  0349 03/14/22  0530 03/15/22  0605   WBC 9.9 14.6* 15.8*   HGB 7.5* 8.3* 8.6*   HCT 21.9* 24.9* 26.2*   .9* 102.5* 104.0*   * 137 194       BMP:  Recent Labs     03/13/22  0349 03/13/22  0349 03/14/22  0530 03/14/22  1755 03/15/22  0605      < > 143 143 145   K 3.2*   < > 2.9* 4.0 3.6   CL 95*   < > 95* 97* 99   CO2 28   < > 32* 29 32*   PHOS 2.9  --  2.7  --  2.7   BUN 25*   < > 27* 30* 33*   CREATININE 1.6*   < > 1.3* 1.3* 1.2    < > = values in this interval not displayed. ALB:3,BILIDIR:3,BILITOT:3,ALKPHOS:3)@    PT/INR: No results for input(s): PROTIME, INR in the last 72 hours. ABG:   Recent Labs     03/14/22  0542   PH 7.527*   PO2 84.5   PCO2 39.7   HCO3 32.2*   BE 8.7*   O2SAT 97.2   METHB 0.3   O2HB 96.4   COHB 0.5   O2CON 11.9   HHB 2.8   THB 8.7*     FiO2 : 30 %  I:E Ratio: 1:3.30    Radiology/Other tests reviewed: CXR reviewed looking similar compared to previous    Assessment:     Principal Problem:    Altered mental status  Resolved Problems:    * No resolved hospital problems. *  pneumonia  Metabolic encephalopathy      Plan:       1. Cont with antibiotics as per ID  2. Cont with duonebs, observe respiratory function  3. Chest vest bid and see if helps with expectoration  4. (+) dysphagia, diet changes as per PCP  5. Cont with oxygen, observe saturations, taper as tolerated  6. OOB to chair if able to      Time at the bedside, reviewing labs and radiographs, reviewing notes and consultations, discussing with staff and family was more than 35 minutes. Thanks for letting us see this patient in consultation. Please contact us with any questions.  Office [phone] or after hours through [facility], x [phone].

## 2022-03-15 NOTE — PROGRESS NOTES
Critical Care Team - Daily Progress Note         Date and time: 3/15/2022 7:01 AM  Patient's name:  Maria Alejandra Dawkins  Medical Record Number: [id number]  Patient's account/billing number: [de-identified]  Patient's YOB: 1955  Age: 77 y.o. Date of Admission: 3/2/2022 10:44 AM  Length of stay during current admission: 13      Primary Care Physician: Ben Ohara DO  ICU Attending Physician: Dr. Nikita Engel    Code Status: Full Code    Reason for ICU admission:   Altered mental status  Impending respiratory failure      SUBJECTIVE:     OVERNIGHT EVENTS:         3/15: Patient had no problems overnight. He did fail his swallow study yesterday and the video swallow was recommended. NG tube was replaced yesterday afternoon. Patient did well overnight. He is tolerating 1 L O2 nasal cannula well. He will likely be transferred out of the ICU today. 3/14: Patient was extubated 3/13. He had no acute problems overnight. He is very raspy and is trying to cough up secretions. Patient was tolerating nasal cannula well. He is still somewhat confused. 3/13: Vent day 9. Patient starting to pass gas. Still no bowel movements. More awake and alert today. Following simple commands. Can likely be extubated today. 3/12: Patient more alert today and following intermittent commands. 3/11: Vent day #7. Per overnight RN, patient is still tolerating pressure support really well. He is off all pressors. He is opening his eyes and following commands. However patient's left side is weaker than his right. Per overnight RN patient is also very swollen and third spacing significantly. Patient still has not had a bowel movement. 3/10: Vent day 6. Patient is off Precedex per night RN. Patient also has had decreased urinary output overnight. LP 3/7 negative. This morning they will patient did have dark brown contents coming out through suction. 3/9: Vent Day 5, intubated, weaning sedation.  Patient on tube feeds, had episode of emesis while being turned. Tube feeds held. 3/8: Vent day 4. Patient remains intubated and sedated on the ventilator. LP was done on 3/7. Patient experienced no problems overnight per RN. Patient is weaned off levo. Patient is still on fentanyl but this will now be ordered prn. Patient continues on acyclovir, Diflucan, Merrem, nystatin. Patient will be started on tube feeds today. 3/7: Vent Day 3. Patient remains intubated and sedated and remains on ventilator. Patient is to go for LP today by IR. Time of procedure is unknown. Patient's dvt ppx is held at this time in anticipation for procedure. MRI 3/6 negative for evidence of herpetic encephalopathy. It showed only chronic microvascular disease. Patient is off Levo. Patient continues on acyclovir, diflucan, merrem, and nystatin. 3/6: Vent Day 2. Remains intubated and sedated. Patient was hypotensive and had to start on pressors. MRI brain scheduled. 3/5: Patient admitted to the ICU for AMS and impending respiratory failure. Patient was intubated. History significant for herpetic esophagitis ,HTN, DVT, allergic rhinitis.     CURRENT VENTILATION STATUS:     [] Ventilator  [] BIPAP  [x] Nasal Cannula [] Room Air      IF INTUBATED, ET TUBE MARKING AT LOWER LIP:     SECRETIONS  Amount:  [] Small [] Moderate  [] Large  [x] None  Color:     [] White [] Colored  [] Bloody    SEDATION:  RAAS Score:  [] Propofol gtt  [] Versed gtt  [] Fentanyl gtt [] Ativan gtt   [x] No Sedation    PARALYZED:  [x] No    [] Yes      VASOPRESSORS:  [x] No    [] Yes    If yes -   [] Levophed       [] Dopamine     [] Vasopressin       [] Dobutamine  [] Phenylephrine         [] Epinephrine    CENTRAL LINES:     [] No   [x] Yes   (Date of Insertion: 3/5 )           If yes -     [x] Right IJ     [] Left IJ [] Right Femoral [] Left Femoral                   [] Right Subclavian [] Left Subclavian       FORBES'S CATHETER:   [] No   [x] Yes  (Date of Insertion: 3/4)     URINE OUTPUT:            [] Good   [x] Low              [] Anuric      OBJECTIVE:     VITAL SIGNS:  BP 90/63   Pulse 114   Temp 98.2 °F (36.8 °C) (Axillary)   Resp 17   Ht 6' 2\" (1.88 m)   Wt 211 lb 6.4 oz (95.9 kg)   SpO2 96%   BMI 27.14 kg/m²   Tmax over 24 hours:  Temp (24hrs), Av.5 °F (36.9 °C), Min:98 °F (36.7 °C), Max:99.5 °F (37.5 °C)      Patient Vitals for the past 6 hrs:   BP Temp Temp src Pulse Resp SpO2   03/15/22 0616 90/63 -- -- 114 17 96 %   03/15/22 0500 84/63 -- -- 112 24 94 %   03/15/22 0400 127/70 98.2 °F (36.8 °C) Axillary 112 19 95 %   03/15/22 0300 (!) 121/45 -- -- 115 26 93 %   03/15/22 0200 (!) 96/57 -- -- 116 12 93 %         Intake/Output Summary (Last 24 hours) at 3/15/2022 0701  Last data filed at 3/15/2022 0616  Gross per 24 hour   Intake 991.34 ml   Output 1425 ml   Net -433.66 ml     Wt Readings from Last 2 Encounters:   03/10/22 211 lb 6.4 oz (95.9 kg)   22 186 lb 12.8 oz (84.7 kg)     Body mass index is 27.14 kg/m².         PHYSICAL EXAMINATION:    General appearance - ill-appearing  Mental status -  confused, but awake and more alert   Eyes - not examined  Ears - not examined  Nose -NG tube in place  Mouth - not examined  Neck - supple, no significant adenopathy  Chest - rhonci present in upper lobes bilaterally  Heart - normal rate and regular rhythm  Abdomen - soft, nontender, nondistended, no masses or organomegaly  Neurological -awake, alert  Extremities - no pedal edema noted  Skin -wound present on sacrum and buttocks     Any additional physical findings: n/a    MEDICATIONS:    Scheduled Meds:   lidocaine PF  5 mL IntraDERmal Once    sodium chloride flush  5-40 mL IntraVENous 2 times per day    heparin flush  3 mL IntraVENous 2 times per day    fluconazole  200 mg Oral Daily    furosemide  40 mg IntraVENous Daily    thiamine (VITAMIN B1) IVPB  250 mg IntraVENous Q24H    senna  1 tablet Oral Nightly    docusate  100 mg Per NG tube BID    enoxaparin  40 mg SubCUTAneous Daily    chlorhexidine  15 mL Mouth/Throat BID    pantoprazole  40 mg IntraVENous Daily    ipratropium-albuterol  1 ampule Inhalation Q4H WA    nystatin  5 mL Oral 4x Daily    amLODIPine  5 mg Oral Daily    folic acid  1 mg Oral Daily    sucralfate  1 g Oral 4x Daily AC & HS    vitamin C  500 mg Oral TID    Vitamin D  1,000 Units Oral Daily    zinc sulfate  50 mg Oral Daily    white petrolatum   Topical BID     Continuous Infusions:   sodium chloride      sodium chloride       PRN Meds:   sodium chloride flush, 5-40 mL, PRN  sodium chloride, 25 mL, PRN  heparin flush, 3 mL, PRN  medicated lip balm, , PRN  sodium chloride, , PRN  fentanNYL, 25 mcg, Q2H PRN  gadoteridol, 16 mL, ONCE PRN  ondansetron, 4 mg, Q6H PRN  acetaminophen, 650 mg, Q4H PRN  cetirizine, 10 mg, Daily PRN          VENT SETTINGS (Comprehensive) (if applicable):  Vent Information  $Ventilation: Off Vent  Skin Assessment: Clean, dry, & intact  Equipment ID: [id number]  Vent Type: [id number]  Vent Mode: PS  Vt Ordered: 0 mL  Rate Set: 0 bmp  Peak Flow: 0 L/min  Pressure Support: 8 cmH20  FiO2 : 30 %  SpO2: 96 %  SpO2/FiO2 ratio: 316.67  Sensitivity: 3  PEEP/CPAP: 8  I Time/ I Time %: 0 s  Humidification Source: Heated wire  Humidification Temp: 37  Humidification Temp Measured: 37  Circuit Condensation: Drained  Additional Respiratory  Assessments  Pulse: 114  Resp: 17  SpO2: 96 %  End Tidal CO2: 26 (%)  Position: Semi-Callejas's  Humidification Source: Heated wire  Humidification Temp: 37  Circuit Condensation: Drained  Oral Care: Mouth moisturizer,Mouth swabbed,Mouth suctioned  Subglottic Suction Done?: Yes  Airway Type: ET  Airway Size: 8  Cuff Pressure (cm H2O): 29 cm H2O    ABGs:   Recent Labs     03/14/22  0542   PH 7.527*   PCO2 39.7   PO2 84.5   HCO3 32.2*   BE 8.7*   O2SAT 97.2       Laboratory findings:    Complete Blood Count:   Recent Labs     03/13/22  0349 03/14/22  0530 03/15/22  0605   WBC 9.9 14.6* 15.8*   HGB 7.5* 8.3* 8.6*   HCT 21.9* 24.9* 26.2*   * 137 194        Last 3 Blood Glucose:   Recent Labs     03/14/22  0530 03/14/22  1755 03/15/22  0605   GLUCOSE 78 83 109*        PT/INR:    Lab Results   Component Value Date    PROTIME 13.4 03/04/2022    INR 1.2 03/04/2022     PTT:    Lab Results   Component Value Date    APTT 24.3 03/04/2022       Comprehensive Metabolic Profile:   Recent Labs     03/14/22  0530 03/14/22  0530 03/14/22  1755 03/15/22  0605     --  143 145   K 2.9*  --  4.0 3.6   CL 95*  --  97* 99   CO2 32*  --  29 32*   BUN 27*  --  30* 33*   CREATININE 1.3*  --  1.3* 1.2   GLUCOSE 78  --  83 109*   CALCIUM 9.5  --  9.3 9.3   PROT 5.4*   < >  --  5.5*   LABALBU 3.2*   < >  --  2.8*   BILITOT 1.2   < >  --  1.2   ALKPHOS 89   < >  --  102   AST 44*   < >  --  42*   ALT 18   < >  --  22    < > = values in this interval not displayed. Magnesium:   Lab Results   Component Value Date    MG 2.0 03/15/2022     Phosphorus:   Lab Results   Component Value Date    PHOS 2.7 03/15/2022     Ionized Calcium: No results found for: CAION     Urinalysis: No results found for: UA     Troponin: No results for input(s): TROPONINI in the last 72 hours. Microbiology:   Culture, Blood 2   Date Value Ref Range Status   03/05/2022 5 Days no growth  Final     CULTURE, RESPIRATORY   Date Value Ref Range Status   03/05/2022 Oral Pharyngeal Lorraine absent  Final       Cultures during this admission:     Blood cultures:  [] None drawn      [x] Negative             []  Positive (Details:  )  Urine Culture:   [] None drawn      [] Negative             [x]  Positive (Details: Legionella antigen 3/5)  Sputum Culture:  [] None drawn      [x] Negative             []  Positive (Details:  )   Endotracheal aspirate: [x] None drawn      [] Negative             []  Positive (Details:  )     Other pertinent Labs:       Radiology/Imaging:     Chest Xray (3/15/2022): Interval placement of nasogastric tube.      No other significant change. Continued follow-up recommended. ASSESSMENT:     Principal Problem:    Altered mental status  Resolved Problems:    * No resolved hospital problems. *      PLAN:     WEAN PER PROTOCOL:  [] No   [x] Yes  [] N/A    DISCONTINUE ANY LABS:   [x] No   [] Yes    ICU PROPHYLAXIS:  Stress ulcer:  [x] PPI Agent  [] [id number] [] Sucralfate  [] Other:  VTE:   [] Enoxaparin  [] Unfract. Heparin Subcut  [] EPC Cuffs    NUTRITION:  [x] NPO [x] Tube Feeding (Specify: ) [] TPN  [] PO (Diet: Diet NPO  ADULT TUBE FEEDING; Orogastric; Immune Enhancing; Continuous; 10; Yes; 10; Q 4 hours; 50; 30; Q 6 hours)    HOME MEDICATIONS RECONCILED: [] No  [x] Yes    INSULIN DRIP:   [x] No   [] Yes    CONSULTATION NEEDED:  [x] No   [] Yes    FAMILY UPDATED:    [] No   [x] Yes    TRANSFER OUT OF ICU:   [] No   [x] Yes    SYSTEMS ASSESSMENT    Neuro     Extubated 3/13  Diffuse left-sided weakness present, but moves all extremities   Fentanyl 5 mcg Q2H for pain, wean as tolerated    Metabolic Encephalopathy   Concern for potential Wernicke's   LP 3/7 negative  MRI 3/6 negative  High dose thiamine 500 mg a day for x3 days, now 250 mg x 3 days then 100 mg  Vitamin supplementation    Respiratory     S/p 9 days of intubation    Metabolic acidosis with respiratory alkalosis, improving   Extubated 3/13  1L O2 NC  Duoneb 1 amp Q4H while awake  Wean oxygen as tolerated.  Keep O2 sat 90-92%       Cardiovascular     Hypotension, stable  Norvasc 5 mg daily  Continue to monitor     Gastrointestinal     NG replaced  Will need video swallow evaluation  Speech therapy    GI PPx  Carafate 1 g 4 times daily  Protonix 40 mg IV daily  GI following, appreciate input    Bowel Regimen  Colace 100 mg twice daily  Senna daily    Renal     CKD stage IIIb, stable  Baseline creatinine 1.5  Nephrology following, appreciate input    Electrolyte Abnormalities  Replete as needed  Continue to monitor    Fluid Overload/Third Spacing  Lasix 40 mg daily  Nephro following, appreciate input    Infectious Disease     HSV and Candida esophagitis  Diflucan 200 mg Q24H (Day 12)  S/P Acyclovir 400 mg 3 times daily PO(9 Days)  Nystatin 500,000 units 4 times daily (Day 13)  MRI negative for any findings of hepatic encephalopathy  S/p Lumbar puncture 3/7  CSF viral PCR negative 3/7  Gram stain CSF negative 3/7  VDRL CSF cx nonreactive 3/7    Aspiration pneumonia   S/p Invanz 1g Q24H (4 days)  Respiratory culture 3/5 negative  Blood culture x2 negative 3/5  Legionella urine negative 3/5  Urine culture negative 3/5  Continue to follow final culture    Leukocytosis, improving  Due to infectious processes listed above    Hematology/Oncology     Chronic macrocytic anemia  Baseline hemoglobin around 11  1U pRBC 3/11, 1U pRBC 7/38  Folic acid supplementation  Continue to monitor  Transfuse for hemoglobin < 7     DVT ppx  Lovenox 40 mg daily    Endocrine     Continue to monitor blood glucose while in hospital    Social/Spiritual/DNR/Other     Full code    Brodie Correia DO, PGY-1            3/15/2022, 7:01 AM     Attending Physician Attestation: Dr. Miguel Cai    Thank you very much for allowing me to see this patient in consultation and follow up. I personally saw, examined and provided care for the patient. Radiographs, labs and medication list were reviewed by me independently. I spoke with bedside nursing, respiratory therapists and consultants. Critical care services and times documented are independent of procedures and multidisciplinary rounds with Residents. Additionally comprehensive, multidisciplinary rounds were conducted with the MICU team. The case was discussed in detail and plans for care were established. Review of Residents documentation was conducted and revisions were made as appropriate. I agree with the the above documented information.      Physical Examination:  Vitals:   Vitals:    03/15/22 1000 03/15/22 1100 03/15/22 1130 03/15/22 1200 BP: (!) 117/53 (!) 113/58  (!) 99/52   Pulse: 110 112  115   Resp: 16 21 16 17   Temp:    98.9 °F (37.2 °C)   TempSrc:    Axillary   SpO2: 96% 93% 94% 94%   Weight:       Height:          General: No Acute Distress, NG Tube in place  HEENT: normocephalic, atruamatic  CVS: RRR, S1, S2, No S3 or S4  Respiratory: decreased breath sounds at lung bases, no focal wheezes noted, positive rhonchi  Extremities: no clubbing/cyanosis/edema  Neuro: responsive, still remains confused     LP 3/7/2022:       ASSESSMENT:  1.) Acute Encephalopathy   - There was no evidence to support encephalitis or meningitis in a patient with myelodysplastic syndrome  - no abnormal ammonia in serum   - immunoglobulins, ZION, hepatitis, AFP, HIV all negative   2.) Respiratory Failure with Hypoxia  3.) Candida Esophagitis   4.) HSV Esophagitis   5.) Aspiration Pneumonia      In addition the following applies:     Check: N/A  Medication Alterations: N/A  Procedures: N/A  Imaging: reviewed  New Consultations: N/A  VENT: N/A     Access: Right IJ TLC  Consults: ID, Pulmonary, GI, Nephrology   Drips: N/A  ABX/Antifungals: Fluconazole   Completed ABX/Anti-Fungals/Anti-Virals: Acyclovir      - OK to floors with pulmonary to continue to follow patient     Thank you for allowing me to participate in the care of this patient. Care reviewed with nursing staff, medical and surgical specialty care, primary care and the patient's family as available. Restraints are ordered when the patient can do harm to him/herself by pulling out devices. Lizy Bhardwaj M.D.     Lizy Bhardwaj MD  3/15/2022  12:43 PM

## 2022-03-15 NOTE — PROGRESS NOTES
Patient seen in icu . Extubated and on tube feeds  Due to faiing swallow eval. vss afebrile. Still lethargic and quite confused. Concerning for wernickes encephalopathy. No neuro available.  Continue with current care

## 2022-03-15 NOTE — PROGRESS NOTES
[address] [place] [address] [facility]  [facility]  Progress Note      Chief Complaint   Patient presents with    Altered Mental Status     sent by physician at [facility] for head CT. Pt wandering around unit, could not redirect. SUBJECTIVE:  The patient is still in the ICU. He is still on the ventilator. He is tolerating antibiotics. Afebrile  Extubated and awake-but still lethargic and upper respiratory secretions heard at bedside  Wife present  1 L nasal cannula  Review of systems:  As stated above in the chief complaint, otherwise negative. Medications:  Scheduled Meds:   [START ON 3/16/2022] lansoprazole  30 mg Per NG tube QAM AC    sodium chloride flush  5-40 mL IntraVENous 2 times per day    heparin flush  3 mL IntraVENous 2 times per day    fluconazole  200 mg Oral Daily    furosemide  40 mg IntraVENous Daily    thiamine (VITAMIN B1) IVPB  250 mg IntraVENous Q24H    senna  1 tablet Oral Nightly    docusate  100 mg Per NG tube BID    enoxaparin  40 mg SubCUTAneous Daily    ipratropium-albuterol  1 ampule Inhalation Q4H WA    nystatin  5 mL Oral 4x Daily    amLODIPine  5 mg Oral Daily    folic acid  1 mg Oral Daily    sucralfate  1 g Oral 4x Daily AC & HS    vitamin C  500 mg Oral TID    Vitamin D  1,000 Units Oral Daily    zinc sulfate  50 mg Oral Daily    white petrolatum   Topical BID     Continuous Infusions:   sodium chloride      sodium chloride       PRN Meds:sodium chloride flush, sodium chloride, heparin flush, medicated lip balm, sodium chloride, gadoteridol, ondansetron, acetaminophen, cetirizine    OBJECTIVE:  BP (!) 113/58   Pulse 112   Temp 99.1 °F (37.3 °C) (Axillary)   Resp 16   Ht 6' 2\" (1.88 m)   Wt 211 lb 6.4 oz (95.9 kg)   SpO2 94%   BMI 27.14 kg/m²   Temp  Av.5 °F (36.9 °C)  Min: 98 °F (36.7 °C)  Max: 99.1 °F (37.3 °C)  Constitutional: The patient is lying in bed in the ICU. Now extubated, opens eyes,.   communicates. Skin: Warm and dry.  No rashes were noted. HEENT: Round and reactive pupils. Moist mucous membranes. No ulcerations or thrush. ET tube. OG tube to suction. Neck: Supple to movements. Chest: No respiratory distress. No crackles. Cardiovascular: Heart sounds rhythmic, regular and tachycardic. Abdomen: Diminished bowel sounds to auscultation. Benign to palpation. No masses felt. Genitourinary: Male  Extremities: Minimal edema. Lines: Right femoral arterial line 3/5/2022-remove. Right IJ TLC 3/5/2022.-Removed on 3/15/2022  Crandall catheter.   3/24/9149 left basilic double-lumen PICC  Laboratory and Tests Review:  Lab Results   Component Value Date    WBC 15.8 (H) 03/15/2022    WBC 14.6 (H) 03/14/2022    WBC 9.9 03/13/2022    HGB 8.6 (L) 03/15/2022    HCT 26.2 (L) 03/15/2022    .0 (H) 03/15/2022     03/15/2022     Lab Results   Component Value Date    NEUTROABS 14.54 (H) 03/15/2022    NEUTROABS 12.59 (H) 03/14/2022    NEUTROABS 8.21 (H) 03/13/2022     No results found for: [facility]  Lab Results   Component Value Date    ALT 22 03/15/2022    AST 42 (H) 03/15/2022    ALKPHOS 102 03/15/2022    BILITOT 1.2 03/15/2022     Lab Results   Component Value Date     03/15/2022    K 3.6 03/15/2022    K 3.7 03/06/2022    CL 99 03/15/2022    CO2 32 03/15/2022    BUN 33 03/15/2022    CREATININE 1.2 03/15/2022    CREATININE 1.3 03/14/2022    CREATININE 1.3 03/14/2022    GFRAA >60 03/15/2022    LABGLOM >60 03/15/2022    GLUCOSE 109 03/15/2022    PROT 5.5 03/15/2022    LABALBU 2.8 03/15/2022    CALCIUM 9.3 03/15/2022    BILITOT 1.2 03/15/2022    ALKPHOS 102 03/15/2022    AST 42 03/15/2022    ALT 22 03/15/2022     Lab Results   Component Value Date    CRP 15.1 (H) 03/05/2022     Lab Results   Component Value Date    SEDRATE 47 (H) 03/05/2022    SEDRATE 40 (H) 03/03/2022     Radiology:  CT of the chest reviewed demonstrating patchy groundglass infiltrates; gallbladder  Monitor declined  MRI of the brain completed-nondiagnostic    Microbiology:   3/2/2022: Urine culture negative  3/5/2022 blood cultures negative x2  3/5/2022 respiratory cultures OP abdullahi absent  3/5/2022 urine for Legionella and strep pneumo negative  3/7/2022: Respiratory panel negative  3/7/2022: CSF negative. VDRL nonreactive    ASSESSMENT:  · Change in mental status. There was no evidence to support encephalitis or meningitis in a patient with myelodysplastic syndrome  · Candida esophagitis  · Aspiration pneumonia. Completed 7 days of Ertapenem  · Leukocytosis related to above-improved    PLAN:  · Continue  Fluconazole p.o.   · Discussed with ICU staff  · We will follow with you    Spoke with nursing and critical care    Sam Albarran MD  11:57 AM   3/14/2022      3/15/2022

## 2022-03-15 NOTE — PROGRESS NOTES
SPEECH LANGUAGE PATHOLOGY  DAILY PROGRESS NOTE        PATIENT NAMEEvangelina Never      :  1955          TODAY'S DATE:  3/15/2022 ROOM:  0215/0215-A    Attempted ongoing Speech-Language Pathology intervention for dysphagia management. Pt unavailable at this time due to:  [] HOLD per RN/ medical staff d/t medical status   [] Off unit for testing/ procedure    [x] With medical staff - pt receiving breathing treatment   [] Declined intervention  [] Sleeping/ Lethargic   [] Other:     SLP to continue previously established POC and re-attempt as able. Chauncy Barthel.  Mil QUISPE, [address] [facility] [address]

## 2022-03-15 NOTE — PATIENT CARE CONFERENCE
Intensive Care Daily Quality Rounding Checklist        ICU Team Members: bedside nurse, charge nurse, Lata Mayes, residents, clinical pharmacist     ICU Day #: NUMBER: 11     Intubation Date: March 5, 2022     Ventilator Day #: extubated 3/13/22     Central Line Insertion Date: March 5, 2022                                                    Day #: [facility] 03/15/2022      Arterial Line Insertion Date: N/A                             Day #: N/A     Temporary Hemodialysis Catheter Insertion Date: N/A                             Day # N/A     DVT Prophylaxis: Lovenox    GI Prophylaxis: Protonix     Crandall Catheter Insertion Date: March 5, 2022                                        Day #: 11                             Continued need (if yes, reason documented and discussed with physician): yes, need for accurate I+O's-getting Lasix TID     Skin Issues/ Wounds and ordered treatment discussed on rounds: has wounds, wound care following, SOS precautions     Goals/ Plans for the Day: place PICC line, transfer out of ICU

## 2022-03-15 NOTE — PROCEDURES
PICC   Catheter insertion date: 3/15/2022     Product Number:  [id number]   Lot No: [id number]   Gauge: 18   Lumen: 0.6LO double   L Basilic    Vein Diameter: 0.65cm   Mid upper arm circumference: 33cm   Catheter Length: 36cm   Internal Length: 36cm   External Catheter Length: 0cm   Ultrasound Used: yes  VPS Blue [place] confirms PICC tip is placed in the lower 1/3 of the SVC or at the Cavoatrial junction. Floor nurse notified PICC is okay to use.    : SANNA De Jesus RN

## 2022-03-15 NOTE — CARE COORDINATION
Updated plan of care. Extubated 3/13. Pt failed swallow evaluation. NG inserted on 3/14 and TF started. Continue chest vest, iv antibiotics. Pt remains confused. From [address] for rehab and can return if stable. Pt to transfer to [facility] down.  Will follow-o

## 2022-03-16 NOTE — PROGRESS NOTES
Speech Language Pathology      NAME:  Mary Campa  :  1955  DATE: 3/16/2022  ROOM:  [address] [place] [address]    Attempted ongoing Speech-Language Pathology intervention for dysphagia. Pt unavailable at this time due to:  [] HOLD per RN/ medical staff d/t medical status   [] Off unit for testing/ procedure    [] With medical staff   [] Declined intervention  [x] Sleeping/ Lethargic- AM and PM unable to arouse patient via verbal and tactile cueing. RN attempted in PM, patient was able to open his eyes but was unable to remain awake for PO intake. [] Other:     SLP to continue previously established POC and re-attempt as able. Altered mental status [R41.82]  Altered mental status, unspecified altered mental status type [R41.82]    Dillon YANCEY  [address] [facility] [id number]  Speech Language Pathologist

## 2022-03-16 NOTE — PROGRESS NOTES
[facility] MD Gabe Altamirano, MD Jennifer Gastelum, MD Julita Morelos, MD Leny Kendrick, MOE [place], OBED  Progress Note    3/15/2022    SUBJECTIVE:   3/4: Off the floor. Discussed case with bedside RN, Dr. Nkechi Barrera. Unable to perform diagnostic radiographic studies due to agitation, movement    3/5 transfer to the ICU intubated via ETT, ventilated. Hemodynamically stable. Sedated appears comfortable on vent. IV K-Phos infusion ordered this morning, still not delivered to nursing unit for administration. IV fluid stopped yesterday for unknown period of time, held today for 2 to 3 hours, reasons not clear though apparently IV access was not lost.    3/6: Remains critically ill. Intubated via ETT. Vent setting stable. Minimally responsive without sedation. Urine output poor over the course of today. BP rather low, though hemodynamically stable. 3/7 : seen in his room , intubated , mechanically ventilated , BP stable . UO on low side . Vent setting stable     3/8 seen earlier today , intubated fio2 30  Low UO . No pressors   D/w Dr Denilson Marie at bedside   3/9:  Remains critically ill. Intubated on vent,  Fio2- 30% peep-8. Remains off pressors. Urinoe output continue to be low. Minimally responsive. 3/10 : seen in his room , intubated , mechaincally ventilated fio2 30 . No pressors comfortable 2+ edema in LE     3/11 : seen in his room intubated mechanically ventiated no pressors fio2 30 peep 8 good UO in response to lasix /spa . Still with 2-3+ LE edema b/l      3/12 : seen in his room , intubated mechanically ventilated . Good UO , fio2 30 peep 8   Awake . Still very weak   D/w Dr Denilson Marie at bedside      3/13 : excellent UO on lasix/spa . CXR improving . Extubated today   On o2/nc     3/14: Resting comfortably. On nasal cannula. BP stable. No new event. 3/15: Seen this morning. Beginning to wake up and interact to still obtunded. BP stable. Swelling marked. PROBLEM LIST:    Principal Problem:    Altered mental status  Resolved Problems:    * No resolved hospital problems. *         DIET:    Diet NPO  ADULT TUBE FEEDING; Orogastric; Immune Enhancing; Continuous; 10; Yes; 10; Q 4 hours; 50; 30;  Q 6 hours     MEDS (scheduled):    [START ON 3/16/2022] lansoprazole  30 mg Per NG tube QAM AC    sodium chloride flush  5-40 mL IntraVENous 2 times per day    heparin flush  3 mL IntraVENous 2 times per day    fluconazole  200 mg Oral Daily    furosemide  40 mg IntraVENous Daily    thiamine (VITAMIN B1) IVPB  250 mg IntraVENous Q24H    senna  1 tablet Oral Nightly    docusate  100 mg Per NG tube BID    enoxaparin  40 mg SubCUTAneous Daily    ipratropium-albuterol  1 ampule Inhalation Q4H WA    nystatin  5 mL Oral 4x Daily    amLODIPine  5 mg Oral Daily    folic acid  1 mg Oral Daily    sucralfate  1 g Oral 4x Daily AC & HS    vitamin C  500 mg Oral TID    Vitamin D  1,000 Units Oral Daily    zinc sulfate  50 mg Oral Daily    white petrolatum   Topical BID       MEDS (infusions):   sodium chloride      sodium chloride         MEDS (prn):  sodium chloride flush, sodium chloride, heparin flush, medicated lip balm, sodium chloride, gadoteridol, ondansetron, acetaminophen, cetirizine    PHYSICAL EXAM:     Patient Vitals for the past 24 hrs:   BP Temp Temp src Pulse Resp SpO2   03/15/22 1654 (!) 142/89 98.6 °F (37 °C) Axillary 112 20 96 %   03/15/22 1343 (!) 137/97 98.9 °F (37.2 °C) Axillary 120 20 --   03/15/22 1200 (!) 99/52 98.9 °F (37.2 °C) Axillary 115 17 94 %   03/15/22 1130 -- -- -- -- 16 94 %   03/15/22 1100 (!) 113/58 -- -- 112 21 93 %   03/15/22 1000 (!) 117/53 -- -- 110 16 96 %   03/15/22 0900 (!) 119/58 -- -- 111 16 95 %   03/15/22 0833 -- -- -- -- 15 100 %   03/15/22 0800 (!) 106/55 99.1 °F (37.3 °C) Axillary 111 15 98 %   03/15/22 0700 90/63 -- -- 112 18 97 %   03/15/22 0616 90/63 -- -- 114 17 96 %   03/15/22 0600 (!) 116/50 98.6 °F (37 °C) Axillary 112 18 98 %   03/15/22 0500 84/63 -- -- 112 24 94 %   03/15/22 0400 127/70 98.2 °F (36.8 °C) Axillary 112 19 95 %   03/15/22 0300 (!) 121/45 -- -- 115 26 93 %   03/15/22 0200 (!) 96/57 -- -- 116 12 93 %   03/15/22 0100 (!) 116/48 -- -- 111 29 93 %   03/15/22 0026 (!) 116/48 -- -- 109 -- --   03/15/22 0022 (!) 116/48 -- -- 112 (!) 48 (!) 89 %   03/15/22 0000 (!) 108/54 98.3 °F (36.8 °C) Axillary 111 18 95 %   03/14/22 2300 (!) 114/56 -- -- 109 18 93 %   03/14/22 2200 (!) 109/48 -- -- 108 20 94 %   03/14/22 2100 (!) 105/54 -- -- 108 14 93 %   @      Intake/Output Summary (Last 24 hours) at 3/15/2022 2026  Last data filed at 3/15/2022 1606  Gross per 24 hour   Intake 801.67 ml   Output 450 ml   Net 351.67 ml         Wt Readings from Last 3 Encounters:   03/10/22 211 lb 6.4 oz (95.9 kg)   02/23/22 186 lb 12.8 oz (84.7 kg)   11/27/18 215 lb (97.5 kg)       Constitutional:  Intubated on vent  HEENT: NC/AT, EOMI, sclera and conjunctiva are clear and anicteric, mucus membranes moist  Neck: Trachea midline, no JVD  Cardiovascular: S1, S2 regular rhythm, no murmur,or rub  Respiratory: Coarse breath sounds throughout all lung fields no crackles, no wheeze  Gastrointestinal:  Soft, nontender, nondistended, NABS  Ext: no edema, feet warm  Skin: dry, no rash  Neuro:  Minimally responsive      DATA:    Recent Labs     03/13/22  0349 03/14/22  0530 03/15/22  0605   WBC 9.9 14.6* 15.8*   HGB 7.5* 8.3* 8.6*   HCT 21.9* 24.9* 26.2*   .9* 102.5* 104.0*   * 137 194     Recent Labs     03/13/22  0349 03/13/22  0349 03/14/22  0530 03/14/22  0530 03/14/22  1755 03/15/22  0605 03/15/22  1911      < > 143   < > 143 145 145   K 3.2*   < > 2.9*   < > 4.0 3.6 3.6   CL 95*   < > 95*   < > 97* 99 99   CO2 28   < > 32*   < > 29 32* 35*   MG 1.7   < > 1.5*   < > 2.4 2.0 1.9   PHOS 2.9  --  2.7  --   --  2.7  --    BUN 25*   < > 27*   < > 30* 33* 34*   CREATININE 1.6*   < > 1.3*   < > 1.3* 1.2 1.2   ALT 15  -- 18  --   --  22  --    AST 43*  --  44*  --   --  42*  --    BILITOT 1.3*  --  1.2  --   --  1.2  --    ALKPHOS 79  --  89  --   --  102  --     < > = values in this interval not displayed. No results found for: LABPROT    Assessment  77 y.o. gentleman known to service, followed for [facility] in January of this year. Admitted with acute mental status change, myoclonic jerks and unusual movements.     1. Chronic kidney disease, stage IIIa. Creatinine at baseline, 1.4 to 1.6 mg/dL     2.  Metabolic acidosis, wide anion gap. Resolved with bicarbonate drip    3. Cannabis on drug screen    4. Hypophosphatemia. 5. Hypernatremia, dehydration due to increased insensible losses, poor intake/input. Resolved    6. Hypokalemia, normalized with supplement      Cr stable/better   excellent UO with negative balance  Extubated 3/13  Swelling somewhat worse than yesterday.   [na+] 145 indicative of mild intravascular dehydration    Recommendations  Supplement potassium (already done) --we will give an additional 40 mEq IV over 4 hours tonight, as well for total of 80 mEq IV supplementation today  Continue current diuretic therapy  F/u UO , labs  Continue  supportive care      Electronically signed by Antonette Hayes MD on 3/15/2022

## 2022-03-16 NOTE — PROGRESS NOTES
[facility]  [address], [address]  [facility], [facility]      Pulmonary Progress Note      SUBJECTIVE:  Currently on 3 li NC, occ nodding to questions thought non-verbal, unclear comprehension, ronchorous cough.  Getting neb treatments and vest therapy when seen, lying down in bed    OBJECTIVE    Medications    Continuous Infusions:   sodium chloride      sodium chloride         Scheduled Meds:   lansoprazole  30 mg Per NG tube QAM AC    sodium chloride flush  5-40 mL IntraVENous 2 times per day    heparin flush  3 mL IntraVENous 2 times per day    fluconazole  200 mg Oral Daily    furosemide  40 mg IntraVENous Daily    thiamine (VITAMIN B1) IVPB  250 mg IntraVENous Q24H    senna  1 tablet Oral Nightly    docusate  100 mg Per NG tube BID    enoxaparin  40 mg SubCUTAneous Daily    ipratropium-albuterol  1 ampule Inhalation Q4H WA    nystatin  5 mL Oral 4x Daily    amLODIPine  5 mg Oral Daily    folic acid  1 mg Oral Daily    sucralfate  1 g Oral 4x Daily AC & HS    vitamin C  500 mg Oral TID    Vitamin D  1,000 Units Oral Daily    zinc sulfate  50 mg Oral Daily    white petrolatum   Topical BID       PRN Meds:sodium chloride flush, sodium chloride, heparin flush, medicated lip balm, sodium chloride, gadoteridol, ondansetron, acetaminophen, cetirizine    Physical    VITALS:  /63   Pulse 116   Temp 98.9 °F (37.2 °C) (Tympanic)   Resp 20   Ht 6' 2\" (1.88 m)   Wt 187 lb 12.8 oz (85.2 kg)   SpO2 91%   BMI 24.11 kg/m²     24HR INTAKE/OUTPUT:      Intake/Output Summary (Last 24 hours) at 3/16/2022 0850  Last data filed at 3/15/2022 2145  Gross per 24 hour   Intake 561.67 ml   Output 1100 ml   Net -538.33 ml       24HR PULSE OXIMETRY RANGE:    SpO2  Av.9 %  Min: 91 %  Max: 96 %    General appearance: alert and appears stated age, (+) NGT  Lungs: rhonchi bilaterally worse with cough  Heart: regular rate and rhythm, S1, S2 normal, no murmur, click, rub or gallop  Abdomen: soft, non-tender; bowel sounds normal; no masses,  no organomegaly  Extremities: edema bipedal minimal  Neurologic: Mental status: awake, occ nodding though unclear comprehension, moving extremities, non-verbal    Data    CBC:   Recent Labs     03/14/22  0530 03/15/22  0605 03/16/22  0400   WBC 14.6* 15.8* 14.7*   HGB 8.3* 8.6* 8.0*   HCT 24.9* 26.2* 24.8*   .5* 104.0* 105.5*    194 216       BMP:  Recent Labs     03/14/22  0530 03/14/22  1755 03/15/22  0605 03/15/22  1911 03/16/22  0400      < > 145 145 145   K 2.9*   < > 3.6 3.6 4.2   CL 95*   < > 99 99 101   CO2 32*   < > 32* 35* 35*   PHOS 2.7  --  2.7  --  1.3*   BUN 27*   < > 33* 34* 37*   CREATININE 1.3*   < > 1.2 1.2 1.2    < > = values in this interval not displayed. ALB:3,BILIDIR:3,BILITOT:3,ALKPHOS:3)@    PT/INR: No results for input(s): PROTIME, INR in the last 72 hours. ABG:   Recent Labs     03/14/22  0542   PH 7.527*   PO2 84.5   PCO2 39.7   HCO3 32.2*   BE 8.7*   O2SAT 97.2   METHB 0.3   O2HB 96.4   COHB 0.5   O2CON 11.9   HHB 2.8   THB 8.7*     FiO2 : 30 %  I:E Ratio: 1:3.30    Radiology/Other tests reviewed: CXR reviewed looking similar compared to previous    Assessment:     Principal Problem:    Altered mental status  Resolved Problems:    * No resolved hospital problems. *  pneumonia  Metabolic encephalopathy      Plan:       1. Cont with antibiotics as per ID  2. Cont with duonebs and chest vest, observe respiratory function and cough/sputum production  3. (+) dysphagia, diet changes as per PCP  4. Cont with oxygen, observe saturations, taper as tolerated  5. OOB to chair if able to  6. Observe mental function, slightly better from when initially admitted, observe for further improvement      Time at the bedside, reviewing labs and radiographs, reviewing notes and consultations, discussing with staff and family was more than 35 minutes.       Thanks for letting us see this patient in consultation. Please contact us with any questions. Office [phone] or after hours through [employer], x [phone].

## 2022-03-16 NOTE — PROGRESS NOTES
[address] [place] [address] [facility]  [facility]  Progress Note      Chief Complaint   Patient presents with    Altered Mental Status     sent by physician at [facility] for head CT. Pt wandering around unit, could not redirect. SUBJECTIVE:  The patient is still in the ICU. He is still on the ventilator. He is tolerating antibiotics. Afebrile  very lethargic and upper respiratory secretions heard at bedside   3 L nasal cannula  Review of systems:  As stated above in the chief complaint, otherwise negative. Medications:  Scheduled Meds:   potassium phosphate IVPB  30 mmol IntraVENous Once    lansoprazole  30 mg Per NG tube QAM AC    sodium chloride flush  5-40 mL IntraVENous 2 times per day    heparin flush  3 mL IntraVENous 2 times per day    fluconazole  200 mg Oral Daily    furosemide  40 mg IntraVENous Daily    thiamine (VITAMIN B1) IVPB  250 mg IntraVENous Q24H    senna  1 tablet Oral Nightly    docusate  100 mg Per NG tube BID    enoxaparin  40 mg SubCUTAneous Daily    ipratropium-albuterol  1 ampule Inhalation Q4H WA    nystatin  5 mL Oral 4x Daily    amLODIPine  5 mg Oral Daily    folic acid  1 mg Oral Daily    sucralfate  1 g Oral 4x Daily AC & HS    vitamin C  500 mg Oral TID    Vitamin D  1,000 Units Oral Daily    zinc sulfate  50 mg Oral Daily    white petrolatum   Topical BID     Continuous Infusions:   sodium chloride      sodium chloride       PRN Meds:sodium chloride flush, sodium chloride, heparin flush, medicated lip balm, sodium chloride, gadoteridol, ondansetron, acetaminophen, cetirizine    OBJECTIVE:  /64   Pulse 112   Temp 99.5 °F (37.5 °C) (Axillary)   Resp 20   Ht 6' 2\" (1.88 m)   Wt 187 lb 12.8 oz (85.2 kg)   SpO2 96%   BMI 24.11 kg/m²   Temp  Av.1 °F (37.3 °C)  Min: 97.4 °F (36.3 °C)  Max: 100.2 °F (37.9 °C)  Constitutional: The patient is lying in bed in the ICU. Now extubated, opens eyes,.   communicates. Skin: Warm and dry.  No rashes were noted. HEENT: Round and reactive pupils. Moist mucous membranes. No ulcerations or thrush. ET tube. OG tube to suction. Neck: Supple to movements. Chest: No respiratory distress. No crackles. coarse bialterally  Cardiovascular: Heart sounds rhythmic, regular and tachycardic. Abdomen: Diminished bowel sounds to auscultation. Benign to palpation. No masses felt. Genitourinary: Male  Extremities: Minimal edema. Lines: Right femoral arterial line 3/5/2022-remove. Right IJ TLC 3/5/2022.-Removed on 3/15/2022  Crandall catheter.   9/79/4359 left basilic double-lumen PICC  Laboratory and Tests Review:  Lab Results   Component Value Date    WBC 14.7 (H) 03/16/2022    WBC 15.8 (H) 03/15/2022    WBC 14.6 (H) 03/14/2022    HGB 8.0 (L) 03/16/2022    HCT 24.8 (L) 03/16/2022    .5 (H) 03/16/2022     03/16/2022     Lab Results   Component Value Date    NEUTROABS 13.97 (H) 03/16/2022    NEUTROABS 14.54 (H) 03/15/2022    NEUTROABS 12.59 (H) 03/14/2022     No results found for: [facility]  Lab Results   Component Value Date    ALT 23 03/16/2022    AST 41 (H) 03/16/2022    ALKPHOS 122 03/16/2022    BILITOT 1.2 03/16/2022     Lab Results   Component Value Date     03/16/2022    K 4.2 03/16/2022    K 3.7 03/06/2022     03/16/2022    CO2 35 03/16/2022    BUN 37 03/16/2022    CREATININE 1.2 03/16/2022    CREATININE 1.2 03/15/2022    CREATININE 1.2 03/15/2022    GFRAA >60 03/16/2022    LABGLOM >60 03/16/2022    GLUCOSE 162 03/16/2022    PROT 5.3 03/16/2022    LABALBU 2.8 03/16/2022    CALCIUM 9.2 03/16/2022    BILITOT 1.2 03/16/2022    ALKPHOS 122 03/16/2022    AST 41 03/16/2022    ALT 23 03/16/2022     Lab Results   Component Value Date    CRP 15.1 (H) 03/05/2022     Lab Results   Component Value Date    SEDRATE 47 (H) 03/05/2022    SEDRATE 40 (H) 03/03/2022     Radiology:  CT of the chest reviewed demonstrating patchy groundglass infiltrates; gallbladder  Monitor declined  MRI of the brain completed-nondiagnostic    Microbiology:   3/2/2022: Urine culture negative  3/5/2022 blood cultures negative x2  3/5/2022 respiratory cultures OP abdullahi absent  3/5/2022 urine for Legionella and strep pneumo negative  3/7/2022: Respiratory panel negative  3/7/2022: CSF negative. VDRL nonreactive    ASSESSMENT:  · Change in mental status. There was no evidence to support encephalitis or meningitis in a patient with myelodysplastic syndrome  · Candida esophagitis  · Aspiration pneumonia.   Completed 7 days of Ertapenem  · Leukocytosis related to above-improved    PLAN:  · Continue  Fluconazole p.o.  · Still lethargic - ? peg  · Discussed with ICU staff  · We will follow with you    Spoke with Liz Em MD  3:22 PM   3/14/2022      3/16/2022

## 2022-03-16 NOTE — PROGRESS NOTES
increased safety with functional transfers/mobility and ADLs  * Cognitive retraining/development of therapeutic activities to improve problem solving, judgement, memory, and attention for increased safety/participation in ADL/IADL tasks  * Therapeutic exercise to improve motor endurance, ROM, and functional strength for ADLs/functional transfers  * Therapeutic activities to facilitate/challenge dynamic balance, stand tolerance for increased safety and independence with ADLs  * Therapeutic activities to facilitate gross/fine motor skills for increased independence with ADLs  * Neuro-muscular re-education: facilitation of righting/equilibrium reactions, midline orientation, scapular stability/mobility, normalization of muscle tone, and facilitation of volitional active controled movement  * Positioning to improve skin integrity, interaction with environment and functional independence     Recommended Adaptive Equipment: TBD      Home Living: Pt was confused during session and unable to respond to questions. Per chart, pt admitted from [place] [id number]. Per recent OT note, prior to TREV pt lived with wife in 1 story house with 3 JAYSHREE. He has a tub/shower with a shower chair and did not use a device for functional mobility. Pt was receiving assistance with ADLs and IADLs at home.     Pain Level: Grimaces noted during BLE and BUE ROM. Cognition: A&O: pt lethargic during session and not following commands.               Problem solving:  poor              Judgement/safety:  poor                Functional Assessment:  AM-PAC Daily Activity Raw Score: 6/24    Initial Eval Status  Date: 3/3/22 Re-Evaluation Status (d/t recent intubation and extubation in ICU)  Date: 3/16/22 Treatment Status  Date: STGs = LTGs  Time frame: 10-14 days   Feeding Mod A/Min A  D/t cognition Tube Feed        Grooming Max A  D/t cognition Dependent/Max A   Min A   UB Dressing Max A  To don hospital gown Dependent   Min A   LB Dressing Dependent  To don socks Dependent    Mod A-with use of AD as appropriate/needed   Bathing Max A Dependent/Max A   Mod A -with use of AD as appropriate/needed   Toileting Dependent/Max A Dependent   Mod A    Bed Mobility  Supine to sit: Mod A X2   Sit to supine: Mod A X2  Supine to sit: Dependent X2   Sit to supine: Dependent A X2  Rolling: Dependent X2  Scooting: Dependent X2   Mod A   Functional Transfers Mod A X2 with no device  Sit<>stand from EOB  Cues for hand placement and safe technique. Decreased standing tolerance and balance NT   Max A   Functional Mobility NT NT   Max A -with device as needed to maximize independence with ADLs and functional task completion   Balance Sitting:     Static:  Min A    Dynamic:Mod A/Min A  Standing: Max A X2. Anterior lean noted with decreased balance. Sitting: Pt dependent X2 to sit to EOB this date. No or head control noted in sitting. Standing: NT   Mod A for  sitting balance to maximize independence with ADLs and functional task completion     Max A for standing balance to maximize independence with ADLs and functional task completion   Activity Tolerance Fair- with light activity. Poor with light activity   Fair- with ADL activity. Visual/  Perceptual Glasses: none at bedside    Glasses: none at bedside                     Additional long-term goal: Pt will increase functional independence to PLOF to allow pt to live in least restrictive environment.     Hand Dominance: unable to obtain this information    AROM (PROM)/Strength   RUE/LUE  Unable to formally assess. Pt not following directions and no spontaneous BUE movement. PROM: grossly WFL; pt grimacing during ROM. Edema noted in BUE.      Hearing: WFL   Sensation:  No c/o numbness or tingling   Tone: WFL   Edema: BUE and BLE     Comments: Upon arrival patient lying in bed. At end of session, patient returned to bed with call light and phone within reach, all lines and tubes intact, alarm set.   Overall patient demonstrated decreased independence and safety during completion of ADL/functional transfer/mobility tasks. Pt would benefit from continued skilled OT to increase safety and independence with completion of ADL/IADL tasks for functional independence and quality of life.     Rehab Potential: Fair for established goals     Patient / Family Goal: none stated     Patient and/or family were instructed on functional diagnosis, prognosis/goals and OT plan of care.  Demonstrated poor understanding.      Eval Complexity: Low     Time In: 0910  Time Out: 0920    Min Units   OT Eval Low 56896       OT Eval Medium 12741       OT Eval High 28230       OT Re-Eval 89698  x  1   Therapeutic Ex 98313       Therapeutic Activities 12796       ADL/Self Care 96064       Orthotic Management 90586       Manual [id number]       Neuro Re-Ed 60561       Non-Billable Time          Evaluation Time additionally includes thorough review of current medical information, gathering information on past medical history/social history and prior level of function, interpretation of standardized testing/informal observation of tasks, assessment of data and development of plan of care and goals.              Silke Randolph, OTR/L, [id number]

## 2022-03-16 NOTE — CARE COORDINATION
Transferred from ICU yesterday-extubated 3/13-requiring O2 3lNC. Tube feeding continued via NG. PT/OT evals pending. From [facility] PTA- not a bedhold- will need insurance precert- can return if approved pending bed availability- facility cannot accept pt with an NG tube. Patient has an insurance that does not typically approve an LTAC. JORGE in epic, ambulance transport form on chart.  Will follow Helen Wadsworth RN case manager

## 2022-03-16 NOTE — PROGRESS NOTES
Critical Care to sign off at this time. Dr. Jose Schneider to follow for pulmonary purposes. Please let us know if our services are needed any further.     Garcia Izquierdo MD

## 2022-03-16 NOTE — PROGRESS NOTES
[facility] MD Gabe Altamirano, MD Jennifer Gastelum, MD Julita Morelos, MD Leny Kendrick, MOE [place], OBED  Progress Note    3/16/2022    SUBJECTIVE:   3/4: Off the floor. Discussed case with bedside RN, Dr. Nkechi Barrera. Unable to perform diagnostic radiographic studies due to agitation, movement    3/5 transfer to the ICU intubated via ETT, ventilated. Hemodynamically stable. Sedated appears comfortable on vent. IV K-Phos infusion ordered this morning, still not delivered to nursing unit for administration. IV fluid stopped yesterday for unknown period of time, held today for 2 to 3 hours, reasons not clear though apparently IV access was not lost.    3/6: Remains critically ill. Intubated via ETT. Vent setting stable. Minimally responsive without sedation. Urine output poor over the course of today. BP rather low, though hemodynamically stable. 3/7 : seen in his room , intubated , mechanically ventilated , BP stable . UO on low side . Vent setting stable     3/8 seen earlier today , intubated fio2 30  Low UO . No pressors   D/w Dr Denilson Marie at bedside   3/9:  Remains critically ill. Intubated on vent,  Fio2- 30% peep-8. Remains off pressors. Urinoe output continue to be low. Minimally responsive. 3/10 : seen in his room , intubated , mechaincally ventilated fio2 30 . No pressors comfortable 2+ edema in LE     3/11 : seen in his room intubated mechanically ventiated no pressors fio2 30 peep 8 good UO in response to lasix /spa . Still with 2-3+ LE edema b/l      3/12 : seen in his room , intubated mechanically ventilated . Good UO , fio2 30 peep 8   Awake . Still very weak   D/w Dr Denilson Marie at bedside      3/13 : excellent UO on lasix/spa . CXR improving . Extubated today   On o2/nc     3/14: Resting comfortably. On nasal cannula. BP stable. No new event. 3/15: Seen this morning. Beginning to wake up and interact to still obtunded. BP stable. @      Intake/Output Summary (Last 24 hours) at 3/16/2022 1831  Last data filed at 3/16/2022 1515  Gross per 24 hour   Intake 120 ml   Output 1950 ml   Net -1830 ml         Wt Readings from Last 3 Encounters:   03/16/22 187 lb 12.8 oz (85.2 kg)   02/23/22 186 lb 12.8 oz (84.7 kg)   11/27/18 215 lb (97.5 kg)       Constitutional:  Intubated on vent  HEENT: NC/AT, EOMI, sclera and conjunctiva are clear and anicteric, mucus membranes moist  Neck: Trachea midline, no JVD  Cardiovascular: S1, S2 regular rhythm, no murmur,or rub  Respiratory: Coarse breath sounds throughout all lung fields no crackles, no wheeze  Gastrointestinal:  Soft, nontender, nondistended, NABS  Ext: no edema, feet warm  Skin: dry, no rash  Neuro:  Minimally responsive      DATA:    Recent Labs     03/14/22  0530 03/15/22  0605 03/16/22  0400   WBC 14.6* 15.8* 14.7*   HGB 8.3* 8.6* 8.0*   HCT 24.9* 26.2* 24.8*   .5* 104.0* 105.5*    194 216     Recent Labs     03/14/22  0530 03/14/22  1755 03/15/22  0605 03/15/22  1911 03/16/22  0400      < > 145 145 145   K 2.9*   < > 3.6 3.6 4.2   CL 95*   < > 99 99 101   CO2 32*   < > 32* 35* 35*   MG 1.5*   < > 2.0 1.9 1.7   PHOS 2.7  --  2.7  --  1.3*   BUN 27*   < > 33* 34* 37*   CREATININE 1.3*   < > 1.2 1.2 1.2   ALT 18  --  22  --  23   AST 44*  --  42*  --  41*   BILITOT 1.2  --  1.2  --  1.2   ALKPHOS 89  --  102  --  122    < > = values in this interval not displayed. No results found for: LABPROT    Assessment  77 y.o. gentleman known to service, followed for FRANCISCA in January of this year. Admitted with acute mental status change, myoclonic jerks and unusual movements.     1. Chronic kidney disease, stage IIIa. Creatinine at baseline, 1.4 to 1.6 mg/dL     2.  Metabolic acidosis, wide anion gap. Resolved with bicarbonate drip    3. Cannabis on drug screen    4. Hypophosphatemia. 5. Hypernatremia, dehydration due to increased insensible losses, poor intake/input. Resolved    6.  Hypokalemia, normalized with supplement      Cr stable  Good UO, still hypervolemic though primarily in the form of third spacing    Recommendations  Increase Lasix to twice daily adjust daily as warranted  F/u UO , labs  Continue  supportive care      Electronically signed by Ignacio Blair MD on 3/16/2022

## 2022-03-16 NOTE — PROGRESS NOTES
Physical Therapy    Facility/Department: [address] INTERNAL MEDICINE 2  Initial Assessment    NAME: Laurie Lopez  : 1955  MRN: [id number]    Date of Service: 3/16/2022      Patient Diagnosis(es): The encounter diagnosis was Altered mental status, unspecified altered mental status type. has a past medical history of Allergic rhinitis, DVT (deep venous thrombosis) ([address] Hypertension, and Smoking. has a past surgical history that includes ECHO Compl W Dop Color Flow (2015); Knee arthroscopy; Tonsillectomy; pr lap, ventral hernia repair,reducible (N/A, 10/15/2018); Colonoscopy (N/A, 2022); Upper gastrointestinal endoscopy (N/A, 2022); and picc line insertion nurse (3/15/2022). Evaluating Therapist: sIiah Arguelles PT      Room #:  1239/3293-N  Diagnosis:  Altered mental status [R41.82]  Altered mental status, unspecified altered mental status type [R41.82]   Pt intubated 3/5 extubated 3/14  Precautions:  Falls, alarm, NG      Social:  Pt admitted from [place] [address]. Pt unable to report prior level of function. Prior to last admit pt lived with wife and ambulated without device. Initial Evaluation  Date: 3/16 Treatment      Short Term/ Long Term   Goals   Was pt agreeable to Eval/treatment? Does pt have pain? Grimaces in pain with LE ROM     Bed Mobility  Rolling: dependent  Supine to sit: dependent  Sit to supine: dependent  Scooting: dependent  Mod assist   Transfers Sit to stand: NT  Stand to sit: NT  Stand pivot: NT  TBA   Ambulation    NT  N/A   Stair Negotiation  Ascended and descended  NT   N/A   LE strength     0/5     3/5   balance      dependent  Mod assist   AM-PAC Raw score                        Pt is alert, does not follow commands, nonverbal  LE ROM: WFL  Sensation: NT  Edema: BUE's     ASSESSMENT:    Pt displays functional ability as noted in the objective portion of this evaluation.       Comments:  Pt did not follow commands, no active movement noted B LE's Pt dependent for sitting balance, no head or trunk control in sitting    Pt's/ family goals   1. Pt unable to state    Conditions Requiring Skilled Therapeutic Intervention:    [x]Decreased strength     []Decreased ROM  [x]Decreased functional mobility  [x]Decreased balance   [x]Decreased endurance   []Decreased posture  []Decreased sensation  []Decreased coordination   []Decreased vision  []Decreased safety awareness   []Increased pain       Patient and or family understand(s) diagnosis, prognosis, and plan of care. No  Prognosis is fair for reaching above PT goals    PHYSICAL THERAPY PLAN OF CARE:    PT POC is established based on physician order and patient diagnosis     Referring provider/PT Order: Kristen Garza, DO/ PT eval and treat      Current Treatment Recommendations:     [x] Strengthening to improve independence with functional mobility   [] ROM to improve independence with functional mobility   [x] Balance Training to improve static/dynamic balance and to reduce fall risk  [x] Endurance Training to improve activity tolerance during functional mobility   [] Transfer Training to improve safety and independence with all functional transfers   [] Gait Training to improve gait mechanics, endurance and assess need for appropriate assistive device  [] Stair Training in preparation for safe discharge home and/or into the community   [x] Positioning to prevent skin breakdown and contractures  [x] Safety and Education Training   [] Patient/Caregiver Education   [] HEP  [] Other     PT long term treatment goals are located in above grid    Frequency of treatments: 1-3 x/week x 7 days. Time in  0905  Time out  0920      Evaluation Time includes thorough review of current medical information, gathering information on past medical history/social history and prior level of function, completion of standardized testing/informal observation of tasks, assessment of data and education on plan of care and goals.       CPT codes:  [x] Low Complexity PT evaluation 01088  [] Moderate Complexity PT evaluation 37405  [] High Complexity PT evaluation [id number]  [] PT Re-evaluation [id number]  [] Gait training 39769 minutes  [] Manual therapy 86567 minutes  [] Therapeutic activities 90637 minutes  [] Therapeutic exercises 25368 minutes  [] Neuromuscular reeducation 27995 minutes     [facility] PT [id number]

## 2022-03-16 NOTE — PROGRESS NOTES
Patient seen still quite lethargic without a clear reason for this. Work up pretty much inconclusive. May benefit from neuro input , but no coverage presently.

## 2022-03-17 NOTE — PROGRESS NOTES
Speech Language Pathology      NAME:  Taty Lutz  :  1955  DATE: 3/17/2022  ROOM:  8082/5096-N    Attempted ongoing Speech-Language Pathology intervention for dysphagia. Pt unavailable at this time due to:  [] HOLD per RN/ medical staff d/t medical status   [] Off unit for testing/ procedure    [] With medical staff   [] Declined intervention  [x] Sleeping/ Lethargic-  unable to awaken patient via verbal and tactile cueing. Eyes opened briefly but unable to remain alert for PO trials. [] Other:     SLP to continue previously established POC and re-attempt as able. Altered mental status [R41.82]  Altered mental status, unspecified altered mental status type [R41.82]    Rocio Benson [facility] [id number]  Speech Language Pathologist

## 2022-03-17 NOTE — PROGRESS NOTES
[facility]  [address], [address]  [facility], [address] [place] [address]      Pulmonary Progress Note      SUBJECTIVE:  Currently on 7 li NC, occ nodding to questions, trying to communicate, very raspy voice and ronchorous cough.  Looking comfortable with respiratory function    OBJECTIVE    Medications    Continuous Infusions:   sodium chloride      sodium chloride         Scheduled Meds:   furosemide  40 mg IntraVENous BID    lansoprazole  30 mg Per NG tube QAM AC    sodium chloride flush  5-40 mL IntraVENous 2 times per day    heparin flush  3 mL IntraVENous 2 times per day    fluconazole  200 mg Oral Daily    thiamine (VITAMIN B1) IVPB  250 mg IntraVENous Q24H    senna  1 tablet Oral Nightly    docusate  100 mg Per NG tube BID    enoxaparin  40 mg SubCUTAneous Daily    ipratropium-albuterol  1 ampule Inhalation Q4H WA    nystatin  5 mL Oral 4x Daily    amLODIPine  5 mg Oral Daily    folic acid  1 mg Oral Daily    sucralfate  1 g Oral 4x Daily AC & HS    vitamin C  500 mg Oral TID    Vitamin D  1,000 Units Oral Daily    zinc sulfate  50 mg Oral Daily    white petrolatum   Topical BID       PRN Meds:sodium chloride flush, sodium chloride, heparin flush, medicated lip balm, sodium chloride, gadoteridol, ondansetron, acetaminophen, cetirizine    Physical    VITALS:  /70   Pulse 83   Temp 100 °F (37.8 °C) (Axillary)   Resp 20   Ht 6' 2\" (1.88 m)   Wt 187 lb 12.8 oz (85.2 kg)   SpO2 97%   BMI 24.11 kg/m²     24HR INTAKE/OUTPUT:      Intake/Output Summary (Last 24 hours) at 3/17/2022 0832  Last data filed at 3/17/2022 0640  Gross per 24 hour   Intake 538 ml   Output 2240 ml   Net -1702 ml       24HR PULSE OXIMETRY RANGE:    SpO2  Av.6 %  Min: 84 %  Max: 100 %    General appearance: alert and appears stated age, (+) NGT  Lungs: rhonchi bilaterally worse with cough  Heart: regular rate and rhythm, S1, S2 normal, no murmur, click, rub or gallop  Abdomen: soft, non-tender; bowel sounds normal; no masses,  no organomegaly  Extremities: edema bipedal minimal  Neurologic: Mental status: awake, occ nodding though unclear comprehension, moving extremities, non-verbal    Data    CBC:   Recent Labs     03/15/22  0605 03/16/22  0400 03/17/22  0456   WBC 15.8* 14.7* 13.8*   HGB 8.6* 8.0* 7.7*   HCT 26.2* 24.8* 23.9*   .0* 105.5* 105.8*    216 193       BMP:  Recent Labs     03/15/22  0605 03/15/22  0605 03/15/22  1911 03/16/22  0400 03/17/22  0456      < > 145 145 148*   K 3.6   < > 3.6 4.2 3.7   CL 99   < > 99 101 103   CO2 32*   < > 35* 35* 37*   PHOS 2.7  --   --  1.3* 2.5   BUN 33*   < > 34* 37* 45*   CREATININE 1.2   < > 1.2 1.2 1.3*    < > = values in this interval not displayed. ALB:3,BILIDIR:3,BILITOT:3,ALKPHOS:3)@    PT/INR: No results for input(s): PROTIME, INR in the last 72 hours. ABG:   No results for input(s): PH, PO2, PCO2, HCO3, BE, O2SAT, METHB, O2HB, COHB, O2CON, HHB, THB in the last 72 hours. FiO2 : 30 %  I:E Ratio: 1:3.30    Radiology/Other tests reviewed: CXR reviewed looking similar compared to previous    Assessment:     Principal Problem:    Altered mental status  Resolved Problems:    * No resolved hospital problems. *  pneumonia  Metabolic encephalopathy      Plan:       1. Cont with antibiotics as per ID  2. Cont with duonebs and chest vest, observe respiratory function and cough/sputum production  3. (+) dysphagia, diet changes as per PCP  4. Cont with oxygen, observe saturations, taper as tolerated  5. OOB to chair if able to  6. Observe mental function, slightly better from when initially admitted, observe for further improvement  7. Tried to call spouse and sibling, left message, (?) bronchoscopy tomorrow for pulmonary hygiene, will see if agreeable to this, may be of temp benefit only.       Time at the bedside, reviewing labs and radiographs, reviewing notes and consultations, discussing with staff and family was more than 35 minutes. Thanks for letting us see this patient in consultation. Please contact us with any questions. Office [phone] or after hours through [facility], [phone].

## 2022-03-17 NOTE — ANESTHESIA PRE PROCEDURE
Department of Anesthesiology  Preprocedure Note       Name:  Sage Hylton   Age:  77 y.o.  :  1955                                          MRN:  [id number]         Date:  3/17/2022      Surgeon: Ronny Schultz    Procedure: Procedure(s):    BRONCHOSCOPY (N/A )      Medications prior to admission:   Prior to Admission medications    Medication Sig Start Date End Date Taking? Authorizing Provider   acyclovir (ZOVIRAX) 800 MG tablet Take 800 mg by mouth 5 times daily    Historical Provider, MD   vitamin C (ASCORBIC ACID) 500 MG tablet Take 500 mg by mouth 3 times daily    Historical Provider, MD   sucralfate (CARAFATE) 1 GM tablet Take 1 g by mouth 4 times daily (before meals and nightly)    Historical Provider, MD   docusate sodium (COLACE) 100 MG capsule Take 100 mg by mouth every 12 hours as needed for Constipation    Historical Provider, MD   acetaminophen (TYLENOL) 325 MG tablet Take 650 mg by mouth every 4 hours as needed (DISCOMFORT;PAIN/ TEMP >100.4 F)    Historical Provider, MD   vitamin D (CHOLECALCIFEROL) 25 MCG (1000 UT) TABS tablet Take 1,000 Units by mouth daily    Historical Provider, MD   amLODIPine (NORVASC) 5 MG tablet Take 1 tablet by mouth daily 22   Aden Aguilar MD   folic acid (FOLVITE) 1 MG tablet Take 1 tablet by mouth daily 22   Aden Aguilar MD   zinc sulfate (ZINCATE) 220 (50 Zn) MG capsule Take 1 capsule by mouth daily 22   Aden Aguilar MD   pantoprazole (PROTONIX) 40 MG tablet Take 1 tablet by mouth every morning (before breakfast) 22   Francisco Cast MD   cetirizine (ZYRTEC) 10 MG tablet Take 10 mg by mouth daily as needed for Allergies     Historical Provider, MD       Current medications:    No current facility-administered medications for this visit. No current outpatient medications on file.      Facility-Administered Medications Ordered in Other Visits   Medication Dose Route Frequency Provider Last Rate Last Admin    [Held by provider] furosemide (LASIX) injection 40 mg  40 mg IntraVENous BID Paul Gleason MD   40 mg at 03/17/22 7265    lansoprazole suspension SUSP 30 mg  30 mg Per NG tube QAM AC Radha Tijerina MD        sodium chloride flush 0.9 % injection 5-40 mL  5-40 mL IntraVENous 2 times per day Radha Tijerina MD   10 mL at 03/17/22 0832    sodium chloride flush 0.9 % injection 5-40 mL  5-40 mL IntraVENous PRN Radha Tijerina MD        0.9 % sodium chloride infusion  25 mL IntraVENous PRN Radha Tijerina MD        heparin flush 100 UNIT/ML injection 300 Units  3 mL IntraVENous 2 times per day Radha Tijerina MD   300 Units at 03/17/22 0835    heparin flush 100 UNIT/ML injection 300 Units  3 mL IntraCATHeter PRN Radha Tijerina MD        fluconazole (DIFLUCAN) tablet 200 mg  200 mg Oral Daily Radha Tijerina MD   200 mg at 03/17/22 8237    medicated lip balm (BLISTEX/CARMEX) stick   Topical PRN Radha Tijerina MD        0.9 % sodium chloride infusion   IntraVENous PRN Radha Tijerina MD        thiamine (B-1) 250 mg in sodium chloride 0.9 % 100 mL IVPB  250 mg IntraVENous Q24H Radha Tijerina  mL/hr at 03/17/22 1213 250 mg at 03/17/22 1213    senna (SENOKOT) tablet 8.6 mg  1 tablet Oral Nightly Radha Tijerina MD   8.6 mg at 03/16/22 2114    docusate (COLACE) 50 MG/5ML liquid 100 mg  100 mg Per NG tube BID Radha Tijerina MD   100 mg at 03/17/22 0834    enoxaparin (LOVENOX) injection 40 mg  40 mg SubCUTAneous Daily Radha Tijerina MD   40 mg at 03/17/22 0832    gadoteridol (PROHANCE) injection 16 mL  16 mL IntraVENous ONCE PRN Radha Tijerina MD        ipratropium-albuterol (DUONEB) nebulizer solution 1 ampule  1 ampule Inhalation Q4H Maureen Matias MD   1 ampule at 03/17/22 1307    nystatin (MYCOSTATIN) 767492 UNIT/ML suspension 500,000 Units  5 mL Oral 4x Daily Radha Tijerina MD   500,000 Units at 03/16/22 1041    ondansetron (ZOFRAN) injection 4 mg  4 mg IntraVENous Q6H PRN Radha Tijerina MD  acetaminophen (TYLENOL) tablet 650 mg  650 mg Oral Q4H PRN Skyla Jones MD   650 mg at 03/17/22 0834    amLODIPine (NORVASC) tablet 5 mg  5 mg Oral Daily Skyla Jones MD   5 mg at 03/16/22 1041    cetirizine (ZYRTEC) tablet 10 mg  10 mg Oral Daily PRN Skyla Jones MD   10 mg at 91/05/89 1843    folic acid (FOLVITE) tablet 1 mg  1 mg Oral Daily Skyla Jones MD   1 mg at 03/17/22 0834    sucralfate (CARAFATE) tablet 1 g  1 g Oral 4x Daily AC & HS Skyla Jones MD   1 g at 03/17/22 1121    ascorbic acid (VITAMIN C) tablet 500 mg  500 mg Oral TID Skyla Jones MD   500 mg at 03/17/22 3848    vitamin D (CHOLECALCIFEROL) tablet 1,000 Units  1,000 Units Oral Daily Skyla Jones MD   1,000 Units at 03/17/22 0834    zinc sulfate (ZINCATE) capsule 50 mg  50 mg Oral Daily Skyla Jones MD   50 mg at 03/17/22 9182    white petrolatum ointment   Topical BID Skyla Jones MD   Given at 03/17/22 0930       Allergies:     Allergies   Allergen Reactions    Pcn [Penicillins] Hives       Problem List:    Patient Active Problem List   Diagnosis Code    Altered mental status R41.82       Past Medical History:        Diagnosis Date    Allergic rhinitis     DVT (deep venous thrombosis) ([facility] [address] 2003    left leg    Hypertension     Smoking        Past Surgical History:        Procedure Laterality Date    COLONOSCOPY N/A 2/21/2022    COLONOSCOPY WITH BIOPSY performed by Anthony Montana MD at [address] ECHO COMPL W DOP COLOR FLOW  7/1/2015         KNEE ARTHROSCOPY      right    PICC LINE INSERTION NURSE  3/15/2022         SC LAP, VENTRAL HERNIA REPAIR,REDUCIBLE N/A 10/15/2018    DIAGNOSTIC LAPAROSCOPY,  OPEN VENTRAL HERNIA REPAIR WITH MESH performed by Anthony Montana MD at [facility] N/A 2/21/2022    EGD BIOPSY performed by Anthony Montana MD at [address] History:    Social History     Tobacco Use    Smoking status: Former Smoker     Packs/day: 1.00     Start date: 1985     Quit date: 2005     Years since quittin.2    Smokeless tobacco: Never Used   Substance Use Topics    Alcohol use: Yes     Comment: occasional beer                                Counseling given: Not Answered      Vital Signs (Current): There were no vitals filed for this visit. BP Readings from Last 3 Encounters:   22 106/70   22 130/80   22 104/70       NPO Status:  Order noted to be in place for NPO after midnight on 2022. BMI:   Wt Readings from Last 3 Encounters:   22 187 lb 12.8 oz (85.2 kg)   22 186 lb 12.8 oz (84.7 kg)   18 215 lb (97.5 kg)     There is no height or weight on file to calculate BMI.    CBC:   Lab Results   Component Value Date    WBC 13.8 2022    RBC 2.26 2022    HGB 7.7 2022    HCT 23.9 2022    .8 2022    RDW 20.6 2022     2022       CMP:   Lab Results   Component Value Date     2022    K 3.7 2022    K 3.7 2022     2022    CO2 37 2022    BUN 45 2022    CREATININE 1.3 2022    GFRAA >60 2022    LABGLOM 55 2022    GLUCOSE 143 2022    PROT 5.3 2022    CALCIUM 8.8 2022    BILITOT 0.9 2022    ALKPHOS 124 2022    AST 34 2022    ALT 22 2022       POC Tests: No results for input(s): POCGLU, POCNA, POCK, POCCL, POCBUN, POCHEMO, POCHCT in the last 72 hours.     Coags:   Lab Results   Component Value Date    PROTIME 13.4 2022    INR 1.2 2022    APTT 24.3 2022       HCG (If Applicable): No results found for: PREGTESTUR, PREGSERUM, HCG, HCGQUANT     ABGs: No results found for: PHART, PO2ART, WRW7AIZ, KNF7NCW, BEART, H0ELVVGO     Type & Screen (If Applicable):  No results found for: YANAO, [address]    Anesthesia Evaluation  Patient summary reviewed and Nursing notes reviewed no history of anesthetic complications:   Airway: Mallampati: Unable to assess / NA  TM distance: >3 FB   Neck ROM: full  Comment: Unable to fully assess due to pt not obeying commands. Mouth opening: < 3 FB Dental:          Pulmonary:   (+) decreased breath sounds (Bilateral lower lobes),  rales (Bilateral, expiratory),      (-) shortness of breath and not a current smoker (Hx cannibus use)          Patient did not smoke on day of surgery. ROS comment: Former smoker, intubated from 03/05/22 to 03/13/2022 for acute respiratory failure/altered mental status    CXR 03/15/2022  FINDINGS:  EKG leads overlie the chest.  Interval placement of nasogastric tube which  appears to be coiled in the proximal to mid stomach.  Right IJ catheter  remains in place.  Bilateral parenchymal infiltrates greater towards the  right demonstrate no definite change.  No pneumothorax. 3 L O2 via NC at time of assessment  PE comment: 3 L O2 via NC at time of assessment Cardiovascular:  Exercise tolerance: good (>4 METS),   (+) hypertension: moderate,       ECG reviewed  Rhythm: regular  Rate: normal  Echocardiogram reviewed         Beta Blocker:  Not on Beta Blocker      ROS comment: EKG 03/02/2022  Sinus tachycardia with occasional premature ventricular complexes  Low voltage QRS  Cannot rule out Anterior infarct (cited on or before 02-MAR-2022)  Abnormal ECG  When compared with ECG of 08-OCT-2018 13:20,  premature ventricular complexes are now present  Nonspecific T wave abnormality, worse in Inferior leads  Nonspecific T wave abnormality, worse in Anterolateral leads  QT has shortened  Confirmed by Veronica Sanchez ([id number]) on 3/2/2022 4:13:44 PM    Specimen Collected: 03/02/22 11:40        2D Echocardiogram 07/01/2015   Summary   Left ventricle grossly normal in size. Normal left ventricular wall thickness.    Estimated left ventricular ejection fraction is 50 %. Inferolateral hypokinesis borderline. Normal left ventricular diastolic filling velocities for age. Normal right ventricular size and function. Physiologic and/or trace mitral regurgitation is present. Physiologic and/or trace tricuspid regurgitation. RVSP is 33 mmHg. Technically poor quality study. No comparison study available. Suggest clinical correlation. Signature      ----------------------------------------------------------------   Electronically signed by Bria Murillo DO(Interpreting   physician) on 07/01/2015 09:07 PM     Neuro/Psych:   Negative Neuro/Psych ROS               ROS comment: Altered mental status/metabolic encephalopathy. History primarily obtained from pt chart/provider notes. Pt alert, does not make eye contact or obey commands at time of assessment, slight myoclonic-like jerks noted in arms and legs. GI/Hepatic/Renal:   (+) GERD: well controlled, PUD (Recent hospitalization for gastritic/esophagitis), renal disease (Acute kidney injury):,           Endo/Other: Negative Endo/Other ROS   (+) blood dyscrasia: anemia:., .                 Abdominal:             Vascular:   + DVT, . Other Findings:             Anesthesia Plan      MAC     ASA 4     (L nare NGT in place, receiving Pivot at 30 mL/hr.)  Induction: intravenous. MIPS: Prophylactic antiemetics administered. Anesthetic plan and risks discussed with patient and spouse (Pt has AMS, unable to assess comprehension). Plan discussed with CRNA and attending. GLORIA Arias  3/17/2022       Patient will need to be re-evaluated prior to surgery by DOS anesthesiologist.    Tanna Clark DO           3/17/2022        4:00 PM    Pt seen, examined, chart reviewed, plan discussed with patient's wife who gave verbal consent for anesthesia.   Collette Neighbor, MD  3/18/2022  2:58 PM

## 2022-03-17 NOTE — PROGRESS NOTES
3/17/2022  2:15 PM      Comprehensive Nutrition Assessment    Type and Reason for Visit:  Reassess    Nutrition Recommendations/Plan: Continue current TF to goal rate, as tolerated until PO can be initiated    Nutrition Assessment:  Pt has not been alert enough to consume PO trials for SLP to complete Swallow Eval. He continues on NGT for nutrition support. Discharge planning remains to [facility], so PEG may be needed for TF support long-term. Will continue to monitor    Malnutrition Assessment:  Malnutrition Status: Moderate malnutrition    Context:  Chronic Illness     Findings of the 6 clinical characteristics of malnutrition:  Energy Intake:  Mild decrease in energy intake (Comment)  Weight Loss:  7 - 5% over 1 month     Body Fat Loss:  No significant body fat loss     Muscle Mass Loss:  No significant muscle mass loss    Fluid Accumulation:  Unable to assess     Strength:  Not Performed    Estimated Daily Nutrient Needs:  Energy (kcal):  ; Weight Used for Energy Requirements:  Admission     Protein (g):   (1.2-1.4 g/kg as naveen w/CKD 3); Weight Used for Protein Requirements:  Admission        Fluid (ml/day):  per Renal or Critical Care management; Method Used for Fluid Requirements:  Other (Comment)      Nutrition Related Findings:  soft abd +BS, confused, +4 edema, +I/O 3.7 L, on NC      Wounds:  Multiple,Skin Tears,Open Wounds (per wound care consult)       Current Nutrition Therapies:    Diet NPO  ADULT TUBE FEEDING; Orogastric; Immune Enhancing; Continuous; 10; Yes; 10; Q 4 hours; 50; 30;  Q 6 hours  Diet NPO Exceptions are: Sips of Water with Meds  Current Tube Feeding (TF) Orders:  · Feeding Route: Nasogastric  · Formula: Immune Enhancing  · Schedule: Continuous (to goal 50 ml/hr = 1200 ml/d)  · Additives/Modulars:  None  · Water Flushes: 30 ml Q 6 hr = 120 ml/d  · Goal TF & Flush Orders Provides: 1200 ml/d, 1800 michelle, 113 g pro, 1031 ml total free water      Anthropometric Measures:  · Height: 6' 2\" (188 cm)  · Current Body Weight: 187 lb 12.8 oz (85.2 kg) (3/16 - note large fluctuations with wts -211# 3/10 and 173# 3/8)   · Admission Body Weight: 173 lb (78.5 kg) (3/3)    · Usual Body Weight: 184 lb 8 oz (83.7 kg) (1/10)     · Ideal Body Weight: 190 lbs; % Ideal Body Weight 98.8 %   · BMI: 24.1  · BMI Categories: Normal Weight (BMI 22.0 to 24.9) age over 72       Nutrition Diagnosis:   · Moderate malnutrition,In context of chronic illness related to increase demand for energy/nutrients as evidenced by wounds,poor intake prior to admission,weight loss greater than or equal to 5% in 1 month      Nutrition Interventions:   Food and/or Nutrient Delivery:  Continue NPO,Continue Current Tube Feeding  Nutrition Education/Counseling:  No recommendation at this time   Coordination of Nutrition Care:  Continue to monitor while inpatient    Goals:  Pt naveen EN at goal rate       Nutrition Monitoring and Evaluation:   Behavioral-Environmental Outcomes:  None Identified   Food/Nutrient Intake Outcomes:  Enteral Nutrition Intake/Tolerance  Physical Signs/Symptoms Outcomes:  GI Status,Biochemical Data,Fluid Status or Edema,Nutrition Focused Physical Findings,Skin,Weight     Discharge Planning:     Too soon to determine     Electronically signed by Juanita Villalobos RD, CNSC, LD on 3/17/22 at 2:15 PM EDT    Contact: [phone]

## 2022-03-17 NOTE — CARE COORDINATION
Requiring O2 5lNC. Tube feeding continued via NG. Continues on Lasix iv bid. PT am-pac 6. From [facility]- not a bedhold- will need insurance precert- can return if approved pending bed availability- facility cannot accept pt with an NG tube. Patient has an insurance that does not typically approve an LTAC. Plan bronchoscopy tomorrow. JORGE in [facility], ambulance transport form on chart.  Will follow  Conrado Ormond, RN case manager

## 2022-03-17 NOTE — PROGRESS NOTES
Patient seen . No big changes. vss  Slight temp. Possible bronchoscopy. Wbc , sed rate, crp up. No major source of infection except aspiration. Off antibiotics. Tolerating tube feeds. Looks a little dry. Na and cl up may need free fluids .  Continue with supportive care

## 2022-03-17 NOTE — PROGRESS NOTES
[address] [place] [address] [facility]  Progress Note      Chief Complaint   Patient presents with    Altered Mental Status     sent by physician at [facility] [place], operated by [facility] for head CT. Pt wandering around unit, could not redirect. SUBJECTIVE:  The patient is still in the ICU. He is still on the ventilator. He is tolerating antibiotics. Afebrile  very lethargic and upper respiratory secretions heard at bedside   3- 5  L nasal cannula  Review of systems:  As stated above in the chief complaint, otherwise negative. Medications:  Scheduled Meds:   [Held by provider] furosemide  40 mg IntraVENous BID    lansoprazole  30 mg Per NG tube QAM AC    sodium chloride flush  5-40 mL IntraVENous 2 times per day    heparin flush  3 mL IntraVENous 2 times per day    fluconazole  200 mg Oral Daily    thiamine (VITAMIN B1) IVPB  250 mg IntraVENous Q24H    senna  1 tablet Oral Nightly    docusate  100 mg Per NG tube BID    enoxaparin  40 mg SubCUTAneous Daily    ipratropium-albuterol  1 ampule Inhalation Q4H WA    nystatin  5 mL Oral 4x Daily    amLODIPine  5 mg Oral Daily    folic acid  1 mg Oral Daily    sucralfate  1 g Oral 4x Daily AC & HS    vitamin C  500 mg Oral TID    Vitamin D  1,000 Units Oral Daily    zinc sulfate  50 mg Oral Daily    white petrolatum   Topical BID     Continuous Infusions:   sodium chloride      sodium chloride       PRN Meds:sodium chloride flush, sodium chloride, heparin flush, medicated lip balm, sodium chloride, gadoteridol, ondansetron, acetaminophen, cetirizine    OBJECTIVE:  /83   Pulse 115   Temp 100.2 °F (37.9 °C) (Axillary)   Resp 20   Ht 6' 2\" (1.88 m)   Wt 187 lb 12.8 oz (85.2 kg)   SpO2 95%   BMI 24.11 kg/m²   Temp  Av.8 °F (37.7 °C)  Min: 98.9 °F (37.2 °C)  Max: 100.2 °F (37.9 °C)  Constitutional: The patient is lying in bed in the ICU. Now extubated, opens eyes,.   communicates. Skin: Warm and dry. No rashes were noted.    HEENT: Round and reactive pupils. Moist mucous membranes. No ulcerations or thrush. ET tube. OG tube to suction. Neck: Supple to movements. Chest: No respiratory distress. No crackles. coarse bialterally  Cardiovascular: Heart sounds rhythmic, regular and tachycardic. Abdomen: Diminished bowel sounds to auscultation. Benign to palpation. No masses felt. Genitourinary: Male  Extremities: Minimal edema. Lines: Right femoral arterial line 3/5/2022-remove. Right IJ TLC 3/5/2022.-Removed on 3/15/2022  Crandall catheter.   5/54/6936 left basilic double-lumen PICC      Laboratory and Tests Review:  Lab Results   Component Value Date    WBC 13.8 (H) 03/17/2022    WBC 14.7 (H) 03/16/2022    WBC 15.8 (H) 03/15/2022    HGB 7.7 (L) 03/17/2022    HCT 23.9 (L) 03/17/2022    .8 (H) 03/17/2022     03/17/2022     Lab Results   Component Value Date    NEUTROABS 11.32 (H) 03/17/2022    NEUTROABS 13.97 (H) 03/16/2022    NEUTROABS 14.54 (H) 03/15/2022     No results found for: [facility]  Lab Results   Component Value Date    ALT 22 03/17/2022    AST 34 03/17/2022    ALKPHOS 124 03/17/2022    BILITOT 0.9 03/17/2022     Lab Results   Component Value Date     03/17/2022    K 3.7 03/17/2022    K 3.7 03/06/2022     03/17/2022    CO2 37 03/17/2022    BUN 45 03/17/2022    CREATININE 1.3 03/17/2022    CREATININE 1.2 03/16/2022    CREATININE 1.2 03/15/2022    GFRAA >60 03/17/2022    LABGLOM 55 03/17/2022    GLUCOSE 143 03/17/2022    PROT 5.3 03/17/2022    LABALBU 2.5 03/17/2022    CALCIUM 8.8 03/17/2022    BILITOT 0.9 03/17/2022    ALKPHOS 124 03/17/2022    AST 34 03/17/2022    ALT 22 03/17/2022     Lab Results   Component Value Date    CRP 29.8 (H) 03/16/2022    CRP 15.1 (H) 03/05/2022     Lab Results   Component Value Date    SEDRATE 108 (H) 03/16/2022    SEDRATE 47 (H) 03/05/2022    SEDRATE 40 (H) 03/03/2022     Radiology:  CT of the chest reviewed demonstrating patchy groundglass infiltrates; gallbladder  Monitor declined  MRI of the brain completed-nondiagnostic    Microbiology:   3/2/2022: Urine culture negative  3/5/2022 blood cultures negative x2  3/5/2022 respiratory cultures OP abdullahi absent  3/5/2022 urine for Legionella and strep pneumo negative  3/7/2022: Respiratory panel negative  3/7/2022: CSF negative. VDRL nonreactive    ASSESSMENT:  · Change in mental status. There was no evidence to support encephalitis or meningitis in a patient with myelodysplastic syndrome  · Candida esophagitis  · Aspiration pneumonia. Completed 7 days of Ertapenem  · Leukocytosis related to above-improved    PLAN:  · Continue  Fluconazole p.o.  · Still lethargic - ?  Peg  · Bronchoscopy for pulmonary toilet and culture  · We will follow with you    Spoke with Esther Hein MD  3:36 PM   3/14/2022      3/17/2022

## 2022-03-18 NOTE — PROGRESS NOTES
Speech Language Pathology      NAME:  Robert Wolfe  :  1955  DATE: 3/18/2022  ROOM:  [facility]/3740-         Chart reviewed. Attempted to complete dysphagia therapy including oral trials. Pt unavailable at this time due to:  [x] HOLD per charge RN due to no clinical improvement and not appropriate for oral trials  [] Off unit for testing/ procedure    [] With medical staff   [] Declined intervention  [] Sleeping/ Lethargic   [] Other:       Will re-attempt as able. Thank you. Altered mental status [R41.82]  Altered mental status, unspecified altered mental status type [R41.82]            Karon YANCEY CCC/SLP [id number]  Speech-Language Pathologist

## 2022-03-18 NOTE — CARE COORDINATION
Requiring O2 3lNC. Tube feeding held via NG for Bronchoscopy today. From [facility] PTA- not a bedhold- will need insurance precert- can return if approved pending bed availability- facility cannot accept pt with an NG tube-will need updated PT/OT therapy notes when close to discharge-will need COVID test on day of discharge. Backdoor referral made to [facility]- will initiate insurance precert today, although patient's insurance does not typically approve an LTAC. JORGE(for [facility]) in epic, ambulance transport form on chart.  Will follow Angelita Rachel RN case manager

## 2022-03-18 NOTE — PROGRESS NOTES
[address] [place] [address] [facility]  Progress Note      Chief Complaint   Patient presents with    Altered Mental Status     sent by physician at [facility] for head CT. Pt wandering around unit, could not redirect. SUBJECTIVE:  The patient is out of ICU. On n/c. He is tolerating antibiotics. t max 101.7 overnight. Alert. Trying to talk a little. Upper respiratory secretions heard at bedside   3- 5  L nasal cannula  Review of systems:  As stated above in the chief complaint, otherwise negative.     Medications:  Scheduled Meds:   potassium phosphate IVPB  30 mmol IntraVENous Once    sodium chloride flush  5-40 mL IntraVENous 2 times per day    lansoprazole  30 mg Per NG tube QAM AC    sodium chloride flush  5-40 mL IntraVENous 2 times per day    heparin flush  3 mL IntraVENous 2 times per day    fluconazole  200 mg Oral Daily    thiamine (VITAMIN B1) IVPB  250 mg IntraVENous Q24H    senna  1 tablet Oral Nightly    docusate  100 mg Per NG tube BID    enoxaparin  40 mg SubCUTAneous Daily    ipratropium-albuterol  1 ampule Inhalation Q4H WA    nystatin  5 mL Oral 4x Daily    amLODIPine  5 mg Oral Daily    folic acid  1 mg Oral Daily    sucralfate  1 g Oral 4x Daily AC & HS    vitamin C  500 mg Oral TID    Vitamin D  1,000 Units Oral Daily    zinc sulfate  50 mg Oral Daily    white petrolatum   Topical BID     Continuous Infusions:   sodium chloride      sodium chloride 63 mL/hr at 22 0909    sodium chloride      sodium chloride       PRN Meds:sodium chloride flush, sodium chloride, sodium chloride flush, sodium chloride, heparin flush, medicated lip balm, sodium chloride, gadoteridol, ondansetron, acetaminophen, cetirizine    OBJECTIVE:  /72   Pulse 112   Temp 99 °F (37.2 °C) (Axillary)   Resp 20   Ht 6' 2\" (1.88 m)   Wt 183 lb 12.8 oz (83.4 kg)   SpO2 96%   BMI 23.60 kg/m²   Temp  Av.1 °F (37.8 °C)  Min: 99 °F (37.2 °C)  Max: 101.7 °F (38.7 °C)  Constitutional: The patient is lying in bed  opens eyes,. Communicates, difficult to understand. Skin: Warm and dry. No rashes were noted. HEENT: Round and reactive pupils. Moist mucous membranes. No ulcerations or thrush. ET tube. OG tube to suction. Neck: Supple to movements. Chest: No respiratory distress. No crackles. coarse bialterally  Cardiovascular: Heart sounds rhythmic, regular and tachycardic. Abdomen: Diminished bowel sounds to auscultation. Benign to palpation. No masses felt. Genitourinary: Male  Extremities: + edema ble, third spacing. Lines: Right femoral arterial line 3/5/2022-remove. Right IJ TLC 3/5/2022.-Removed on 3/15/2022  Crandall catheter.   0/97/4193 left basilic double-lumen PICC      Laboratory and Tests Review:  Lab Results   Component Value Date    WBC 13.4 (H) 03/18/2022    WBC 13.8 (H) 03/17/2022    WBC 14.7 (H) 03/16/2022    HGB 7.4 (L) 03/18/2022    HCT 23.8 (L) 03/18/2022    .3 (H) 03/18/2022     03/18/2022     Lab Results   Component Value Date    NEUTROABS 10.59 (H) 03/18/2022    NEUTROABS 11.32 (H) 03/17/2022    NEUTROABS 13.97 (H) 03/16/2022     No results found for: [facility]  Lab Results   Component Value Date    ALT 22 03/18/2022    AST 33 03/18/2022    ALKPHOS 126 03/18/2022    BILITOT 0.9 03/18/2022     Lab Results   Component Value Date     03/18/2022    K 3.4 03/18/2022    K 3.7 03/06/2022     03/18/2022    CO2 35 03/18/2022    BUN 46 03/18/2022    CREATININE 1.4 03/18/2022    CREATININE 1.3 03/17/2022    CREATININE 1.3 03/17/2022    GFRAA >60 03/18/2022    LABGLOM 51 03/18/2022    GLUCOSE 113 03/18/2022    PROT 5.3 03/18/2022    LABALBU 2.6 03/18/2022    CALCIUM 8.5 03/18/2022    BILITOT 0.9 03/18/2022    ALKPHOS 126 03/18/2022    AST 33 03/18/2022    ALT 22 03/18/2022     Lab Results   Component Value Date    CRP 29.8 (H) 03/16/2022    CRP 15.1 (H) 03/05/2022     Lab Results   Component Value Date    SEDRATE 108 (H) 03/16/2022 SEDRATE 47 (H) 03/05/2022    SEDRATE 40 (H) 03/03/2022     Radiology:  CT of the chest reviewed demonstrating patchy groundglass infiltrates; gallbladder  Monitor declined  MRI of the brain completed-nondiagnostic    Microbiology:   3/2/2022: Urine culture negative  3/5/2022 blood cultures negative x2  3/5/2022 respiratory cultures OP abdullahi absent  3/5/2022 urine for Legionella and strep pneumo negative  3/7/2022: Respiratory panel negative  3/7/2022: CSF negative. VDRL nonreactive    ASSESSMENT:  · Change in mental status. There was no evidence to support encephalitis or meningitis in a patient with myelodysplastic syndrome  · Candida esophagitis  · Aspiration pneumonia. Completed 7 days of Ertapenem  · Leukocytosis related to above-improved    PLAN:  · Continue  Fluconazole p.o. · Bronchoscopy today for pulmonary toilet and culture-start antimicrobials pending cultures  · Temps- related to probable mucus plugging  · Start antibiotic pending cultures-discussed with wife   · Check blood culture  · We will follow with you        CRISTIANO Burgos - CNP  12:02 PM     3/18/2022     Pt seen and examined. Above discussed agree with advanced practice nurse. Labs, cultures, and radiographs reviewed. Face to Face encounter occurred. Changes made as necessary.      Jacinta Yarbrough MD

## 2022-03-18 NOTE — ANESTHESIA POSTPROCEDURE EVALUATION
Department of Anesthesiology  Postprocedure Note    Patient: Mary Campa  MRN: [id number]  YOB: 1955  Date of evaluation: 3/18/2022  Time:  3:32 PM     Procedure Summary     Date: 03/18/22 Room / Location: [place] [id number] / [facility]    Anesthesia Start: 4314 Anesthesia Stop: 7114    Procedure: BRONCHOSCOPY DIAGNOSTIC OR CELL [address] (N/A ) Diagnosis: (/)    Surgeons: Naa Jang MD Responsible Provider: Deyanira Miguel MD    Anesthesia Type: MAC ASA Status: 4          Anesthesia Type: MAC    Pato Phase I:      Pato Phase II:      Last vitals: Reviewed and per EMR flowsheets. Anesthesia Post Evaluation    Patient location: Endo.   Patient participation: complete - patient cannot participate  Level of consciousness: awake  Airway patency: patent  Nausea & Vomiting: no nausea and no vomiting  Complications: no  Cardiovascular status: blood pressure returned to baseline and hemodynamically stable  Respiratory status: acceptable, nasal cannula and spontaneous ventilation  Hydration status: euvolemic

## 2022-03-18 NOTE — PROGRESS NOTES
[facility] MD Inna Street, MD Albert Lawrence, MD Vicki Redmond, MD Mickie Daniel, CNP   Harriet Groves, OBED  Progress Note    3/17/2022    SUBJECTIVE:   3/4: Off the floor. Discussed case with bedside RN, Dr. Erin Sheldon. Unable to perform diagnostic radiographic studies due to agitation, movement    3/5 transfer to the ICU intubated via ETT, ventilated. Hemodynamically stable. Sedated appears comfortable on vent. IV K-Phos infusion ordered this morning, still not delivered to nursing unit for administration. IV fluid stopped yesterday for unknown period of time, held today for 2 to 3 hours, reasons not clear though apparently IV access was not lost.    3/6: Remains critically ill. Intubated via ETT. Vent setting stable. Minimally responsive without sedation. Urine output poor over the course of today. BP rather low, though hemodynamically stable. 3/7 : seen in his room , intubated , mechanically ventilated , BP stable . UO on low side . Vent setting stable     3/8 seen earlier today , intubated fio2 30  Low UO . No pressors   D/w Dr Estela Miller at bedside   3/9:  Remains critically ill. Intubated on vent,  Fio2- 30% peep-8. Remains off pressors. Urinoe output continue to be low. Minimally responsive. 3/10 : seen in his room , intubated , mechaincally ventilated fio2 30 . No pressors comfortable 2+ edema in LE     3/11 : seen in his room intubated mechanically ventiated no pressors fio2 30 peep 8 good UO in response to lasix /spa . Still with 2-3+ LE edema b/l      3/12 : seen in his room , intubated mechanically ventilated . Good UO , fio2 30 peep 8   Awake . Still very weak   D/w Dr Estela Miller at bedside      3/13 : excellent UO on lasix/spa . CXR improving . Extubated today   On o2/nc     3/14: Resting comfortably. On nasal cannula. BP stable. No new event. 3/15: Seen this morning. Beginning to wake up and interact to still obtunded. BP stable. 03/17/22 1458 123/83 100.2 °F (37.9 °C) Axillary 115 20 95 % --   03/17/22 1358 -- -- -- -- -- -- 6' 2\" (1.88 m)   03/17/22 1215 -- 99.6 °F (37.6 °C) Axillary -- -- 92 % --   03/17/22 0800 106/70 100 °F (37.8 °C) Axillary 83 20 97 % --   03/17/22 0000 101/67 100.1 °F (37.8 °C) Axillary 116 20 96 % --   03/16/22 2200 -- -- -- -- -- 99 % --   @      Intake/Output Summary (Last 24 hours) at 3/17/2022 2100  Last data filed at 3/17/2022 1515  Gross per 24 hour   Intake 418 ml   Output 2290 ml   Net -1872 ml         Wt Readings from Last 3 Encounters:   03/16/22 187 lb 12.8 oz (85.2 kg)   02/23/22 186 lb 12.8 oz (84.7 kg)   11/27/18 215 lb (97.5 kg)       Constitutional:  Intubated on vent  HEENT: NC/AT, EOMI, sclera and conjunctiva are clear and anicteric, mucus membranes moist  Neck: Trachea midline, no JVD  Cardiovascular: S1, S2 regular rhythm, no murmur,or rub  Respiratory: Coarse breath sounds throughout all lung fields no crackles, no wheeze  Gastrointestinal:  Soft, nontender, nondistended, NABS  Ext: no edema dependently or distally though has marked bilateral upper extremity edema, feet warm  Skin: dry, no rash  Neuro:  Minimally responsive      DATA:    Recent Labs     03/15/22  0605 03/16/22  0400 03/17/22  0456   WBC 15.8* 14.7* 13.8*   HGB 8.6* 8.0* 7.7*   HCT 26.2* 24.8* 23.9*   .0* 105.5* 105.8*    216 193     Recent Labs     03/15/22  0605 03/15/22  0605 03/15/22  1911 03/15/22  1911 03/16/22  0400 03/17/22  0456 03/17/22  1920      < > 145   < > 145 148* 150*   K 3.6   < > 3.6   < > 4.2 3.7 3.6   CL 99   < > 99   < > 101 103 104   CO2 32*   < > 35*   < > 35* 37* 38*   MG 2.0   < > 1.9  --  1.7 1.5*  --    PHOS 2.7  --   --   --  1.3* 2.5  --    BUN 33*   < > 34*   < > 37* 45* 45*   CREATININE 1.2   < > 1.2   < > 1.2 1.3* 1.3*   ALT 22  --   --   --  23 22  --    AST 42*  --   --   --  41* 34  --    BILITOT 1.2  --   --   --  1.2 0.9  --    ALKPHOS 102  --   --   --  122 124  --

## 2022-03-18 NOTE — OP NOTE
Patient: Shaka Hughes Date: 2022   : 1967    54 year old male      OUTPATIENT WOUND CARE PROGRESS NOTE     Supervising Wound Care / Hyperbaric Medicine Physician: Dr. Soy Monroy  Consulting Provider:  Chelsey Singh NP  Date of Consultation/Last Comprehensive Exam:  20  Referring  Provider:  Dr. Acevedo    SUBJECTIVE:    Chief Complaint:  Left plantar diabetic foot ulcer (DFU)     Wound/Ulcer Present:    Diabetic lower extremity ulcer:  Garcia grade 3 (deep ulcer with abscess or osteomyelitis)  Does the patient have a plantar wound?   No.    Diabetic foot exam performed?  No.     Current Vascular Assessment:  Angiogram.     Current Antibiotic Regimen:  None.     Current Offloading Modality:  heel wedge shoe    History of Present Illness:  This is a 54 year old type 2 insulin-treated diabetic male smoker with HTN, CHF, CVA (), COPD who developed an ulcer on the left plantar 4th MT area in late 2020 when his \"insert\" cut the bottom and side of his left foot. He had been following with Podiatrist Dr. Waldron but was admitted to [facility] on 20 for L foot infection.    - s/p I&D of L foot 20 by Dr. Ma   - s/p left PT stent - Dr. Larose - 2020   - s/p Left foot debridement 20 by Dr. Ma   - s/p L TMA 20 by Dr. Ma   Surgical path from L 4th MT 20, 20, and 20 positive for OM   - s/p left TMA debridement on 21 by Dr. Ma with application of Integra graft.      Admitted 3/18- with worsening pain and erythema to right foot, diagnosed with OM of right 5th MTP joint by MRI. Dr. Sanders followed; treated with vanco + cefepime then meropenem and discharged on PO clindamycin with plan for Dr. Sanders to follow with us in clinic.       Interval History 2022  Patient was seen the outpatient wound care clinic for wound evaluation and cast change.  His tolerated therapy well without any complaint or concern.  With cast removal  Operative Note      Patient: Dianna Arroyo  YOB: 1955  MRN: [id number]    Date of Procedure: 3/18/2022    Pre-Op Diagnosis: pneumonia    Post-Op Diagnosis: Same       Procedure(s):  BRONCHOSCOPY DIAGNOSTIC OR CELL [address] ONLY    Surgeon(s):  Marie Mcintosh MD    Assistant:   * No surgical staff found *    Anesthesia: Monitor Anesthesia Care    Estimated Blood Loss (mL): Minimal    Complications: None    Specimens:   ID Type Source Tests Collected by Time Destination   1 : [place] AIRWAY Body Fluid Bronchial Washing CULTURE, FUNGUS, GRAM STAIN, CULTURE, BODY FLUID, ASPERGILLUS GALACT AG BY EIA-A Marie Mcintosh MD 3/18/2022 1519        Implants:  * No implants in log *      Drains:   NG/OG/NJ/NE Tube Nasogastric Left nostril (Active)   Surrounding Skin Dry; Intact 03/15/22 0600   Securement device Yes 03/15/22 1850   Status Clamped 03/18/22 0000   Placement Verified by Gastric Contents 03/15/22 1850   NG/OG/NJ/NE External Measurement (cm) 70 cm 03/15/22 1606   Drainage Appearance None 03/15/22 1606   Tube Feeding Other Tube Feeding (must specify product in comment) 03/16/22 1837   Tube Feeding Status Continuous 03/16/22 1837   Rate/Schedule 0 mL/hr 03/18/22 0000   Tube Feeding Supplement Amount (mL) 666 03/16/22 1515   Tube Feeding Intake (mL) 184 ml 03/18/22 0000   Free Water Flush (mL) 30 mL 03/18/22 0000   Free Water Rate 30 03/17/22 0619   Residual Volume (ml) 70 ml 03/17/22 0800       Urethral Catheter Straight-tip 16 fr (Active)   $ Urethral catheter insertion $ Not inserted for procedure 03/10/22 0800   Catheter Indications Need for fluid volume management of the critically ill patient in a critical care setting 03/18/22 0730   Site Assessment No urethral drainage 03/16/22 2204   Urine Color Harriet 03/18/22 0730   Urine Appearance Clear 03/16/22 2204   Output (mL) 400 mL 03/18/22 0124       [REMOVED] NG/OG/NJ/NE Tube Orogastric Left mouth (Removed)   Surrounding Skin Dry; Intact; Non reddened 03/13/22 0800   Securement device Yes 03/13/22 0800   Status Suction-low intermittent 03/13/22 0800   Placement Verified by Gastric Contents 03/13/22 0800   NG/OG/NJ/NE External Measurement (cm) 60 cm 03/11/22 1600   Tube Feeding Status Stopped 03/11/22 1600   Rate/Schedule 10 mL/hr 03/10/22 0400   Tube Feeding Supplement Amount (mL) 347 03/11/22 0412   Tube Feeding Intake (mL) 83 ml 03/10/22 1825   Free Water Flush (mL) 50 mL 03/11/22 1129   Free Water Rate 30ccq 4hrs 03/11/22 1129   Residual Volume (ml) 300 ml 03/10/22 1811   Output (mL) 100 ml 03/11/22 1600       Findings:  Large thin secretions all airways    Detailed Description of Procedure:   Bronchoscope was passed through the oral cavity. Thick dried secretions were seen in oral cavity. Vocal cords were visualized and moving symmetrically. Scope was passed through the vocal cords and all airways were visualized and inspected. Large amount of thin yellowish-creamish secretions were seen in all airways. Washings done and sent for cultures. No clear airway lesions/pathology seen once secretions were suctioned. Bronchoscope was removed and pt will be sent to PACU for recovery.     Electronically signed by Osorio Melara MD on 3/18/2022 at 3:25 PM there was a noted new ulceration to the medial aspect of the left foot.  Patient denies any trauma friction rubbing or discomfort while wearing his cast.  He denies any fever, chills, sweats, nausea or vomiting for      Current Treatment Regimen:  Dressing:  Endoform and TCC   Frequency:  Weekly   Changed by:  Hyperbaric/Wound Care provider    Review of Systems:  Pertinent items are noted in HPI (history of present illness).    Past Medical History:   Diagnosis Date   • Abscess of left leg 03/2017    required draining- Dr. Quintana of ID   • Acute osteomyelitis of ankle or foot, right (CMS/[facility]) 3/30/2021   • Acute pancreatitis 04/18/2009   • Adjustment disorder 08/16/2017   • Cellulitis of left foot 12/23/2015   • Cellulitis of right foot    • Cerebral infarction (CMS/[facility]) 05/2016    stated was r/t his diabetes   • Chest pain 04/04/2017   • Chest pressure    • Chronic gastritis 05/25/2021    Dr. Freeman   • Chronic kidney disease    • Chronic total occlusion of coronary artery 04/10/2017   • Closed nondisplaced fracture of left patella 07/14/2018   • Colon polyp 05/25/2021    Dr. Freeman, 5 tubular adenomas. recall 3 years   • Congestive cardiac failure (CMS/[facility]) 04/09/2017    cardiomyopathy LVEF 29%   • COPD (chronic obstructive pulmonary disease) (CMS/[facility])    • Coronary artery disease 04/09/2017   • CVA (cerebral vascular accident) (CMS/[facility]) 05/18/2016   • Dysphagia 02/19/2019   • Dyspnea    • ED (erectile dysfunction) 09/22/2016   • Essential (primary) hypertension 07/05/2016   • High cholesterol    • Ischemic cardiomyopathy 07/19/2017   • Left hemiparesis (CMS/[facility]) 07/05/2016   • Left leg pain 03/02/2017   • Left ventricular dysfunction 05/08/2017   • Leg edema    • Major depression 09/20/2017   • Marijuana smoker 04/17/2017    continues to use   • Myocardial infarction (CMS/[facility])    • Osteomyelitis of left foot (CMS/[facility]) 12/16/2020   • Osteomyelitis of toe of right foot (CMS/[facility]) 3/30/2021   • Polyneuropathy  02/07/2018   • Smoker     states has quit since being hospitalized   • Temporomandibular joint (TMJ) pain 09/22/2016   • Type 1 diabetes mellitus with complications (CMS/[facility])     insulin dependent, BS stable   • Uses walker 2021    or wheelchair depending on distance required   • Wears glasses     for reading     Past Surgical History:   Procedure Laterality Date   • Amputation Left     half of left foot   • Cardiac catherization  04/09/2017   • Cdl ptca w/ stent  04/19/2017     catheter of LAD   • Colonoscopy     • Colonoscopy  05/25/2021    Dr. Freeman   • Egd  05/25/2021    Dr. Freeman   • Ir kidney biopsy  04/13/2021   • Past surgical history      none     Social History     Tobacco Use   • Smoking status: Current Some Day Smoker     Packs/day: 0.50     Years: 34.00     Pack years: 17.00     Types: Cigarettes     Start date: 6/1/1983   • Smokeless tobacco: Never Used   Substance Use Topics   • Alcohol use: No     Alcohol/week: 0.0 standard drinks     Family History   Problem Relation Age of Onset   • COPD Mother    • Patient is unaware of any medical problems Father    • Inflammatory Bowel Disease Sister    • Cancer Maternal Grandmother        Current Outpatient Medications   Medication Sig   • carvedilol (COREG) 12.5 MG tablet TAKE 1 TABLET BY MOUTH TWICE DAILY WITH MEALS   • furosemide (Lasix) 20 MG tablet Take 1 tablet by mouth daily.   • losartan (COZAAR) 50 MG tablet Take 1 tablet by mouth daily.   • pantoprazole (PROTONIX) 40 MG tablet Take 1 tablet by mouth daily as needed (Heartburn).   • clopidogrel (PLAVIX) 75 MG tablet Take 1 tablet by mouth daily.   • Insulin Pen Needle (Pen Needles) 31G X 5 MM Misc 1 each 4 times daily.   • hydrALAZINE (APRESOLINE) 100 MG tablet Take 1 tablet by mouth 3 times daily.   • atorvastatin (LIPITOR) 20 MG tablet TAKE 1 TABLET BY MOUTH DAILY   • insulin glargine 100 UNIT/ML pen-injector Inject 38 Units into the skin nightly. Prime 2 units before each dose.   • Insulin  Lispro, 1 Unit Dial, (HumaLOG KwikPen) 100 UNIT/ML pen-injector Prime 2 units before each dose. Inject 8-10 units before meals. Max daily dose 30 units. Sliding scale   • acetic acid 0.25 % irrigation solution Irrigate with as directed daily. (Patient taking differently: Irrigate with as directed 3 days a week. Receives from wound care on Monday, Wednesday, Friday)   • blood glucose meter Test blood sugar four times daily. Diagnosis: E11.9. Meter: Please dispense what is covered by patient's insurance   • blood glucose lancets Test blood sugar four times daily. Diagnosis: E11.9.   • blood glucose (Pharmacist Choice No Coding) test strip Test blood sugar four times daily. Meter: Please dispense what is covered by patient's insurance   • Insulin Pen Needle 31G X 8 MM Misc Use as directed four times daily to inject insulin.   • urea 40 % lotion Apply topically as needed to areas of callus.  Do not apply to open wounds. (Patient taking differently: 3 days a week. Apply topically as needed to areas of callus on Mondays, Wednesdays, and Fridays.  Do not apply to open wounds.)   • aspirin 81 MG EC tablet Take 1 tablet by mouth daily.   • amLODIPine (NORVASC) 10 MG tablet Take 10 mg by mouth daily.   • DULoxetine (CYMBALTA) 30 MG capsule Take 1 capsule by mouth daily. Do not start before March 24, 2021.   • lamoTRIgine (LaMICtal) 25 MG tablet Take 1 tablet by mouth daily. Do not start before March 24, 2021.     No current facility-administered medications for this encounter.      ALLERGIES:  Hydralazine    OBJECTIVE:    There were no vitals taken for this visit.      Physical Exam:  General appearance: Alert, in no distress and cooperative  Extremities:       Pedal and posterior tibial pulses +1/4    Left plantar foot ulcer with dry crusts no open ulcers noted, deep fissures without drainage or open ulcers.  Left medial foot ulcer with viable granular tissue no purulence, fluctuance or local signs of infection.      Updated  photo no available     12/29/21 pre-debridement- no post debridement was taken unfortunately         12/22/21      12/15/21 pre-debridement      12/8/21 pre-debridement         12/1/21 11/26/21       Wound Bed Quality: see above   Anastasiya-wound Quality: See above  Additional Descriptors: see above    Wound Measurements Per Flowsheet:       Wound Left Transmetatarsal (Active)   Wound Length (cm) 1 cm 12/29/21 0900   Wound Width (cm) 0.5 cm 12/29/21 0900   Wound Depth (cm) 0.3 cm 12/29/21 0900   Wound Surface Area (cm^2) 0.5 cm^2 12/29/21 0900   Wound Volume (cm^3) 0.15 cm^3 12/29/21 0900   Number of days: 372     PROCEDURE:  Cast      Procedure was Performed by:  Not applicable     Total Contact Cast    Extremity:  Left lower extremity.     Patient Preparation:  After informed consent obtained, Total Contact Casting was performed by Nadege Waters PA-C under the direct supervision of Dr. Soy Monroy.  A foam dressing was applied to the ulcer area and secured.  A stockinette was placed over the foot, extending to the knee, and was pulled forward to cover the toes and folded approximately two to four inches over the dorsum of the foot. The excess stockinette was trimmed and secured with plastic tape.  A strip of felt padding was placed along the anterior crest of the tibia with flaps covering the malleoli. Adhesive foam was folded lengthwise to cover the toes completely, with the top and bottom sticking to the stockinette. The patient was placed in a prone position with the leg flexed at the knee.  A cotton layer was placed around the leg, overlapping slightly at the shin area. The foot was maintained in a neutral position with the ankle as close to 90 degrees as possible, making sure not to crimp materials in the toes or heel/ankle areas during the application.     Casting:  A 4-inch roll of plaster was then briefly wet and the foot and leg was wrapped from distal to proximal. All folds of excess plaster were  not made over the padded areas.  A 3-inch roll of fiberglass was then applied in the same fashion. A posterior splint was then fabricated from 4-inch fiberglass without wetting, extending from the toes to the most proximal part of casting material. The splint was placed so that any excess material hung over the width of the foot medially and could be rolled inward to fill any void in the arch area.  A 1/4-inch walking plate was placed on the bottom of the foot, and another posterior splint was fashioned with 3-inch fiberglass without wetting and cut in the appropriate place for the walking heel to show through. A final layer of wet 4-inch roll of fiberglass was then applied to finish the cast procedure.  The patient was non-weight bearing for 15 minutes and until the cast was cooled, dry, and hardened.     Patient tolerated the procedure well. The patient was provided with an instruction sheet and an emergency removal card, with instructions to present to the ED for removal of the cast if any difficulty after clinic hours.      Complications:  None.    Procedure was Performed by:  Tao Mayorga NP        Laboratory assessments reviewed:  No results found for: PAB   Albumin (g/dL)   Date Value   10/13/2021 3.5 (L)   06/01/2021 2.8 (L)   03/18/2021 2.2 (L)      No results available in last 24 hours    Lab Results   Component Value Date    WBC 9.8 12/08/2021    GLUCOSE 229 (H) 12/08/2021    HGBA1C 7.2 (H) 10/13/2021    CRP 0.4 07/26/2021    RESR 37 (H) 07/26/2021    CREATININE 1.90 (H) 12/08/2021    GFRA >90 09/14/2017    GFRNA >90 09/14/2017        Infection/inflammation lab results summary:   Recent Labs   Lab 12/08/21  1342 10/15/21  0809 10/14/21  0742 10/13/21  1659 07/26/21  1619 06/01/21  1010 05/11/21  1036 05/11/21  1036 04/13/21  0950 03/23/21  0405 03/19/21  0423 03/18/21  1211 03/18/21  1200   0000   WBC 9.8 9.2 9.4 10.9 11.0 9.4   < > 10.1   < > 8.6   < >  --  10.0  --    C-Reactive Protein  --   --   --    --  0.4 0.5  --  0.5  --  1.0  --   --  5.6*  --    RBC Sedimentation Rate  --   --   --   --  37* 41*  --  56*  --  69*  --   --  101*   < >   VLA  --   --   --   --   --   --   --   --   --   --   --  0.9  --   --     < > = values in this interval not displayed.      Culture results:  Specimen Description (no units)   Date Value   03/04/2017 ABSCESS THIGH LEFT IR TO DRAIN   03/04/2017 ABSCESS THIGH LEFT IR TO DRAIN TODAY   03/02/2017 BLOOD, PERIPHERAL HAND, LEFT   03/02/2017 BLOOD, PERIPHERAL ANTECUBITAL,RIGHT     Gram Stain (no units)   Date Value   03/18/2021 No polymorphonuclear cells seen.   03/18/2021 No epithelial cells seen.   03/18/2021 Very Rare Gram positive cocci.   12/04/2020 No polymorphonuclear cells seen.   12/04/2020 No epithelial cells seen.   12/04/2020 Rare Gram positive cocci.   12/04/2020 Moderate Gram negative bacilli.     CULTURE WITH GRAM STAIN, ANAEROBE/AEROBE (no units)   Date Value   03/18/2021 Few Streptococcus agalactiae (Strep Group B) (A)   12/04/2020 Few Morganella morganii (A)   12/04/2020 Few Escherichia coli (A)   12/04/2020 Few Prevotella bivia (A)     CULTURE (no units)   Date Value   03/04/2017 NO AEROBIC OR ANAEROBIC BACTERIAL GROWTH   03/04/2017 NO GROWTH 31 DAYS.   03/02/2017 NO GROWTH 5 DAYS.   03/02/2017 NO GROWTH 5 DAYS.         Diagnostic Assessments Reviewed:       LLE angio 10/14/21  Summary of Findings:  1. L common iliac artery with 30% stenosis, no gradient: 15 mm HG gradient  2. L TP stent patent   3. 3 vessel run off below knee     LLE arterial duplex 8/12/21  1. There is no sonographic evidence of hemodynamically significant arterial  inflow disease to the left leg.  2. Focal velocity acceleration across the mid left popliteal artery  suggests an approximate 50% stenosis.  3. No other areas of focal velocity acceleration to suggest hemodynamically  significant focal stenosis are seen and there is no evidence of segmental  occlusion within the arterial inflow  to the left ankle.  4. Degradation of the arterial waveforms below the left knee do suggest  some degree of mild to moderate scattered atherosclerotic disease (likely  most prominently in the peroneal artery).  5. Nonspecific enlarged left groin lymph nodes. These may be reactive in  etiology. Recommend correlation to clinical exam and history.    Xray right foot 4/27/21:  FINDINGS/IMPRESSION: Interval worsened osseous destruction and osteolysis of the distal aspect of the right 5th metatarsal, and about the proximal to mid aspect of the right 5th proximal phalanx, centered at the 5th MTP joint. Mild periosteal reaction about the remnant shaft of the 5th metatarsal. Surrounding soft tissue swelling. Findings indicate worsened changes from osteomyelitis. The surrounding soft tissue swelling may be related to cellulitis.      Surgical Pathology: [id number]   Order: [id number]  Collected:  12/8/2020 16:12 Status:  Final result   Visible to patient:  No (not released)  Component    Pathologic Diagnosis   A through D.  Forefoot, left, transmetatarsal amputation including with separately submitted third, fourth and fifth metatarsal margins of resection:   - Ulceration and necrosis with acute osteomyelitis.  - Acute osteomyelitis does involve the fourth metatarsal bone margin of resection (specimen C; other bony margins appear free of osteomyelitis).  - Cutaneous ulceration is present at the skin/soft tissue margin of resection adjacent to the fifth and fourth toes, (deeper soft tissues appear viable).           MRI left foot 12/6/21: IMPRESSION:   1.  Postsurgical changes of recent fourth metatarsal resection, without evidence of abscess or septic joint. Findings compatible with osteomyelitis are present in the fourth proximal and distal phalanges, as well as in the third proximal phalanx.  2.  Scattered small fluid collections with findings suggesting rim enhancement associated with one view collections. Developing  abscess cannot be ruled out. Scattered soft tissue gas, may be postsurgical.  3.  Diffuse edema of the plantar foot musculature with associated component of enhancement.  4.  Chronic erosive changes of the distal third metatarsal.       Peripheral angiogram 12/2/20 Findings:    Peripheral angiogram performed with catheter in the distal abdominal aorta  Right common iliac has mild disease  Left common iliac has moderate disease with pressure gradient 15 mmHg  Bilateral SFA normal  Right below the knee 3 vessel runoff  Left below the knee 3 vessel runoff with severe focal stenosis of the TP trunk       Nutritional Assessment:  Prealbumin and/or Albumin reviewed    Wound treatment goals are palliative:  No    DIAGNOSES:  Diabetic lower extremity ulcer, Garcia grade 3 (deep ulcer with abscess or osteomyelitis) left foot, right foot healed.    Non-healing surgical wound    Diabetes    Smoker        Medical Decision Making:    Left DFU 3 s/p TMA and multiple debridements, most recently on 2/12/21.    New ulceration to the left medial foot with viable granular tissue slightly dry.  Start Iodosorb to open ulcer, continue cast therapy.    Local Wound Care   Wash with mild soap and water, pat dry.   Iodoflex to left ulcer with granular tissue.   Weekly cast changes.      Edema  Purple top lotion to RLE, medium tetragrip. amlactin to R foot callus    Surgery  11/30/20: I&D of L foot by Dr. Ma  12/4/20: Left foot debridement by Dr. Ma  12/8/20: L TMA by Dr. Ma  Patient is s/p TMA debridement on 2/12/21 by Dr. Ma with application of Integra.    Vascular  11/29/2020 LLE Arterial duplex - The three-vessel runoff is patent.  12/02/2020: s/p stent in the left PTA for high-grade stenosis by Dr. Larose, right below the knee with 3V run off.   12/11/2020 - Left venous US with no DVT  Arterial duplex LLE 8/12/21  Angiogram 10/14/21 with Dr. Maite LAFLEUR TP stent patent, 3 vessel run off below the knee    ID  No clinical  signs of infection on exam today       HBOT  Has completed 2 sessions with difficulty, thus will not want to consider further HBOT  No indication for HBO at this time      DM/Nutrition  Encouraged diligent glucose monitoring practices, A1c 7.2 10/13/21  His primary team has been managing his diabetes.   Encourage high protein diet     Offloading  TCC started 11/17/21   Also has wedge shoe   Discussed obtaining insert with block for shoes for after pt heals.     Follow up next week    Plan of Care:  Advanced Wound Care Recommendations:  See above   Percent Wound Closure from consult:  NA  Care plan to augment wound closure: Not applicable.  see above     Patient stable. All questions were answered.     Significant note data copied forward from CECILLE Crain and reviewed by me as needed in the formulation of this note and plan.      Tao ODEN   [facility]

## 2022-03-18 NOTE — PROGRESS NOTES
[facility] MD Nicky Morales MD Thurlow Lederer, MD Ty Dunk, MD Libby Collier, MOE Groves, OBED  Progress Note    3/18/2022    SUBJECTIVE:   3/4: Off the floor. Discussed case with bedside RN, Dr. Rupert Dunbar. Unable to perform diagnostic radiographic studies due to agitation, movement    3/5 transfer to the ICU intubated via ETT, ventilated. Hemodynamically stable. Sedated appears comfortable on vent. IV K-Phos infusion ordered this morning, still not delivered to nursing unit for administration. IV fluid stopped yesterday for unknown period of time, held today for 2 to 3 hours, reasons not clear though apparently IV access was not lost.    3/6: Remains critically ill. Intubated via ETT. Vent setting stable. Minimally responsive without sedation. Urine output poor over the course of today. BP rather low, though hemodynamically stable. 3/7 : seen in his room , intubated , mechanically ventilated , BP stable . UO on low side . Vent setting stable     3/8 seen earlier today , intubated fio2 30  Low UO . No pressors   D/w Dr Kirk Leonardo at bedside   3/9:  Remains critically ill. Intubated on vent,  Fio2- 30% peep-8. Remains off pressors. Urinoe output continue to be low. Minimally responsive. 3/10 : seen in his room , intubated , mechaincally ventilated fio2 30 . No pressors comfortable 2+ edema in LE     3/11 : seen in his room intubated mechanically ventiated no pressors fio2 30 peep 8 good UO in response to lasix /spa . Still with 2-3+ LE edema b/l      3/12 : seen in his room , intubated mechanically ventilated . Good UO , fio2 30 peep 8   Awake . Still very weak   D/w Dr Kirk Leonardo at bedside      3/13 : excellent UO on lasix/spa . CXR improving . Extubated today   On o2/nc     3/14: Resting comfortably. On nasal cannula. BP stable. No new event. 3/15: Seen this morning. Beginning to wake up and interact to still obtunded. BP stable. Swelling marked. 3/16: Seen this morning. Mental status unchanged. Tachypneic but otherwise breathing comfortably. Does not answer questions or follow commands. 3/17: No new complaint or issue. Bilateral upper extremities are quite swollen, though lower back sacrum thighs and distal lower extremities have no swelling. No response to verbal or tactile stimuli. Breathing seems more comfortable. 3/18: Awakens, attempts answer questions it was unintelligible. Very noisy respiratory sounds due to posterior pharyngeal secretions. Denies dyspnea. Near flaccid, unable to move meaningfully independently. PROBLEM LIST:    Principal Problem:    Altered mental status  Resolved Problems:    * No resolved hospital problems.  *         DIET:    Diet NPO Exceptions are: Sips of Water with Meds     MEDS (scheduled):    potassium phosphate IVPB  30 mmol IntraVENous Once    magnesium sulfate  2,000 mg IntraVENous Once    potassium chloride  10 mEq IntraVENous Q1H    lansoprazole  30 mg Per NG tube QAM AC    sodium chloride flush  5-40 mL IntraVENous 2 times per day    heparin flush  3 mL IntraVENous 2 times per day    fluconazole  200 mg Oral Daily    thiamine (VITAMIN B1) IVPB  250 mg IntraVENous Q24H    senna  1 tablet Oral Nightly    docusate  100 mg Per NG tube BID    enoxaparin  40 mg SubCUTAneous Daily    ipratropium-albuterol  1 ampule Inhalation Q4H WA    nystatin  5 mL Oral 4x Daily    amLODIPine  5 mg Oral Daily    folic acid  1 mg Oral Daily    sucralfate  1 g Oral 4x Daily AC & HS    vitamin C  500 mg Oral TID    Vitamin D  1,000 Units Oral Daily    zinc sulfate  50 mg Oral Daily    white petrolatum   Topical BID       MEDS (infusions):   sodium chloride 63 mL/hr at 03/18/22 0909    sodium chloride      sodium chloride         MEDS (prn):  sodium chloride flush, sodium chloride, heparin flush, medicated lip balm, sodium chloride, gadoteridol, ondansetron, acetaminophen, 3.4*      < > 103 104 102   CO2 35*   < > 37* 38* 35*   MG 1.7  --  1.5*  --  1.6   PHOS 1.3*  --  2.5  --  2.1*   BUN 37*   < > 45* 45* 46*   CREATININE 1.2   < > 1.3* 1.3* 1.4*   ALT 23  --  22  --  22   AST 41*  --  34  --  33   BILITOT 1.2  --  0.9  --  0.9   ALKPHOS 122  --  124  --  126    < > = values in this interval not displayed. No results found for: LABPROT    Assessment  77 y.o. gentleman known to service, followed for FRANCISCA in January of this year. Admitted with acute mental status change, myoclonic jerks and unusual movements.     1. Chronic kidney disease, stage IIIa. Creatinine at baseline, 1.4 to 1.6 mg/dL     2.  Metabolic acidosis, wide anion gap. Resolved with bicarbonate drip    3. Cannabis on drug screen    4. Hypophosphatemia. 5. Hypernatremia, dehydration due to increased insensible losses, poor intake/input. Resolved    6. Hypokalemia, normalized with supplement      Cr stable  Good UO, still hypervolemic though primarily in the form of third spacing  Hypernatremia improving on 1/2 NS drip started last evening  For bronchoscopy today, currently n.p.o.    Recommendations  Continue 1/2 NS drip at conservative rate --will reduce the rate somewhat  Supplement KCl IV  Supplement phosphorus IV  Nutrition --tube feed if unable to consume oral  F/u UO , labs  Continue  supportive care  Elevate upper extremities on pillows. Discussed with RN.       Electronically signed by Sukhwinder Ocasio MD on 3/18/2022

## 2022-03-19 NOTE — PROGRESS NOTES
[facility] Josh Christie, MD Mati Seth, MD Radha Jose, MD Kevin Fisher, MD Alexa Hua, CNP   Harriet Groves, OBED  Progress Note    3/19/2022    SUBJECTIVE:   3/4: Off the floor. Discussed case with bedside RN, Dr. Slade Bai. Unable to perform diagnostic radiographic studies due to agitation, movement    3/5 transfer to the ICU intubated via ETT, ventilated. Hemodynamically stable. Sedated appears comfortable on vent. IV K-Phos infusion ordered this morning, still not delivered to nursing unit for administration. IV fluid stopped yesterday for unknown period of time, held today for 2 to 3 hours, reasons not clear though apparently IV access was not lost.    3/6: Remains critically ill. Intubated via ETT. Vent setting stable. Minimally responsive without sedation. Urine output poor over the course of today. BP rather low, though hemodynamically stable. 3/7 : seen in his room , intubated , mechanically ventilated , BP stable . UO on low side . Vent setting stable     3/8 seen earlier today , intubated fio2 30  Low UO . No pressors   D/w Dr Rogene Favre at bedside   3/9:  Remains critically ill. Intubated on vent,  Fio2- 30% peep-8. Remains off pressors. Urinoe output continue to be low. Minimally responsive. 3/10 : seen in his room , intubated , mechaincally ventilated fio2 30 . No pressors comfortable 2+ edema in LE     3/11 : seen in his room intubated mechanically ventiated no pressors fio2 30 peep 8 good UO in response to lasix /spa . Still with 2-3+ LE edema b/l      3/12 : seen in his room , intubated mechanically ventilated . Good UO , fio2 30 peep 8   Awake . Still very weak   D/w Dr Rogene Favre at bedside      3/13 : excellent UO on lasix/spa . CXR improving . Extubated today   On o2/nc     3/14: Resting comfortably. On nasal cannula. BP stable. No new event. 3/15: Seen this morning. Beginning to wake up and interact to still obtunded. BP stable. Swelling marked. 3/16: Seen this morning. Mental status unchanged. Tachypneic but otherwise breathing comfortably. Does not answer questions or follow commands. 3/17: No new complaint or issue. Bilateral upper extremities are quite swollen, though lower back sacrum thighs and distal lower extremities have no swelling. No response to verbal or tactile stimuli. Breathing seems more comfortable. 3/18: Awakens, attempts answer questions it was unintelligible. Very noisy respiratory sounds due to posterior pharyngeal secretions. Denies dyspnea. Near flaccid, unable to move meaningfully independently. 3/19: Overall no new change in clinical status since yesterday  PROBLEM LIST:    Principal Problem:    Altered mental status  Resolved Problems:    * No resolved hospital problems. *         DIET:    ADULT TUBE FEEDING; Nasogastric;  Other Tube Feeding (specify); pivot 1.5; Continuous; 50; No; 30; Q 6 hours     MEDS (scheduled):    meropenem  1,000 mg IntraVENous Q8H    lansoprazole  30 mg Per NG tube QAM AC    sodium chloride flush  5-40 mL IntraVENous 2 times per day    heparin flush  3 mL IntraVENous 2 times per day    fluconazole  200 mg Oral Daily    thiamine (VITAMIN B1) IVPB  250 mg IntraVENous Q24H    senna  1 tablet Oral Nightly    docusate  100 mg Per NG tube BID    enoxaparin  40 mg SubCUTAneous Daily    ipratropium-albuterol  1 ampule Inhalation Q4H WA    nystatin  5 mL Oral 4x Daily    amLODIPine  5 mg Oral Daily    folic acid  1 mg Oral Daily    sucralfate  1 g Oral 4x Daily AC & HS    vitamin C  500 mg Oral TID    Vitamin D  1,000 Units Oral Daily    zinc sulfate  50 mg Oral Daily    white petrolatum   Topical BID       MEDS (infusions):   sodium chloride 33.3 mL/hr at 03/19/22 1357    sodium chloride 63 mL/hr at 03/19/22 0009    sodium chloride      sodium chloride         MEDS (prn):  sodium chloride flush, sodium chloride, heparin flush, medicated lip balm, sodium chloride, gadoteridol, ondansetron, acetaminophen, cetirizine    PHYSICAL EXAM:     Patient Vitals for the past 24 hrs:   BP Temp Temp src Pulse Resp SpO2   03/19/22 0841 -- -- -- -- -- 95 %   03/19/22 0727 125/63 98.7 °F (37.1 °C) Axillary 107 24 94 %   03/19/22 0004 (!) 112/50 99.6 °F (37.6 °C) Axillary 108 24 96 %   03/18/22 1942 (!) 124/109 98.7 °F (37.1 °C) Axillary 101 24 96 %   03/18/22 1645 (!) 122/54 98.4 °F (36.9 °C) Axillary 100 20 97 %   03/18/22 1600 (!) 140/76 -- -- 100 20 94 %   03/18/22 1549 118/74 -- -- 102 20 94 %   03/18/22 1534 125/67 99.4 °F (37.4 °C) Temporal 100 24 95 %   03/18/22 1529 (!) 137/59 98.9 °F (37.2 °C) Temporal 100 24 94 %   03/18/22 1445 130/83 99.6 °F (37.6 °C) Temporal 114 -- 91 %   @      Intake/Output Summary (Last 24 hours) at 3/19/2022 1422  Last data filed at 3/19/2022 0546  Gross per 24 hour   Intake 672 ml   Output 800 ml   Net -128 ml         Wt Readings from Last 3 Encounters:   03/18/22 183 lb 12.8 oz (83.4 kg)   02/23/22 186 lb 12.8 oz (84.7 kg)   11/27/18 215 lb (97.5 kg)       Constitutional:  Intubated on vent  HEENT: NC/AT, EOMI, sclera and conjunctiva are clear and anicteric, mucus membranes moist  Neck: Trachea midline, no JVD  Cardiovascular: S1, S2 regular rhythm, no murmur,or rub  Respiratory: Coarse breath sounds throughout all lung fields no crackles, no wheeze  Gastrointestinal:  Soft, nontender, nondistended, NABS  Ext: no edema dependently or distally in lower extremities, though has marked bilateral upper extremity edema, feet warm  Skin: dry, no rash  Neuro:  Minimally responsive      DATA:    Recent Labs     03/17/22  0456 03/18/22  0419 03/19/22  0526   WBC 13.8* 13.4* 14.2*   HGB 7.7* 7.4* 7.0*   HCT 23.9* 23.8* 21.9*   .8* 106.3* 106.3*    210 262     Recent Labs     03/17/22  0456 03/17/22  0456 03/17/22  1920 03/18/22  0419 03/19/22  0526   *   < > 150* 147* 148*   K 3.7   < > 3.6 3.4* 3.7      < > 104 102 108*   CO2 37* < > 38* 35* 31*   MG 1.5*  --   --  1.6 1.8   PHOS 2.5  --   --  2.1* 2.1*   BUN 45*   < > 45* 46* 38*   CREATININE 1.3*   < > 1.3* 1.4* 1.1   ALT 22  --   --  22 21   AST 34  --   --  33 32   BILITOT 0.9  --   --  0.9 0.6   ALKPHOS 124  --   --  126 142*    < > = values in this interval not displayed. No results found for: LABPROT    Assessment  77 y.o. gentleman known to service, followed for FRANCISCA in January of this year. Admitted with acute mental status change, myoclonic jerks and unusual movements.     1. Chronic kidney disease, stage IIIa. Creatinine at baseline, 1.4 to 1.6 mg/dL  BUN today is 38 and creatinine is 1.1, FRANCISCA resolved  2. Metabolic acidosis, wide anion gap. Resolved with bicarbonate drip    3. Cannabis on drug screen    4. Hypophosphatemia. 5. Hypernatremia, dehydration due to increased insensible losses, poor intake/input. Resolved    6. Hypokalemia, normalized with supplement      Cr stable  Good UO, still hypervolemic though primarily in the form of third spacing  Hypernatremia improving on 1/2 NS drip started last evening  For bronchoscopy today, currently n.p.o.    Recommendations  Continue 1/2 NS drip at conservative rate --will reduce the rate somewhat  Supplement KCl IV  Supplement phosphorus IV  Nutrition --tube feed if unable to consume oral  F/u UO , labs  Continue  supportive care  Elevate upper extremities on pillows. Discussed with RN.       Electronically signed by Darinel Mcintyre MD on 3/19/2022

## 2022-03-19 NOTE — SIGNIFICANT EVENT
Rapid response was called due to patients deterioration in respiratory status. He began to desaturate after coming from head CT for AMS. His sp02 was down trending. Suctioning showed significant thick mucous secretions. He was unresponsive, BP and HR stable. Shallow tachypnic Paradoxal breathing with diffuse rhonchi. Facemask was unable to correct sp02 and thus the plan was to intubate however wife at bedside did not feel this was what the patient would have wanted and the decision was made to change code status, make patient comfortable. Plan to transition mgt to a palliative philosophy of care. Bedside nurse, RT, ICU team was all at bedside working together. Wife was provided bedside emotional support during this process.

## 2022-03-19 NOTE — PROGRESS NOTES
Pt pronounced by dr Steffen Stanford at 4865. Wife was present when pt passed. Wife refused autopsy and chose  home.

## 2022-03-19 NOTE — PROGRESS NOTES
[facility] was called. Dr. Rosmery Peres, Dr. Tyson Smith and Dr. Shane Isaacs notified patient .   Darryl Cade RN'

## 2022-03-19 NOTE — PROGRESS NOTES
[address] [place] [address] [facility]  Progress Note      Chief Complaint   Patient presents with    Altered Mental Status     sent by physician at [facility] for head CT. Pt wandering around unit, could not redirect. SUBJECTIVE:  The patient is lying in bed, tremulous  3LNC, no fevers overnight   Seems to be tolerating antibiotics   In general doing poorly    Review of systems:  As stated above in the chief complaint, otherwise negative. Medications:  Scheduled Meds:   meropenem  1,000 mg IntraVENous Q8H    lansoprazole  30 mg Per NG tube QAM AC    sodium chloride flush  5-40 mL IntraVENous 2 times per day    heparin flush  3 mL IntraVENous 2 times per day    fluconazole  200 mg Oral Daily    thiamine (VITAMIN B1) IVPB  250 mg IntraVENous Q24H    senna  1 tablet Oral Nightly    docusate  100 mg Per NG tube BID    enoxaparin  40 mg SubCUTAneous Daily    ipratropium-albuterol  1 ampule Inhalation Q4H WA    nystatin  5 mL Oral 4x Daily    amLODIPine  5 mg Oral Daily    folic acid  1 mg Oral Daily    sucralfate  1 g Oral 4x Daily AC & HS    vitamin C  500 mg Oral TID    Vitamin D  1,000 Units Oral Daily    zinc sulfate  50 mg Oral Daily    white petrolatum   Topical BID     Continuous Infusions:   sodium chloride 33.3 mL/hr at 22 1357    sodium chloride 63 mL/hr at 22 0009    sodium chloride      sodium chloride       PRN Meds:sodium chloride flush, sodium chloride, heparin flush, medicated lip balm, sodium chloride, gadoteridol, ondansetron, acetaminophen, cetirizine    OBJECTIVE:  /63   Pulse 107   Temp 98.7 °F (37.1 °C) (Axillary)   Resp 24   Ht 6' 2\" (1.88 m)   Wt 183 lb 12.8 oz (83.4 kg)   SpO2 95%   BMI 23.60 kg/m²   Temp  Av °F (37.2 °C)  Min: 98.4 °F (36.9 °C)  Max: 99.6 °F (37.6 °C)  Constitutional: The patient is lying in bed, eyes open, encephalopathic. Tremulous   Skin: Warm and dry. No rashes were noted. HEENT: Round and reactive pupils.   Moist mucous membranes. No ulcerations or thrush. NG tube  Neck: Supple to movements. Chest: No respiratory distress. No crackles. coarse bialterally, noisy upper airways   Cardiovascular: Heart sounds rhythmic, regular and tachycardic. Abdomen: Diminished bowel sounds to auscultation. Benign to palpation. No masses felt. Genitourinary: Male, rodriguez   Extremities: + edema ble, third spacing.  Arms wrapped   Lines: 7/26/5774 left basilic double-lumen PICC      Laboratory and Tests Review:  Lab Results   Component Value Date    WBC 14.2 (H) 03/19/2022    WBC 13.4 (H) 03/18/2022    WBC 13.8 (H) 03/17/2022    HGB 7.0 (L) 03/19/2022    HCT 21.9 (L) 03/19/2022    .3 (H) 03/19/2022     03/19/2022     Lab Results   Component Value Date    NEUTROABS 11.64 (H) 03/19/2022    NEUTROABS 10.59 (H) 03/18/2022    NEUTROABS 11.32 (H) 03/17/2022     No results found for: [facility]  Lab Results   Component Value Date    ALT 21 03/19/2022    AST 32 03/19/2022    ALKPHOS 142 (H) 03/19/2022    BILITOT 0.6 03/19/2022     Lab Results   Component Value Date     03/19/2022    K 3.7 03/19/2022    K 3.7 03/06/2022     03/19/2022    CO2 31 03/19/2022    BUN 38 03/19/2022    CREATININE 1.1 03/19/2022    CREATININE 1.4 03/18/2022    CREATININE 1.3 03/17/2022    GFRAA >60 03/19/2022    LABGLOM >60 03/19/2022    GLUCOSE 140 03/19/2022    PROT 5.1 03/19/2022    LABALBU 2.3 03/19/2022    CALCIUM 7.8 03/19/2022    BILITOT 0.6 03/19/2022    ALKPHOS 142 03/19/2022    AST 32 03/19/2022    ALT 21 03/19/2022     Lab Results   Component Value Date    CRP 29.8 (H) 03/16/2022    CRP 15.1 (H) 03/05/2022     Lab Results   Component Value Date    SEDRATE 108 (H) 03/16/2022    SEDRATE 47 (H) 03/05/2022    SEDRATE 40 (H) 03/03/2022     Radiology:  CT of the chest reviewed demonstrating patchy groundglass infiltrates; gallbladder  Monitor declined  MRI of the brain completed-nondiagnostic    Microbiology:   3/2/2022: Urine culture negative  3/5/2022 blood cultures negative x2  3/5/2022 respiratory cultures OP abdullahi absent  3/5/2022 urine for Legionella and strep pneumo negative  3/7/2022: Respiratory panel negative  3/7/2022: CSF negative. VDRL nonreactive  3/18/2022; Blood cultures: negative so far  BAL cultures: no growth     ASSESSMENT:  Change in mental status. There was no evidence to support encephalitis or meningitis in a patient with myelodysplastic syndrome  Candida esophagitis  Aspiration pneumonia. Completed 7 days of Ertapenem  Leukocytosis related to above-improved    PLAN:  Continue Fluconazole p.o. & Merrem pending cultures   Await repeat blood & bronchoscopy cultures   We will follow with you    CRISTIANO Hernandez CNP  2:36 PM   3/19/2022   Pt seen and examined. Above discussed agree with advanced practice nurse. Labs, cultures, and radiographs reviewed. Face to Face encounter occurred. Changes made as necessary.      Ace Mcwilliams MD

## 2022-03-19 NOTE — SIGNIFICANT EVENT
Responded to RRT as part of ICU care team.  On arrival patient agonal breathing not protecting airway initial attempts were made to place patient on nonrebreather with no improvement SPO2. Oxygen saturations remained in the low 80s. Decision was made to proceed with intubation as patient was a full code. Wife arrived at the bedside states that patient has had minimal improvement in mental status and is not having good quality of life. She notes that he would not want to be reintubated and she does not want him reintubated as well. Discussed that given patient's current status he would likely pass away almost immediately without intubation. Wife was very understanding of this fact and continued to wish to proceed with comfort measures only at this time. Patient placed on nasal cannula, comfort care order set placed. Patient  shortly after at [phone]. Primary care provider updated. Comfort and support offered to family.     Cornelia Wilkes, DO

## 2022-03-19 NOTE — PROGRESS NOTES
Patient seen no big changes mentally. Tolerated bronch yesterday, multiple cultures taken. On meropenem and aerosols for suspected aspiration lungs audibly ronchous . arouseable but quite confused . Encephalopathic wit unclear etiology. No neuro service, might help. Check repeat ct of head and check ammonia levels.

## 2022-03-21 LAB
(1,3)-BETA-D-GLUCAN (FUNGITELL) INTERPRETATION: NEGATIVE
(1,3)-BETA-D-GLUCAN (FUNGITELL): <31 PG/ML
CULTURE, RESPIRATORY: NORMAL
SMEAR, RESPIRATORY: NORMAL

## 2022-03-23 LAB
BLOOD CULTURE, ROUTINE: NORMAL
CULTURE, BLOOD 2: NORMAL

## 2022-03-24 LAB
ASPERGILLUS GALACTO AG: NEGATIVE
ASPERGILLUS GALACTO INDEX: 0.04

## 2022-03-25 LAB
ASPERGILLUS GALACTO AG: POSITIVE
ASPERGILLUS GALACTO INDEX: 0.74

## 2022-03-25 NOTE — DISCHARGE SUMMARY
[address] [address]                               DISCHARGE SUMMARY    PATIENT NAME: Keli Tovar                      :        1955  MED REC NO:   [id number]                            ROOM:       9443  ACCOUNT NO:   [de-identified]                           ADMIT DATE: 2022  PROVIDER:     Ann Livingston DO                  [address] DATE: 2022    FINAL DIAGNOSES:  1. Generalized weakness and fatigue. 2.  30 to 40-pound unintentional weight loss. 3.  Abdominal pain with nausea and vomiting. 4.  Gastritis. 5.  Acute kidney injury. 6.  Chronic kidney disease. 7.  Fat containing hiatal hernia. 8.  Folic acid deficiency. 9.  Macrocytic hyperchromic anemia. HISTORY AND HOSPITAL COURSE:  The patient is a 78-year-old white male,  who was initially seen in the office and complaining of just _____  appetite, food not tasting good, significant weight loss, very inactive,  and not moving around too much. Wife is concerned if something is wrong  with him. He just seems to be losing too much weight. So, it was  decided to bring him into the hospital for some definitive evaluation,  as he just seems to be a little bit too weak to work him up as an  outpatient, though he has a history of hypertension, he has generalized  abdominal tenderness, apparently had some acute kidney injury with his  creatinine spiking at 2.2, is now down to 1.5. BUN was 35. Slightly  elevated bilirubin at 1.5. Macrocytic hyperchromic indices on CBC. He  had a CT of his abdomen without contrast, showed some cholelithiasis,  but no cholecystitis. COVID was negative. Started him on some fluids  per Renal.  Electrolyte abnormalities were all corrected. Had GI see  him. They went ahead and did EGD and colonoscopy. All that was found  was some gastritis in the stomach, nothing else.   CT of the abdomen  failed to demonstrate any signs of any malignancies. He was noted to  have low folic acid and subsequently placed on some folic acid  replacement. He seemed to do okay and was still quite weak and not  really moving around too good. Wife thinks that she was able to take  care of him at home at this point, so he was discharged to an extended  care facility for some close followup and rehab and monitoring of his  appetite and his liquid intake and so that he get some physical therapy. At the time of discharge, he went home on his routine medications,  although he was also placed on pantoprazole and zinc and folic acid and  he still has some biopsies pending and cultures from his EGD. So, we  are going to wait and see what those show and follow him up once he is  stable enough to be seen as an outpatient. CONDITION UPON DISCHARGE:  At the time of discharge, his condition was  fair. His prognosis is fair.         Chelsey Rosado DO    D: 03/24/2022 14:18:37       T: 03/24/2022 14:56:36     [email or web address]  Job#: [id number]     Doc#: [id number]    CC:

## 2022-04-11 LAB
FUNGUS (MYCOLOGY) CULTURE: NORMAL
FUNGUS STAIN: NORMAL

## 2022-04-26 LAB
AFB CULTURE (MYCOBACTERIA): NORMAL
AFB SMEAR: NORMAL

## 2022-04-29 NOTE — DISCHARGE SUMMARY
[address] [address]                               DISCHARGE SUMMARY    PATIENT NAME: Ganga Villarreal                      :        1955  MED REC NO:   [id number]                            ROOM:       0422  ACCOUNT NO:   [de-identified]                           ADMIT DATE: 2022  PROVIDER:     Randall Velasquez DO                  [address] DATE: 2022    DATE OF ADMISSION:  2022. DISCHARGE/EXPIRATION DATE:  2022. FINAL DIAGNOSES:  1. Altered mental status. 2.  Metabolic acidosis with a bicarb of 13.  3.  Positive cannabis on drug screen. 4.  Chronic kidney disease. 5.  Acute kidney injury. 6.  Hypertension. 7.  Herpes esophagitis. 8.  Probable aspiration pneumonia. 9.  Cholelithiasis. 10.  Hepatic steatosis. 11.  Oral thrush. 12.  Metabolic encephalopathy. 13.  Cardiopulmonary arrest.  14.  Chronic kidney disease, as mentioned, stage III. HISTORY AND HOSPITAL COURSE:  The patient is a 80-year-old white male  who was recently hospitalized for acute kidney injury and weight loss  and chronic alcoholism was subsequently transferred over to extended  care facility because of his weight loss prior. He had EGD and  colonoscopy, initially were unremarkable, but later showed herpetic  esophagitis and he was placed on Valtrex. After being discharged to  skilled facility in stable condition, he subsequently got confused, was  wandering around, needed to be redirected, and was brought back into the  hospital.  On admission, he had acute kidney injury with a creatinine of  1.4. He was acidotic with a bicarb of 13. There was questionable  ground-glass infiltrate following emesis and possibility of aspiration.    Because of the confusion, the Valtrex was initially stopped because of  the side effect of altered mental status, as there was no clear-cut  explanation for his altered mental status, but he was brought over into  the hospital for reevaluation and subsequently became extremely confused  and agitated and hypoxic and he had to be admitted into the intensive  care unit for intubation and protection of his airway. GI had seen him. Infectious Disease had seen him. He was restarted up on meropenem,  acyclovir, and Accutane for oral thrush. He was also placed on a  bicarbonate isotonic drip for his acidosis. Chest x-ray showed right  lower lobe opacity, possibility of pneumonia. Because of his altered  status and presence of herpetic esophagitis, CT of the brain was  performed, failed to demonstrate any significant abnormality. MRI of  the brain was performed, failed to demonstrate any significant  abnormality. He had a lumbar puncture to rule out the possibility of  encephalitis and this was inconclusive and negative for any viral  growth. He was also placed on pressors for hypotension. His blood  gases looked good and improved on his current settings and on the vent. While in the intensive care unit, consideration was given to the fact  that he could have some type of Wernicke's encephalopathy and he was  placed on high dose of thiamine. Seemed to have tolerated that quite  well. His kidney function was followed along and remained pretty  stable. Electrolytes were pretty good. There was intermittent  corrections in his low potassium. His chest x-ray started to clear,  although he did still have an elevated white count. Blood cultures  failed to demonstrate any growth. Respiratory cultures only showed  oropharyngeal abdullahi. Urine cultures did not demonstrate any growth. Meropenem was subsequently switched to Invanz per ID. He was finally  extubated, was still very weak. He did attempt to converse. He had  upper extremity weakness, but was able to move all extremities. Tolerating nasal cannula. He was quite edematous. He was placed on  some diuretics and salt-poor albumin. Fluid was diuresing very nicely. Chest x-ray showed continued improvement. It was difficult for him to  swallow. He was placed on tube feeds. Consideration was given for a  Neurologic consult, however, none is available at this hospital and the  patient is at this point not safe enough to be transferred. At this  point we could only _____ on toxic metabolic encephalopathy. His  antibiotics were subsequently discontinued, as his chest x-ray improved. Sed rate, C-reactive protein, and white count were still elevated with  no clear source of infection. She had rhonchus breath sounds and an  extremely moist cough. Pulmonology elected to do a diagnostic  bronchoscopy and sent it for appropriate culture for fungus, gram stain,  body fluid, and Aspergillus etc.  It is not back currently. He seemed  to tolerate the bronchoscopy okay. He was arousable, but quite confused  and lethargic. Ammonia levels were checked and repeat CT of his head  was checked, no acute intracranial abnormality was noted. Mild sinus  disease. Ammonia level was normal.  His comprehensive metabolic panel  showed that he was dry with a slightly elevated sodium and chloride and  BUN. Hypoalbuminemic with poor oral intake and protein-caloric  malnutrition. White count was slightly elevated at 14. Hemoglobin was  low at 7. Unfortunately, later that night, rapid response team was  called. The patient demonstrating agonal breathing. He was placed on a  nonrebreather with no improvement. His wife decided that she really did  not want him intubated anymore and states that he would likely not  wanted to be intubated. So, he was placed on nasal cannula and the  patient  from cardiopulmonary arrest later at about 1705 hours. Body was subsequently released to the  home at this point.         Klever Jernigan, DO    D: 2022 12:38:10       T: 2022 12:45:33     [email or web address]  Job#: [id number]     Doc#: [id number]    CC:

## 2022-10-04 NOTE — PROGRESS NOTES
Nurse to nurse report called to Monique Silver. Spoke to Alyn Babinski.  [phone] at rehab.  assigned 511. [facility] 2:00 pm[facility] Patient vitals stable.  Electronically signed by Moncho Richardson RN on 2/23/2022 at 1:58 PM Detail Level: Generalized Detail Level: Detailed Detail Level: Simple Detail Level: Zone

## 2024-07-02 NOTE — CARE COORDINATION
Care coordination: Patient down for EGD/ Colonoscopy. Spoke with wife Atiya Arango at bedside who states patient has been becoming progressively weaker at home to where she does not think he would be able to go up the steps in the home. Patient [place] a [place] with 3 steps to enter. PT/ OT ordered. Provided wife TREV list but wife does not think patient will be agreeable to TREV at discharge. Will follow.      Heather Fields RN pt's brother Pérez Ramos [phone]

## (undated) DEVICE — SOLUTION IV IRRIG 500ML 0.9% SODIUM CHL 2F7123

## (undated) DEVICE — SPONGE GZ W4XL4IN RAYON POLY FILL CVR W/ NONWOVEN FAB

## (undated) DEVICE — TROCAR ENDOSCP L100MM DIA5MM BLDELSS STBL SL THRD OPT VW

## (undated) DEVICE — GRADUATE TRIANG MEASURE 1000ML BLK PRNT

## (undated) DEVICE — CHLORAPREP 26ML ORANGE

## (undated) DEVICE — FORCEPS BX OVL CUP FEN DISPOSABLE CAP L 160CM RAD JAW 4

## (undated) DEVICE — TROCAR ENDOSCP L100MM DIA5MM DIL TIP STBL SL W OPTVW

## (undated) DEVICE — INTENDED FOR TISSUE SEPARATION, AND OTHER PROCEDURES THAT REQUIRE A SHARP SURGICAL BLADE TO PUNCTURE OR CUT.: Brand: BARD-PARKER ® STAINLESS STEEL BLADES

## (undated) DEVICE — SYRINGE, LUER SLIP, 20ML: Brand: MEDLINE

## (undated) DEVICE — NEEDLE FLTR 18GA L1.5IN MEM THK5UM BLNT DISP

## (undated) DEVICE — INSUFFLATION TUBING SET WITH FILTER, FUNNEL CONNECTOR AND LUER LOCK: Brand: JOSNOE MEDICAL INC

## (undated) DEVICE — CONTROL SYRINGE LUER-LOCK TIP: Brand: MONOJECT

## (undated) DEVICE — DRAPE,CHEST,FENES,15X10,STERIL: Brand: MEDLINE

## (undated) DEVICE — PATIENT RETURN ELECTRODE, SINGLE-USE, CONTACT QUALITY MONITORING, ADULT, WITH 9FT CORD, FOR PATIENTS WEIGING OVER 33LBS. (15KG): Brand: MEGADYNE

## (undated) DEVICE — GOWN,SIRUS,FABRNF,XL,20/CS: Brand: MEDLINE

## (undated) DEVICE — NEEDLE INSUF L120MM DIA2MM DISP FOR PNEUMOPERI ENDOPATH

## (undated) DEVICE — DOUBLE BASIN SET: Brand: MEDLINE INDUSTRIES, INC.

## (undated) DEVICE — SOLUTION IV IRRIG WATER 1000ML POUR BRL 2F7114

## (undated) DEVICE — AIRWAY PHARYNGEAL AD 10 CM INTUB

## (undated) DEVICE — TOWEL,OR,DSP,ST,BLUE,STD,6/PK,12PK/CS: Brand: MEDLINE

## (undated) DEVICE — SURGICAL PROCEDURE PACK BRONCH

## (undated) DEVICE — PACK PROCEDURE SURG GEN CUST

## (undated) DEVICE — KIT,ANTI FOG,W/SPONGE & FLUID,SOFT PACK: Brand: MEDLINE

## (undated) DEVICE — SKIN AFFIX SURG ADHESIVE 72/CS 0.55ML: Brand: MEDLINE

## (undated) DEVICE — 1 ML TUBERCULIN SYRINGE,DETACHABLE NEEDLE: Brand: MONOJECT

## (undated) DEVICE — FORCEPS BX L240CM JAW DIA2.4MM ORNG L CAP W/ NDL DISP RAD

## (undated) DEVICE — SOLUTION IV IRRIG POUR BRL 0.9% SODIUM CHL 2F7124

## (undated) DEVICE — STANDARD HYPODERMIC NEEDLE,ALUMINUM HUB: Brand: MONOJECT

## (undated) DEVICE — SHEET, T, LAPAROTOMY, STERILE: Brand: MEDLINE

## (undated) DEVICE — MASK RESP UNIV N95 4 PANEL HD STRP INDIVIDUALLY WRP LF

## (undated) DEVICE — BLOCK BITE 60FR RUBBER ADLT DENTAL